# Patient Record
Sex: FEMALE | Race: WHITE | NOT HISPANIC OR LATINO | Employment: OTHER | URBAN - METROPOLITAN AREA
[De-identification: names, ages, dates, MRNs, and addresses within clinical notes are randomized per-mention and may not be internally consistent; named-entity substitution may affect disease eponyms.]

---

## 2017-01-04 ENCOUNTER — RADIANT APPOINTMENT (OUTPATIENT)
Dept: GENERAL RADIOLOGY | Facility: CLINIC | Age: 82
End: 2017-01-04
Attending: INTERNAL MEDICINE
Payer: COMMERCIAL

## 2017-01-04 ENCOUNTER — OFFICE VISIT (OUTPATIENT)
Dept: INTERNAL MEDICINE | Facility: CLINIC | Age: 82
End: 2017-01-04
Payer: COMMERCIAL

## 2017-01-04 VITALS
BODY MASS INDEX: 26.5 KG/M2 | HEART RATE: 77 BPM | SYSTOLIC BLOOD PRESSURE: 120 MMHG | OXYGEN SATURATION: 93 % | WEIGHT: 144 LBS | DIASTOLIC BLOOD PRESSURE: 64 MMHG | HEIGHT: 62 IN | TEMPERATURE: 98.4 F

## 2017-01-04 DIAGNOSIS — M54.2 CERVICALGIA: ICD-10-CM

## 2017-01-04 DIAGNOSIS — M62.830 BACK MUSCLE SPASM: ICD-10-CM

## 2017-01-04 DIAGNOSIS — M62.830 BACK MUSCLE SPASM: Primary | ICD-10-CM

## 2017-01-04 PROCEDURE — 72040 X-RAY EXAM NECK SPINE 2-3 VW: CPT

## 2017-01-04 PROCEDURE — 99204 OFFICE O/P NEW MOD 45 MIN: CPT | Performed by: INTERNAL MEDICINE

## 2017-01-04 PROCEDURE — 72100 X-RAY EXAM L-S SPINE 2/3 VWS: CPT

## 2017-01-04 PROCEDURE — 72070 X-RAY EXAM THORAC SPINE 2VWS: CPT

## 2017-01-04 RX ORDER — METHOCARBAMOL 500 MG/1
1000 TABLET, FILM COATED ORAL 3 TIMES DAILY PRN
Qty: 30 TABLET | Refills: 0 | Status: SHIPPED | OUTPATIENT
Start: 2017-01-04 | End: 2017-01-20
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
## 2017-01-04 NOTE — NURSING NOTE
"Chief Complaint   Patient presents with     Establish Care     from [place]     initial /64 mmHg  Pulse 77  Temp(Src) 98.4  F (36.9  C) (Oral)  Ht 5' 2\" (1.575 m)  Wt 144 lb (65.318 kg)  BMI 26.33 kg/m2  SpO2 93% Estimated body mass index is 26.33 kg/(m^2) as calculated from the following:    Height as of this encounter: 5' 2\" (1.575 m).    Weight as of this encounter: 144 lb (65.318 kg)..  bp completed using cuff size regular  ANA LUISA KISER LPN  "

## 2017-01-04 NOTE — MR AVS SNAPSHOT
After Visit Summary   1/4/2017    Veronica Bray    MRN: [id number]           Patient Information     Date Of Birth          5/31/1931        Visit Information        Provider Department      1/4/2017 9:20 AM Concepcion Mccall MD [facility]        Today's Diagnoses     Back muscle spasm    -  1     Cervicalgia            Follow-ups after your visit        Your next 10 appointments already scheduled     Jan 20, 2017 10:15 AM   New Visit with Tr Benavides MD   [facility] ([facility])    [address]  [facility] [address] [phone]              Future tests that were ordered for you today     Open Future Orders        Priority Expected Expires Ordered    XR Cervical Spine 2/3 Views Routine 1/4/2017 1/4/2018 1/4/2017    XR Thoracic Spine G/E 4 Views Routine 1/4/2017 1/4/2018 1/4/2017    XR Lumbar Spine 2/3 Views Routine 1/4/2017 1/4/2018 1/4/2017            Who to contact     If you have questions or need follow up information about today's clinic visit or your schedule please contact [facility] directly at [phone].  Normal or non-critical lab and imaging results will be communicated to you by MyChart, letter or phone within 4 business days after the clinic has received the results. If you do not hear from us within 7 days, please contact the clinic through [employer] or phone. If you have a critical or abnormal lab result, we will notify you by phone as soon as possible.  Submit refill requests through [employer] or call your pharmacy and they will forward the refill request to us. Please allow 3 business days for your refill to be completed.          Additional Information About Your Visit        MyChart Information     [employer] lets you send messages to your doctor, view your test results, renew your prescriptions, schedule appointments and more. To sign up, go to [email or web address] .  "Click on \"Log in\" on the left side of the screen, which will take you to the Welcome page. Then click on \"Sign up Now\" on the right side of the page.     You will be asked to enter the access code listed below, as well as some personal information. Please follow the directions to create your username and password.     Your access code is: [email or web address]  Expires: 2017  9:34 AM     Your access code will  in 90 days. If you need help or a new code, please call your [facility] or [phone].        Care EveryWhere ID     This is your Care EveryWhere ID. This could be used by other organizations to access your [place] medical records  [id number]        Your Vitals Were     Pulse Temperature Height BMI (Body Mass Index) Pulse Oximetry       77 98.4  F (36.9  C) (Oral) 5' 2\" (1.575 m) 26.33 kg/m2 93%        Blood Pressure from Last 3 Encounters:   17 120/64    Weight from Last 3 Encounters:   17 144 lb (65.318 kg)                 Today's Medication Changes          These changes are accurate as of: 17  9:35 AM.  If you have any questions, ask your nurse or doctor.               Start taking these medicines.        Dose/Directions    methocarbamol 500 MG tablet   Commonly known as:  ROBAXIN   Used for:  Back muscle spasm   Started by:  Concepcion Mccall MD        Dose:  1000 mg   Take 2 tablets (1,000 mg) by mouth 3 times daily as needed for muscle spasms   Quantity:  30 tablet   Refills:  0            Where to get your medicines      These medications were sent to [facility] [address] [place] [address] [address] [address]     Phone:  [phone]    - methocarbamol 500 MG tablet             Primary Care Provider Office Phone # Fax #    Concepcion Mccall [phone]       [facility] [address] [facility] [address]        Thank you!     Thank you for choosing [facility]  for your " care. Our goal is always to provide you with excellent care. Hearing back from our patients is one way we can continue to improve our services. Please take a few minutes to complete the written survey that you may receive in the mail after your visit with us. Thank you!             Your Updated Medication List - Protect others around you: Learn how to safely use, store and throw away your medicines at www.disposemymeds.org.          This list is accurate as of: 1/4/17  9:35 AM.  Always use your most recent med list.                   Brand Name Dispense Instructions for use    ASPIRIN PO      Take 81 mg by mouth       ATORVASTATIN CALCIUM PO      Take 10 mg by mouth daily       CARVEDILOL PO      Take 6.25 mg by mouth 2 times daily (with meals)       LISINOPRIL PO      Take 5 mg by mouth daily       methocarbamol 500 MG tablet    ROBAXIN    30 tablet    Take 2 tablets (1,000 mg) by mouth 3 times daily as needed for muscle spasms       VITEYES COMPLETE PO

## 2017-01-19 ENCOUNTER — PRE VISIT (OUTPATIENT)
Dept: CARDIOLOGY | Facility: CLINIC | Age: 82
End: 2017-01-19

## 2017-01-20 ENCOUNTER — OFFICE VISIT (OUTPATIENT)
Dept: CARDIOLOGY | Facility: CLINIC | Age: 82
End: 2017-01-20
Payer: COMMERCIAL

## 2017-01-20 VITALS
OXYGEN SATURATION: 94 % | BODY MASS INDEX: 25.95 KG/M2 | WEIGHT: 141 LBS | DIASTOLIC BLOOD PRESSURE: 72 MMHG | HEART RATE: 54 BPM | SYSTOLIC BLOOD PRESSURE: 108 MMHG | HEIGHT: 62 IN

## 2017-01-20 DIAGNOSIS — I10 BENIGN ESSENTIAL HYPERTENSION: ICD-10-CM

## 2017-01-20 DIAGNOSIS — I48.0 PAROXYSMAL ATRIAL FIBRILLATION (H): Primary | ICD-10-CM

## 2017-01-20 DIAGNOSIS — I48.92 ATRIAL FLUTTER, PAROXYSMAL (H): ICD-10-CM

## 2017-01-20 DIAGNOSIS — Z95.1 S/P CABG (CORONARY ARTERY BYPASS GRAFT): ICD-10-CM

## 2017-01-20 PROCEDURE — 99204 OFFICE O/P NEW MOD 45 MIN: CPT | Performed by: INTERNAL MEDICINE

## 2017-01-20 RX ORDER — CARVEDILOL 3.12 MG/1
6.25 TABLET ORAL 2 TIMES DAILY WITH MEALS
Qty: 180 TABLET | Refills: 3 | Status: SHIPPED | OUTPATIENT
Start: 2017-01-20 | End: 2017-01-31

## 2017-01-20 RX ORDER — LISINOPRIL 2.5 MG/1
5 TABLET ORAL DAILY
Qty: 90 TABLET | Refills: 3 | Status: SHIPPED | OUTPATIENT
Start: 2017-01-20 | End: 2017-07-07

## 2017-01-20 RX ORDER — ATORVASTATIN CALCIUM 10 MG/1
10 TABLET, FILM COATED ORAL DAILY
Qty: 90 TABLET | Refills: 3 | Status: SHIPPED | OUTPATIENT
Start: 2017-01-20 | End: 2018-01-02

## 2017-01-20 NOTE — MR AVS SNAPSHOT
After Visit Summary   1/20/2017    Veronica Bray    MRN: [id number]           Patient Information     Date Of Birth          5/31/1931        Visit Information        Provider Department      1/20/2017 10:15 AM Kennedy, Tr Carrera MD [facility]        Today's Diagnoses     Paroxysmal atrial fibrillation (H)    -  1     S/P CABG (coronary artery bypass graft)         Atrial flutter, paroxysmal (H)         Benign essential hypertension            Follow-ups after your visit        Additional Services     Follow-Up with Cardiologist                 Your next 10 appointments already scheduled     Apr 28, 2017  9:45 AM   Return Visit with Tr Benavides MD   [facility] ([facility])    [address]  [facility] [address] [phone]              Future tests that were ordered for you today     Open Future Orders        Priority Expected Expires Ordered    Follow-Up with Cardiologist Routine 4/20/2017 1/20/2018 1/20/2017            Who to contact     If you have questions or need follow up information about today's clinic visit or your schedule please contact [facility] directly at [phone].  Normal or non-critical lab and imaging results will be communicated to you by MyChart, letter or phone within 4 business days after the clinic has received the results. If you do not hear from us within 7 days, please contact the clinic through MyChart or phone. If you have a critical or abnormal lab result, we will notify you by phone as soon as possible.  Submit refill requests through [employer] or call your pharmacy and they will forward the refill request to us. Please allow 3 business days for your refill to be completed.          Additional Information About Your Visit        Novar[employer]art Information     [employer] lets you send messages to your doctor, view your  "test results, renew your prescriptions, schedule appointments and more. To sign up, go to [email or web address] . Click on \"Log in\" on the left side of the screen, which will take you to the Welcome page. Then click on \"Sign up Now\" on the right side of the page.     You will be asked to enter the access code listed below, as well as some personal information. Please follow the directions to create your username and password.     Your access code is: [email or web address]  Expires: 2017  9:34 AM     Your access code will  in 90 days. If you need help or a new code, please call your [place] clinic or [phone].        Care EveryWhere ID     This is your Care EveryWhere ID. This could be used by other organizations to access your [place] medical records  [id number]        Your Vitals Were     Pulse Height BMI (Body Mass Index) Pulse Oximetry          54 1.575 m (5' 2\") 25.78 kg/m2 94%         Blood Pressure from Last 3 Encounters:   17 108/72   17 120/64    Weight from Last 3 Encounters:   17 63.957 kg (141 lb)   17 65.318 kg (144 lb)                 Today's Medication Changes          These changes are accurate as of: 17 10:56 AM.  If you have any questions, ask your nurse or doctor.               Start taking these medicines.        Dose/Directions    rivaroxaban ANTICOAGULANT 20 MG Tabs tablet   Commonly known as:  XARELTO   Used for:  Paroxysmal atrial fibrillation (H)   Started by:  Tr Benavides MD        Dose:  20 mg   Take 1 tablet (20 mg) by mouth daily (with dinner)   Quantity:  90 tablet   Refills:  3            Where to get your medicines      These medications were sent to [facility] - [place], [place] - [address]  [address] [facility] [address]     Phone:  [phone]    - atorvastatin 10 MG tablet  - carvedilol 3.125 MG tablet  - lisinopril 2.5 MG tablet  - rivaroxaban ANTICOAGULANT 20 MG Tabs tablet             Primary " Care Provider Office Phone # Fax #    Concepcion Mccall [phone]       [facility] [address]  [facility] [address]        Thank you!     Thank you for choosing [facility] [place]  for your care. Our goal is always to provide you with excellent care. Hearing back from our patients is one way we can continue to improve our services. Please take a few minutes to complete the written survey that you may receive in the mail after your visit with us. Thank you!             Your Updated Medication List - Protect others around you: Learn how to safely use, store and throw away your medicines at www.disposemymeds.org.          This list is accurate as of: 1/20/17 10:56 AM.  Always use your most recent med list.                   Brand Name Dispense Instructions for use    ASPIRIN PO      Take 81 mg by mouth       atorvastatin 10 MG tablet    LIPITOR    90 tablet    Take 1 tablet (10 mg) by mouth daily       carvedilol 3.125 MG tablet    COREG    180 tablet    Take 2 tablets (6.25 mg) by mouth 2 times daily (with meals)       CLARITIN PO          lisinopril 2.5 MG tablet    PRINIVIL/Zestril    90 tablet    Take 2 tablets (5 mg) by mouth daily       rivaroxaban ANTICOAGULANT 20 MG Tabs tablet    XARELTO    90 tablet    Take 1 tablet (20 mg) by mouth daily (with dinner)       VITEYES COMPLETE PO
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
## 2017-01-20 NOTE — PROGRESS NOTES
HPI and Plan:   See dictation    Orders Placed This Encounter   Procedures     Follow-Up with Cardiologist       Orders Placed This Encounter   Medications     Loratadine (CLARITIN PO)     Sig:      carvedilol (COREG) 3.125 MG tablet     Sig: Take 2 tablets (6.25 mg) by mouth 2 times daily (with meals)     Dispense:  180 tablet     Refill:  3     lisinopril (PRINIVIL/ZESTRIL) 2.5 MG tablet     Sig: Take 2 tablets (5 mg) by mouth daily     Dispense:  90 tablet     Refill:  3     atorvastatin (LIPITOR) 10 MG tablet     Sig: Take 1 tablet (10 mg) by mouth daily     Dispense:  90 tablet     Refill:  3     rivaroxaban ANTICOAGULANT (XARELTO) 20 MG TABS tablet     Sig: Take 1 tablet (20 mg) by mouth daily (with dinner)     Dispense:  90 tablet     Refill:  3       Encounter Diagnoses   Name Primary?     Paroxysmal atrial fibrillation (H) Yes     S/P CABG (coronary artery bypass graft)      Atrial flutter, paroxysmal (H)      Benign essential hypertension        CURRENT MEDICATIONS:  Current Outpatient Prescriptions   Medication Sig Dispense Refill     Loratadine (CLARITIN PO)        carvedilol (COREG) 3.125 MG tablet Take 2 tablets (6.25 mg) by mouth 2 times daily (with meals) 180 tablet 3     lisinopril (PRINIVIL/ZESTRIL) 2.5 MG tablet Take 2 tablets (5 mg) by mouth daily 90 tablet 3     atorvastatin (LIPITOR) 10 MG tablet Take 1 tablet (10 mg) by mouth daily 90 tablet 3     rivaroxaban ANTICOAGULANT (XARELTO) 20 MG TABS tablet Take 1 tablet (20 mg) by mouth daily (with dinner) 90 tablet 3     ASPIRIN PO Take 81 mg by mouth       Multiple Vitamins-Minerals (VITEYES COMPLETE PO)        [DISCONTINUED] CARVEDILOL PO Take 6.25 mg by mouth 2 times daily (with meals)       [DISCONTINUED] LISINOPRIL PO Take 5 mg by mouth daily       [DISCONTINUED] ATORVASTATIN CALCIUM PO Take 10 mg by mouth daily         ALLERGIES     Allergies   Allergen Reactions     Aleve [Naproxen] Anaphylaxis     Celebrex [Celecoxib]      Codeine       Demerol [Meperidine]      Hydromorphone      Percocet [Oxycodone-Acetaminophen]      Tramadol      Vicodin [Hydrocodone-Acetaminophen]        PAST MEDICAL HISTORY:  Past Medical History   Diagnosis Date     NSTEMI (non-ST elevated myocardial infarction) (H)      Cardiomyopathy, unspecified (H)      tachy induced     LBBB (left bundle branch block)      PAF (paroxysmal atrial fibrillation) (H)      converted on Amiodarone     SVT (supraventricular tachycardia) (H)      Atrial flutter (H)      Mitral regurgitation      Hyperlipidemia      Hypertension      Tobacco use      Atrial fibrillation (H)        PAST SURGICAL HISTORY:  Past Surgical History   Procedure Laterality Date     Hc left heart catheterization  11/2006     mid distal-anterior/ distal inferior/ Apical Yossi, 85     Hc left heart catheterization  5/2015     100% Proximal LAD, patent LIMA-LAD, SVG-D1, LVEDP 13, 10-18 mmHg AV gradient, 3+ MR after PVC     Coronary angiography adult order  1/2005     Stent: Distal cfx, Mid LAD, Stent: D -1       FAMILY HISTORY:  Family History   Problem Relation Age of Onset     Unknown/Adopted No family hx of        SOCIAL HISTORY:  Social History     Social History     Marital Status:      Spouse Name: N/A     Number of Children: N/A     Years of Education: N/A     Social History Main Topics     Smoking status: Former Smoker     Smokeless tobacco: None      Comment: quit 65 years ago     Alcohol Use: 0.0 oz/week     0 Standard drinks or equivalent per week      Comment: social     Drug Use: No     Sexual Activity:     Partners: Male     Other Topics Concern     Caffeine Concern No     Special Diet No     Exercise No     Social History Narrative       Review of Systems:  Skin:  Negative     Eyes:  Positive for glasses  ENT:  Negative    Respiratory:  Positive for dyspnea on exertion;sleep apnea  Cardiovascular:    lightheadedness;Positive for;palpitations  Gastroenterology: Negative    Genitourinary:  Positive for  "urinary frequency  Musculoskeletal:  Positive for back pain;arthritis;joint pain  Neurologic:  Positive for headaches;numbness or tingling of feet  Psychiatric:  Positive for excessive stress;anxiety  Heme/Lymph/Imm:  Negative    Endocrine:  Negative      Physical Exam:  Vitals: /72 mmHg  Pulse 54  Ht 1.575 m (5' 2\")  Wt 63.957 kg (141 lb)  BMI 25.78 kg/m2  SpO2 94%    Constitutional:  cooperative, alert and oriented, well developed, well nourished, in no acute distress        Skin:  warm and dry to the touch, no apparent skin lesions or masses noted        Head:  normocephalic, no masses or lesions        Eyes:  pupils equal and round, conjunctivae and lids unremarkable, sclera white, no xanthalasma, EOMS intact, no nystagmus        ENT:  no pallor or cyanosis, dentition good        Neck:  carotid pulses are full and equal bilaterally, JVP normal, no carotid bruit, no thyromegaly        Chest:  normal breath sounds, clear to auscultation, normal A-P diameter, normal symmetry, normal respiratory excursion, no use of accessory muscles;healed median sternotomy scar        Cardiac: regular rhythm, normal S1/S2, no S3 or S4, apical impulse not displaced, no murmurs, gallops or rubs                  Abdomen:  abdomen soft, non-tender, BS normoactive, no mass, no HSM, no bruits        Vascular: pulses full and equal, no bruits auscultated                                      Extremities and Back:  no deformities, clubbing, cyanosis, erythema observed        Neurological:  affect appropriate, oriented to time, person and place          Recent Lab Results:  LIPID RESULTS:  Lab Results   Component Value Date    CHOL 81 04/29/2015    HDL 81.0 04/29/2015    LDL 87.00 04/29/2015    TRIG 64 04/29/2015       LIVER ENZYME RESULTS:  No results found for: AST, ALT    CBC RESULTS:  No results found for: WBC, RBC, HGB, HCT, MCV, MCH, MCHC, RDW, PLT    BMP RESULTS:  Lab Results   Component Value Date     07/14/2016    " POTASSIUM 4.1 07/14/2016    CHLORIDE 100 07/14/2016    * 07/14/2016    BUN 14 07/14/2016    CR 0.72 07/14/2016    BARRETT 8.9 07/14/2016        A1C RESULTS:  No results found for: A1C    INR RESULTS:  No results found for: INR        [facility]  [facility]
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
## 2017-01-31 DIAGNOSIS — I48.92 ATRIAL FLUTTER, PAROXYSMAL (H): ICD-10-CM

## 2017-01-31 DIAGNOSIS — I10 BENIGN ESSENTIAL HYPERTENSION: ICD-10-CM

## 2017-01-31 DIAGNOSIS — I48.0 PAROXYSMAL ATRIAL FIBRILLATION (H): Primary | ICD-10-CM

## 2017-01-31 DIAGNOSIS — Z95.1 S/P CABG (CORONARY ARTERY BYPASS GRAFT): ICD-10-CM

## 2017-01-31 RX ORDER — CARVEDILOL 6.25 MG/1
6.25 TABLET ORAL 2 TIMES DAILY WITH MEALS
Qty: 180 TABLET | Refills: 3 | Status: SHIPPED | OUTPATIENT
Start: 2017-01-31 | End: 2017-12-14

## 2017-01-31 NOTE — TELEPHONE ENCOUNTER
Received refill request for:  Carvedilol - 6.25mg tabs BID per pt request (instead of 3.125mg tabs (2 tabs twice daily) from original script)  Last OV was: 1/20/2017 with Dr. Benavides  F/U scheduled: 4/28/2017 with Dr. Benavides  New script sent to: [facility]

## 2017-02-01 ENCOUNTER — TELEPHONE (OUTPATIENT)
Dept: CARDIOLOGY | Facility: CLINIC | Age: 82
End: 2017-02-01

## 2017-02-01 NOTE — TELEPHONE ENCOUNTER
Call from patient to report that Xarelto will likely be too expensive for her to continue filling. I advised her to contact her insurance company to determine if Eliquis, Pradaxa, or Coumadin would be more cost effective for her. She will call back once she has more information on her options. She stated that she has enough Xarelto remaining to continue taking it until a change could be made. ORACIO Granados RN

## 2017-02-27 ENCOUNTER — OFFICE VISIT (OUTPATIENT)
Dept: CARDIOLOGY | Facility: CLINIC | Age: 82
End: 2017-02-27
Payer: COMMERCIAL

## 2017-02-27 VITALS
DIASTOLIC BLOOD PRESSURE: 70 MMHG | HEIGHT: 62 IN | HEART RATE: 60 BPM | BODY MASS INDEX: 26.5 KG/M2 | WEIGHT: 144 LBS | SYSTOLIC BLOOD PRESSURE: 136 MMHG

## 2017-02-27 DIAGNOSIS — I48.0 PAROXYSMAL ATRIAL FIBRILLATION (H): Primary | ICD-10-CM

## 2017-02-27 DIAGNOSIS — Z95.1 S/P CABG (CORONARY ARTERY BYPASS GRAFT): ICD-10-CM

## 2017-02-27 PROCEDURE — 99213 OFFICE O/P EST LOW 20 MIN: CPT | Performed by: INTERNAL MEDICINE

## 2017-02-27 NOTE — PROGRESS NOTES
HISTORY OF PRESENT ILLNESS:  It is a pleasure for me to follow up with Ms. Stauffer.  She is here principally to discuss paroxysmal atrial fibrillation.  For full details, please refer to my note from 2017.        Her CHADS2-VASc score is 4.  I thought she was taking Xarelto.  She stopped taking it because it cost $500 a month.  She tells me she looked at dabigatran and Eliquis as well and these were also too expensive.  However, she has not looked at Savaysa.  I gave her some information on this medication and she will call us back to let us know if the cost is acceptable to her.  She is willing to take warfarin.  I explained what this involves.  Next, if Savaysa is too expensive, then we will start her on warfarin and she can come to have and her INRs monitored in our clinic at [facility].        She described an episode of palpitations.  Heart rate was 90.  She did not have chest pain, shortness of breath, orthopnea or dizzy spells.  The arrhythmia was well tolerated.  I told her not to take an extra dose of Coreg unless the heart rate is persistently above 100 for more than 4 hours.  She understands.        She will let us know how she would like to proceed with regards to anticoagulation.  I will see her again in 6 months' time for further followup.         MOOK JIANG MD, [facility]             D: 2017 11:30   T: 2017 16:17   MT: [id number]      Name:     MICHELLE STAUFFER   MRN:      [id number]        Account:      [id number]   :      1931           Service Date: 2017      Document: [id number]

## 2017-02-27 NOTE — MR AVS SNAPSHOT
"              After Visit Summary   2/27/2017    Veronica Bray    MRN: [id number]           Patient Information     Date Of Birth          5/31/1931        Visit Information        Provider Department      2/27/2017 10:15 AM Kennedy, Tr Carrera MD [facility] HEART [facility]        Today's Diagnoses     Paroxysmal atrial fibrillation (H)    -  1    S/P CABG (coronary artery bypass graft)           Follow-ups after your visit        Additional Services     Follow-Up with Cardiologist                 Future tests that were ordered for you today     Open Future Orders        Priority Expected Expires Ordered    Follow-Up with Cardiologist Routine 8/26/2017 2/27/2018 2/27/2017            Who to contact     If you have questions or need follow up information about today's clinic visit or your schedule please contact [facility] directly at [phone].  Normal or non-critical lab and imaging results will be communicated to you by Flare3dhart, letter or phone within 4 business days after the clinic has received the results. If you do not hear from us within 7 days, please contact the clinic through Flare3dhart or phone. If you have a critical or abnormal lab result, we will notify you by phone as soon as possible.  Submit refill requests through DadShed or call your pharmacy and they will forward the refill request to us. Please allow 3 business days for your refill to be completed.          Additional Information About Your Visit        MyChart Information     DadShed lets you send messages to your doctor, view your test results, renew your prescriptions, schedule appointments and more. To sign up, go to [email or web address] . Click on \"Log in\" on the left side of the screen, which will take you to the Welcome page. Then click on \"Sign up Now\" on the right side of the page.     You will be asked to enter the access code listed below, as well as some personal " "information. Please follow the directions to create your username and password.     Your access code is: [email or web address]  Expires: 2017  9:34 AM     Your access code will  in 90 days. If you need help or a new code, please call your [facility] clinic or [phone].        Care EveryWhere ID     This is your Care EveryWhere ID. This could be used by other organizations to access your [facility] medical records  [id number]        Your Vitals Were     Pulse Height BMI (Body Mass Index)             60 1.575 m (5' 2\") 26.34 kg/m2          Blood Pressure from Last 3 Encounters:   17 136/70   17 108/72   17 120/64    Weight from Last 3 Encounters:   17 65.3 kg (144 lb)   17 64 kg (141 lb)   17 65.3 kg (144 lb)                 Today's Medication Changes          These changes are accurate as of: 17 10:58 AM.  If you have any questions, ask your nurse or doctor.               These medicines have changed or have updated prescriptions.        Dose/Directions    carvedilol 6.25 MG tablet   Commonly known as:  COREG   This may have changed:  when to take this   Used for:  Paroxysmal atrial fibrillation (H), S/P CABG (coronary artery bypass graft), Atrial flutter, paroxysmal (H), Benign essential hypertension        Dose:  6.25 mg   Take 1 tablet (6.25 mg) by mouth 2 times daily (with meals)   Quantity:  180 tablet   Refills:  3                Primary Care Provider Office Phone # Fax #    Concepcion Mccall [phone]       [facility] [place] [address] [facility] [address]        Thank you!     Thank you for choosing [facility] [place]  for your care. Our goal is always to provide you with excellent care. Hearing back from our patients is one way we can continue to improve our services. Please take a few minutes to complete the written survey that you may receive in the mail after your visit with us. Thank you!      "        Your Updated Medication List - Protect others around you: Learn how to safely use, store and throw away your medicines at www.disposemymeds.org.          This list is accurate as of: 2/27/17 10:58 AM.  Always use your most recent med list.                   Brand Name Dispense Instructions for use    ASPIRIN PO      Take 81 mg by mouth       atorvastatin 10 MG tablet    LIPITOR    90 tablet    Take 1 tablet (10 mg) by mouth daily       carvedilol 6.25 MG tablet    COREG    180 tablet    Take 1 tablet (6.25 mg) by mouth 2 times daily (with meals)       CLARITIN PO      Take by mouth as needed       lisinopril 2.5 MG tablet    PRINIVIL/Zestril    90 tablet    Take 2 tablets (5 mg) by mouth daily       VITEYES COMPLETE PO

## 2017-02-27 NOTE — LETTER
2/27/2017    Concepcion Mccall MD  [facility]   [address]  [facility] [address]    RE: Veronica Bray       Dear Colleague,    It is a pleasure for me to follow up with Ms. Bray.  She is here principally to discuss paroxysmal atrial fibrillation.  For full details, please refer to my note from 01/20/2017.        Her CHADS2-VASc score is 4.  I thought she was taking Xarelto.  She stopped taking it because it cost $500 a month.  She tells me she looked at dabigatran and Eliquis as well and these were also too expensive.  However, she has not looked at Savaysa.  I gave her some information on this medication and she will call us back to let us know if the cost is acceptable to her.  She is willing to take warfarin.  I explained what this involves.  Next, if Savaysa is too expensive, then we will start her on warfarin and she can come to have and her INRs monitored in our clinic at [facility].        She described an episode of palpitations.  Heart rate was 90.  She did not have chest pain, shortness of breath, orthopnea or dizzy spells.  The arrhythmia was well tolerated.  I told her not to take an extra dose of Coreg unless the heart rate is persistently above 100 for more than 4 hours.  She understands.        She will let us know how she would like to proceed with regards to anticoagulation.  I will see her again in 6 months' time for further followup.     Again, thank you for allowing me to participate in the care of your patient.      Sincerely,    DR MOOK JIANG MD     [facility]

## 2017-02-27 NOTE — PROGRESS NOTES
HPI and Plan:   See dictation    Orders Placed This Encounter   Procedures     Follow-Up with Cardiologist       No orders of the defined types were placed in this encounter.      Encounter Diagnoses   Name Primary?     Paroxysmal atrial fibrillation (H) Yes     S/P CABG (coronary artery bypass graft)        CURRENT MEDICATIONS:  Current Outpatient Prescriptions   Medication Sig Dispense Refill     carvedilol (COREG) 6.25 MG tablet Take 1 tablet (6.25 mg) by mouth 2 times daily (with meals) (Patient taking differently: Take 6.25 mg by mouth daily ) 180 tablet 3     Loratadine (CLARITIN PO) Take by mouth as needed        lisinopril (PRINIVIL/ZESTRIL) 2.5 MG tablet Take 2 tablets (5 mg) by mouth daily 90 tablet 3     atorvastatin (LIPITOR) 10 MG tablet Take 1 tablet (10 mg) by mouth daily 90 tablet 3     ASPIRIN PO Take 81 mg by mouth       Multiple Vitamins-Minerals (VITEYES COMPLETE PO)          ALLERGIES     Allergies   Allergen Reactions     Aleve [Naproxen] Anaphylaxis     Celebrex [Celecoxib]      Codeine      Demerol [Meperidine]      Hydromorphone      Percocet [Oxycodone-Acetaminophen]      Tramadol      Vicodin [Hydrocodone-Acetaminophen]        PAST MEDICAL HISTORY:  Past Medical History   Diagnosis Date     Atrial fibrillation (H)      Atrial flutter (H)      CAD (coronary artery disease)      LIMA graft to the LAD and vein graft to the diagonal are patent.      Cardiomyopathy, unspecified (H)      tachy induced     Hyperlipidemia      Hypertension      LBBB (left bundle branch block)      Mitral regurgitation      NSTEMI (non-ST elevated myocardial infarction) (H)      PAF (paroxysmal atrial fibrillation) (H)      converted on Amiodarone     Sleep apnea      CPAP     SVT (supraventricular tachycardia) (H)      Tobacco use        PAST SURGICAL HISTORY:  Past Surgical History   Procedure Laterality Date      left heart catheterization  11/2006     mid distal-anterior/ distal inferior/ Apical Akin, 85      "Hc left heart catheterization  5/2015     100% Proximal LAD, patent LIMA-LAD, SVG-D1, LVEDP 13, 10-18 mmHg AV gradient, 3+ MR after PVC     Coronary angiography adult order  1/2005     Stent: Distal cfx, Mid LAD, Stent: D -1       FAMILY HISTORY:  Family History   Problem Relation Age of Onset     Unknown/Adopted No family hx of        SOCIAL HISTORY:  Social History     Social History     Marital status:      Spouse name: N/A     Number of children: N/A     Years of education: N/A     Social History Main Topics     Smoking status: Former Smoker     Smokeless tobacco: Not on file      Comment: quit approx 1967      Alcohol use 0.0 oz/week     0 Standard drinks or equivalent per week      Comment: social     Drug use: No     Sexual activity: Yes     Partners: Male     Other Topics Concern     Caffeine Concern No     Special Diet No     Exercise No     Social History Narrative       Review of Systems:  Skin:  Negative     Eyes:  Positive for glasses  ENT:  Positive for sinus trouble  Respiratory:  Positive for dyspnea on exertion;sleep apnea  Cardiovascular:    Positive for;palpitations  Gastroenterology: Positive for constipation  Genitourinary:  Negative    Musculoskeletal:  Positive for back pain;arthritis;joint pain  Neurologic:  Positive for numbness or tingling of feet  Psychiatric:  Negative    Heme/Lymph/Imm:  Positive for allergies  Endocrine:  Negative      Physical Exam:  Vitals: /70 (BP Location: Right arm, Patient Position: Chair, Cuff Size: Adult Regular)  Pulse 60  Ht 1.575 m (5' 2\")  Wt 65.3 kg (144 lb)  BMI 26.34 kg/m2    Constitutional:  cooperative, alert and oriented, well developed, well nourished, in no acute distress        Skin:  warm and dry to the touch, no apparent skin lesions or masses noted        Head:  normocephalic, no masses or lesions        Eyes:  pupils equal and round, conjunctivae and lids unremarkable, sclera white, no xanthalasma, EOMS intact, no nystagmus    "     ENT:  no pallor or cyanosis, dentition good        Neck:  carotid pulses are full and equal bilaterally, JVP normal, no carotid bruit, no thyromegaly        Chest:  normal breath sounds, clear to auscultation, normal A-P diameter, normal symmetry, normal respiratory excursion, no use of accessory muscles;healed median sternotomy scar        Cardiac: regular rhythm, normal S1/S2, no S3 or S4, apical impulse not displaced, no murmurs, gallops or rubs                  Abdomen:  abdomen soft, non-tender, BS normoactive, no mass, no HSM, no bruits        Vascular: pulses full and equal, no bruits auscultated                                      Extremities and Back:  no deformities, clubbing, cyanosis, erythema observed        Neurological:  affect appropriate, oriented to time, person and place          Recent Lab Results:  LIPID RESULTS:  Lab Results   Component Value Date    CHOL 81 04/29/2015    HDL 81.0 04/29/2015    LDL 87.00 04/29/2015    TRIG 64 04/29/2015       LIVER ENZYME RESULTS:  No results found for: AST, ALT    CBC RESULTS:  No results found for: WBC, RBC, HGB, HCT, MCV, MCH, MCHC, RDW, PLT    BMP RESULTS:  Lab Results   Component Value Date     07/14/2016    POTASSIUM 4.1 07/14/2016    CHLORIDE 100 07/14/2016     (A) 07/14/2016    BUN 14 07/14/2016    CR 0.72 07/14/2016    BARRETT 8.9 07/14/2016        A1C RESULTS:  No results found for: A1C    INR RESULTS:  No results found for: INR        CC  No referring provider defined for this encounter.

## 2017-03-01 ENCOUNTER — TELEPHONE (OUTPATIENT)
Dept: CARDIOLOGY | Facility: CLINIC | Age: 82
End: 2017-03-01

## 2017-03-01 DIAGNOSIS — I48.91 ATRIAL FIBRILLATION (H): Primary | ICD-10-CM

## 2017-03-01 RX ORDER — WARFARIN SODIUM 5 MG/1
TABLET ORAL
Qty: 30 TABLET | Refills: 0 | Status: SHIPPED | OUTPATIENT
Start: 2017-03-01 | End: 2017-03-28

## 2017-03-01 NOTE — TELEPHONE ENCOUNTER
Spoke with pt regarding starting anticoagulation (history of parosyxmal afib with CHADs2-VASc score of 4 per Dr Benavides's OV note). Pt had called to report that all of the NOACs are too expensive and she had decided to start Warfarin as offered by Dr Benavides at the 2/27/17 OV. Briefly reviewed Warfarin with pt but will need to complete education at new pt INR appt on 3/3/17 at the [facility]. She will  the Warfarin Rx tomorrow and take Warfarin 5 mg tomorrow evening. Rx escripted to [facility] pharmacy (eventually she will want a Rx sent to her mail order pharmacy). [place]

## 2017-03-01 NOTE — TELEPHONE ENCOUNTER
Voice message received indicating all NOACs are to expensive based on her insurance. Warfarin is the medication of her choice. OV on 2/27/17 with Dr. Benavides, indicates, If Warfarin, will have INRs monitored in our clinic at [facility].   Would like call back at [phone].    Will forward to INR clinic.

## 2017-03-03 ENCOUNTER — ANTICOAGULATION THERAPY VISIT (OUTPATIENT)
Dept: CARDIOLOGY | Facility: CLINIC | Age: 82
End: 2017-03-03
Payer: COMMERCIAL

## 2017-03-03 DIAGNOSIS — I48.91 ATRIAL FIBRILLATION, UNSPECIFIED TYPE (H): ICD-10-CM

## 2017-03-03 DIAGNOSIS — Z79.01 LONG-TERM (CURRENT) USE OF ANTICOAGULANTS: ICD-10-CM

## 2017-03-03 LAB — INR POINT OF CARE: 1 (ref 0.86–1.14)

## 2017-03-03 PROCEDURE — 36416 COLLJ CAPILLARY BLOOD SPEC: CPT | Performed by: INTERNAL MEDICINE

## 2017-03-03 PROCEDURE — 85610 PROTHROMBIN TIME: CPT | Mod: QW | Performed by: INTERNAL MEDICINE

## 2017-03-03 NOTE — PROGRESS NOTES
ANTICOAGULATION INITIAL CLINIC VISIT    Patient Name:  Veronica Bray  Date:  3/3/2017  Referred by: Dr. Benavides  Contact Type:  Face to Face    SUBJECTIVE:  Coumadin education was completed today.  Topics covered include:  -Introduction to coumadin  -Proper Administration  -INR Testing  -Sign/Symptoms of Bleeding  -Signs/Symptoms of Clot Formation or Stroke  -Dietary Intake of Vitamin K  -Drug Interactions  -Anticoagulation Identification (bracelet, necklace or wallet card)  -Future Surgery  -Effects of Alcohol, Tobacco, and Exercise on Coumadin    Coumadin Education Booklet and Coumadin Identification Wallet Card were given to the patient.       Patient Findings     Positives Initiation of therapy          OBJECTIVE    INR Protime   Date Value Ref Range Status   03/03/2017 1.0 0.86 - 1.14 Final       ASSESSMENT / PLAN  INR assessment SUB    Recheck INR In: 4 DAYS    INR Location Clinic      Anticoagulation Summary as of 3/3/2017     INR goal 2.0-3.0   Today's INR 1.0!   Maintenance plan No maintenance plan   Full instructions 3/3: 5 mg; 3/4: 5 mg; 3/5: 5 mg; 3/6: 5 mg   Plan last modified Larisa Monae, RN (3/3/2017)   Next INR check 3/7/2017   Target end date Indefinite    Indications   Atrial fibrillation (H) [I48.91] [I48.91]  Long-term (current) use of anticoagulants [Z79.01] [Z79.01]         Anticoagulation Episode Summary     INR check location     Preferred lab     Send INR reminders to [facility] INR NURSE    Comments       Anticoagulation Care Providers     Provider Role Specialty Phone number    Kennedy, Tr Carrera MD Responsible Cardiology [phone]            See the Encounter Report to view Anticoagulation Flowsheet and Dosing Calendar (Go to Encounters tab in chart review, and find the Anticoagulation Therapy Visit)    INR 1.0.  New start per Dr. Benavides for afib.  First dose was Warfarin 5mg on Thursday night.  Education provided.  Novell agents too expensive.  Continue the 5mg/day and recheck  in [place] in 4 days.  Mike Monae, RN

## 2017-03-03 NOTE — MR AVS SNAPSHOT
Veronica Bray   3/3/2017 2:20 PM   Anticoagulation Therapy Visit    Description:  85 year old female   Provider:   ANTICOAGULATION   Department:  [facility]           INR as of 3/3/2017     Today's INR 1.0!      Anticoagulation Summary as of 3/3/2017     INR goal 2.0-3.0   Today's INR 1.0!   Full instructions 3/3: 5 mg; 3/4: 5 mg; 3/5: 5 mg; 3/6: 5 mg   Next INR check 3/7/2017    Indications   Atrial fibrillation (H) [I48.91] [I48.91]  Long-term (current) use of anticoagulants [Z79.01] [Z79.01]         Your next Anticoagulation Clinic appointment(s)     Mar 07, 2017  9:40 AM CST   Anticoagulation Visit with  ANTICOAGULATION   [facility] ([facility])    [id number] [place] [address] [address]  [facility] [address]   [phone]            Mar 10, 2017 10:20 AM CST   Anticoagulation Visit with  ANTICOAGULATION   [facility] ([facility])    [address]  [facility] [address]   [phone]              March 2017 Details    Sun Mon Tue Wed Thu Fri Sat        1               2               3      5 mg   See details      4      5 mg           5      5 mg         6      5 mg         7            8               9               10               11                 12               13               14               15               16               17               18                 19               20               21               22               23               24               25                 26               27               28               29               30               31                 Date Details   03/03 This INR check       Date of next INR:  3/7/2017         How to take your warfarin dose     To take:  5 mg Take 1 of the 5 mg tablets.

## 2017-03-07 ENCOUNTER — ANTICOAGULATION THERAPY VISIT (OUTPATIENT)
Dept: CARDIOLOGY | Facility: CLINIC | Age: 82
End: 2017-03-07
Payer: COMMERCIAL

## 2017-03-07 DIAGNOSIS — Z79.01 LONG-TERM (CURRENT) USE OF ANTICOAGULANTS: ICD-10-CM

## 2017-03-07 DIAGNOSIS — I48.91 ATRIAL FIBRILLATION, UNSPECIFIED TYPE (H): ICD-10-CM

## 2017-03-07 LAB — INR POINT OF CARE: 1.5 (ref 0.86–1.14)

## 2017-03-07 PROCEDURE — 36416 COLLJ CAPILLARY BLOOD SPEC: CPT | Performed by: INTERNAL MEDICINE

## 2017-03-07 PROCEDURE — 85610 PROTHROMBIN TIME: CPT | Mod: QW | Performed by: INTERNAL MEDICINE

## 2017-03-07 NOTE — MR AVS SNAPSHOT
Veronica Bray   3/7/2017 9:40 AM   Anticoagulation Therapy Visit    Description:  85 year old female   Provider:   ANTICOAGULATION   Department:  [facility]           INR as of 3/7/2017     Today's INR 1.5!      Anticoagulation Summary as of 3/7/2017     INR goal 2.0-3.0   Today's INR 1.5!   Full instructions 3/7: 5 mg; 3/8: 5 mg; 3/9: 5 mg   Next INR check 3/10/2017    Indications   Atrial fibrillation (H) [I48.91] [I48.91]  Long-term (current) use of anticoagulants [Z79.01] [Z79.01]         Your next Anticoagulation Clinic appointment(s)     Mar 10, 2017 10:20 AM CST   Anticoagulation Visit with  ANTICOAGULATION   [facility] ([facility])    [address]  [facility] [address] [phone]            Mar 14, 2017 11:40 AM CDT   Anticoagulation Visit with  ANTICOAGULATION   [facility] ([facility])    [address]  [facility] [address] [phone]              March 2017 Details    Sun Mon Tue Wed Thu Fri Sat        1               2               3               4                 5               6               7      5 mg   See details      8      5 mg         9      5 mg         10            11                 12               13               14               15               16               17               18                 19               20               21               22               23               24               25                 26               27               28               29               30               31                 Date Details   03/07 This INR check       Date of next INR:  3/10/2017         How to take your warfarin dose     To take:  5 mg Take 1 of the 5 mg tablets.

## 2017-03-07 NOTE — PROGRESS NOTES
ANTICOAGULATION FOLLOW-UP CLINIC VISIT    Patient Name:  Veronica Bray  Date:  3/7/2017  Contact Type:  Face to Face    SUBJECTIVE:     Patient Findings     Positives No Problem Findings           OBJECTIVE    INR Protime   Date Value Ref Range Status   03/07/2017 1.5 (A) 0.86 - 1.14 Final       ASSESSMENT / PLAN  INR assessment SUB    Recheck INR In: 3 DAYS    INR Location Clinic      Anticoagulation Summary as of 3/7/2017     INR goal 2.0-3.0   Today's INR 1.5!   Maintenance plan No maintenance plan   Full instructions 3/7: 5 mg; 3/8: 5 mg; 3/9: 5 mg   Plan last modified Larisa Monae RN (3/3/2017)   Next INR check 3/10/2017   Target end date Indefinite    Indications   Atrial fibrillation (H) [I48.91] [I48.91]  Long-term (current) use of anticoagulants [Z79.01] [Z79.01]         Anticoagulation Episode Summary     INR check location     Preferred lab     Send INR reminders to [facility] HEART INR NURSE    Comments       Anticoagulation Care Providers     Provider Role Specialty Phone number    Ip, Tr Morris Carrera MD Responsible Cardiology [phone]            See the Encounter Report to view Anticoagulation Flowsheet and Dosing Calendar (Go to Encounters tab in chart review, and find the Anticoagulation Therapy Visit)    INR 1.5.  Has been taking Warfarin 5mg for 5 doses now.  Continue the 5mg/day and recheck INR on Friday.  Mike Monae RN

## 2017-03-10 ENCOUNTER — ANTICOAGULATION THERAPY VISIT (OUTPATIENT)
Dept: CARDIOLOGY | Facility: CLINIC | Age: 82
End: 2017-03-10
Payer: COMMERCIAL

## 2017-03-10 DIAGNOSIS — Z79.01 LONG-TERM (CURRENT) USE OF ANTICOAGULANTS: ICD-10-CM

## 2017-03-10 DIAGNOSIS — I48.91 ATRIAL FIBRILLATION, UNSPECIFIED TYPE (H): ICD-10-CM

## 2017-03-10 LAB — INR POINT OF CARE: 2.1 (ref 0.86–1.14)

## 2017-03-10 PROCEDURE — 36416 COLLJ CAPILLARY BLOOD SPEC: CPT | Performed by: INTERNAL MEDICINE

## 2017-03-10 PROCEDURE — 85610 PROTHROMBIN TIME: CPT | Mod: QW | Performed by: INTERNAL MEDICINE

## 2017-03-10 NOTE — MR AVS SNAPSHOT
Veronica Bray   3/10/2017 10:20 AM   Anticoagulation Therapy Visit    Description:  85 year old female   Provider:   ANTICOAGULATION   Department:  [facility]           INR as of 3/10/2017     Today's INR 2.1      Anticoagulation Summary as of 3/10/2017     INR goal 2.0-3.0   Today's INR 2.1   Full instructions 3/10: 5 mg; 3/11: 5 mg; 3/12: 5 mg; 3/13: 5 mg   Next INR check 3/14/2017    Indications   Atrial fibrillation (H) [I48.91] [I48.91]  Long-term (current) use of anticoagulants [Z79.01] [Z79.01]         Your next Anticoagulation Clinic appointment(s)     Mar 10, 2017 10:20 AM CST   Anticoagulation Visit with  ANTICOAGULATION   [facility] ([facility])    [address]  [facility] [address]   [phone]            Mar 14, 2017 11:40 AM CDT   Anticoagulation Visit with  ANTICOAGULATION   [facility] ([facility])    [address]  [facility] [address]   [phone]              March 2017 Details    Sun Mon Tue Wed Thu Fri Sat        1               2               3               4                 5               6               7               8               9               10      5 mg   See details      11      5 mg           12      5 mg         13      5 mg         14            15               16               17               18                 19               20               21               22               23               24               25                 26               27               28               29               30               31                 Date Details   03/10 This INR check       Date of next INR:  3/14/2017         How to take your warfarin dose     To take:  5 mg Take 1 of the 5 mg tablets.

## 2017-03-10 NOTE — PROGRESS NOTES
ANTICOAGULATION FOLLOW-UP CLINIC VISIT    Patient Name:  Veronica Bray  Date:  3/10/2017  Contact Type:  Face to Face    SUBJECTIVE:     Patient Findings     Positives No Problem Findings           OBJECTIVE    INR Protime   Date Value Ref Range Status   03/10/2017 2.1 (A) 0.86 - 1.14 Final       ASSESSMENT / PLAN  INR assessment THER    Recheck INR In: 4 DAYS    INR Location Clinic      Anticoagulation Summary as of 3/10/2017     INR goal 2.0-3.0   Today's INR 2.1   Maintenance plan No maintenance plan   Full instructions 3/10: 5 mg; 3/11: 5 mg; 3/12: 5 mg; 3/13: 5 mg   Plan last modified Larisa Monae RN (3/3/2017)   Next INR check 3/14/2017   Target end date Indefinite    Indications   Atrial fibrillation (H) [I48.91] [I48.91]  Long-term (current) use of anticoagulants [Z79.01] [Z79.01]         Anticoagulation Episode Summary     INR check location     Preferred lab     Send INR reminders to [facility] HEART INR NURSE    Comments       Anticoagulation Care Providers     Provider Role Specialty Phone number    Ip, Tr Morris Carrera MD Responsible Cardiology [phone]            See the Encounter Report to view Anticoagulation Flowsheet and Dosing Calendar (Go to Encounters tab in chart review, and find the Anticoagulation Therapy Visit)    INR 2.1.  Has been on the warfarin for one full week now.  Continue the 5mg/day and recheck on Tuesday.  Mike Monae, ARMANDO

## 2017-03-14 ENCOUNTER — ANTICOAGULATION THERAPY VISIT (OUTPATIENT)
Dept: CARDIOLOGY | Facility: CLINIC | Age: 82
End: 2017-03-14
Payer: COMMERCIAL

## 2017-03-14 DIAGNOSIS — I48.91 ATRIAL FIBRILLATION, UNSPECIFIED TYPE (H): ICD-10-CM

## 2017-03-14 DIAGNOSIS — Z79.01 LONG-TERM (CURRENT) USE OF ANTICOAGULANTS: ICD-10-CM

## 2017-03-14 LAB — INR POINT OF CARE: 2.3 (ref 0.86–1.14)

## 2017-03-14 PROCEDURE — 85610 PROTHROMBIN TIME: CPT | Mod: QW | Performed by: INTERNAL MEDICINE

## 2017-03-14 PROCEDURE — 36416 COLLJ CAPILLARY BLOOD SPEC: CPT | Performed by: INTERNAL MEDICINE

## 2017-03-14 NOTE — PROGRESS NOTES
ANTICOAGULATION FOLLOW-UP CLINIC VISIT    Patient Name:  Veronica Bray  Date:  3/14/2017  Contact Type:  Face to Face    SUBJECTIVE:     Patient Findings     Positives No Problem Findings           OBJECTIVE    INR Protime   Date Value Ref Range Status   03/14/2017 2.3 (A) 0.86 - 1.14 Final       ASSESSMENT / PLAN  INR assessment THER    Recheck INR In: 3 DAYS    INR Location Clinic      Anticoagulation Summary as of 3/14/2017     INR goal 2.0-3.0   Today's INR 2.3   Maintenance plan 5 mg (5 mg x 1) every day   Full instructions 5 mg every day   Weekly total 35 mg   Plan last modified Larisa Monae RN (3/14/2017)   Next INR check 3/17/2017   Target end date Indefinite    Indications   Atrial fibrillation (H) [I48.91] [I48.91]  Long-term (current) use of anticoagulants [Z79.01] [Z79.01]         Anticoagulation Episode Summary     INR check location     Preferred lab     Send INR reminders to [facility] HEART INR NURSE    Comments       Anticoagulation Care Providers     Provider Role Specialty Phone number    Ip, Tr Morris Carrera MD Responsible Cardiology [phone]            See the Encounter Report to view Anticoagulation Flowsheet and Dosing Calendar (Go to Encounters tab in chart review, and find the Anticoagulation Therapy Visit)    INR 2.3.  She had a little blood smear when blowing her nose once and brushing her teeth.  Continue the 5mg/day and recheck INR on Friday then stretch out further.  Mike Monae, RN

## 2017-03-14 NOTE — MR AVS SNAPSHOT
Veronica Bray   3/14/2017 11:40 AM   Anticoagulation Therapy Visit    Description:  85 year old female   Provider:   ANTICOAGULATION   Department:  [facility]           INR as of 3/14/2017     Today's INR 2.3      Anticoagulation Summary as of 3/14/2017     INR goal 2.0-3.0   Today's INR 2.3   Full instructions 5 mg every day   Next INR check 3/17/2017    Indications   Atrial fibrillation (H) [I48.91] [I48.91]  Long-term (current) use of anticoagulants [Z79.01] [Z79.01]         Your next Anticoagulation Clinic appointment(s)     Mar 14, 2017 11:40 AM CDT   Anticoagulation Visit with  ANTICOAGULATION   [facility] [place] ([facility])    [address]  [facility] [address] [phone]            Mar 17, 2017  9:40 AM CDT   Anticoagulation Visit with  ANTICOAGULATION   [facility] [place] ([facility])    [address]  [facility] [address] [phone]              March 2017 Details    Sun Mon Tue Wed Thu Fri Sat        1               2               3               4                 5               6               7               8               9               10               11                 12               13               14      5 mg   See details      15      5 mg         16      5 mg         17            18                 19               20               21               22               23               24               25                 26               27               28               29               30               31                 Date Details   03/14 This INR check       Date of next INR:  3/17/2017         How to take your warfarin dose     To take:  5 mg Take 1 of the 5 mg tablets.

## 2017-03-17 ENCOUNTER — ANTICOAGULATION THERAPY VISIT (OUTPATIENT)
Dept: CARDIOLOGY | Facility: CLINIC | Age: 82
End: 2017-03-17
Payer: COMMERCIAL

## 2017-03-17 DIAGNOSIS — I48.91 ATRIAL FIBRILLATION, UNSPECIFIED TYPE (H): ICD-10-CM

## 2017-03-17 DIAGNOSIS — Z79.01 LONG-TERM (CURRENT) USE OF ANTICOAGULANTS: ICD-10-CM

## 2017-03-17 LAB — INR POINT OF CARE: 2.5 (ref 0.86–1.14)

## 2017-03-17 PROCEDURE — 36416 COLLJ CAPILLARY BLOOD SPEC: CPT | Performed by: INTERNAL MEDICINE

## 2017-03-17 PROCEDURE — 85610 PROTHROMBIN TIME: CPT | Mod: QW | Performed by: INTERNAL MEDICINE

## 2017-03-17 NOTE — PROGRESS NOTES
ANTICOAGULATION FOLLOW-UP CLINIC VISIT    Patient Name:  Veronica Bray  Date:  3/17/2017  Contact Type:  Face to Face    SUBJECTIVE:     Patient Findings     Positives No Problem Findings           OBJECTIVE    INR Protime   Date Value Ref Range Status   03/17/2017 2.5 (A) 0.86 - 1.14 Final       ASSESSMENT / PLAN  INR assessment THER    Recheck INR In: 10 DAYS    INR Location Clinic      Anticoagulation Summary as of 3/17/2017     INR goal 2.0-3.0   Today's INR 2.5   Maintenance plan 5 mg (5 mg x 1) every day   Full instructions 5 mg every day   Weekly total 35 mg   No change documented Larisa Monae RN   Plan last modified Larisa Monae RN (3/14/2017)   Next INR check 3/28/2017   Target end date Indefinite    Indications   Atrial fibrillation (H) [I48.91] [I48.91]  Long-term (current) use of anticoagulants [Z79.01] [Z79.01]         Anticoagulation Episode Summary     INR check location     Preferred lab     Send INR reminders to [facility] HEART INR NURSE    Comments       Anticoagulation Care Providers     Provider Role Specialty Phone number    Ip, Tr Morris Carrera MD Responsible Cardiology [phone]            See the Encounter Report to view Anticoagulation Flowsheet and Dosing Calendar (Go to Encounters tab in chart review, and find the Anticoagulation Therapy Visit)    INR 2.5.  She has been on 5mg/day since starting about 2 weeks ago.  Continue the 5mg/day and recheck in 10 days.  Mike Monae RN

## 2017-03-17 NOTE — MR AVS SNAPSHOT
Veronica Bray   3/17/2017 9:40 AM   Anticoagulation Therapy Visit    Description:  85 year old female   Provider:   ANTICOAGULATION   Department:  [facility]           INR as of 3/17/2017     Today's INR 2.5      Anticoagulation Summary as of 3/17/2017     INR goal 2.0-3.0   Today's INR 2.5   Full instructions 5 mg every day   Next INR check 3/28/2017    Indications   Atrial fibrillation (H) [I48.91] [I48.91]  Long-term (current) use of anticoagulants [Z79.01] [Z79.01]         Your next Anticoagulation Clinic appointment(s)     Mar 17, 2017  9:40 AM CDT   Anticoagulation Visit with  ANTICOAGULATION   [facility] [place] ([facility])    [address]  [facility] [address] [phone]            Mar 28, 2017 10:20 AM CDT   Anticoagulation Visit with  ANTICOAGULATION   [facility] [place] ([facility])    [address]  [facility] [address] [phone]              March 2017 Details    Sun Mon Tue Wed Thu Fri Sat        1               2               3               4                 5               6               7               8               9               10               11                 12               13               14               15               16               17      5 mg   See details      18      5 mg           19      5 mg         20      5 mg         21      5 mg         22      5 mg         23      5 mg         24      5 mg         25      5 mg           26      5 mg         27      5 mg         28            29               30               31                 Date Details   03/17 This INR check       Date of next INR:  3/28/2017         How to take your warfarin dose     To take:  5 mg Take 1 of the 5 mg tablets.

## 2017-03-28 ENCOUNTER — ANTICOAGULATION THERAPY VISIT (OUTPATIENT)
Dept: CARDIOLOGY | Facility: CLINIC | Age: 82
End: 2017-03-28
Payer: COMMERCIAL

## 2017-03-28 DIAGNOSIS — Z79.01 LONG-TERM (CURRENT) USE OF ANTICOAGULANTS: ICD-10-CM

## 2017-03-28 DIAGNOSIS — I48.91 ATRIAL FIBRILLATION, UNSPECIFIED TYPE (H): ICD-10-CM

## 2017-03-28 LAB — INR POINT OF CARE: 2 (ref 0.86–1.14)

## 2017-03-28 PROCEDURE — 36416 COLLJ CAPILLARY BLOOD SPEC: CPT | Performed by: INTERNAL MEDICINE

## 2017-03-28 PROCEDURE — 85610 PROTHROMBIN TIME: CPT | Mod: QW | Performed by: INTERNAL MEDICINE

## 2017-03-28 RX ORDER — WARFARIN SODIUM 5 MG/1
TABLET ORAL
Qty: 40 TABLET | Refills: 0 | Status: SHIPPED | OUTPATIENT
Start: 2017-03-28 | End: 2017-04-11

## 2017-03-28 NOTE — PROGRESS NOTES
ANTICOAGULATION FOLLOW-UP CLINIC VISIT    Patient Name:  Veronica Bray  Date:  3/28/2017  Contact Type:  Face to Face    SUBJECTIVE:     Patient Findings     Positives No Problem Findings           OBJECTIVE    INR Protime   Date Value Ref Range Status   03/28/2017 2.0 (A) 0.86 - 1.14 Final       ASSESSMENT / PLAN  INR assessment THER    Recheck INR In: 2 WEEKS    INR Location Clinic      Anticoagulation Summary as of 3/28/2017     INR goal 2.0-3.0   Today's INR 2.0   Maintenance plan 5 mg (5 mg x 1) every day   Full instructions 5 mg every day   Weekly total 35 mg   No change documented Larisa Monae RN   Plan last modified Larisa Monae RN (3/14/2017)   Next INR check 4/11/2017   Target end date Indefinite    Indications   Atrial fibrillation (H) [I48.91] [I48.91]  Long-term (current) use of anticoagulants [Z79.01] [Z79.01]         Anticoagulation Episode Summary     INR check location     Preferred lab     Send INR reminders to [facility] HEART INR NURSE    Comments       Anticoagulation Care Providers     Provider Role Specialty Phone number    Ip, Tr Morris Carrera MD Responsible Cardiology [phone]            See the Encounter Report to view Anticoagulation Flowsheet and Dosing Calendar (Go to Encounters tab in chart review, and find the Anticoagulation Therapy Visit)    INR 2.0.  She had some extra greens and might continue with more greens going into the summer so if INR is any lower next time then increase to 1 day of 7.5mg.  For now continue the 5mg/day and recheck in 2 weeks.  Will send warfarin 5mg to her [facility] pharmacy and next time we will refill with updated instructions to her [facility] mail order pharmacy.  Mike Monae, ARMANDO

## 2017-03-28 NOTE — MR AVS SNAPSHOT
Veronica Bray   3/28/2017 10:20 AM   Anticoagulation Therapy Visit    Description:  85 year old female   Provider:   ANTICOAGULATION   Department:  [facility]           INR as of 3/28/2017     Today's INR 2.0      Anticoagulation Summary as of 3/28/2017     INR goal 2.0-3.0   Today's INR 2.0   Full instructions 5 mg every day   Next INR check 4/11/2017    Indications   Atrial fibrillation (H) [I48.91] [I48.91]  Long-term (current) use of anticoagulants [Z79.01] [Z79.01]         Your next Anticoagulation Clinic appointment(s)     Mar 28, 2017 10:20 AM CDT   Anticoagulation Visit with  ANTICOAGULATION   [facility] AT [place] ([facility])    [address]  [facility] [address] [phone]            Apr 11, 2017 10:20 AM CDT   Anticoagulation Visit with  ANTICOAGULATION   [facility] AT [place] ([facility])    [address]  [facility] [address] [phone]              March 2017 Details    Sun Mon Tue Wed Thu Fri Sat        1               2               3               4                 5               6               7               8               9               10               11                 12               13               14               15               16               17               18                 19               20               21               22               23               24               25                 26               27               28      5 mg   See details      29      5 mg         30      5 mg         31      5 mg           Date Details   03/28 This INR check               How to take your warfarin dose     To take:  5 mg Take 1 of the 5 mg tablets.           April 2017 Details    Sun Mon Tue Wed Thu Fri Sat           1      5 mg           2      5 mg         3      5 mg         4      5 mg         5      5 mg         6      5 mg         7       5 mg         8      5 mg           9      5 mg         10      5 mg         11            12               13               14               15                 16               17               18               19               20               21               22                 23               24               25               26               27               28               29                 30                      Date Details   No additional details    Date of next INR:  4/11/2017         How to take your warfarin dose     To take:  5 mg Take 1 of the 5 mg tablets.

## 2017-04-11 ENCOUNTER — ANTICOAGULATION THERAPY VISIT (OUTPATIENT)
Dept: CARDIOLOGY | Facility: CLINIC | Age: 82
End: 2017-04-11
Payer: COMMERCIAL

## 2017-04-11 DIAGNOSIS — I48.91 ATRIAL FIBRILLATION, UNSPECIFIED TYPE (H): ICD-10-CM

## 2017-04-11 DIAGNOSIS — Z79.01 LONG-TERM (CURRENT) USE OF ANTICOAGULANTS: ICD-10-CM

## 2017-04-11 LAB — INR POINT OF CARE: 2.2 (ref 0.86–1.14)

## 2017-04-11 PROCEDURE — 85610 PROTHROMBIN TIME: CPT | Mod: QW

## 2017-04-11 PROCEDURE — 36416 COLLJ CAPILLARY BLOOD SPEC: CPT

## 2017-04-11 RX ORDER — WARFARIN SODIUM 5 MG/1
TABLET ORAL
Qty: 100 TABLET | Refills: 1 | Status: SHIPPED | OUTPATIENT
Start: 2017-04-11 | End: 2017-10-25

## 2017-04-11 NOTE — PROGRESS NOTES
ANTICOAGULATION FOLLOW-UP CLINIC VISIT    Patient Name:  Veronica Bray  Date:  4/11/2017  Contact Type:  Face to Face    SUBJECTIVE:     Patient Findings     Positives No Problem Findings           OBJECTIVE    INR Protime   Date Value Ref Range Status   04/11/2017 2.2 (A) 0.86 - 1.14 Final       ASSESSMENT / PLAN  INR assessment THER    Recheck INR In: 2 WEEKS    INR Location Clinic      Anticoagulation Summary as of 4/11/2017     INR goal 2.0-3.0   Today's INR 2.2   Maintenance plan 5 mg (5 mg x 1) every day   Full instructions 5 mg every day   Weekly total 35 mg   No change documented Larisa Monae RN   Plan last modified Larisa Monae RN (3/14/2017)   Next INR check 4/28/2017   Target end date Indefinite    Indications   Atrial fibrillation (H) [I48.91] [I48.91]  Long-term (current) use of anticoagulants [Z79.01] [Z79.01]         Anticoagulation Episode Summary     INR check location     Preferred lab     Send INR reminders to [facility] HEART INR NURSE    Comments       Anticoagulation Care Providers     Provider Role Specialty Phone number    Ip, Tr Morris Carrera MD Responsible Cardiology [phone]            See the Encounter Report to view Anticoagulation Flowsheet and Dosing Calendar (Go to Encounters tab in chart review, and find the Anticoagulation Therapy Visit)    INR 2.2.  Continue the 5mg/day and recheck in 2-3 weeks.  Will send script to [facility] for Warfarin 5mg.  Mike Monae RN

## 2017-04-11 NOTE — MR AVS SNAPSHOT
Veronica Bray   4/11/2017 10:20 AM   Anticoagulation Therapy Visit    Description:  85 year old female   Provider:   ANTICOAGULATION   Department:  [facility]           INR as of 4/11/2017     Today's INR 2.2      Anticoagulation Summary as of 4/11/2017     INR goal 2.0-3.0   Today's INR 2.2   Full instructions 5 mg every day   Next INR check 4/28/2017    Indications   Atrial fibrillation (H) [I48.91] [I48.91]  Long-term (current) use of anticoagulants [Z79.01] [Z79.01]         Your next Anticoagulation Clinic appointment(s)     Apr 11, 2017 10:20 AM CDT   Anticoagulation Visit with  ANTICOAGULATION   [facility] [place] ([facility])    [address]  [facility] [address] [phone]            Apr 28, 2017 11:20 AM CDT   Anticoagulation Visit with  ANTICOAGULATION   [facility] [place] ([facility])    [address]  [facility] [id number] [phone]              April 2017 Details    Sun Mon Tue Wed Thu Fri Sat           1                 2               3               4               5               6               7               8                 9               10               11      5 mg   See details      12      5 mg         13      5 mg         14      5 mg         15      5 mg           16      5 mg         17      5 mg         18      5 mg         19      5 mg         20      5 mg         21      5 mg         22      5 mg           23      5 mg         24      5 mg         25      5 mg         26      5 mg         27      5 mg         28            29                 30                      Date Details   04/11 This INR check       Date of next INR:  4/28/2017         How to take your warfarin dose     To take:  5 mg Take 1 of the 5 mg tablets.

## 2017-04-28 ENCOUNTER — ANTICOAGULATION THERAPY VISIT (OUTPATIENT)
Dept: CARDIOLOGY | Facility: CLINIC | Age: 82
End: 2017-04-28
Payer: COMMERCIAL

## 2017-04-28 DIAGNOSIS — Z79.01 LONG-TERM (CURRENT) USE OF ANTICOAGULANTS: ICD-10-CM

## 2017-04-28 DIAGNOSIS — I48.91 ATRIAL FIBRILLATION, UNSPECIFIED TYPE (H): ICD-10-CM

## 2017-04-28 LAB — INR POINT OF CARE: 1.9 (ref 0.86–1.14)

## 2017-04-28 PROCEDURE — 36416 COLLJ CAPILLARY BLOOD SPEC: CPT

## 2017-04-28 PROCEDURE — 85610 PROTHROMBIN TIME: CPT | Mod: QW

## 2017-04-28 NOTE — MR AVS SNAPSHOT
Veronica DANNY Bray   4/28/2017 11:20 AM   Anticoagulation Therapy Visit    Description:  85 year old female   Provider:  [facility]   Department:  [facility]           INR as of 4/28/2017     Today's INR 1.9!      Anticoagulation Summary as of 4/28/2017     INR goal 2.0-3.0   Today's INR 1.9!   Full instructions 7.5 mg on Sat; 5 mg all other days   Next INR check 5/12/2017    Indications   Atrial fibrillation (H) [I48.91] [I48.91]  Long-term (current) use of anticoagulants [Z79.01] [Z79.01]         Your next Anticoagulation Clinic appointment(s)     May 12, 2017 11:40 AM CDT   Anticoagulation Visit with  ANTICOAGULATION   [facility] [place] ([facility])    [address] [address] [id number] [phone]              April 2017 Details    Sun Mon Tue Wed Thu Fri Sat           1                 2               3               4               5               6               7               8                 9               10               11               12               13               14               15                 16               17               18               19               20               21               22                 23               24               25               26               27               28      5 mg   See details      29      7.5 mg           30      5 mg                Date Details   04/28 This INR check               How to take your warfarin dose     To take:  5 mg Take 1 of the 5 mg tablets.    To take:  7.5 mg Take 1.5 of the 5 mg tablets.           May 2017 Details    Sun Mon Tue Wed Thu Fri Sat      1      5 mg         2      5 mg         3      5 mg         4      5 mg         5      5 mg         6      7.5 mg           7      5 mg         8      5 mg         9      5 mg         10      5 mg         11      5 mg         12            13                 14               15               16                17               18               19               20                 21               22               23               24               25               26               27                 28               29               30               31                   Date Details   No additional details    Date of next INR:  5/12/2017         How to take your warfarin dose     To take:  5 mg Take 1 of the 5 mg tablets.    To take:  7.5 mg Take 1.5 of the 5 mg tablets.

## 2017-04-28 NOTE — PROGRESS NOTES
ANTICOAGULATION FOLLOW-UP CLINIC VISIT    Patient Name:  Veronica Bray  Date:  4/28/2017  Contact Type:  Face to Face    SUBJECTIVE:     Patient Findings     Positives No Problem Findings           OBJECTIVE    INR Protime   Date Value Ref Range Status   04/28/2017 1.9 (A) 0.86 - 1.14 Final       ASSESSMENT / PLAN  INR assessment THER    Recheck INR In: 2 WEEKS    INR Location Clinic      Anticoagulation Summary as of 4/28/2017     INR goal 2.0-3.0   Today's INR 1.9!   Maintenance plan 7.5 mg (5 mg x 1.5) on Sat; 5 mg (5 mg x 1) all other days   Full instructions 7.5 mg on Sat; 5 mg all other days   Weekly total 37.5 mg   Plan last modified Larisa Monae, RN (4/28/2017)   Next INR check 5/12/2017   Target end date Indefinite    Indications   Atrial fibrillation (H) [I48.91] [I48.91]  Long-term (current) use of anticoagulants [Z79.01] [Z79.01]         Anticoagulation Episode Summary     INR check location     Preferred lab     Send INR reminders to [facility] HEART INR NURSE    Comments       Anticoagulation Care Providers     Provider Role Specialty Phone number    Ip, Tr Carrera MD Responsible Cardiology [phone]            See the Encounter Report to view Anticoagulation Flowsheet and Dosing Calendar (Go to Encounters tab in chart review, and find the Anticoagulation Therapy Visit)    INR 1.9.  No missed doses.  Had a salad and green beans yesterday.  We will increase dosing by 2.5mg overall and recheck in 2 weeks.  Mike Monae, ARMANDO

## 2017-05-16 ENCOUNTER — ANTICOAGULATION THERAPY VISIT (OUTPATIENT)
Dept: CARDIOLOGY | Facility: CLINIC | Age: 82
End: 2017-05-16
Payer: COMMERCIAL

## 2017-05-16 DIAGNOSIS — I48.91 ATRIAL FIBRILLATION, UNSPECIFIED TYPE (H): ICD-10-CM

## 2017-05-16 DIAGNOSIS — Z79.01 LONG-TERM (CURRENT) USE OF ANTICOAGULANTS: ICD-10-CM

## 2017-05-16 LAB — INR POINT OF CARE: 2.4 (ref 0.86–1.14)

## 2017-05-16 PROCEDURE — 99207 ZZC NO CHARGE NURSE ONLY: CPT | Performed by: INTERNAL MEDICINE

## 2017-05-16 PROCEDURE — 36416 COLLJ CAPILLARY BLOOD SPEC: CPT | Performed by: INTERNAL MEDICINE

## 2017-05-16 PROCEDURE — 85610 PROTHROMBIN TIME: CPT | Mod: QW | Performed by: INTERNAL MEDICINE

## 2017-05-16 NOTE — PROGRESS NOTES
ANTICOAGULATION FOLLOW-UP CLINIC VISIT    Patient Name:  Veronica Bray  Date:  5/16/2017  Contact Type:  Face to Face    SUBJECTIVE:     Patient Findings     Positives No Problem Findings           OBJECTIVE    INR Protime   Date Value Ref Range Status   05/16/2017 2.4 (A) 0.86 - 1.14 Final       ASSESSMENT / PLAN  INR assessment THER    Recheck INR In: 3 WEEKS    INR Location Clinic      Anticoagulation Summary as of 5/16/2017     INR goal 2.0-3.0   Today's INR 2.4   Maintenance plan 7.5 mg (5 mg x 1.5) on Sat; 5 mg (5 mg x 1) all other days   Full instructions 7.5 mg on Sat; 5 mg all other days   Weekly total 37.5 mg   No change documented Larisa Monae RN   Plan last modified Larisa Monae RN (4/28/2017)   Next INR check 6/9/2017   Target end date Indefinite    Indications   Atrial fibrillation (H) [I48.91] [I48.91]  Long-term (current) use of anticoagulants [Z79.01] [Z79.01]         Anticoagulation Episode Summary     INR check location     Preferred lab     Send INR reminders to [facility] HEART INR NURSE    Comments       Anticoagulation Care Providers     Provider Role Specialty Phone number    Ip, Tr Morris Carrera MD Responsible Cardiology [phone]            See the Encounter Report to view Anticoagulation Flowsheet and Dosing Calendar (Go to Encounters tab in chart review, and find the Anticoagulation Therapy Visit)    INR 2.4.  Dosing was increased by 2.5mg overall last time and now perfectly in range.  Continue the above schedule and recheck in 3 weeks.  Mike Monae RN

## 2017-05-16 NOTE — MR AVS SNAPSHOT
Veronica DANNY Bray   5/16/2017 2:40 PM   Anticoagulation Therapy Visit    Description:  85 year old female   Provider:  [facility]   Department:  [facility]           INR as of 5/16/2017     Today's INR 2.4      Anticoagulation Summary as of 5/16/2017     INR goal 2.0-3.0   Today's INR 2.4   Full instructions 7.5 mg on Sat; 5 mg all other days   Next INR check 6/9/2017    Indications   Atrial fibrillation (H) [I48.91] [I48.91]  Long-term (current) use of anticoagulants [Z79.01] [Z79.01]         Your next Anticoagulation Clinic appointment(s)     Jun 09, 2017 11:40 AM CDT   Anticoagulation Visit with RU ANTICOAGULATION   [facility] ([facility])    [address] [address] [address] [phone]              May 2017 Details    Sun Mon Tue Wed Thu Fri Sat      1               2               3               4               5               6                 7               8               9               10               11               12               13                 14               15               16      5 mg   See details      17      5 mg         18      5 mg         19      5 mg         20      7.5 mg           21      5 mg         22      5 mg         23      5 mg         24      5 mg         25      5 mg         26      5 mg         27      7.5 mg           28      5 mg         29      5 mg         30      5 mg         31      5 mg             Date Details   05/16 This INR check               How to take your warfarin dose     To take:  5 mg Take 1 of the 5 mg tablets.    To take:  7.5 mg Take 1.5 of the 5 mg tablets.           June 2017 Details    Sun Mon Tue Wed Thu Fri Sat         1      5 mg         2      5 mg         3      7.5 mg           4      5 mg         5      5 mg         6      5 mg         7      5 mg         8      5 mg         9            10                 11               12               13                14               15               16               17                 18               19               20               21               22               23               24                 25               26               27               28               29               30                 Date Details   No additional details    Date of next INR:  6/9/2017         How to take your warfarin dose     To take:  5 mg Take 1 of the 5 mg tablets.    To take:  7.5 mg Take 1.5 of the 5 mg tablets.

## 2017-06-06 ENCOUNTER — TELEPHONE (OUTPATIENT)
Dept: INTERNAL MEDICINE | Facility: CLINIC | Age: 82
End: 2017-06-06

## 2017-06-06 DIAGNOSIS — Z98.818 OTHER DENTAL PROCEDURE STATUS: Primary | ICD-10-CM

## 2017-06-06 NOTE — TELEPHONE ENCOUNTER
(Reason for Call:  Medication or medication refill:    Do you use a [facility]?  Name of the pharmacy and phone number for the current request:  [facility]    Name of the medication requested: Antibotic    Other request: needs this before getting teeth cleaned    Can we leave a detailed message on this number? YES    Phone number patient can be reached at: Home number on file [phone] (home)    Best Time: any    Call taken on 6/6/2017 at 11:15 AM by Blanca Avina

## 2017-06-06 NOTE — TELEPHONE ENCOUNTER
No history of antibiotic being prescribed for pt in the past, but she is new to our office.     Called pt, she states that she has history of knee and hip replacements and has taken antibiotics in the past prior to dental work. She is unsure what antibiotic she has taken before. Appt is scheduled for June 19.    Sent to provider to review.     **Same message sent for her  Vaibhav Levine.

## 2017-06-07 RX ORDER — AMOXICILLIN 500 MG/1
2000 CAPSULE ORAL ONCE
Qty: 4 CAPSULE | Refills: 0 | Status: SHIPPED | OUTPATIENT
Start: 2017-06-07 | End: 2017-06-07

## 2017-06-09 ENCOUNTER — ANTICOAGULATION THERAPY VISIT (OUTPATIENT)
Dept: CARDIOLOGY | Facility: CLINIC | Age: 82
End: 2017-06-09
Payer: COMMERCIAL

## 2017-06-09 DIAGNOSIS — Z79.01 LONG-TERM (CURRENT) USE OF ANTICOAGULANTS: ICD-10-CM

## 2017-06-09 DIAGNOSIS — I48.91 ATRIAL FIBRILLATION, UNSPECIFIED TYPE (H): ICD-10-CM

## 2017-06-09 LAB — INR POINT OF CARE: 2.1 (ref 0.86–1.14)

## 2017-06-09 PROCEDURE — 36416 COLLJ CAPILLARY BLOOD SPEC: CPT

## 2017-06-09 PROCEDURE — 85610 PROTHROMBIN TIME: CPT | Mod: QW

## 2017-06-09 NOTE — PROGRESS NOTES
ANTICOAGULATION FOLLOW-UP CLINIC VISIT    Patient Name:  Veronica Bray  Date:  6/9/2017  Contact Type:  Face to Face    SUBJECTIVE:     Patient Findings     Positives No Problem Findings           OBJECTIVE    INR Protime   Date Value Ref Range Status   06/09/2017 2.1 (A) 0.86 - 1.14 Final       ASSESSMENT / PLAN  INR assessment THER    Recheck INR In: 3 WEEKS    INR Location Clinic      Anticoagulation Summary as of 6/9/2017     INR goal 2.0-3.0   Today's INR 2.1   Maintenance plan 7.5 mg (5 mg x 1.5) on Sat; 5 mg (5 mg x 1) all other days   Full instructions 7.5 mg on Sat; 5 mg all other days   Weekly total 37.5 mg   No change documented Violet Sinha RN   Plan last modified Larisa Monae RN (4/28/2017)   Next INR check 6/30/2017   Target end date Indefinite    Indications   Atrial fibrillation (H) [I48.91] [I48.91]  Long-term (current) use of anticoagulants [Z79.01] [Z79.01]         Anticoagulation Episode Summary     INR check location     Preferred lab     Send INR reminders to [facility] HEART INR NURSE    Comments       Anticoagulation Care Providers     Provider Role Specialty Phone number    Ip, Tr Carrera MD Responsible Cardiology [phone]            See the Encounter Report to view Anticoagulation Flowsheet and Dosing Calendar (Go to Encounters tab in chart review, and find the Anticoagulation Therapy Visit)    INR 2.1 No complications noted. No med or diet changes. Continue same dosing and recheck in 3 weeks. If still in range can consider 4 week rechecks.     Violet Sinha RN

## 2017-06-09 NOTE — MR AVS SNAPSHOT
Veronica Bray   6/9/2017 11:40 AM   Anticoagulation Therapy Visit    Description:  86 year old female   Provider:  [facility] ANTICOAGUL[facility]   Department:  [facility]           INR as of 6/9/2017     Today's INR 2.1      Anticoagulation Summary as of 6/9/2017     INR goal 2.0-3.0   Today's INR 2.1   Full instructions 7.5 mg on Sat; 5 mg all other days   Next INR check 6/30/2017    Indications   Atrial fibrillation (H) [I48.91] [I48.91]  Long-term (current) use of anticoagulants [Z79.01] [Z79.01]         Your next Anticoagulation Clinic appointment(s)     Jun 30, 2017 11:40 AM CDT   Anticoagulation Visit with  ANTICOAGULATION   [facility] ([facility])    [address] [address] [id number] [phone]              June 2017 Details    Sun Mon Tue Wed Thu Fri Sat         1               2               3                 4               5               6               7               8               9      5 mg   See details      10      7.5 mg           11      5 mg         12      5 mg         13      5 mg         14      5 mg         15      5 mg         16      5 mg         17      7.5 mg           18      5 mg         19      5 mg         20      5 mg         21      5 mg         22      5 mg         23      5 mg         24      7.5 mg           25      5 mg         26      5 mg         27      5 mg         28      5 mg         29      5 mg         30              Date Details   06/09 This INR check       Date of next INR:  6/30/2017         How to take your warfarin dose     To take:  5 mg Take 1 of the 5 mg tablets.    To take:  7.5 mg Take 1.5 of the 5 mg tablets.

## 2017-06-30 ENCOUNTER — ANTICOAGULATION THERAPY VISIT (OUTPATIENT)
Dept: CARDIOLOGY | Facility: CLINIC | Age: 82
End: 2017-06-30
Payer: COMMERCIAL

## 2017-06-30 DIAGNOSIS — Z79.01 LONG-TERM (CURRENT) USE OF ANTICOAGULANTS: ICD-10-CM

## 2017-06-30 DIAGNOSIS — I48.91 ATRIAL FIBRILLATION, UNSPECIFIED TYPE (H): ICD-10-CM

## 2017-06-30 LAB — INR POINT OF CARE: 1.7 (ref 0.86–1.14)

## 2017-06-30 PROCEDURE — 36416 COLLJ CAPILLARY BLOOD SPEC: CPT | Performed by: INTERNAL MEDICINE

## 2017-06-30 PROCEDURE — 85610 PROTHROMBIN TIME: CPT | Mod: QW | Performed by: INTERNAL MEDICINE

## 2017-06-30 PROCEDURE — 99207 ZZC NO CHARGE NURSE ONLY: CPT | Performed by: INTERNAL MEDICINE

## 2017-06-30 NOTE — MR AVS SNAPSHOT
Veronica Bray   6/30/2017 11:40 AM   Anticoagulation Therapy Visit    Description:  86 year old female   Provider:  [facility] ANTICOAGULATION   Department:  [facility]           INR as of 6/30/2017     Today's INR 1.7!      Anticoagulation Summary as of 6/30/2017     INR goal 2.0-3.0   Today's INR 1.7!   Full instructions 7.5 mg on Mon, Fri; 5 mg all other days   Next INR check 7/14/2017    Indications   Atrial fibrillation (H) [I48.91] [I48.91]  Long-term (current) use of anticoagulants [Z79.01] [Z79.01]         Your next Anticoagulation Clinic appointment(s)     Jun 30, 2017 11:40 AM CDT   Anticoagulation Visit with  ANTICOAGULATION   [facility] ([facility])    [address]  [facility] [address] [phone]            Jul 14, 2017  1:20 PM CDT   Anticoagulation Visit with  ANTICOAGULATION   [facility] ([facility])    [address]  [facility] [id number] [phone]              June 2017 Details    Sun Mon Tue Wed Thu Fri Sat         1               2               3                 4               5               6               7               8               9               10                 11               12               13               14               15               16               17                 18               19               20               21               22               23               24                 25               26               27               28               29               30      7.5 mg   See details        Date Details   06/30 This INR check               How to take your warfarin dose     To take:  7.5 mg Take 1.5 of the 5 mg tablets.           July 2017 Details    Sun Mon Tue Wed Thu Fri Sat           1      5 mg           2      5 mg         3      7.5 mg         4      5 mg         5      5 mg         6      5 mg          7      7.5 mg         8      5 mg           9      5 mg         10      7.5 mg         11      5 mg         12      5 mg         13      5 mg         14            15                 16               17               18               19               20               21               22                 23               24               25               26               27               28               29                 30               31                     Date Details   No additional details    Date of next INR:  7/14/2017         How to take your warfarin dose     To take:  5 mg Take 1 of the 5 mg tablets.    To take:  7.5 mg Take 1.5 of the 5 mg tablets.

## 2017-06-30 NOTE — PROGRESS NOTES
ANTICOAGULATION FOLLOW-UP CLINIC VISIT    Patient Name:  Veronica Bray  Date:  6/30/2017  Contact Type:  Face to Face    SUBJECTIVE:     Patient Findings     Positives No Problem Findings           OBJECTIVE    INR Protime   Date Value Ref Range Status   06/30/2017 1.7 (A) 0.86 - 1.14 Final       ASSESSMENT / PLAN  INR assessment SUB    Recheck INR In: 2 WEEKS    INR Location Clinic      Anticoagulation Summary as of 6/30/2017     INR goal 2.0-3.0   Today's INR 1.7!   Maintenance plan 7.5 mg (5 mg x 1.5) on Mon, Fri; 5 mg (5 mg x 1) all other days   Full instructions 7.5 mg on Mon, Fri; 5 mg all other days   Weekly total 40 mg   Plan last modified Larisa Monae RN (6/30/2017)   Next INR check 7/14/2017   Target end date Indefinite    Indications   Atrial fibrillation (H) [I48.91] [I48.91]  Long-term (current) use of anticoagulants [Z79.01] [Z79.01]         Anticoagulation Episode Summary     INR check location     Preferred lab     Send INR reminders to [facility] HEART INR NURSE    Comments       Anticoagulation Care Providers     Provider Role Specialty Phone number    Ip, Tr Carrera MD Responsible Cardiology [phone]            See the Encounter Report to view Anticoagulation Flowsheet and Dosing Calendar (Go to Encounters tab in chart review, and find the Anticoagulation Therapy Visit)    INR 1.7.  No missed doses.  She did have 2 big salads this week and eating cucumbers with the peel.  We will increase dosing overall by 2.5mg and recheck in 2 weeks. She is eating more greens during the summer months.  Mike Monae, ARMANDO

## 2017-07-07 ENCOUNTER — TELEPHONE (OUTPATIENT)
Dept: INTERNAL MEDICINE | Facility: CLINIC | Age: 82
End: 2017-07-07

## 2017-07-07 DIAGNOSIS — I10 BENIGN ESSENTIAL HYPERTENSION: ICD-10-CM

## 2017-07-07 DIAGNOSIS — I48.92 ATRIAL FLUTTER, PAROXYSMAL (H): ICD-10-CM

## 2017-07-07 DIAGNOSIS — Z00.00 ENCOUNTER FOR ROUTINE ADULT HEALTH EXAMINATION WITHOUT ABNORMAL FINDINGS: Primary | ICD-10-CM

## 2017-07-07 DIAGNOSIS — Z95.1 S/P CABG (CORONARY ARTERY BYPASS GRAFT): ICD-10-CM

## 2017-07-07 DIAGNOSIS — I48.0 PAROXYSMAL ATRIAL FIBRILLATION (H): ICD-10-CM

## 2017-07-07 RX ORDER — LISINOPRIL 2.5 MG/1
5 TABLET ORAL DAILY
Qty: 90 TABLET | Refills: 1 | Status: SHIPPED | OUTPATIENT
Start: 2017-07-07 | End: 2017-08-28 | Stop reason: DRUGHIGH

## 2017-07-07 NOTE — TELEPHONE ENCOUNTER
Pt calls to request order for lab prior to appt on 7/13/17 so she can discuss with Dr. Mccall at appt. She is asking if orders can be placed.    *Same message for pt's  Vaibhav.

## 2017-07-08 DIAGNOSIS — Z00.00 ENCOUNTER FOR ROUTINE ADULT HEALTH EXAMINATION WITHOUT ABNORMAL FINDINGS: ICD-10-CM

## 2017-07-08 LAB
ALBUMIN SERPL-MCNC: 3.4 G/DL (ref 3.4–5)
ALP SERPL-CCNC: 79 U/L (ref 40–150)
ALT SERPL W P-5'-P-CCNC: 27 U/L (ref 0–50)
ANION GAP SERPL CALCULATED.3IONS-SCNC: 7 MMOL/L (ref 3–14)
AST SERPL W P-5'-P-CCNC: 21 U/L (ref 0–45)
BASOPHILS # BLD AUTO: 0 10E9/L (ref 0–0.2)
BASOPHILS NFR BLD AUTO: 1 %
BILIRUB SERPL-MCNC: 0.5 MG/DL (ref 0.2–1.3)
BUN SERPL-MCNC: 14 MG/DL (ref 7–30)
CALCIUM SERPL-MCNC: 8.3 MG/DL (ref 8.5–10.1)
CHLORIDE SERPL-SCNC: 106 MMOL/L (ref 94–109)
CHOLEST SERPL-MCNC: 177 MG/DL
CO2 SERPL-SCNC: 29 MMOL/L (ref 20–32)
CREAT SERPL-MCNC: 0.6 MG/DL (ref 0.52–1.04)
DIFFERENTIAL METHOD BLD: ABNORMAL
EOSINOPHIL # BLD AUTO: 0.1 10E9/L (ref 0–0.7)
EOSINOPHIL NFR BLD AUTO: 3.7 %
ERYTHROCYTE [DISTWIDTH] IN BLOOD BY AUTOMATED COUNT: 13.1 % (ref 10–15)
GFR SERPL CREATININE-BSD FRML MDRD: ABNORMAL ML/MIN/1.7M2
GLUCOSE SERPL-MCNC: 90 MG/DL (ref 70–99)
HCT VFR BLD AUTO: 40.8 % (ref 35–47)
HDLC SERPL-MCNC: 70 MG/DL
HGB BLD-MCNC: 13.1 G/DL (ref 11.7–15.7)
LDLC SERPL CALC-MCNC: 83 MG/DL
LYMPHOCYTES # BLD AUTO: 0.8 10E9/L (ref 0.8–5.3)
LYMPHOCYTES NFR BLD AUTO: 27.7 %
MCH RBC QN AUTO: 30 PG (ref 26.5–33)
MCHC RBC AUTO-ENTMCNC: 32.1 G/DL (ref 31.5–36.5)
MCV RBC AUTO: 93 FL (ref 78–100)
MONOCYTES # BLD AUTO: 0.2 10E9/L (ref 0–1.3)
MONOCYTES NFR BLD AUTO: 7.3 %
NEUTROPHILS # BLD AUTO: 1.8 10E9/L (ref 1.6–8.3)
NEUTROPHILS NFR BLD AUTO: 60.3 %
NONHDLC SERPL-MCNC: 107 MG/DL
PLATELET # BLD AUTO: 123 10E9/L (ref 150–450)
POTASSIUM SERPL-SCNC: 4 MMOL/L (ref 3.4–5.3)
PROT SERPL-MCNC: 7.1 G/DL (ref 6.8–8.8)
RBC # BLD AUTO: 4.37 10E12/L (ref 3.8–5.2)
SODIUM SERPL-SCNC: 142 MMOL/L (ref 133–144)
TRIGL SERPL-MCNC: 122 MG/DL
TSH SERPL DL<=0.005 MIU/L-ACNC: 1.15 MU/L (ref 0.4–4)
WBC # BLD AUTO: 3 10E9/L (ref 4–11)

## 2017-07-08 PROCEDURE — 84443 ASSAY THYROID STIM HORMONE: CPT | Mod: GZ | Performed by: INTERNAL MEDICINE

## 2017-07-08 PROCEDURE — 85025 COMPLETE CBC W/AUTO DIFF WBC: CPT | Mod: GZ | Performed by: INTERNAL MEDICINE

## 2017-07-08 PROCEDURE — 80061 LIPID PANEL: CPT | Performed by: INTERNAL MEDICINE

## 2017-07-08 PROCEDURE — 36415 COLL VENOUS BLD VENIPUNCTURE: CPT | Mod: GZ | Performed by: INTERNAL MEDICINE

## 2017-07-08 PROCEDURE — 80053 COMPREHEN METABOLIC PANEL: CPT | Mod: GZ | Performed by: INTERNAL MEDICINE

## 2017-07-11 ENCOUNTER — ANTICOAGULATION THERAPY VISIT (OUTPATIENT)
Dept: CARDIOLOGY | Facility: CLINIC | Age: 82
End: 2017-07-11
Payer: COMMERCIAL

## 2017-07-11 DIAGNOSIS — Z79.01 LONG-TERM (CURRENT) USE OF ANTICOAGULANTS: ICD-10-CM

## 2017-07-11 DIAGNOSIS — I48.91 ATRIAL FIBRILLATION, UNSPECIFIED TYPE (H): ICD-10-CM

## 2017-07-11 LAB — INR POINT OF CARE: 2.2 (ref 0.86–1.14)

## 2017-07-11 PROCEDURE — 36416 COLLJ CAPILLARY BLOOD SPEC: CPT | Performed by: INTERNAL MEDICINE

## 2017-07-11 PROCEDURE — 85610 PROTHROMBIN TIME: CPT | Mod: QW | Performed by: INTERNAL MEDICINE

## 2017-07-11 NOTE — PROGRESS NOTES
ANTICOAGULATION FOLLOW-UP CLINIC VISIT    Patient Name:  Veronica Bray  Date:  7/11/2017  Contact Type:  Face to Face    SUBJECTIVE:     Patient Findings     Positives No Problem Findings           OBJECTIVE    INR Protime   Date Value Ref Range Status   07/11/2017 2.2 (A) 0.86 - 1.14 Final       ASSESSMENT / PLAN  INR assessment THER    Recheck INR In: 3 WEEKS    INR Location Clinic      Anticoagulation Summary as of 7/11/2017     INR goal 2.0-3.0   Today's INR 2.2   Maintenance plan 7.5 mg (5 mg x 1.5) on Mon, Fri; 5 mg (5 mg x 1) all other days   Full instructions 7.5 mg on Mon, Fri; 5 mg all other days   Weekly total 40 mg   No change documented Larisa Monae RN   Plan last modified Larisa Monae RN (6/30/2017)   Next INR check 8/1/2017   Target end date Indefinite    Indications   Atrial fibrillation (H) [I48.91] [I48.91]  Long-term (current) use of anticoagulants [Z79.01] [Z79.01]         Anticoagulation Episode Summary     INR check location     Preferred lab     Send INR reminders to [facility] HEART INR NURSE    Comments       Anticoagulation Care Providers     Provider Role Specialty Phone number    Ip, Tr Morris Carrera MD Responsible Cardiology [phone]            See the Encounter Report to view Anticoagulation Flowsheet and Dosing Calendar (Go to Encounters tab in chart review, and find the Anticoagulation Therapy Visit)    INR 2.2.  Dosing was increased overall last time and now in range.  Continue the increased schedule and recheck in 3 weeks.  Mike Monae RN

## 2017-07-11 NOTE — MR AVS SNAPSHOT
Veronica DANNY Bray   7/11/2017 11:20 AM   Anticoagulation Therapy Visit    Description:  86 year old female   Provider:  [facility]   Department:  [facility]           INR as of 7/11/2017     Today's INR 2.2      Anticoagulation Summary as of 7/11/2017     INR goal 2.0-3.0   Today's INR 2.2   Full instructions 7.5 mg on Mon, Fri; 5 mg all other days   Next INR check 8/1/2017    Indications   Atrial fibrillation (H) [I48.91] [I48.91]  Long-term (current) use of anticoagulants [Z79.01] [Z79.01]         Your next Anticoagulation Clinic appointment(s)     Aug 01, 2017 11:00 AM CDT   Anticoagulation Visit with  ANTICOAGULATION   [facility] ([facility])    [address] [address] [address] [phone]              July 2017 Details    Sun Mon Tue Wed Thu Fri Sat           1                 2               3               4               5               6               7               8                 9               10               11      5 mg   See details      12      5 mg         13      5 mg         14      7.5 mg         15      5 mg           16      5 mg         17      7.5 mg         18      5 mg         19      5 mg         20      5 mg         21      7.5 mg         22      5 mg           23      5 mg         24      7.5 mg         25      5 mg         26      5 mg         27      5 mg         28      7.5 mg         29      5 mg           30      5 mg         31      7.5 mg               Date Details   07/11 This INR check               How to take your warfarin dose     To take:  5 mg Take 1 of the 5 mg tablets.    To take:  7.5 mg Take 1.5 of the 5 mg tablets.           August 2017 Details    Sun Mon Tue Wed Thu Fri Sat       1            2               3               4               5                 6               7               8               9               10               11               12                  13               14               15               16               17               18               19                 20               21               22               23               24               25               26                 27               28               29               30               31                  Date Details   No additional details    Date of next INR:  8/1/2017         How to take your warfarin dose     To take:  5 mg Take 1 of the 5 mg tablets.

## 2017-07-13 ENCOUNTER — RADIANT APPOINTMENT (OUTPATIENT)
Dept: GENERAL RADIOLOGY | Facility: CLINIC | Age: 82
End: 2017-07-13
Attending: INTERNAL MEDICINE
Payer: COMMERCIAL

## 2017-07-13 ENCOUNTER — OFFICE VISIT (OUTPATIENT)
Dept: INTERNAL MEDICINE | Facility: CLINIC | Age: 82
End: 2017-07-13
Payer: COMMERCIAL

## 2017-07-13 VITALS
HEART RATE: 71 BPM | WEIGHT: 146 LBS | HEIGHT: 62 IN | TEMPERATURE: 98.6 F | OXYGEN SATURATION: 93 % | SYSTOLIC BLOOD PRESSURE: 130 MMHG | DIASTOLIC BLOOD PRESSURE: 60 MMHG | BODY MASS INDEX: 26.87 KG/M2

## 2017-07-13 DIAGNOSIS — M25.561 RIGHT KNEE PAIN, UNSPECIFIED CHRONICITY: ICD-10-CM

## 2017-07-13 DIAGNOSIS — I48.0 PAF (PAROXYSMAL ATRIAL FIBRILLATION) (H): ICD-10-CM

## 2017-07-13 DIAGNOSIS — E78.49 OTHER HYPERLIPIDEMIA: ICD-10-CM

## 2017-07-13 DIAGNOSIS — R26.9 ABNORMALITY OF GAIT: ICD-10-CM

## 2017-07-13 DIAGNOSIS — M54.2 CERVICALGIA: ICD-10-CM

## 2017-07-13 DIAGNOSIS — M25.512 BILATERAL SHOULDER PAIN, UNSPECIFIED CHRONICITY: ICD-10-CM

## 2017-07-13 DIAGNOSIS — D69.6 THROMBOCYTOPENIA, UNSPECIFIED (H): Primary | ICD-10-CM

## 2017-07-13 DIAGNOSIS — I10 ESSENTIAL HYPERTENSION: ICD-10-CM

## 2017-07-13 DIAGNOSIS — M54.50 BILATERAL LOW BACK PAIN WITHOUT SCIATICA, UNSPECIFIED CHRONICITY: ICD-10-CM

## 2017-07-13 DIAGNOSIS — M25.511 BILATERAL SHOULDER PAIN, UNSPECIFIED CHRONICITY: ICD-10-CM

## 2017-07-13 LAB
BASOPHILS # BLD AUTO: 0 10E9/L (ref 0–0.2)
BASOPHILS NFR BLD AUTO: 0.8 %
DIFFERENTIAL METHOD BLD: ABNORMAL
EOSINOPHIL # BLD AUTO: 0.1 10E9/L (ref 0–0.7)
EOSINOPHIL NFR BLD AUTO: 2 %
ERYTHROCYTE [DISTWIDTH] IN BLOOD BY AUTOMATED COUNT: 13.3 % (ref 10–15)
HCT VFR BLD AUTO: 41 % (ref 35–47)
HGB BLD-MCNC: 13.2 G/DL (ref 11.7–15.7)
LYMPHOCYTES # BLD AUTO: 0.9 10E9/L (ref 0.8–5.3)
LYMPHOCYTES NFR BLD AUTO: 24.3 %
MCH RBC QN AUTO: 30.1 PG (ref 26.5–33)
MCHC RBC AUTO-ENTMCNC: 32.2 G/DL (ref 31.5–36.5)
MCV RBC AUTO: 93 FL (ref 78–100)
MONOCYTES # BLD AUTO: 0.4 10E9/L (ref 0–1.3)
MONOCYTES NFR BLD AUTO: 10.3 %
NEUTROPHILS # BLD AUTO: 2.2 10E9/L (ref 1.6–8.3)
NEUTROPHILS NFR BLD AUTO: 62.6 %
PLATELET # BLD AUTO: 147 10E9/L (ref 150–450)
RBC # BLD AUTO: 4.39 10E12/L (ref 3.8–5.2)
WBC # BLD AUTO: 3.6 10E9/L (ref 4–11)

## 2017-07-13 PROCEDURE — 85025 COMPLETE CBC W/AUTO DIFF WBC: CPT | Performed by: INTERNAL MEDICINE

## 2017-07-13 PROCEDURE — 73560 X-RAY EXAM OF KNEE 1 OR 2: CPT | Mod: RT

## 2017-07-13 PROCEDURE — 72040 X-RAY EXAM NECK SPINE 2-3 VW: CPT

## 2017-07-13 PROCEDURE — 99214 OFFICE O/P EST MOD 30 MIN: CPT | Performed by: INTERNAL MEDICINE

## 2017-07-13 PROCEDURE — 36415 COLL VENOUS BLD VENIPUNCTURE: CPT | Performed by: INTERNAL MEDICINE
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
## 2017-07-13 NOTE — MR AVS SNAPSHOT
After Visit Summary   7/13/2017    Veronica Bray    MRN: [id number]           Patient Information     Date Of Birth          5/31/1931        Visit Information        Provider Department      7/13/2017 10:00 AM Concepcion Mccall MD [facility]        Today's Diagnoses     Thrombocytopenia, unspecified (H)    -  1    Right knee pain, unspecified chronicity        Cervicalgia        Bilateral shoulder pain, unspecified chronicity        PAF (paroxysmal atrial fibrillation) (H)        Other hyperlipidemia        Essential hypertension        Abnormality of gait           Follow-ups after your visit        Additional Services     [facility] PT, HAND, AND CHIROPRACTIC REFERRAL       **This order will print in the [facility] Scheduling Office**    Physical Therapy, Hand Therapy and Chiropractic Care are available through:    *[place] [facility]  *[facility]  *[facility]    Call one number to schedule at any of the above locations: [phone].    Your provider has referred you to: Physical Therapy at [facility] or [facility]    Indication/Reason for Referral: right knee, neck , shoulder pain  Onset of Illness:   Therapy Orders: Evaluate and Treat  Special Programs: None  Special Request: None    Cayetano Monteiro      Additional Comments for the Therapist or Chiropractor:     Please be aware that coverage of these services is subject to the terms and limitations of your health insurance plan.  Call member services at your health plan with any benefit or coverage questions.      Please bring the following to your appointment:    *Your personal calendar for scheduling future appointments  *Comfortable clothing                  Your next 10 appointments already scheduled     Aug 01, 2017 11:00 AM CDT   Anticoagulation Visit with RU ANTICOAGULATION   [facility] [place] [facility]    [address] [place] [address]  "[phone]   [phone]            Aug 28, 2017 10:45 AM CDT   Return Visit with Tr Benavides MD   [facility] [place] ([facility])    [address]  [facility] [address] [phone]              Future tests that were ordered for you today     Open Future Orders        Priority Expected Expires Ordered    XR Cervical Spine 2/3 Views Routine 2017    XR Knee Standing Right 2 Views Routine 2017            Who to contact     If you have questions or need follow up information about today's clinic visit or your schedule please contact [facility] directly at [phone].  Normal or non-critical lab and imaging results will be communicated to you by MyChart, letter or phone within 4 business days after the clinic has received the results. If you do not hear from us within 7 days, please contact the clinic through MyChart or phone. If you have a critical or abnormal lab result, we will notify you by phone as soon as possible.  Submit refill requests through [employer] or call your pharmacy and they will forward the refill request to us. Please allow 3 business days for your refill to be completed.          Additional Information About Your Visit        [employer] Information     [employer] lets you send messages to your doctor, view your test results, renew your prescriptions, schedule appointments and more. To sign up, go to [email or web address] . Click on \"Log in\" on the left side of the screen, which will take you to the Welcome page. Then click on \"Sign up Now\" on the right side of the page.     You will be asked to enter the access code listed below, as well as some personal information. Please follow the directions to create your username and password.     Your access code is: [email or web address]  Expires: 10/11/2017 10:49 AM     Your access code will  in 90 days. If you need help or a " "new code, please call your [facility] or [phone].        Care EveryWhere ID     This is your Care EveryWhere ID. This could be used by other organizations to access your [facility] medical records  [id number]        Your Vitals Were     Pulse Temperature Height Pulse Oximetry BMI (Body Mass Index)       71 98.6  F (37  C) (Oral) 5' 2\" (1.575 m) 93% 26.7 kg/m2        Blood Pressure from Last 3 Encounters:   07/13/17 130/60   02/27/17 136/70   01/20/17 108/72    Weight from Last 3 Encounters:   07/13/17 146 lb (66.2 kg)   02/27/17 144 lb (65.3 kg)   01/20/17 141 lb (64 kg)              We Performed the Following     CBC with platelets differential     [facility] PT, HAND, AND CHIROPRACTIC REFERRAL          Today's Medication Changes          These changes are accurate as of: 7/13/17 10:50 AM.  If you have any questions, ask your nurse or doctor.               Start taking these medicines.        Dose/Directions    order for DME   Used for:  Right knee pain, unspecified chronicity, Abnormality of gait   Started by:  Concepcion Mccall MD        Cane with prongs   Quantity:  1 each   Refills:  0         These medicines have changed or have updated prescriptions.        Dose/Directions    carvedilol 6.25 MG tablet   Commonly known as:  COREG   This may have changed:  when to take this   Used for:  Paroxysmal atrial fibrillation (H), S/P CABG (coronary artery bypass graft), Atrial flutter, paroxysmal (H), Benign essential hypertension        Dose:  6.25 mg   Take 1 tablet (6.25 mg) by mouth 2 times daily (with meals)   Quantity:  180 tablet   Refills:  3            Where to get your medicines      Some of these will need a paper prescription and others can be bought over the counter.  Ask your nurse if you have questions.     Bring a paper prescription for each of these medications     order for DME                Primary Care Provider Office Phone # Fax #    Concepcion Mccall [phone]       " [facility] [address]  [facility] [address]        Equal Access to Services     SYLVIA ALLEN : Hadii len arita hadjerryelder Sobrooksali, waaxda luqadaha, qaybta kaalmada adedevikaj luisda, fang blackwood clementeanna amaraltatyanared atkins. So [facility] [phone].    ATENCIÓN: Si habla español, tiene a martinez disposición servicios gratuitos de asistencia lingüística. Llame al [phone].    We comply with applicable federal civil rights laws and [place] laws. We do not discriminate on the basis of race, color, national origin, age, disability sex, sexual orientation or gender identity.            Thank you!     Thank you for choosing [facility]  for your care. Our goal is always to provide you with excellent care. Hearing back from our patients is one way we can continue to improve our services. Please take a few minutes to complete the written survey that you may receive in the mail after your visit with us. Thank you!             Your Updated Medication List - Protect others around you: Learn how to safely use, store and throw away your medicines at www.disposemymeds.org.          This list is accurate as of: 7/13/17 10:50 AM.  Always use your most recent med list.                   Brand Name Dispense Instructions for use Diagnosis    ASPIRIN PO      Take 81 mg by mouth        atorvastatin 10 MG tablet    LIPITOR    90 tablet    Take 1 tablet (10 mg) by mouth daily    Paroxysmal atrial fibrillation (H), S/P CABG (coronary artery bypass graft), Atrial flutter, paroxysmal (H), Benign essential hypertension       carvedilol 6.25 MG tablet    COREG    180 tablet    Take 1 tablet (6.25 mg) by mouth 2 times daily (with meals)    Paroxysmal atrial fibrillation (H), S/P CABG (coronary artery bypass graft), Atrial flutter, paroxysmal (H), Benign essential hypertension       CLARITIN PO      Take by mouth as needed        lisinopril 2.5 MG tablet    PRINIVIL/Zestril    90 tablet    Take 2 tablets (5 mg) by mouth  daily    Paroxysmal atrial fibrillation (H), S/P CABG (coronary artery bypass graft), Atrial flutter, paroxysmal (H), Benign essential hypertension       order for DME     1 each    Cane with prongs    Right knee pain, unspecified chronicity, Abnormality of gait       VITEYES COMPLETE PO           warfarin 5 MG tablet    COUMADIN    100 tablet    Take 1 tab daily or as directed by INR clinic    Atrial fibrillation, unspecified type (H)

## 2017-07-27 ENCOUNTER — THERAPY VISIT (OUTPATIENT)
Dept: PHYSICAL THERAPY | Facility: CLINIC | Age: 82
End: 2017-07-27
Payer: MEDICARE

## 2017-07-27 DIAGNOSIS — M54.2 CERVICALGIA: Primary | ICD-10-CM

## 2017-07-27 PROCEDURE — 97161 PT EVAL LOW COMPLEX 20 MIN: CPT | Mod: GP | Performed by: PHYSICAL THERAPIST

## 2017-07-27 PROCEDURE — 97110 THERAPEUTIC EXERCISES: CPT | Mod: GP | Performed by: PHYSICAL THERAPIST

## 2017-07-27 PROCEDURE — 97112 NEUROMUSCULAR REEDUCATION: CPT | Mod: GP | Performed by: PHYSICAL THERAPIST

## 2017-07-27 PROCEDURE — G8985 CARRY GOAL STATUS: HCPCS | Mod: GP | Performed by: PHYSICAL THERAPIST

## 2017-07-27 PROCEDURE — G8984 CARRY CURRENT STATUS: HCPCS | Mod: GP | Performed by: PHYSICAL THERAPIST

## 2017-07-27 NOTE — MR AVS SNAPSHOT
After Visit Summary   7/27/2017    Veronica Bray    MRN: [id number]           Patient Information     Date Of Birth          5/31/1931        Visit Information        Provider Department      7/27/2017 9:40 AM Mari Lainez, PT [facility]        Today's Diagnoses     Cervicalgia    -  1       Follow-ups after your visit        Your next 10 appointments already scheduled     Aug 01, 2017 11:00 AM CDT   Anticoagulation Visit with RU ANTICOAGULATION   [facility] ([facility])    [address]  [facility] [id number] [phone]            Aug 01, 2017  1:30 PM CDT   [facility] Spine with Mari Lainez PT   [facility]    [address]  [address]  [facility] [address]   [phone]            Aug 03, 2017 10:20 AM CDT   [facility] Spine with Mari Lainez PT   [facility]  )    [id number] [place] [address]  [address]  [facility] [address]   [phone]            Aug 28, 2017 10:45 AM CDT   Return Visit with Tr Benavides MD   [facility] ([facility])    [address]  [facility] [id number] [phone]            Dec 05, 2017  9:00 AM CST   PHYSICAL with Concepcion Mccall MD   [facility]    [address]  [facility] [id number] [phone]              Who to contact     If you have questions or need follow up information about today's clinic visit or your schedule please contact [facility] directly at [phone].  Normal or non-critical lab and imaging results will be communicated to you by MyChart, letter or phone within 4 business days after the clinic has received the results. If you do not hear from us within 7 days, please contact the clinic through MyChart or phone. If you have a critical or abnormal lab result, we will notify you by phone  "as soon as possible.  Submit refill requests through [employer] or call your pharmacy and they will forward the refill request to us. Please allow 3 business days for your refill to be completed.          Additional Information About Your Visit        [employer] Information     [employer] lets you send messages to your doctor, view your test results, renew your prescriptions, schedule appointments and more. To sign up, go to [email or web address] . Click on \"Log in\" on the left side of the screen, which will take you to the Welcome page. Then click on \"Sign up Now\" on the right side of the page.     You will be asked to enter the access code listed below, as well as some personal information. Please follow the directions to create your username and password.     Your access code is: [email or web address]  Expires: 10/11/2017 10:49 AM     Your access code will  in 90 days. If you need help or a new code, please call your [place] clinic or [phone].        Care EveryWhere ID     This is your Care EveryWhere ID. This could be used by other organizations to access your [place] medical records  [id number]         Blood Pressure from Last 3 Encounters:   17 130/60   17 136/70   17 108/72    Weight from Last 3 Encounters:   17 66.2 kg (146 lb)   17 65.3 kg (144 lb)   17 64 kg (141 lb)              We Performed the Following     HC PT EVAL, LOW COMPLEXITY     [facility] CERT REPORT     [facility] INITIAL EVAL REPORT     NEUROMUSCULAR RE-EDUCATION     THERAPEUTIC EXERCISES          Today's Medication Changes          These changes are accurate as of: 17 12:17 PM.  If you have any questions, ask your nurse or doctor.               These medicines have changed or have updated prescriptions.        Dose/Directions    carvedilol 6.25 MG tablet   Commonly known as:  COREG   This may have changed:  when to take this   Used for:  Paroxysmal atrial fibrillation (H), S/P CABG (coronary artery bypass graft), " Atrial flutter, paroxysmal (H), Benign essential hypertension        Dose:  6.25 mg   Take 1 tablet (6.25 mg) by mouth 2 times daily (with meals)   Quantity:  180 tablet   Refills:  3                Primary Care Provider Office Phone # Fax #    Concepcion Mccall [phone]       [facility] [address] [address]        Equal Access to Services     ARABELLA ALLEN : Hadii aad ku hadasho Soomaali, waaxda luqadaha, qaybta kaalmada adeegyada, waxay idiin hayaan adeeg khtatyanash la'aan ah. So [facility] [phone].    ATENCIÓN: Si habla español, tiene a martinez disposición servicios gratuitos de asistencia lingüística. Llame al [phone].    We comply with applicable federal civil rights laws and [place] laws. We do not discriminate on the basis of race, color, national origin, age, disability sex, sexual orientation or gender identity.            Thank you!     Thank you for choosing [facility]   for your care. Our goal is always to provide you with excellent care. Hearing back from our patients is one way we can continue to improve our services. Please take a few minutes to complete the written survey that you may receive in the mail after your visit with us. Thank you!             Your Updated Medication List - Protect others around you: Learn how to safely use, store and throw away your medicines at www.disposemymeds.org.          This list is accurate as of: 7/27/17 12:17 PM.  Always use your most recent med list.                   Brand Name Dispense Instructions for use Diagnosis    ASPIRIN PO      Take 81 mg by mouth        atorvastatin 10 MG tablet    LIPITOR    90 tablet    Take 1 tablet (10 mg) by mouth daily    Paroxysmal atrial fibrillation (H), S/P CABG (coronary artery bypass graft), Atrial flutter, paroxysmal (H), Benign essential hypertension       carvedilol 6.25 MG tablet    COREG    180 tablet    Take 1 tablet (6.25 mg) by mouth 2 times daily (with  meals)    Paroxysmal atrial fibrillation (H), S/P CABG (coronary artery bypass graft), Atrial flutter, paroxysmal (H), Benign essential hypertension       CLARITIN PO      Take by mouth as needed        lisinopril 2.5 MG tablet    PRINIVIL/Zestril    90 tablet    Take 2 tablets (5 mg) by mouth daily    Paroxysmal atrial fibrillation (H), S/P CABG (coronary artery bypass graft), Atrial flutter, paroxysmal (H), Benign essential hypertension       order for DME     1 each    Cane with prongs    Right knee pain, unspecified chronicity, Abnormality of gait       VITEYES COMPLETE PO           warfarin 5 MG tablet    COUMADIN    100 tablet    Take 1 tab daily or as directed by INR clinic    Atrial fibrillation, unspecified type (H)
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
## 2017-07-31 ENCOUNTER — TELEPHONE (OUTPATIENT)
Dept: INTERNAL MEDICINE | Facility: CLINIC | Age: 82
End: 2017-07-31

## 2017-08-01 ENCOUNTER — ANTICOAGULATION THERAPY VISIT (OUTPATIENT)
Dept: CARDIOLOGY | Facility: CLINIC | Age: 82
End: 2017-08-01
Payer: COMMERCIAL

## 2017-08-01 ENCOUNTER — THERAPY VISIT (OUTPATIENT)
Dept: PHYSICAL THERAPY | Facility: CLINIC | Age: 82
End: 2017-08-01
Payer: MEDICARE

## 2017-08-01 DIAGNOSIS — M54.2 CERVICALGIA: ICD-10-CM

## 2017-08-01 DIAGNOSIS — I48.91 ATRIAL FIBRILLATION, UNSPECIFIED TYPE (H): ICD-10-CM

## 2017-08-01 DIAGNOSIS — Z79.01 LONG-TERM (CURRENT) USE OF ANTICOAGULANTS: ICD-10-CM

## 2017-08-01 LAB — INR POINT OF CARE: 3 (ref 0.86–1.14)

## 2017-08-01 PROCEDURE — 97140 MANUAL THERAPY 1/> REGIONS: CPT | Mod: GP | Performed by: PHYSICAL THERAPIST

## 2017-08-01 PROCEDURE — 97112 NEUROMUSCULAR REEDUCATION: CPT | Mod: GP | Performed by: PHYSICAL THERAPIST

## 2017-08-01 PROCEDURE — 85610 PROTHROMBIN TIME: CPT | Mod: QW | Performed by: INTERNAL MEDICINE

## 2017-08-01 PROCEDURE — 36416 COLLJ CAPILLARY BLOOD SPEC: CPT | Performed by: INTERNAL MEDICINE

## 2017-08-01 PROCEDURE — 97110 THERAPEUTIC EXERCISES: CPT | Mod: GP | Performed by: PHYSICAL THERAPIST

## 2017-08-01 NOTE — PROGRESS NOTES
ANTICOAGULATION FOLLOW-UP CLINIC VISIT    Patient Name:  Veronica Bray  Date:  8/1/2017  Contact Type:  Face to Face    SUBJECTIVE:     Patient Findings     Positives No Problem Findings           OBJECTIVE    INR Protime   Date Value Ref Range Status   08/01/2017 3.0 (A) 0.86 - 1.14 Final       ASSESSMENT / PLAN  INR assessment THER    Recheck INR In: 3 WEEKS    INR Location Clinic      Anticoagulation Summary as of 8/1/2017     INR goal 2.0-3.0   Today's INR 3.0   Maintenance plan 7.5 mg (5 mg x 1.5) on Mon, Fri; 5 mg (5 mg x 1) all other days   Full instructions 7.5 mg on Mon, Fri; 5 mg all other days   Weekly total 40 mg   No change documented Larisa Monae RN   Plan last modified Larisa Monae RN (6/30/2017)   Next INR check 8/18/2017   Target end date Indefinite    Indications   Atrial fibrillation (H) [I48.91] [I48.91]  Long-term (current) use of anticoagulants [Z79.01] [Z79.01]         Anticoagulation Episode Summary     INR check location     Preferred lab     Send INR reminders to [facility] HEART INR NURSE    Comments       Anticoagulation Care Providers     Provider Role Specialty Phone number    Ip, Tr Morris Carrera MD Responsible Cardiology [phone]            See the Encounter Report to view Anticoagulation Flowsheet and Dosing Calendar (Go to Encounters tab in chart review, and find the Anticoagulation Therapy Visit)    INR 3.0.  She has been eating fewer salads recently but she is able to increase that amount.  Continue the higher schedule of 2 days of 7.5mg and recheck in 2-3 weeks before leaving [facility] for 1 week.  Mike Monae, ARMANDO

## 2017-08-01 NOTE — MR AVS SNAPSHOT
Veronica DANNY Bray   8/1/2017 11:00 AM   Anticoagulation Therapy Visit    Description:  86 year old female   Provider:   ANTICOAGULATION   Department:  [facility]           INR as of 8/1/2017     Today's INR 3.0      Anticoagulation Summary as of 8/1/2017     INR goal 2.0-3.0   Today's INR 3.0   Full instructions 7.5 mg on Mon, Fri; 5 mg all other days   Next INR check 8/18/2017    Indications   Atrial fibrillation (H) [I48.91] [I48.91]  Long-term (current) use of anticoagulants [Z79.01] [Z79.01]         Your next Anticoagulation Clinic appointment(s)     Aug 01, 2017 11:00 AM CDT   Anticoagulation Visit with  ANTICOAGULATION   [facility] ([facility])    [address]  [place] [address] [phone]            Aug 18, 2017 10:20 AM CDT   Anticoagulation Visit with  ANTICOAGULATION   [facility] ([facility])    [address]  [place] [id number] [phone]              August 2017 Details    Sun Mon Tue Wed Thu Fri Sat       1      5 mg   See details      2      5 mg         3      5 mg         4      7.5 mg         5      5 mg           6      5 mg         7      7.5 mg         8      5 mg         9      5 mg         10      5 mg         11      7.5 mg         12      5 mg           13      5 mg         14      7.5 mg         15      5 mg         16      5 mg         17      5 mg         18            19                 20               21               22               23               24               25               26                 27               28               29               30               31                  Date Details   08/01 This INR check       Date of next INR:  8/18/2017         How to take your warfarin dose     To take:  5 mg Take 1 of the 5 mg tablets.    To take:  7.5 mg Take 1.5 of the 5 mg tablets.

## 2017-08-03 ENCOUNTER — THERAPY VISIT (OUTPATIENT)
Dept: PHYSICAL THERAPY | Facility: CLINIC | Age: 82
End: 2017-08-03
Payer: MEDICARE

## 2017-08-03 DIAGNOSIS — M54.2 CERVICALGIA: ICD-10-CM

## 2017-08-03 PROCEDURE — 97140 MANUAL THERAPY 1/> REGIONS: CPT | Mod: GP | Performed by: PHYSICAL THERAPIST

## 2017-08-03 PROCEDURE — 97110 THERAPEUTIC EXERCISES: CPT | Mod: GP | Performed by: PHYSICAL THERAPIST

## 2017-08-03 PROCEDURE — 97530 THERAPEUTIC ACTIVITIES: CPT | Mod: GP | Performed by: PHYSICAL THERAPIST

## 2017-08-15 ENCOUNTER — THERAPY VISIT (OUTPATIENT)
Dept: PHYSICAL THERAPY | Facility: CLINIC | Age: 82
End: 2017-08-15
Payer: MEDICARE

## 2017-08-15 DIAGNOSIS — M54.2 CERVICALGIA: ICD-10-CM

## 2017-08-15 PROCEDURE — 97140 MANUAL THERAPY 1/> REGIONS: CPT | Mod: GP | Performed by: PHYSICAL THERAPIST

## 2017-08-15 PROCEDURE — 97110 THERAPEUTIC EXERCISES: CPT | Mod: GP | Performed by: PHYSICAL THERAPIST

## 2017-08-18 ENCOUNTER — ANTICOAGULATION THERAPY VISIT (OUTPATIENT)
Dept: CARDIOLOGY | Facility: CLINIC | Age: 82
End: 2017-08-18
Payer: COMMERCIAL

## 2017-08-18 DIAGNOSIS — Z79.01 LONG-TERM (CURRENT) USE OF ANTICOAGULANTS: ICD-10-CM

## 2017-08-18 DIAGNOSIS — I48.91 ATRIAL FIBRILLATION, UNSPECIFIED TYPE (H): ICD-10-CM

## 2017-08-18 LAB — INR POINT OF CARE: 2.3 (ref 0.86–1.14)

## 2017-08-18 PROCEDURE — 85610 PROTHROMBIN TIME: CPT | Mod: QW | Performed by: INTERNAL MEDICINE

## 2017-08-18 PROCEDURE — 36416 COLLJ CAPILLARY BLOOD SPEC: CPT | Performed by: INTERNAL MEDICINE

## 2017-08-18 NOTE — PROGRESS NOTES
ANTICOAGULATION FOLLOW-UP CLINIC VISIT    Patient Name:  Veronica Bray  Date:  8/18/2017  Contact Type:  Face to Face    SUBJECTIVE:     Patient Findings     Positives No Problem Findings           OBJECTIVE    INR Protime   Date Value Ref Range Status   08/18/2017 2.3 (A) 0.86 - 1.14 Final       ASSESSMENT / PLAN  INR assessment THER    Recheck INR In: 4 WEEKS    INR Location Clinic      Anticoagulation Summary as of 8/18/2017     INR goal 2.0-3.0   Today's INR 2.3   Maintenance plan 7.5 mg (5 mg x 1.5) on Mon, Fri; 5 mg (5 mg x 1) all other days   Full instructions 7.5 mg on Mon, Fri; 5 mg all other days   Weekly total 40 mg   No change documented Larisa Monae RN   Plan last modified Larisa Monae RN (6/30/2017)   Next INR check 9/15/2017   Target end date Indefinite    Indications   Atrial fibrillation (H) [I48.91] [I48.91]  Long-term (current) use of anticoagulants [Z79.01] [Z79.01]         Anticoagulation Episode Summary     INR check location     Preferred lab     Send INR reminders to [facility] HEART INR NURSE    Comments       Anticoagulation Care Providers     Provider Role Specialty Phone number    Ip, Tr Morris Carrera MD Responsible Cardiology [phone]            See the Encounter Report to view Anticoagulation Flowsheet and Dosing Calendar (Go to Encounters tab in chart review, and find the Anticoagulation Therapy Visit)    INR 2.3.  No changes.  Continue same schedule and recheck in 4 weeks.  Mike Monae RN

## 2017-08-18 NOTE — MR AVS SNAPSHOT
Veronica Bray   8/18/2017 10:20 AM   Anticoagulation Therapy Visit    Description:  86 year old female   Provider:   ANTICOAGULATION   Department:  [facility]           INR as of 8/18/2017     Today's INR 2.3      Anticoagulation Summary as of 8/18/2017     INR goal 2.0-3.0   Today's INR 2.3   Full instructions 7.5 mg on Mon, Fri; 5 mg all other days   Next INR check 9/15/2017    Indications   Atrial fibrillation (H) [I48.91] [I48.91]  Long-term (current) use of anticoagulants [Z79.01] [Z79.01]         Your next Anticoagulation Clinic appointment(s)     Aug 18, 2017 10:20 AM CDT   Anticoagulation Visit with  ANTICOAGULATION   [facility] ([facility])    [address]  [place] [id number] [phone]            Sep 15, 2017 10:20 AM CDT   Anticoagulation Visit with  ANTICOAGULATION   [facility] ([facility])    [address]  [place] [id number] [phone]              August 2017 Details    Sun Mon Tue Wed Thu Fri Sat       1               2               3               4               5                 6               7               8               9               10               11               12                 13               14               15               16               17               18      7.5 mg   See details      19      5 mg           20      5 mg         21      7.5 mg         22      5 mg         23      5 mg         24      5 mg         25      7.5 mg         26      5 mg           27      5 mg         28      7.5 mg         29      5 mg         30      5 mg         31      5 mg            Date Details   08/18 This INR check               How to take your warfarin dose     To take:  5 mg Take 1 of the 5 mg tablets.    To take:  7.5 mg Take 1.5 of the 5 mg tablets.           September 2017 Details    Sun Mon Tue Wed Thu Fri Sat           1      7.5 mg         2      5 mg           3      5 mg         4      7.5 mg         5      5 mg         6      5 mg         7      5 mg         8      7.5 mg         9      5 mg           10      5 mg         11      7.5 mg         12      5 mg         13      5 mg         14      5 mg         15            16                 17               18               19               20               21               22               23                 24               25               26               27               28               29               30                Date Details   No additional details    Date of next INR:  9/15/2017         How to take your warfarin dose     To take:  5 mg Take 1 of the 5 mg tablets.    To take:  7.5 mg Take 1.5 of the 5 mg tablets.

## 2017-08-24 ENCOUNTER — PRE VISIT (OUTPATIENT)
Dept: CARDIOLOGY | Facility: CLINIC | Age: 82
End: 2017-08-24

## 2017-08-28 ENCOUNTER — OFFICE VISIT (OUTPATIENT)
Dept: CARDIOLOGY | Facility: CLINIC | Age: 82
End: 2017-08-28
Attending: INTERNAL MEDICINE
Payer: COMMERCIAL

## 2017-08-28 VITALS
DIASTOLIC BLOOD PRESSURE: 66 MMHG | BODY MASS INDEX: 27.58 KG/M2 | HEIGHT: 62 IN | WEIGHT: 149.9 LBS | SYSTOLIC BLOOD PRESSURE: 134 MMHG | HEART RATE: 60 BPM

## 2017-08-28 DIAGNOSIS — Z95.1 S/P CABG (CORONARY ARTERY BYPASS GRAFT): ICD-10-CM

## 2017-08-28 DIAGNOSIS — I48.0 PAROXYSMAL ATRIAL FIBRILLATION (H): ICD-10-CM

## 2017-08-28 PROCEDURE — 99214 OFFICE O/P EST MOD 30 MIN: CPT | Performed by: INTERNAL MEDICINE

## 2017-08-28 RX ORDER — LISINOPRIL 5 MG/1
5 TABLET ORAL DAILY
COMMUNITY
End: 2017-09-12

## 2017-08-28 RX ORDER — ANTIOX #8/OM3/DHA/EPA/LUT/ZEAX 250-2.5 MG
1 CAPSULE ORAL 2 TIMES DAILY
COMMUNITY

## 2017-08-28 NOTE — PROGRESS NOTES
HPI and Plan:   See dictation    Orders Placed This Encounter   Procedures     Follow-Up with Cardiologist       Orders Placed This Encounter   Medications     lisinopril (PRINIVIL/ZESTRIL) 5 MG tablet     Sig: Take 5 mg by mouth daily     Dextromethorphan-Guaifenesin (MUCINEX DM MAXIMUM STRENGTH PO)     Sig: Take by mouth as needed     Multiple Vitamins-Minerals (PRESERVISION AREDS 2) CAPS     Sig: Take by mouth 2 times daily       Encounter Diagnoses   Name Primary?     Paroxysmal atrial fibrillation (H)      S/P CABG (coronary artery bypass graft)        CURRENT MEDICATIONS:  Current Outpatient Prescriptions   Medication Sig Dispense Refill     lisinopril (PRINIVIL/ZESTRIL) 5 MG tablet Take 5 mg by mouth daily       Dextromethorphan-Guaifenesin (MUCINEX DM MAXIMUM STRENGTH PO) Take by mouth as needed       Multiple Vitamins-Minerals (PRESERVISION AREDS 2) CAPS Take by mouth 2 times daily       warfarin (COUMADIN) 5 MG tablet Take 1 tab daily or as directed by INR clinic 100 tablet 1     carvedilol (COREG) 6.25 MG tablet Take 1 tablet (6.25 mg) by mouth 2 times daily (with meals) 180 tablet 3     Loratadine (CLARITIN PO) Take by mouth as needed        atorvastatin (LIPITOR) 10 MG tablet Take 1 tablet (10 mg) by mouth daily 90 tablet 3     ASPIRIN PO Take 81 mg by mouth every other day        [DISCONTINUED] lisinopril (PRINIVIL/ZESTRIL) 2.5 MG tablet Take 2 tablets (5 mg) by mouth daily 90 tablet 1       ALLERGIES     Allergies   Allergen Reactions     Aleve [Naproxen] Anaphylaxis     Celebrex [Celecoxib]      Codeine      Demerol [Meperidine]      Hydromorphone      Percocet [Oxycodone-Acetaminophen]      Tramadol      Vicodin [Hydrocodone-Acetaminophen]        PAST MEDICAL HISTORY:  Past Medical History:   Diagnosis Date     Atrial fibrillation (H)      Atrial flutter (H)      CAD (coronary artery disease)     LIMA graft to the LAD and vein graft to the diagonal are patent.      Cardiomyopathy, unspecified (H)      tachy induced     Hyperlipidemia      Hypertension      LBBB (left bundle branch block)      Mitral regurgitation      NSTEMI (non-ST elevated myocardial infarction) (H)      PAF (paroxysmal atrial fibrillation) (H)     converted on Amiodarone     Sleep apnea     CPAP     SVT (supraventricular tachycardia) (H)      Tobacco use        PAST SURGICAL HISTORY:  Past Surgical History:   Procedure Laterality Date     CORONARY ANGIOGRAPHY ADULT ORDER  1/2005    Stent: Distal cfx, Mid LAD, Stent: D -1     HC LEFT HEART CATHETERIZATION  11/2006    mid distal-anterior/ distal inferior/ Apical Yossi, 85     HC LEFT HEART CATHETERIZATION  5/2015    100% Proximal LAD, patent LIMA-LAD, SVG-D1, LVEDP 13, 10-18 mmHg AV gradient, 3+ MR after PVC       FAMILY HISTORY:  Family History   Problem Relation Age of Onset     Unknown/Adopted No family hx of        SOCIAL HISTORY:  Social History     Social History     Marital status:      Spouse name: N/A     Number of children: N/A     Years of education: N/A     Social History Main Topics     Smoking status: Former Smoker     Smokeless tobacco: None      Comment: quit approx 1967      Alcohol use 0.0 oz/week     0 Standard drinks or equivalent per week      Comment: social     Drug use: No     Sexual activity: Yes     Partners: Male     Other Topics Concern     Caffeine Concern No     Special Diet No     Exercise No     Social History Narrative       Review of Systems:  Skin:  Negative     Eyes:  Positive for glasses  ENT:  Positive for sinus trouble;postnasal drainage  Respiratory:  Positive for dyspnea on exertion;sleep apnea  Cardiovascular:    Positive for;palpitations;lightheadedness  Gastroenterology:      Genitourinary:  Negative    Musculoskeletal:    back pain;arthritis;joint pain  Neurologic:  Positive for numbness or tingling of feet  Psychiatric:  Negative    Heme/Lymph/Imm:  Positive for allergies;easy bruising  Endocrine:  Negative      Physical Exam:  Vitals: BP  "134/66 (BP Location: Right arm, Patient Position: Sitting, Cuff Size: Adult Regular)  Pulse 60  Ht 1.575 m (5' 2\")  Wt 68 kg (149 lb 14.4 oz)  BMI 27.42 kg/m2    Constitutional:  cooperative, alert and oriented, well developed, well nourished, in no acute distress        Skin:  warm and dry to the touch, no apparent skin lesions or masses noted        Head:  normocephalic, no masses or lesions        Eyes:  pupils equal and round, conjunctivae and lids unremarkable, sclera white, no xanthalasma, EOMS intact, no nystagmus        ENT:  no pallor or cyanosis, dentition good        Neck:  carotid pulses are full and equal bilaterally, JVP normal, no carotid bruit, no thyromegaly        Chest:  normal breath sounds, clear to auscultation, normal A-P diameter, normal symmetry, normal respiratory excursion, no use of accessory muscles;healed median sternotomy scar        Cardiac: regular rhythm, normal S1/S2, no S3 or S4, apical impulse not displaced, no murmurs, gallops or rubs                  Abdomen:  abdomen soft, non-tender, BS normoactive, no mass, no HSM, no bruits        Vascular: pulses full and equal, no bruits auscultated                                      Extremities and Back:  no deformities, clubbing, cyanosis, erythema observed        Neurological:  affect appropriate, oriented to time, person and place          Recent Lab Results:  LIPID RESULTS:  Lab Results   Component Value Date    CHOL 177 07/08/2017    HDL 70 07/08/2017    LDL 83 07/08/2017    TRIG 122 07/08/2017       LIVER ENZYME RESULTS:  Lab Results   Component Value Date    AST 21 07/08/2017    ALT 27 07/08/2017       CBC RESULTS:  Lab Results   Component Value Date    WBC 3.6 (L) 07/13/2017    RBC 4.39 07/13/2017    HGB 13.2 07/13/2017    HCT 41.0 07/13/2017    MCV 93 07/13/2017    MCH 30.1 07/13/2017    MCHC 32.2 07/13/2017    RDW 13.3 07/13/2017     (L) 07/13/2017       BMP RESULTS:  Lab Results   Component Value Date     " 07/08/2017    POTASSIUM 4.0 07/08/2017    CHLORIDE 106 07/08/2017    CO2 29 07/08/2017    ANIONGAP 7 07/08/2017    GLC 90 07/08/2017    BUN 14 07/08/2017    CR 0.60 07/08/2017    GFRESTIMATED >90  Non  GFR Calc   07/08/2017    GFRESTBLACK >90   GFR Calc   07/08/2017    BARRETT 8.3 (L) 07/08/2017        A1C RESULTS:  No results found for: A1C    INR RESULTS:  Lab Results   Component Value Date    INR 2.3 (A) 08/18/2017    INR 3.0 (A) 08/01/2017           CC  Tr Benavides MD  [address]  [place] [id number]

## 2017-08-28 NOTE — LETTER
8/28/2017    Concepcion Mccall MD  [address] [facility] [address]    RE: Veronica Bray       Dear Colleague,    I had the pleasure of seeing Veronica Bray in the [facility].    It is a pleasure for me to see Mrs. Bray who is here for a followup of paroxysmal atrial fibrillation as well as coronary artery disease.  Her history has been described quite extensively.  Please refer to my note from 01/2017.        At this time she describes no chest pains, shortness of breath, PND, orthopnea or palpitations.  She also has no bleeding.  She is tolerating warfarin well.  Cardiac examination is negative for congestive heart failure.     Outpatient Encounter Prescriptions as of 8/28/2017   Medication Sig Dispense Refill     lisinopril (PRINIVIL/ZESTRIL) 5 MG tablet Take 5 mg by mouth daily       Dextromethorphan-Guaifenesin (MUCINEX DM MAXIMUM STRENGTH PO) Take by mouth as needed       Multiple Vitamins-Minerals (PRESERVISION AREDS 2) CAPS Take by mouth 2 times daily       warfarin (COUMADIN) 5 MG tablet Take 1 tab daily or as directed by INR clinic 100 tablet 1     carvedilol (COREG) 6.25 MG tablet Take 1 tablet (6.25 mg) by mouth 2 times daily (with meals) 180 tablet 3     Loratadine (CLARITIN PO) Take by mouth as needed        atorvastatin (LIPITOR) 10 MG tablet Take 1 tablet (10 mg) by mouth daily 90 tablet 3     ASPIRIN PO Take 81 mg by mouth every other day        [DISCONTINUED] order for DME Cane with prongs (Patient not taking: Reported on 8/28/2017) 1 each 0     [DISCONTINUED] lisinopril (PRINIVIL/ZESTRIL) 2.5 MG tablet Take 2 tablets (5 mg) by mouth daily 90 tablet 1     [DISCONTINUED] Multiple Vitamins-Minerals (VITEYES COMPLETE PO)        No facility-administered encounter medications on file as of 8/28/2017.       IMPRESSION AND PLAN:    1.  Paroxysmal atrial fibrillation.  Continue on warfarin.   2.  Coronary artery disease.  History of bypass surgery with LIMA  graft to the LAD and vein graft to the first diagonal, stable with no symptoms of angina.   3.  Atrial flutter, status post ablation in 2009.   4.  Valvular heart disease not hemodynamically significant at this time.   5.  Hypertension, good control.   6.  Sleep apnea on CPAP.   7.  Dyslipidemia, on low-dose statin with satisfactory numbers.      I will followup in a year's time.     Again, thank you for allowing me to participate in the care of your patient.      Sincerely,    DR MOOK JIANG MD     [facility]

## 2017-08-28 NOTE — PROGRESS NOTES
HISTORY OF PRESENT ILLNESS:  It is a pleasure for me to see Mrs. Stauffer who is here for a followup of paroxysmal atrial fibrillation as well as coronary artery disease.  Her history has been described quite extensively.  Please refer to my note from 2017.        At this time she describes no chest pains, shortness of breath, PND, orthopnea or palpitations.  She also has no bleeding.  She is tolerating warfarin well.  Cardiac examination is negative for congestive heart failure.      IMPRESSION AND PLAN:    1.  Paroxysmal atrial fibrillation.  Continue on warfarin.   2.  Coronary artery disease.  History of bypass surgery with LIMA graft to the LAD and vein graft to the first diagonal, stable with no symptoms of angina.   3.  Atrial flutter, status post ablation in .   4.  Valvular heart disease not hemodynamically significant at this time.   5.  Hypertension, good control.   6.  Sleep apnea on CPAP.   7.  Dyslipidemia, on low-dose statin with satisfactory numbers.      I will followup in a year's time.         MOOK JIANG MD, [facility]             D: 2017 11:53   T: 2017 12:05   MT: INDU      Name:     MICHELLE STAUFFER   MRN:      [id number]        Account:      [id number]   :      1931           Service Date: 2017      Document: [id number]

## 2017-08-28 NOTE — MR AVS SNAPSHOT
After Visit Summary   8/28/2017    Veronica Bray    MRN: [id number]           Patient Information     Date Of Birth          5/31/1931        Visit Information        Provider Department      8/28/2017 10:45 AM Ip, Tr Carrera MD [facility]        Today's Diagnoses     Paroxysmal atrial fibrillation (H)        S/P CABG (coronary artery bypass graft)           Follow-ups after your visit        Additional Services     Follow-Up with Cardiologist                 Your next 10 appointments already scheduled     Aug 29, 2017 12:50 PM CDT   [facility] Spine with Mari Lainez, PT   [facility] AYAAN [facility] PT ([facility]  )    [id number] [place] [address]  [address]  [facility] [address]   [phone]            Sep 05, 2017  2:50 PM CDT   [facility] Spine with Mari Lainez, PT   [facility] ([facility]    [address] [place] [address]  [address]  [facility] [address]   [phone]            Sep 15, 2017 10:20 AM CDT   Anticoagulation Visit with RU ANTICOAGULATION   [facility] ([facility])    [address]  [facility] [id number] [phone]            Dec 05, 2017  9:00 AM CST   PHYSICAL with Concepcion Mccall MD   [facility]    [address]  [facility] [id number] [phone]              Future tests that were ordered for you today     Open Future Orders        Priority Expected Expires Ordered    Follow-Up with Cardiologist Routine 8/28/2018 8/29/2018 8/28/2017            Who to contact     If you have questions or need follow up information about today's clinic visit or your schedule please contact [facility] directly at [phone].  Normal or non-critical lab and imaging results will be communicated to you by MyChart, letter or phone within 4 business days after the clinic has received the results. If  "you do not hear from us within 7 days, please contact the clinic through SwapMob or phone. If you have a critical or abnormal lab result, we will notify you by phone as soon as possible.  Submit refill requests through SwapMob or call your pharmacy and they will forward the refill request to us. Please allow 3 business days for your refill to be completed.          Additional Information About Your Visit        [facility] Information     SwapMob lets you send messages to your doctor, view your test results, renew your prescriptions, schedule appointments and more. To sign up, go to [email or web address] . Click on \"Log in\" on the left side of the screen, which will take you to the Welcome page. Then click on \"Sign up Now\" on the right side of the page.     You will be asked to enter the access code listed below, as well as some personal information. Please follow the directions to create your username and password.     Your access code is: [email or web address]  Expires: 10/11/2017 10:49 AM     Your access code will  in 90 days. If you need help or a new code, please call your [place] clinic or [phone].        Care EveryWhere ID     This is your Care EveryWhere ID. This could be used by other organizations to access your [place] medical records  [id number]        Your Vitals Were     Pulse Height BMI (Body Mass Index)             60 1.575 m (5' 2\") 27.42 kg/m2          Blood Pressure from Last 3 Encounters:   17 134/66   17 130/60   17 136/70    Weight from Last 3 Encounters:   17 68 kg (149 lb 14.4 oz)   17 66.2 kg (146 lb)   17 65.3 kg (144 lb)              We Performed the Following     Follow-Up with Cardiologist          Today's Medication Changes          These changes are accurate as of: 17 12:25 PM.  If you have any questions, ask your nurse or doctor.               These medicines have changed or have updated prescriptions.        Dose/Directions    lisinopril 5 " MG tablet   Commonly known as:  PRINIVIL/ZESTRIL   This may have changed:  Another medication with the same name was removed. Continue taking this medication, and follow the directions you see here.   Changed by:  Tr Benavides MD        Dose:  5 mg   Take 5 mg by mouth daily   Refills:  0                Primary Care Provider Office Phone # Fax #    Concepcion Mccall [phone]       [address] [facility] [address]        Equal Access to Services     [facility] : Hadii aad ku hadasho Soomaali, waaxda luqadaha, qaybta kaalmada adeegyada, waxay idiin hayaan adeeg kharash la'hiren . So [facility] [phone].    ATENCIÓN: Si habla español, tiene a martinez disposición servicios gratuitos de asistencia lingüística. [facility] [phone].    We comply with applicable federal civil rights laws and [place] laws. We do not discriminate on the basis of race, color, national origin, age, disability sex, sexual orientation or gender identity.            Thank you!     Thank you for choosing [facility] [place]  for your care. Our goal is always to provide you with excellent care. Hearing back from our patients is one way we can continue to improve our services. Please take a few minutes to complete the written survey that you may receive in the mail after your visit with us. Thank you!             Your Updated Medication List - Protect others around you: Learn how to safely use, store and throw away your medicines at www.disposemymeds.org.          This list is accurate as of: 8/28/17 12:25 PM.  Always use your most recent med list.                   Brand Name Dispense Instructions for use Diagnosis    ASPIRIN PO      Take 81 mg by mouth every other day        atorvastatin 10 MG tablet    LIPITOR    90 tablet    Take 1 tablet (10 mg) by mouth daily    Paroxysmal atrial fibrillation (H), S/P CABG (coronary artery bypass graft), Atrial flutter, paroxysmal (H), Benign  essential hypertension       carvedilol 6.25 MG tablet    COREG    180 tablet    Take 1 tablet (6.25 mg) by mouth 2 times daily (with meals)    Paroxysmal atrial fibrillation (H), S/P CABG (coronary artery bypass graft), Atrial flutter, paroxysmal (H), Benign essential hypertension       CLARITIN PO      Take by mouth as needed        lisinopril 5 MG tablet    PRINIVIL/ZESTRIL     Take 5 mg by mouth daily        MUCINEX DM MAXIMUM STRENGTH PO      Take by mouth as needed        PRESERVISION AREDS 2 Caps      Take by mouth 2 times daily        warfarin 5 MG tablet    COUMADIN    100 tablet    Take 1 tab daily or as directed by INR clinic    Atrial fibrillation, unspecified type (H)

## 2017-08-29 ENCOUNTER — THERAPY VISIT (OUTPATIENT)
Dept: PHYSICAL THERAPY | Facility: CLINIC | Age: 82
End: 2017-08-29
Payer: MEDICARE

## 2017-08-29 DIAGNOSIS — M54.2 CERVICALGIA: ICD-10-CM

## 2017-08-29 DIAGNOSIS — M54.59 MECHANICAL LOW BACK PAIN: Primary | ICD-10-CM

## 2017-08-29 PROCEDURE — G8979 MOBILITY GOAL STATUS: HCPCS | Mod: GP | Performed by: PHYSICAL THERAPIST

## 2017-08-29 PROCEDURE — 97161 PT EVAL LOW COMPLEX 20 MIN: CPT | Mod: GP | Performed by: PHYSICAL THERAPIST

## 2017-08-29 PROCEDURE — 97110 THERAPEUTIC EXERCISES: CPT | Mod: GP | Performed by: PHYSICAL THERAPIST

## 2017-08-29 PROCEDURE — G8978 MOBILITY CURRENT STATUS: HCPCS | Mod: GP | Performed by: PHYSICAL THERAPIST
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
## 2017-08-29 NOTE — MR AVS SNAPSHOT
After Visit Summary   8/29/2017    Veronica Bray    MRN: [id number]           Patient Information     Date Of Birth          5/31/1931        Visit Information        Provider Department      8/29/2017 12:50 PM Mari Lainez, PT [facility]        Today's Diagnoses     Mechanical low back pain    -  1    Cervicalgia           Follow-ups after your visit        Your next 10 appointments already scheduled     Sep 05, 2017  2:50 PM CDT   LISETH Spine with Mari Lainez PT   LISETH CAMPOS PT ([facility]  )    [address]  [address]  [facility] [address]   [phone]            Sep 15, 2017 10:20 AM CDT   Anticoagulation Visit with RU ANTICOAGULATION   [facility] [place] ([facility])    [address]  [facility] [id number] [phone]            Dec 05, 2017  9:00 AM CST   PHYSICAL with Concepcion Mccall MD   [facility] ([facility])    [address] [place]  [facility] [id number] [phone]              Who to contact     If you have questions or need follow up information about today's clinic visit or your schedule please contact [facility] directly at [phone].  Normal or non-critical lab and imaging results will be communicated to you by Cumuluxhart, letter or phone within 4 business days after the clinic has received the results. If you do not hear from us within 7 days, please contact the clinic through MyChart or phone. If you have a critical or abnormal lab result, we will notify you by phone as soon as possible.  Submit refill requests through Eyevensys or call your pharmacy and they will forward the refill request to us. Please allow 3 business days for your refill to be completed.          Additional Information About Your Visit        CumuluxharPCS Edventures Information     Eyevensys lets you send messages to your doctor, view your test results, renew your prescriptions, schedule  "appointments and more. To sign up, go to [email or web address] . Click on \"Log in\" on the left side of the screen, which will take you to the Welcome page. Then click on \"Sign up Now\" on the right side of the page.     You will be asked to enter the access code listed below, as well as some personal information. Please follow the directions to create your username and password.     Your access code is: [email or web address]  Expires: 10/11/2017 10:49 AM     Your access code will  in 90 days. If you need help or a new code, please call your [place] clinic or [phone].        Care EveryWhere ID     This is your Care EveryWhere ID. This could be used by other organizations to access your [place] medical records  [id number]         Blood Pressure from Last 3 Encounters:   17 134/66   17 130/60   17 136/70    Weight from Last 3 Encounters:   17 68 kg (149 lb 14.4 oz)   17 66.2 kg (146 lb)   17 65.3 kg (144 lb)              We Performed the Following     HC PT EVAL, LOW COMPLEXITY     [facility] CERT REPORT     THERAPEUTIC EXERCISES        Primary Care Provider Office Phone # Fax #    Concepcion Mccall [phone]       [address] [facility] [address]        Equal Access to Services     [facility]: Hadii aad ku hadasho Soomaali, waaxda luqadaha, qaybta kaalmada adeegyada, waxay merced manzano . So [facility] [phone].    ATENCIÓN: Si habla español, tiene a martinez disposición servicios gratuitos de asistencia lingüística. Llame al [phone].    We comply with applicable federal civil rights laws and [place] laws. We do not discriminate on the basis of race, color, national origin, age, disability sex, sexual orientation or gender identity.            Thank you!     Thank you for choosing [facility]  for your care. Our goal is always to provide you with excellent care. Hearing back from our patients is one way we can continue to " improve our services. Please take a few minutes to complete the written survey that you may receive in the mail after your visit with us. Thank you!             Your Updated Medication List - Protect others around you: Learn how to safely use, store and throw away your medicines at www.disposemymeds.org.          This list is accurate as of: 8/29/17 11:59 PM.  Always use your most recent med list.                   Brand Name Dispense Instructions for use Diagnosis    ASPIRIN PO      Take 81 mg by mouth every other day        atorvastatin 10 MG tablet    LIPITOR    90 tablet    Take 1 tablet (10 mg) by mouth daily    Paroxysmal atrial fibrillation (H), S/P CABG (coronary artery bypass graft), Atrial flutter, paroxysmal (H), Benign essential hypertension       carvedilol 6.25 MG tablet    COREG    180 tablet    Take 1 tablet (6.25 mg) by mouth 2 times daily (with meals)    Paroxysmal atrial fibrillation (H), S/P CABG (coronary artery bypass graft), Atrial flutter, paroxysmal (H), Benign essential hypertension       CLARITIN PO      Take by mouth as needed        lisinopril 5 MG tablet    PRINIVIL/ZESTRIL     Take 5 mg by mouth daily        MUCINEX DM MAXIMUM STRENGTH PO      Take by mouth as needed        PRESERVISION AREDS 2 Caps      Take by mouth 2 times daily        warfarin 5 MG tablet    COUMADIN    100 tablet    Take 1 tab daily or as directed by INR clinic    Atrial fibrillation, unspecified type (H)

## 2017-08-30 PROBLEM — M54.59 MECHANICAL LOW BACK PAIN: Status: ACTIVE | Noted: 2017-08-30
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
## 2017-09-05 ENCOUNTER — THERAPY VISIT (OUTPATIENT)
Dept: PHYSICAL THERAPY | Facility: CLINIC | Age: 82
End: 2017-09-05
Payer: MEDICARE

## 2017-09-05 DIAGNOSIS — M54.2 CERVICALGIA: ICD-10-CM

## 2017-09-05 DIAGNOSIS — M54.59 MECHANICAL LOW BACK PAIN: ICD-10-CM

## 2017-09-05 PROCEDURE — 97110 THERAPEUTIC EXERCISES: CPT | Mod: GP | Performed by: PHYSICAL THERAPIST

## 2017-09-05 PROCEDURE — 97140 MANUAL THERAPY 1/> REGIONS: CPT | Mod: GP | Performed by: PHYSICAL THERAPIST

## 2017-09-11 ENCOUNTER — TELEPHONE (OUTPATIENT)
Dept: INTERNAL MEDICINE | Facility: CLINIC | Age: 82
End: 2017-09-11

## 2017-09-11 NOTE — TELEPHONE ENCOUNTER
[facility]  PT  Rehab updated plan of progress  ED's office  Fax back

## 2017-09-12 DIAGNOSIS — I10 BENIGN ESSENTIAL HYPERTENSION: Primary | ICD-10-CM

## 2017-09-12 RX ORDER — LISINOPRIL 5 MG/1
5 TABLET ORAL DAILY
Qty: 90 TABLET | Refills: 3 | Status: SHIPPED | OUTPATIENT
Start: 2017-09-12 | End: 2018-01-02

## 2017-09-14 DIAGNOSIS — I10 BENIGN ESSENTIAL HYPERTENSION: ICD-10-CM

## 2017-09-15 ENCOUNTER — ANTICOAGULATION THERAPY VISIT (OUTPATIENT)
Dept: CARDIOLOGY | Facility: CLINIC | Age: 82
End: 2017-09-15
Payer: COMMERCIAL

## 2017-09-15 DIAGNOSIS — I48.91 ATRIAL FIBRILLATION, UNSPECIFIED TYPE (H): ICD-10-CM

## 2017-09-15 DIAGNOSIS — Z79.01 LONG-TERM (CURRENT) USE OF ANTICOAGULANTS: ICD-10-CM

## 2017-09-15 LAB — INR POINT OF CARE: 3 (ref 0.86–1.14)

## 2017-09-15 PROCEDURE — 36416 COLLJ CAPILLARY BLOOD SPEC: CPT | Performed by: INTERNAL MEDICINE

## 2017-09-15 PROCEDURE — 85610 PROTHROMBIN TIME: CPT | Mod: QW | Performed by: INTERNAL MEDICINE

## 2017-09-15 NOTE — MR AVS SNAPSHOT
Veronica DANNY Bray   9/15/2017 10:20 AM   Anticoagulation Therapy Visit    Description:  86 year old female   Provider:  [facility]   Department:  [facility]           INR as of 9/15/2017     Today's INR 3.0      Anticoagulation Summary as of 9/15/2017     INR goal 2.0-3.0   Today's INR 3.0   Full instructions 7.5 mg on Mon, Fri; 5 mg all other days   Next INR check 10/13/2017    Indications   Atrial fibrillation (H) [I48.91] [I48.91]  Long-term (current) use of anticoagulants [Z79.01] [Z79.01]         Your next Anticoagulation Clinic appointment(s)     Oct 13, 2017 10:20 AM CDT   Anticoagulation Visit with RU ANTICOAGULATION   [facility] ([facility])    [address] [address] [address] [phone]              September 2017 Details    Sun Mon Tue Wed Thu Fri Sat          1               2                 3               4               5               6               7               8               9                 10               11               12               13               14               15      7.5 mg   See details      16      5 mg           17      5 mg         18      7.5 mg         19      5 mg         20      5 mg         21      5 mg         22      7.5 mg         23      5 mg           24      5 mg         25      7.5 mg         26      5 mg         27      5 mg         28      5 mg         29      7.5 mg         30      5 mg          Date Details   09/15 This INR check               How to take your warfarin dose     To take:  5 mg Take 1 of the 5 mg tablets.    To take:  7.5 mg Take 1.5 of the 5 mg tablets.           October 2017 Details    Sun Mon Tue Wed Thu Fri Sat     1      5 mg         2      7.5 mg         3      5 mg         4      5 mg         5      5 mg         6      7.5 mg         7      5 mg           8      5 mg         9      7.5 mg         10      5 mg         11      5 mg         12       5 mg         13            14                 15               16               17               18               19               20               21                 22               23               24               25               26               27               28                 29               30               31                    Date Details   No additional details    Date of next INR:  10/13/2017         How to take your warfarin dose     To take:  5 mg Take 1 of the 5 mg tablets.    To take:  7.5 mg Take 1.5 of the 5 mg tablets.

## 2017-09-15 NOTE — PROGRESS NOTES
ANTICOAGULATION FOLLOW-UP CLINIC VISIT    Patient Name:  Veronica Bray  Date:  9/15/2017  Contact Type:  Face to Face    SUBJECTIVE:     Patient Findings     Positives No Problem Findings           OBJECTIVE    INR Protime   Date Value Ref Range Status   09/15/2017 3.0 (A) 0.86 - 1.14 Final       ASSESSMENT / PLAN  INR assessment THER    Recheck INR In: 4 WEEKS    INR Location Clinic      Anticoagulation Summary as of 9/15/2017     INR goal 2.0-3.0   Today's INR 3.0   Maintenance plan 7.5 mg (5 mg x 1.5) on Mon, Fri; 5 mg (5 mg x 1) all other days   Full instructions 7.5 mg on Mon, Fri; 5 mg all other days   Weekly total 40 mg   No change documented Larisa Monae RN   Plan last modified Larisa Monae RN (6/30/2017)   Next INR check 10/13/2017   Target end date Indefinite    Indications   Atrial fibrillation (H) [I48.91] [I48.91]  Long-term (current) use of anticoagulants [Z79.01] [Z79.01]         Anticoagulation Episode Summary     INR check location     Preferred lab     Send INR reminders to [facility] HEART INR NURSE    Comments       Anticoagulation Care Providers     Provider Role Specialty Phone number    Ip, Tr Morris Carrera MD Responsible Cardiology [phone]            See the Encounter Report to view Anticoagulation Flowsheet and Dosing Calendar (Go to Encounters tab in chart review, and find the Anticoagulation Therapy Visit)    INR 3.0.  She is still doing rehab for her neck pain but no increased amount of tylenol.  Continue same schedule and recheck in 4 weeks.  She said the amount of greens will continue throughout the season change.  Mike Monae RN

## 2017-09-19 ENCOUNTER — THERAPY VISIT (OUTPATIENT)
Dept: PHYSICAL THERAPY | Facility: CLINIC | Age: 82
End: 2017-09-19
Payer: MEDICARE

## 2017-09-19 DIAGNOSIS — M54.2 CERVICALGIA: ICD-10-CM

## 2017-09-19 DIAGNOSIS — M54.59 MECHANICAL LOW BACK PAIN: ICD-10-CM

## 2017-09-19 PROCEDURE — 97530 THERAPEUTIC ACTIVITIES: CPT | Mod: GP | Performed by: PHYSICAL THERAPIST

## 2017-09-19 PROCEDURE — G8986 CARRY D/C STATUS: HCPCS | Mod: GP | Performed by: PHYSICAL THERAPIST

## 2017-09-19 PROCEDURE — 97110 THERAPEUTIC EXERCISES: CPT | Mod: GP | Performed by: PHYSICAL THERAPIST

## 2017-09-19 PROCEDURE — G8985 CARRY GOAL STATUS: HCPCS | Mod: GP | Performed by: PHYSICAL THERAPIST

## 2017-10-03 ENCOUNTER — THERAPY VISIT (OUTPATIENT)
Dept: PHYSICAL THERAPY | Facility: CLINIC | Age: 82
End: 2017-10-03
Payer: MEDICARE

## 2017-10-03 DIAGNOSIS — M54.59 MECHANICAL LOW BACK PAIN: ICD-10-CM

## 2017-10-03 DIAGNOSIS — M54.2 CERVICALGIA: ICD-10-CM

## 2017-10-03 PROCEDURE — 97110 THERAPEUTIC EXERCISES: CPT | Mod: GP | Performed by: PHYSICAL THERAPIST

## 2017-10-03 PROCEDURE — G8978 MOBILITY CURRENT STATUS: HCPCS | Mod: GP | Performed by: PHYSICAL THERAPIST

## 2017-10-03 PROCEDURE — G8980 MOBILITY D/C STATUS: HCPCS | Mod: GP | Performed by: PHYSICAL THERAPIST

## 2017-10-03 PROCEDURE — 97140 MANUAL THERAPY 1/> REGIONS: CPT | Mod: GP | Performed by: PHYSICAL THERAPIST

## 2017-10-03 PROCEDURE — G8979 MOBILITY GOAL STATUS: HCPCS | Mod: GP | Performed by: PHYSICAL THERAPIST

## 2017-10-03 PROCEDURE — 97112 NEUROMUSCULAR REEDUCATION: CPT | Mod: GP | Performed by: PHYSICAL THERAPIST

## 2017-10-03 NOTE — PROGRESS NOTES
Subjective:    HPI                    Objective:    System    Physical Exam    General     ROS    Assessment/Plan:      DISCHARGE REPORT    Progress reporting period is from 7-27-17 to 10-3-17.       SUBJECTIVE  Subjective changes noted by patient:  .  Subjective: Feels she is 85% better overall; increased cervical ROM and lumbar mobility.  Able to do all her regular ADL's and able to go upstairs with reciprocal gait.     Current pain level is 1/10 Current Pain level: 1/10.     Previous pain level was  6/10 Initial Pain level: 6/10.   Changes in function:  Yes (See Goal flowsheet attached for changes in current functional level)  Adverse reaction to treatment or activity: None    OBJECTIVE  Changes noted in objective findings:  The objective findings below are from DOS 10-3-17.  Objective: Cx ROM: L Rot=min loss, Ext=min loss, Lx ROM: flex=min loss, decreased curve reversal, Ext=min loss;      ASSESSMENT/PLAN  Updated problem list and treatment plan: Diagnosis 1:  cervicalgia  Pain -  manual therapy, self management, education, directional preference exercise and home program  Decreased ROM/flexibility - manual therapy and therapeutic exercise  Decreased joint mobility - manual therapy and therapeutic exercise  Decreased strength - therapeutic exercise and therapeutic activities  Impaired muscle performance - neuro re-education  Decreased function - therapeutic activities  Diagnosis 2:  Mechanical low back pain   Pain -  self management, education, directional preference exercise and home program  Decreased ROM/flexibility - manual therapy and therapeutic exercise  Decreased function - therapeutic activities  STG/LTGs have been met or progress has been made towards goals:  Yes (See Goal flow sheet completed today.)  Assessment of Progress: The patient has met all of their long term goals.  Self Management Plans:  Patient is independent in a home treatment program.  Patient is independent in self management of  symptoms.  I have re-evaluated this patient and find that the nature, scope, duration and intensity of the therapy is appropriate for the medical condition of the patient.  Veronica continues to require the following intervention to meet STG and LTG's:  PT intervention is no longer required to meet STG/LTG.    Recommendations:  This patient is ready to be discharged from therapy and continue their home treatment program.    Please refer to the daily flowsheet for treatment today, total treatment time and time spent performing 1:1 timed codes.

## 2017-10-03 NOTE — MR AVS SNAPSHOT
After Visit Summary   10/3/2017    Veronica Bray    MRN: [id number]           Patient Information     Date Of Birth          5/31/1931        Visit Information        Provider Department      10/3/2017 2:50 PM Mari Lainez, PT [facility]        Today's Diagnoses     Mechanical low back pain        Cervicalgia           Follow-ups after your visit        Your next 10 appointments already scheduled     Oct 13, 2017 10:20 AM CDT   Anticoagulation Visit with RU ANTICOAGULATION   [facility] [place] ([email or web address])    [address]  [facility] [facility] [id number] [phone]            Oct 17, 2017  2:50 PM CDT   [facility] with MARK Her[facility] ([facility]  )    [address]  [address]  [place] [address]   [phone]            Dec 05, 2017  9:00 AM CST   PHYSICAL with Concepcion Mccall MD   [facility]    [address] [place]  [facility] [id number] [phone]              Who to contact     If you have questions or need follow up information about today's clinic visit or your schedule please contact [facility] directly at [phone].  Normal or non-critical lab and imaging results will be communicated to you by [employer], letter or phone within 4 business days after the clinic has received the results. If you do not hear from us within 7 days, please contact the clinic through [employer] or phone. If you have a critical or abnormal lab result, we will notify you by phone as soon as possible.  Submit refill requests through [email or web address] or call your pharmacy and they will forward the refill request to us. Please allow 3 business days for your refill to be completed.          Additional Information About Your Visit        [email or web address] Information     [email or web address] lets you send messages to your doctor, view your test results, renew your prescriptions, schedule  "appointments and more. To sign up, go to [email or web address] . Click on \"Log in\" on the left side of the screen, which will take you to the Welcome page. Then click on \"Sign up Now\" on the right side of the page.     You will be asked to enter the access code listed below, as well as some personal information. Please follow the directions to create your username and password.     Your access code is: [email or web address]  Expires: 10/11/2017 10:49 AM     Your access code will  in 90 days. If you need help or a new code, please call your [place] clinic or [phone].        Care EveryWhere ID     This is your Care EveryWhere ID. This could be used by other organizations to access your [place] medical records  [id number]         Blood Pressure from Last 3 Encounters:   17 134/66   17 130/60   17 136/70    Weight from Last 3 Encounters:   17 68 kg (149 lb 14.4 oz)   17 66.2 kg (146 lb)   17 65.3 kg (144 lb)              We Performed the Following     MANUAL THER TECH,1+REGIONS,EA 15 MIN     NEUROMUSCULAR RE-EDUCATION     THERAPEUTIC EXERCISES        Primary Care Provider Office Phone # Fax #    Concepcion Mccall [phone]       [address] [facility] [address]        Equal Access to Services     [facility]: Hadii aad ku hadasho Sogladys, waaxda luqadaha, qaybta kaalmada mannyyada, fang atkins. So [facility] [phone].    ATENCIÓN: Si habla español, tiene a martinez disposición servicios gratuitos de asistencia lingüística. Llame al [phone].    We comply with applicable federal civil rights laws and [place] laws. We do not discriminate on the basis of race, color, national origin, age, disability, sex, sexual orientation, or gender identity.            Thank you!     Thank you for choosing [facility]  for your care. Our goal is always to provide you with excellent care. Hearing back from our patients is one " way we can continue to improve our services. Please take a few minutes to complete the written survey that you may receive in the mail after your visit with us. Thank you!             Your Updated Medication List - Protect others around you: Learn how to safely use, store and throw away your medicines at www.disposemymeds.org.          This list is accurate as of: 10/3/17  3:21 PM.  Always use your most recent med list.                   Brand Name Dispense Instructions for use Diagnosis    ASPIRIN PO      Take 81 mg by mouth every other day        atorvastatin 10 MG tablet    LIPITOR    90 tablet    Take 1 tablet (10 mg) by mouth daily    Paroxysmal atrial fibrillation (H), S/P CABG (coronary artery bypass graft), Atrial flutter, paroxysmal (H), Benign essential hypertension       carvedilol 6.25 MG tablet    COREG    180 tablet    Take 1 tablet (6.25 mg) by mouth 2 times daily (with meals)    Paroxysmal atrial fibrillation (H), S/P CABG (coronary artery bypass graft), Atrial flutter, paroxysmal (H), Benign essential hypertension       CLARITIN PO      Take by mouth as needed        lisinopril 5 MG tablet    PRINIVIL/ZESTRIL    90 tablet    Take 1 tablet (5 mg) by mouth daily    Benign essential hypertension       MUCINEX DM MAXIMUM STRENGTH PO      Take by mouth as needed        PRESERVISION AREDS 2 Caps      Take by mouth 2 times daily        warfarin 5 MG tablet    COUMADIN    100 tablet    Take 1 tab daily or as directed by INR clinic    Atrial fibrillation, unspecified type (H)

## 2017-10-10 ENCOUNTER — OFFICE VISIT (OUTPATIENT)
Dept: INTERNAL MEDICINE | Facility: CLINIC | Age: 82
End: 2017-10-10
Payer: COMMERCIAL

## 2017-10-10 VITALS
DIASTOLIC BLOOD PRESSURE: 68 MMHG | WEIGHT: 149.1 LBS | TEMPERATURE: 97.8 F | BODY MASS INDEX: 27.27 KG/M2 | OXYGEN SATURATION: 93 % | SYSTOLIC BLOOD PRESSURE: 144 MMHG | HEART RATE: 69 BPM

## 2017-10-10 DIAGNOSIS — R79.89 ABNORMAL CBC: ICD-10-CM

## 2017-10-10 DIAGNOSIS — N39.0 URINARY TRACT INFECTION WITH HEMATURIA, SITE UNSPECIFIED: Primary | ICD-10-CM

## 2017-10-10 DIAGNOSIS — R31.9 URINARY TRACT INFECTION WITH HEMATURIA, SITE UNSPECIFIED: Primary | ICD-10-CM

## 2017-10-10 LAB
ALBUMIN UR-MCNC: 30 MG/DL
APPEARANCE UR: ABNORMAL
BACTERIA #/AREA URNS HPF: ABNORMAL /HPF
BILIRUB UR QL STRIP: NEGATIVE
COLOR UR AUTO: YELLOW
ERYTHROCYTE [DISTWIDTH] IN BLOOD BY AUTOMATED COUNT: 13.7 % (ref 10–15)
GLUCOSE UR STRIP-MCNC: NEGATIVE MG/DL
HCT VFR BLD AUTO: 40.9 % (ref 35–47)
HGB BLD-MCNC: 13.1 G/DL (ref 11.7–15.7)
HGB UR QL STRIP: ABNORMAL
KETONES UR STRIP-MCNC: NEGATIVE MG/DL
LEUKOCYTE ESTERASE UR QL STRIP: ABNORMAL
MCH RBC QN AUTO: 30.3 PG (ref 26.5–33)
MCHC RBC AUTO-ENTMCNC: 32 G/DL (ref 31.5–36.5)
MCV RBC AUTO: 95 FL (ref 78–100)
NITRATE UR QL: NEGATIVE
PH UR STRIP: 7 PH (ref 5–7)
PLATELET # BLD AUTO: 178 10E9/L (ref 150–450)
RBC # BLD AUTO: 4.33 10E12/L (ref 3.8–5.2)
RBC #/AREA URNS AUTO: ABNORMAL /HPF
SOURCE: ABNORMAL
SP GR UR STRIP: 1.02 (ref 1–1.03)
UROBILINOGEN UR STRIP-ACNC: 0.2 EU/DL (ref 0.2–1)
WBC # BLD AUTO: 4.5 10E9/L (ref 4–11)
WBC #/AREA URNS AUTO: >100 /HPF

## 2017-10-10 PROCEDURE — 85027 COMPLETE CBC AUTOMATED: CPT | Performed by: INTERNAL MEDICINE

## 2017-10-10 PROCEDURE — 99214 OFFICE O/P EST MOD 30 MIN: CPT | Performed by: INTERNAL MEDICINE

## 2017-10-10 PROCEDURE — 87086 URINE CULTURE/COLONY COUNT: CPT | Performed by: INTERNAL MEDICINE

## 2017-10-10 PROCEDURE — 87186 SC STD MICRODIL/AGAR DIL: CPT | Performed by: INTERNAL MEDICINE

## 2017-10-10 PROCEDURE — 87088 URINE BACTERIA CULTURE: CPT | Performed by: INTERNAL MEDICINE

## 2017-10-10 PROCEDURE — 81001 URINALYSIS AUTO W/SCOPE: CPT | Performed by: INTERNAL MEDICINE

## 2017-10-10 PROCEDURE — 36415 COLL VENOUS BLD VENIPUNCTURE: CPT | Performed by: INTERNAL MEDICINE

## 2017-10-10 RX ORDER — NITROFURANTOIN 25; 75 MG/1; MG/1
100 CAPSULE ORAL 2 TIMES DAILY
Qty: 14 CAPSULE | Refills: 0 | Status: SHIPPED | OUTPATIENT
Start: 2017-10-10 | End: 2018-01-02

## 2017-10-10 NOTE — NURSING NOTE
"Chief Complaint   Patient presents with     Dysuria     Frequesncy x's 6 days       Initial /68  Pulse 69  Temp 97.8  F (36.6  C) (Oral)  Wt 149 lb 1.6 oz (67.6 kg)  SpO2 93%  BMI 27.27 kg/m2 Estimated body mass index is 27.27 kg/(m^2) as calculated from the following:    Height as of 8/28/17: 5' 2\" (1.575 m).    Weight as of this encounter: 149 lb 1.6 oz (67.6 kg).  Medication Reconciliation: complete     Mindi Farley CMA      "

## 2017-10-10 NOTE — PROGRESS NOTES
SUBJECTIVE:   Veronica Bray is a 86 year old female who presents to clinic today for the following health issues:     URINARY TRACT SYMPTOMS      Duration: 6 days     Description  dysuria and frequency    Intensity:  moderate    Accompanying signs and symptoms:  Fever/chills: no   Flank pain no   Nausea and vomiting: YES,nausea   Vaginal symptoms: none  Abdominal/Pelvic Pain: no     History  History of frequent UTI's: no   History of kidney stones: no   Sexually Active: no   Possibility of pregnancy: No    Precipitating or alleviating factors: None    Therapies tried and outcome: pyridium       Patient is also requesting repeat CBC as her previous WBC on  07/17 was low and she was advised by her PCP to repeat in one month        Patient Active Problem List   Diagnosis     LBBB (left bundle branch block)     PAF (paroxysmal atrial fibrillation) (H)     SVT (supraventricular tachycardia) (H)     Atrial flutter (H)     Mitral regurgitation     Hyperlipidemia     Hypertension     Atrial fibrillation (H) [I48.91]     Long-term (current) use of anticoagulants [Z79.01]     Thrombocytopenia, unspecified     Past Surgical History:   Procedure Laterality Date     CORONARY ANGIOGRAPHY ADULT ORDER  1/2005    Stent: Distal cfx, Mid LAD, Stent: D -1     HC LEFT HEART CATHETERIZATION  11/2006    mid distal-anterior/ distal inferior/ Apical Yossi, 85     HC LEFT HEART CATHETERIZATION  5/2015    100% Proximal LAD, patent LIMA-LAD, SVG-D1, LVEDP 13, 10-18 mmHg AV gradient, 3+ MR after PVC       Social History   Substance Use Topics     Smoking status: Former Smoker     Smokeless tobacco: Never Used      Comment: quit approx 1967      Alcohol use 0.0 oz/week     0 Standard drinks or equivalent per week      Comment: social     Family History   Problem Relation Age of Onset     Unknown/Adopted No family hx of          Current Outpatient Prescriptions   Medication Sig Dispense Refill     lisinopril (PRINIVIL/ZESTRIL) 5 MG tablet Take  1 tablet (5 mg) by mouth daily 90 tablet 3     Dextromethorphan-Guaifenesin (MUCINEX DM MAXIMUM STRENGTH PO) Take by mouth as needed       Multiple Vitamins-Minerals (PRESERVISION AREDS 2) CAPS Take by mouth 2 times daily       warfarin (COUMADIN) 5 MG tablet Take 1 tab daily or as directed by INR clinic 100 tablet 1     carvedilol (COREG) 6.25 MG tablet Take 1 tablet (6.25 mg) by mouth 2 times daily (with meals) 180 tablet 3     Loratadine (CLARITIN PO) Take by mouth as needed        atorvastatin (LIPITOR) 10 MG tablet Take 1 tablet (10 mg) by mouth daily 90 tablet 3     ASPIRIN PO Take 81 mg by mouth every other day            Reviewed and updated as needed this visit by clinical staffTobacco  Allergies  Meds  Med Hx  Surg Hx  Fam Hx  Soc Hx      Reviewed and updated as needed this visit by Provider         ROS:  C: NEGATIVE for fever, chills, change in weight  GI: has mild nausea,    : dysuria and frequency     OBJECTIVE:                                                    /68  Pulse 69  Temp 97.8  F (36.6  C) (Oral)  Wt 149 lb 1.6 oz (67.6 kg)  SpO2 93%  BMI 27.27 kg/m2  Body mass index is 27.27 kg/(m^2).   GENERAL: healthy, alert, well nourished, well hydrated, no distress  ABDOMEN: soft, mild suprapubic  tenderness, no  hepatosplenomegaly, no masses, normal bowel sounds  BACK: no CVA tenderness, no paralumbar tenderness         ASSESSMENT/PLAN:                                                         (N39.0,  R31.9) Urinary tract infection with hematuria, site unspecified  Plan: *UA reflex to Microscopic and Culture  Urine Culture Aerobic Bacterial,         Urine Microscopic, started on nitroFURantoin, macrocrystal-monohydrate, (MACROBID) 100 MG         Capsule as directed.explained clearly about the medication,insructions and side effects.   monitor INR while on abx.       (R79.89) Abnormal CBC  Plan: CBC with platelets            Mars Mantilla MD  [facility]

## 2017-10-10 NOTE — MR AVS SNAPSHOT
"              After Visit Summary   10/10/2017    Veronica Bray    MRN: [id number]           Patient Information     Date Of Birth          5/31/1931        Visit Information        Provider Department      10/10/2017 1:20 PM Mars Mantilla MD [facility]        Today's Diagnoses     Urinary tract infection with hematuria, site unspecified    -  1    Abnormal CBC           Follow-ups after your visit        Your next 10 appointments already scheduled     Oct 13, 2017 10:20 AM CDT   Anticoagulation Visit with RU ANTICOAGULATION   [facility] PHYSICIANS [facility] ([facility])    [address]  [facility] [id number] [phone]            Dec 05, 2017  9:00 AM CST   PHYSICAL with Concepcion Mccall MD   [facility]    [address]  [facility] [address] [phone]              Who to contact     If you have questions or need follow up information about today's clinic visit or your schedule please contact [facility] directly at [phone].  Normal or non-critical lab and imaging results will be communicated to you by [employer], letter or phone within 4 business days after the clinic has received the results. If you do not hear from us within 7 days, please contact the clinic through [employer] or phone. If you have a critical or abnormal lab result, we will notify you by phone as soon as possible.  Submit refill requests through [employer] or call your pharmacy and they will forward the refill request to us. Please allow 3 business days for your refill to be completed.          Additional Information About Your Visit        MyChart Information     Zmags lets you send messages to your doctor, view your test results, renew your prescriptions, schedule appointments and more. To sign up, go to [email or web address] . Click on \"Log in\" on the left side of the screen, " "which will take you to the Welcome page. Then click on \"Sign up Now\" on the right side of the page.     You will be asked to enter the access code listed below, as well as some personal information. Please follow the directions to create your username and password.     Your access code is: [email or web address]  Expires: 10/11/2017 10:49 AM     Your access code will  in 90 days. If you need help or a new code, please call your [facility] or [phone].        Care EveryWhere ID     This is your Care EveryWhere ID. This could be used by other organizations to access your [place] medical records  [id number]        Your Vitals Were     Pulse Temperature Pulse Oximetry BMI (Body Mass Index)          69 97.8  F (36.6  C) (Oral) 93% 27.27 kg/m2         Blood Pressure from Last 3 Encounters:   10/10/17 144/68   17 134/66   17 130/60    Weight from Last 3 Encounters:   10/10/17 149 lb 1.6 oz (67.6 kg)   17 149 lb 14.4 oz (68 kg)   17 146 lb (66.2 kg)              We Performed the Following     *UA reflex to Microscopic and Culture ([place] and [facility] (except [place] and [place])     CBC with platelets     Urine Culture Aerobic Bacterial     Urine Microscopic          Today's Medication Changes          These changes are accurate as of: 10/10/17  1:43 PM.  If you have any questions, ask your nurse or doctor.               Start taking these medicines.        Dose/Directions    nitroFURantoin (macrocrystal-monohydrate) 100 MG capsule   Commonly known as:  MACROBID   Used for:  Urinary tract infection with hematuria, site unspecified   Started by:  Mars Mantilla MD        Dose:  100 mg   Take 1 capsule (100 mg) by mouth 2 times daily   Quantity:  14 capsule   Refills:  0            Where to get your medicines      These medications were sent to [facility] [address] [place] [address]     Phone:  " [phone]     nitroFURantoin (macrocrystal-monohydrate) 100 MG capsule                Primary Care Provider Office Phone # Fax #    Concepcion Mccall [phone]       [address] [facility] [address]        Equal Access to Services     SYLVIA ALLEN : Hadii aad ku hadasho Soomaali, waaxda luqadaha, qaybta kaalmada adeegyada, waxay idiin hayaan adedevika beata lalian . So [facility] [phone].    ATENCIÓN: Si habla español, tiene a martinez disposición servicios gratuitos de asistencia lingüística. Yohanaame al [phone].    We comply with applicable federal civil rights laws and [place] laws. We do not discriminate on the basis of race, color, national origin, age, disability, sex, sexual orientation, or gender identity.            Thank you!     Thank you for choosing [facility]  for your care. Our goal is always to provide you with excellent care. Hearing back from our patients is one way we can continue to improve our services. Please take a few minutes to complete the written survey that you may receive in the mail after your visit with us. Thank you!             Your Updated Medication List - Protect others around you: Learn how to safely use, store and throw away your medicines at www.disposemymeds.org.          This list is accurate as of: 10/10/17  1:43 PM.  Always use your most recent med list.                   Brand Name Dispense Instructions for use Diagnosis    ASPIRIN PO      Take 81 mg by mouth every other day        atorvastatin 10 MG tablet    LIPITOR    90 tablet    Take 1 tablet (10 mg) by mouth daily    Paroxysmal atrial fibrillation (H), S/P CABG (coronary artery bypass graft), Atrial flutter, paroxysmal (H), Benign essential hypertension       carvedilol 6.25 MG tablet    COREG    180 tablet    Take 1 tablet (6.25 mg) by mouth 2 times daily (with meals)    Paroxysmal atrial fibrillation (H), S/P CABG (coronary artery bypass graft), Atrial flutter,  paroxysmal (H), Benign essential hypertension       CLARITIN PO      Take by mouth as needed        lisinopril 5 MG tablet    PRINIVIL/ZESTRIL    90 tablet    Take 1 tablet (5 mg) by mouth daily    Benign essential hypertension       MUCINEX DM MAXIMUM STRENGTH PO      Take by mouth as needed        nitroFURantoin (macrocrystal-monohydrate) 100 MG capsule    MACROBID    14 capsule    Take 1 capsule (100 mg) by mouth 2 times daily    Urinary tract infection with hematuria, site unspecified       PRESERVISION AREDS 2 Caps      Take by mouth 2 times daily        warfarin 5 MG tablet    COUMADIN    100 tablet    Take 1 tab daily or as directed by INR clinic    Atrial fibrillation, unspecified type (H)

## 2017-10-11 LAB
BACTERIA SPEC CULT: ABNORMAL
SPECIMEN SOURCE: ABNORMAL

## 2017-10-13 ENCOUNTER — ANTICOAGULATION THERAPY VISIT (OUTPATIENT)
Dept: CARDIOLOGY | Facility: CLINIC | Age: 82
End: 2017-10-13
Payer: COMMERCIAL

## 2017-10-13 DIAGNOSIS — Z79.01 LONG-TERM (CURRENT) USE OF ANTICOAGULANTS: ICD-10-CM

## 2017-10-13 DIAGNOSIS — I48.91 ATRIAL FIBRILLATION, UNSPECIFIED TYPE (H): ICD-10-CM

## 2017-10-13 LAB — INR POINT OF CARE: 7.9 (ref 0.86–1.14)

## 2017-10-13 PROCEDURE — 85610 PROTHROMBIN TIME: CPT | Mod: QW | Performed by: INTERNAL MEDICINE

## 2017-10-13 PROCEDURE — 36416 COLLJ CAPILLARY BLOOD SPEC: CPT | Performed by: INTERNAL MEDICINE

## 2017-10-13 PROCEDURE — 99207 ZZC NO CHARGE NURSE ONLY: CPT | Performed by: INTERNAL MEDICINE

## 2017-10-13 NOTE — MR AVS SNAPSHOT
Veronica DANNY Bray   10/13/2017 10:20 AM   Anticoagulation Therapy Visit    Description:  86 year old female   Provider:  ENRIQUE ANTICOAGULATION   [facility]  [facility]           INR as of 10/13/2017     Today's INR 7.9!      Anticoagulation Summary as of 10/13/2017     INR goal 2.0-3.0   Today's INR 7.9!   Full instructions 10/13: Hold; 10/14: Hold; 10/15: Hold; 10/16: Hold; Otherwise 7.5 mg on Mon, Fri; 5 mg all other days   Next INR check 10/17/2017    Indications   Atrial fibrillation (H) [I48.91] [I48.91]  Long-term (current) use of anticoagulants [Z79.01] [Z79.01]         Your next Anticoagulation Clinic appointment(s)     Oct 17, 2017 10:20 AM CDT   Anticoagulation Visit with  ANTICOAGULATION   [facility] PHYSICIANS [facility] ([facility])    [address]  [facility] [id number] [phone]              October 2017 Details    Sun Mon Tue Wed Thu Fri Sat     1               2               3               4               5               6               7                 8               9               10               11               12               13      Hold   See details      14      Hold           15      Hold         16      Hold         17            18               19               20               21                 22               23               24               25               26               27               28                 29               30               31                    Date Details   10/13 This INR check       Date of next INR:  10/17/2017         How to take your warfarin dose     To take:  5 mg Take 1 of the 5 mg tablets.    Hold Do not take your warfarin dose. See the Details table to the right for additional instructions.

## 2017-10-13 NOTE — PROGRESS NOTES
ANTICOAGULATION FOLLOW-UP CLINIC VISIT    Patient Name:  Veronica Bray  Date:  10/13/2017  Contact Type:  Face to Face    SUBJECTIVE:     Patient Findings     Positives No Problem Findings           OBJECTIVE    INR Protime   Date Value Ref Range Status   10/13/2017 7.9 (A) 0.86 - 1.14 Final       ASSESSMENT / PLAN  INR assessment SUPRA    Recheck INR In: 4 DAYS    INR Location Clinic      Anticoagulation Summary as of 10/13/2017     INR goal 2.0-3.0   Today's INR 7.9!   Maintenance plan 7.5 mg (5 mg x 1.5) on Mon, Fri; 5 mg (5 mg x 1) all other days   Full instructions 10/13: Hold; 10/14: Hold; 10/15: Hold; 10/16: Hold; Otherwise 7.5 mg on Mon, Fri; 5 mg all other days   Weekly total 40 mg   Plan last modified Larisa Monae RN (6/30/2017)   Next INR check 10/17/2017   Target end date Indefinite    Indications   Atrial fibrillation (H) [I48.91] [I48.91]  Long-term (current) use of anticoagulants [Z79.01] [Z79.01]         Anticoagulation Episode Summary     INR check location     Preferred lab     Send INR reminders to [facility] HEART INR NURSE    Comments       Anticoagulation Care Providers     Provider Role Specialty Phone number    Kennedy, Tr Carrera MD Responsible Cardiology [phone]            See the Encounter Report to view Anticoagulation Flowsheet and Dosing Calendar (Go to Encounters tab in chart review, and find the Anticoagulation Therapy Visit)    INR 7.9 x 2 checks.  She was seen on Tuesday and started on Macrobid for 7 days for severe bladder infection.  She denied any signs of bleeding.  She did drink 2 glasses of cranberry juice last night.  No increased tylenol.  She will hold her warfarin for 4 days until recheck in [place] on Tuesday since she refused to go to [place] on Monday.  Told her to increase her greens and no ETOH or cranberry juice this weekend.  Told her to go to the ER this weekend if she has any signs of bleeding.  Will update Dr. Benavides.  Mike Monae,  RN

## 2017-10-17 ENCOUNTER — ANTICOAGULATION THERAPY VISIT (OUTPATIENT)
Dept: CARDIOLOGY | Facility: CLINIC | Age: 82
End: 2017-10-17
Payer: COMMERCIAL

## 2017-10-17 DIAGNOSIS — Z79.01 LONG-TERM (CURRENT) USE OF ANTICOAGULANTS: ICD-10-CM

## 2017-10-17 DIAGNOSIS — I48.91 ATRIAL FIBRILLATION, UNSPECIFIED TYPE (H): ICD-10-CM

## 2017-10-17 LAB — INR POINT OF CARE: 1.4 (ref 0.86–1.14)

## 2017-10-17 PROCEDURE — 85610 PROTHROMBIN TIME: CPT | Mod: QW | Performed by: INTERNAL MEDICINE

## 2017-10-17 PROCEDURE — 36416 COLLJ CAPILLARY BLOOD SPEC: CPT | Performed by: INTERNAL MEDICINE

## 2017-10-17 PROCEDURE — 99207 ZZC NO CHARGE NURSE ONLY: CPT | Performed by: INTERNAL MEDICINE

## 2017-10-17 NOTE — PROGRESS NOTES
ANTICOAGULATION FOLLOW-UP CLINIC VISIT    Patient Name:  Veronica Bray  Date:  10/17/2017  Contact Type:  Face to Face    SUBJECTIVE:     Patient Findings     Positives No Problem Findings           OBJECTIVE    INR Protime   Date Value Ref Range Status   10/17/2017 1.4 (A) 0.86 - 1.14 Final       ASSESSMENT / PLAN  INR assessment SUB    Recheck INR In: 10 DAYS    INR Location Clinic      Anticoagulation Summary as of 10/17/2017     INR goal 2.0-3.0   Today's INR 1.4!   Maintenance plan 7.5 mg (5 mg x 1.5) on Mon, Fri; 5 mg (5 mg x 1) all other days   Full instructions 7.5 mg on Mon, Fri; 5 mg all other days   Weekly total 40 mg   No change documented Larisa Monae RN   Plan last modified Larisa Monae RN (6/30/2017)   Next INR check 10/27/2017   Target end date Indefinite    Indications   Atrial fibrillation (H) [I48.91] [I48.91]  Long-term (current) use of anticoagulants [Z79.01] [Z79.01]         Anticoagulation Episode Summary     INR check location     Preferred lab     Send INR reminders to [facility] HEART INR NURSE    Comments       Anticoagulation Care Providers     Provider Role Specialty Phone number    Ip, Tr Morris Carrera MD Responsible Cardiology [phone]            See the Encounter Report to view Anticoagulation Flowsheet and Dosing Calendar (Go to Encounters tab in chart review, and find the Anticoagulation Therapy Visit)    INR 1.4.  Last dose of Macrobid was last night.  Her INR was up to 7.9 on Friday due to bladder infection and antibiotic.  Dosing was held for the past 4 days and she increased her greens.  No bleeding.  Resume normal schedule and recheck in 10 days.  Told her to call right away if more antibiotics are started.  Mike Monae RN

## 2017-10-17 NOTE — MR AVS SNAPSHOT
Veronica DANNY Bray   10/17/2017 10:20 AM   Anticoagulation Therapy Visit    Description:  86 year old female   Provider:   ANTICOAGULATION   Department:  [facility]           INR as of 10/17/2017     Today's INR 1.4!      Anticoagulation Summary as of 10/17/2017     INR goal 2.0-3.0   Today's INR 1.4!   Full instructions 7.5 mg on Mon, Fri; 5 mg all other days   Next INR check 10/27/2017    Indications   Atrial fibrillation (H) [I48.91] [I48.91]  Long-term (current) use of anticoagulants [Z79.01] [Z79.01]         Your next Anticoagulation Clinic appointment(s)     Oct 17, 2017 10:20 AM CDT   Anticoagulation Visit with  ANTICOAGULATION   [facility] ([facility])    [address]  [facility] [address] [phone]            Oct 27, 2017 10:40 AM CDT   Anticoagulation Visit with  ANTICOAGULATION   [facility] ([facility])    [address]  [facility] [address] [phone]              October 2017 Details    Sun Mon Tue Wed Thu Fri Sat     1               2               3               4               5               6               7                 8               9               10               11               12               13               14                 15               16               17      5 mg   See details      18      5 mg         19      5 mg         20      7.5 mg         21      5 mg           22      5 mg         23      7.5 mg         24      5 mg         25      5 mg         26      5 mg         27            28                 29               30               31                    Date Details   10/17 This INR check       Date of next INR:  10/27/2017         How to take your warfarin dose     To take:  5 mg Take 1 of the 5 mg tablets.    To take:  7.5 mg Take 1.5 of the 5 mg tablets.

## 2017-10-25 DIAGNOSIS — I48.91 ATRIAL FIBRILLATION, UNSPECIFIED TYPE (H): ICD-10-CM

## 2017-10-25 RX ORDER — WARFARIN SODIUM 5 MG/1
TABLET ORAL
Qty: 110 TABLET | Refills: 1 | Status: SHIPPED | OUTPATIENT
Start: 2017-10-25 | End: 2018-01-02 | Stop reason: DRUGHIGH

## 2017-10-27 ENCOUNTER — ANTICOAGULATION THERAPY VISIT (OUTPATIENT)
Dept: CARDIOLOGY | Facility: CLINIC | Age: 82
End: 2017-10-27
Payer: COMMERCIAL

## 2017-10-27 DIAGNOSIS — Z79.01 LONG-TERM (CURRENT) USE OF ANTICOAGULANTS: ICD-10-CM

## 2017-10-27 DIAGNOSIS — I48.91 ATRIAL FIBRILLATION, UNSPECIFIED TYPE (H): ICD-10-CM

## 2017-10-27 LAB — INR POINT OF CARE: 1.9 (ref 0.86–1.14)

## 2017-10-27 PROCEDURE — 36416 COLLJ CAPILLARY BLOOD SPEC: CPT | Performed by: INTERNAL MEDICINE

## 2017-10-27 PROCEDURE — 85610 PROTHROMBIN TIME: CPT | Mod: QW | Performed by: INTERNAL MEDICINE

## 2017-10-27 NOTE — MR AVS SNAPSHOT
Veronica DANNY Bray   10/27/2017 10:40 AM   Anticoagulation Therapy Visit    Description:  86 year old female   Provider:  ENRIQUE ANTICOAGULATION   Department:  [facility]           INR as of 10/27/2017     Today's INR 1.9!      Anticoagulation Summary as of 10/27/2017     INR goal 2.0-3.0   Today's INR 1.9!   Full instructions 7.5 mg on Mon, Fri; 5 mg all other days   Next INR check 11/10/2017    Indications   Atrial fibrillation (H) [I48.91] [I48.91]  Long-term (current) use of anticoagulants [Z79.01] [Z79.01]         Your next Anticoagulation Clinic appointment(s)     Nov 10, 2017 10:40 AM CST   Anticoagulation Visit with  ANTICOAGULATION   [facility] ([facility])    [address] [address] [address] [phone]              October 2017 Details    Sun Mon Tue Wed Thu Fri Sat     1               2               3               4               5               6               7                 8               9               10               11               12               13               14                 15               16               17               18               19               20               21                 22               23               24               25               26               27      7.5 mg   See details      28      5 mg           29      5 mg         30      7.5 mg         31      5 mg              Date Details   10/27 This INR check               How to take your warfarin dose     To take:  5 mg Take 1 of the 5 mg tablets.    To take:  7.5 mg Take 1.5 of the 5 mg tablets.           November 2017 Details    Sun Mon Tue Wed Thu Fri Sat        1      5 mg         2      5 mg         3      7.5 mg         4      5 mg           5      5 mg         6      7.5 mg         7      5 mg         8      5 mg         9      5 mg         10            11                 12               13               14                15               16               17               18                 19               20               21               22               23               24               25                 26               27               28               29               30                  Date Details   No additional details    Date of next INR:  11/10/2017         How to take your warfarin dose     To take:  5 mg Take 1 of the 5 mg tablets.    To take:  7.5 mg Take 1.5 of the 5 mg tablets.

## 2017-10-27 NOTE — PROGRESS NOTES
ANTICOAGULATION FOLLOW-UP CLINIC VISIT    Patient Name:  Veronica Bray  Date:  10/27/2017  Contact Type:  Face to Face    SUBJECTIVE:     Patient Findings     Positives No Problem Findings           OBJECTIVE    INR Protime   Date Value Ref Range Status   10/27/2017 1.9 (A) 0.86 - 1.14 Final       ASSESSMENT / PLAN  INR assessment THER    Recheck INR In: 2 WEEKS    INR Location Clinic      Anticoagulation Summary as of 10/27/2017     INR goal 2.0-3.0   Today's INR 1.9!   Maintenance plan 7.5 mg (5 mg x 1.5) on Mon, Fri; 5 mg (5 mg x 1) all other days   Full instructions 7.5 mg on Mon, Fri; 5 mg all other days   Weekly total 40 mg   No change documented Larisa Monae RN   Plan last modified Larisa Monae RN (6/30/2017)   Next INR check 11/10/2017   Target end date Indefinite    Indications   Atrial fibrillation (H) [I48.91] [I48.91]  Long-term (current) use of anticoagulants [Z79.01] [Z79.01]         Anticoagulation Episode Summary     INR check location     Preferred lab     Send INR reminders to [facility] HEART INR NURSE    Comments       Anticoagulation Care Providers     Provider Role Specialty Phone number    Ip, Tr Morris Carrera MD Responsible Cardiology [phone]            See the Encounter Report to view Anticoagulation Flowsheet and Dosing Calendar (Go to Encounters tab in chart review, and find the Anticoagulation Therapy Visit)    INR 1.9.  She has been done with the antibiotics that caused INR to jump up.  She is due for bigger dose tonight so we will continue her normal schedule and recheck in 2 weeks.  Mike Monae RN

## 2017-11-10 ENCOUNTER — ANTICOAGULATION THERAPY VISIT (OUTPATIENT)
Dept: CARDIOLOGY | Facility: CLINIC | Age: 82
End: 2017-11-10
Payer: COMMERCIAL

## 2017-11-10 DIAGNOSIS — I48.91 ATRIAL FIBRILLATION, UNSPECIFIED TYPE (H): ICD-10-CM

## 2017-11-10 DIAGNOSIS — Z79.01 LONG-TERM (CURRENT) USE OF ANTICOAGULANTS: ICD-10-CM

## 2017-11-10 LAB — INR POINT OF CARE: 3.4 (ref 0.86–1.14)

## 2017-11-10 PROCEDURE — 85610 PROTHROMBIN TIME: CPT | Mod: QW | Performed by: INTERNAL MEDICINE

## 2017-11-10 PROCEDURE — 36416 COLLJ CAPILLARY BLOOD SPEC: CPT | Performed by: INTERNAL MEDICINE

## 2017-11-10 NOTE — MR AVS SNAPSHOT
Veronica Bray   11/10/2017 10:40 AM   Anticoagulation Therapy Visit    Description:  86 year old female   Provider:  ENRIQUE ANTICOAGULATION   Department:  [facility]           INR as of 11/10/2017     Today's INR 3.4!      Anticoagulation Summary as of 11/10/2017     INR goal 2.0-3.0   Today's INR 3.4!   Full instructions 7.5 mg on Mon; 5 mg all other days   Next INR check 12/1/2017    Indications   Atrial fibrillation (H) [I48.91] [I48.91]  Long-term (current) use of anticoagulants [Z79.01] [Z79.01]         Your next Anticoagulation Clinic appointment(s)     Dec 01, 2017 10:40 AM CST   Anticoagulation Visit with RU ANTICOAGULATION   [facility] ([facility])    [address] [address] [address] [phone]              November 2017 Details    Sun Mon Tue Wed Thu Fri Sat        1               2               3               4                 5               6               7               8               9               10      5 mg   See details      11      5 mg           12      5 mg         13      7.5 mg         14      5 mg         15      5 mg         16      5 mg         17      5 mg         18      5 mg           19      5 mg         20      7.5 mg         21      5 mg         22      5 mg         23      5 mg         24      5 mg         25      5 mg           26      5 mg         27      7.5 mg         28      5 mg         29      5 mg         30      5 mg            Date Details   11/10 This INR check               How to take your warfarin dose     To take:  5 mg Take 1 of the 5 mg tablets.    To take:  7.5 mg Take 1.5 of the 5 mg tablets.           December 2017 Details    Sun Mon Tue Wed Thu Fri Sat          1            2                 3               4               5               6               7               8               9                 10               11               12               13               14                15               16                 17               18               19               20               21               22               23                 24               25               26               27               28               29               30                 31                      Date Details   No additional details    Date of next INR:  12/1/2017         How to take your warfarin dose     To take:  5 mg Take 1 of the 5 mg tablets.

## 2017-11-10 NOTE — PROGRESS NOTES
ANTICOAGULATION FOLLOW-UP CLINIC VISIT    Patient Name:  Veronica Bray  Date:  11/10/2017  Contact Type:  Face to Face    SUBJECTIVE:     Patient Findings     Positives No Problem Findings           OBJECTIVE    INR Protime   Date Value Ref Range Status   11/10/2017 3.4 (A) 0.86 - 1.14 Final       ASSESSMENT / PLAN  INR assessment SUPRA    Recheck INR In: 3 WEEKS    INR Location Clinic      Anticoagulation Summary as of 11/10/2017     INR goal 2.0-3.0   Today's INR 3.4!   Maintenance plan 7.5 mg (5 mg x 1.5) on Mon; 5 mg (5 mg x 1) all other days   Full instructions 7.5 mg on Mon; 5 mg all other days   Weekly total 37.5 mg   Plan last modified Larisa Monae RN (11/10/2017)   Next INR check 12/1/2017   Target end date Indefinite    Indications   Atrial fibrillation (H) [I48.91] [I48.91]  Long-term (current) use of anticoagulants [Z79.01] [Z79.01]         Anticoagulation Episode Summary     INR check location     Preferred lab     Send INR reminders to [facility] HEART INR NURSE    Comments       Anticoagulation Care Providers     Provider Role Specialty Phone number    Ip, Tr Carrera MD Responsible Cardiology [phone]            See the Encounter Report to view Anticoagulation Flowsheet and Dosing Calendar (Go to Encounters tab in chart review, and find the Anticoagulation Therapy Visit)    INR 3.4.  She missed a dose the night before her last INR check.  No other changes.  NO bleeding.  We will decrease dosing by 2.5mg overall and recheck in 3 weeks since that was the soonest she could return.  Mike Monae, ARMANDO

## 2017-11-29 ENCOUNTER — TELEPHONE (OUTPATIENT)
Dept: INTERNAL MEDICINE | Facility: CLINIC | Age: 82
End: 2017-11-29

## 2017-11-29 ENCOUNTER — DOCUMENTATION ONLY (OUTPATIENT)
Dept: INTERNAL MEDICINE | Facility: CLINIC | Age: 82
End: 2017-11-29

## 2017-11-29 DIAGNOSIS — I10 ESSENTIAL HYPERTENSION: Primary | ICD-10-CM

## 2017-11-29 NOTE — TELEPHONE ENCOUNTER
Pt calls, has MD appt for 12/05 and lab appt for 12/01. Asking MD to enter needed orders.    Please advise, thanks.  Also calling for spouse, Vaibhav.

## 2017-12-01 ENCOUNTER — ANTICOAGULATION THERAPY VISIT (OUTPATIENT)
Dept: CARDIOLOGY | Facility: CLINIC | Age: 82
End: 2017-12-01
Payer: COMMERCIAL

## 2017-12-01 DIAGNOSIS — I10 ESSENTIAL HYPERTENSION: ICD-10-CM

## 2017-12-01 DIAGNOSIS — Z79.01 LONG-TERM (CURRENT) USE OF ANTICOAGULANTS: ICD-10-CM

## 2017-12-01 DIAGNOSIS — I48.91 ATRIAL FIBRILLATION, UNSPECIFIED TYPE (H): ICD-10-CM

## 2017-12-01 LAB
ANION GAP SERPL CALCULATED.3IONS-SCNC: 10 MMOL/L (ref 3–14)
BASOPHILS # BLD AUTO: 0 10E9/L (ref 0–0.2)
BASOPHILS NFR BLD AUTO: 0.6 %
BUN SERPL-MCNC: 13 MG/DL (ref 7–30)
CALCIUM SERPL-MCNC: 8.8 MG/DL (ref 8.5–10.1)
CHLORIDE SERPL-SCNC: 106 MMOL/L (ref 94–109)
CO2 SERPL-SCNC: 26 MMOL/L (ref 20–32)
CREAT SERPL-MCNC: 0.63 MG/DL (ref 0.52–1.04)
DIFFERENTIAL METHOD BLD: ABNORMAL
EOSINOPHIL # BLD AUTO: 0.1 10E9/L (ref 0–0.7)
EOSINOPHIL NFR BLD AUTO: 2.6 %
ERYTHROCYTE [DISTWIDTH] IN BLOOD BY AUTOMATED COUNT: 13.6 % (ref 10–15)
GFR SERPL CREATININE-BSD FRML MDRD: 89 ML/MIN/1.7M2
GLUCOSE SERPL-MCNC: 86 MG/DL (ref 70–99)
HCT VFR BLD AUTO: 40.2 % (ref 35–47)
HGB BLD-MCNC: 12.8 G/DL (ref 11.7–15.7)
INR POINT OF CARE: 3.2 (ref 0.86–1.14)
LYMPHOCYTES # BLD AUTO: 0.8 10E9/L (ref 0.8–5.3)
LYMPHOCYTES NFR BLD AUTO: 23 %
MCH RBC QN AUTO: 30.1 PG (ref 26.5–33)
MCHC RBC AUTO-ENTMCNC: 31.8 G/DL (ref 31.5–36.5)
MCV RBC AUTO: 95 FL (ref 78–100)
MONOCYTES # BLD AUTO: 0.3 10E9/L (ref 0–1.3)
MONOCYTES NFR BLD AUTO: 8.5 %
NEUTROPHILS # BLD AUTO: 2.2 10E9/L (ref 1.6–8.3)
NEUTROPHILS NFR BLD AUTO: 65.3 %
PLATELET # BLD AUTO: 145 10E9/L (ref 150–450)
POTASSIUM SERPL-SCNC: 4.1 MMOL/L (ref 3.4–5.3)
RBC # BLD AUTO: 4.25 10E12/L (ref 3.8–5.2)
SODIUM SERPL-SCNC: 142 MMOL/L (ref 133–144)
WBC # BLD AUTO: 3.4 10E9/L (ref 4–11)

## 2017-12-01 PROCEDURE — 85610 PROTHROMBIN TIME: CPT | Mod: QW | Performed by: INTERNAL MEDICINE

## 2017-12-01 PROCEDURE — 36416 COLLJ CAPILLARY BLOOD SPEC: CPT | Performed by: INTERNAL MEDICINE

## 2017-12-01 PROCEDURE — 80048 BASIC METABOLIC PNL TOTAL CA: CPT | Performed by: INTERNAL MEDICINE

## 2017-12-01 PROCEDURE — 85025 COMPLETE CBC W/AUTO DIFF WBC: CPT | Performed by: INTERNAL MEDICINE

## 2017-12-01 NOTE — PROGRESS NOTES
ANTICOAGULATION FOLLOW-UP CLINIC VISIT    Patient Name:  Veronica Bray  Date:  12/1/2017  Contact Type:  Face to Face    SUBJECTIVE:     Patient Findings     Positives No Problem Findings           OBJECTIVE    INR Protime   Date Value Ref Range Status   12/01/2017 3.2 (A) 0.86 - 1.14 Final       ASSESSMENT / PLAN  INR assessment SUPRA    Recheck INR In: 2 WEEKS    INR Location Clinic      Anticoagulation Summary as of 12/1/2017     INR goal 2.0-3.0   Today's INR 3.2!   Maintenance plan 5 mg (5 mg x 1) every day   Full instructions 5 mg every day   Weekly total 35 mg   Plan last modified Larisa Monae, RN (12/1/2017)   Next INR check 12/19/2017   Target end date Indefinite    Indications   Atrial fibrillation (H) [I48.91] [I48.91]  Long-term (current) use of anticoagulants [Z79.01] [Z79.01]         Anticoagulation Episode Summary     INR check location     Preferred lab     Send INR reminders to [facility] HEART INR NURSE    Comments       Anticoagulation Care Providers     Provider Role Specialty Phone number    Ip, Tr Morris Carrera MD Responsible Cardiology [phone]            See the Encounter Report to view Anticoagulation Flowsheet and Dosing Calendar (Go to Encounters tab in chart review, and find the Anticoagulation Therapy Visit)    INR 3.2.  No bleeding.  No major changes.  Decrease dosing by 2.5mg overall and take 5mg/day and recheck in 2 weeks.  Mike Monae, RN

## 2017-12-01 NOTE — MR AVS SNAPSHOT
Veronica Bray   12/1/2017 10:40 AM   Anticoagulation Therapy Visit    Description:  86 year old female   Provider:  [facility]   Department:  [facility]           INR as of 12/1/2017     Today's INR 3.2!      Anticoagulation Summary as of 12/1/2017     INR goal 2.0-3.0   Today's INR 3.2!   Full instructions 5 mg every day   Next INR check 12/19/2017    Indications   Atrial fibrillation (H) [I48.91] [I48.91]  Long-term (current) use of anticoagulants [Z79.01] [Z79.01]         Your next Anticoagulation Clinic appointment(s)     Dec 19, 2017 10:40 AM CST   Anticoagulation Visit with RU ANTICOAGULATION   [facility] ([facility])    [address] [address] [phone]              December 2017 Details    Sun Mon Tue Wed Thu Fri Sat          1      5 mg   See details      2      5 mg           3      5 mg         4      5 mg         5      5 mg         6      5 mg         7      5 mg         8      5 mg         9      5 mg           10      5 mg         11      5 mg         12      5 mg         13      5 mg         14      5 mg         15      5 mg         16      5 mg           17      5 mg         18      5 mg         19            20               21               22               23                 24               25               26               27               28               29               30                 31                      Date Details   12/01 This INR check       Date of next INR:  12/19/2017         How to take your warfarin dose     To take:  5 mg Take 1 of the 5 mg tablets.

## 2017-12-14 DIAGNOSIS — I48.0 PAROXYSMAL ATRIAL FIBRILLATION (H): ICD-10-CM

## 2017-12-14 DIAGNOSIS — I10 BENIGN ESSENTIAL HYPERTENSION: ICD-10-CM

## 2017-12-14 DIAGNOSIS — I48.92 ATRIAL FLUTTER, PAROXYSMAL (H): ICD-10-CM

## 2017-12-14 DIAGNOSIS — Z95.1 S/P CABG (CORONARY ARTERY BYPASS GRAFT): ICD-10-CM

## 2017-12-14 RX ORDER — CARVEDILOL 6.25 MG/1
6.25 TABLET ORAL 2 TIMES DAILY WITH MEALS
Qty: 180 TABLET | Refills: 2 | Status: SHIPPED | OUTPATIENT
Start: 2017-12-14 | End: 2018-01-02

## 2017-12-19 ENCOUNTER — ANTICOAGULATION THERAPY VISIT (OUTPATIENT)
Dept: CARDIOLOGY | Facility: CLINIC | Age: 82
End: 2017-12-19
Payer: COMMERCIAL

## 2017-12-19 DIAGNOSIS — Z79.01 LONG-TERM (CURRENT) USE OF ANTICOAGULANTS: ICD-10-CM

## 2017-12-19 DIAGNOSIS — I48.91 ATRIAL FIBRILLATION, UNSPECIFIED TYPE (H): ICD-10-CM

## 2017-12-19 LAB — INR POINT OF CARE: 2 (ref 0.86–1.14)

## 2017-12-19 PROCEDURE — 85610 PROTHROMBIN TIME: CPT | Mod: QW | Performed by: INTERNAL MEDICINE

## 2017-12-19 PROCEDURE — 99207 ZZC NO CHARGE NURSE ONLY: CPT | Performed by: INTERNAL MEDICINE

## 2017-12-19 PROCEDURE — 36416 COLLJ CAPILLARY BLOOD SPEC: CPT | Performed by: INTERNAL MEDICINE

## 2017-12-19 NOTE — PROGRESS NOTES
ANTICOAGULATION FOLLOW-UP CLINIC VISIT    Patient Name:  Veronica Bray  Date:  12/19/2017  Contact Type:  Face to Face    SUBJECTIVE:     Patient Findings     Positives Missed doses           OBJECTIVE    INR Protime   Date Value Ref Range Status   12/19/2017 2.0 (A) 0.86 - 1.14 Final       ASSESSMENT / PLAN  INR assessment THER    Recheck INR In: 2 WEEKS    INR Location Clinic      Anticoagulation Summary as of 12/19/2017     INR goal 2.0-3.0   Today's INR 2.0   Maintenance plan 5 mg (5 mg x 1) every day   Full instructions 5 mg every day   Weekly total 35 mg   No change documented Larisa Monae, RN   Plan last modified Larisa Monae RN (12/1/2017)   Next INR check 1/5/2018   Target end date Indefinite    Indications   Atrial fibrillation (H) [I48.91] [I48.91]  Long-term (current) use of anticoagulants [Z79.01] [Z79.01]         Anticoagulation Episode Summary     INR check location     Preferred lab     Send INR reminders to [facility] HEART INR NURSE    Comments       Anticoagulation Care Providers     Provider Role Specialty Phone number    Ip, Tr Morris Carrera MD Responsible Cardiology [phone]            See the Encounter Report to view Anticoagulation Flowsheet and Dosing Calendar (Go to Encounters tab in chart review, and find the Anticoagulation Therapy Visit)    INR 2.0.  She missed a dose of Warfarin about 3 days ago or more.  It got stuck in her pill box and I reviewed that it is ok to make up missed doses.  Continue the 5mg/day and recheck in 2 weeks.  Dosing was decreased last time so we will keep the 5mg/day to truly see her INR with the decrease versus the missed dose.  Mike Monae, ARMANDO

## 2017-12-19 NOTE — MR AVS SNAPSHOT
Veronica Bray   12/19/2017 10:40 AM   Anticoagulation Therapy Visit    Description:  86 year old female   Provider:  [facility]   [facility]:  [facility]           INR as of 12/19/2017     Today's INR 2.0      Anticoagulation Summary as of 12/19/2017     INR goal 2.0-3.0   Today's INR 2.0   Full instructions 5 mg every day   Next INR check 1/5/2018    Indications   Atrial fibrillation (H) [I48.91] [I48.91]  Long-term (current) use of anticoagulants [Z79.01] [Z79.01]         Your next Anticoagulation Clinic appointment(s)     Jan 05, 2018 11:00 AM CST   Anticoagulation Visit with RU ANTICOAGULATION   [facility] ([facility])    [address]  [facility] [address] [phone]              December 2017 Details    Sun Mon Tue Wed Thu Fri Sat          1               2                 3               4               5               6               7               8               9                 10               11               12               13               14               15               16                 17               18               19      5 mg   See details      20      5 mg         21      5 mg         22      5 mg         23      5 mg           24      5 mg         25      5 mg         26      5 mg         27      5 mg         28      5 mg         29      5 mg         30      5 mg           31      5 mg                Date Details   12/19 This INR check               How to take your warfarin dose     To take:  5 mg Take 1 of the 5 mg tablets.           January 2018 Details    Sun Mon Tue Wed Thu Fri Sat      1      5 mg         2      5 mg         3      5 mg         4      5 mg         5            6                 7               8               9               10               11               12               13                 14               15               16               17               18                19               20                 21               22               23               24               25               26               27                 28               29               30               31                   Date Details   No additional details    Date of next INR:  1/5/2018         How to take your warfarin dose     To take:  5 mg Take 1 of the 5 mg tablets.

## 2018-01-02 ENCOUNTER — OFFICE VISIT (OUTPATIENT)
Dept: INTERNAL MEDICINE | Facility: CLINIC | Age: 83
End: 2018-01-02
Payer: COMMERCIAL

## 2018-01-02 VITALS
HEART RATE: 73 BPM | BODY MASS INDEX: 26.68 KG/M2 | WEIGHT: 145 LBS | TEMPERATURE: 97.6 F | SYSTOLIC BLOOD PRESSURE: 120 MMHG | OXYGEN SATURATION: 95 % | DIASTOLIC BLOOD PRESSURE: 74 MMHG | HEIGHT: 62 IN

## 2018-01-02 DIAGNOSIS — I48.92 ATRIAL FLUTTER, PAROXYSMAL (H): ICD-10-CM

## 2018-01-02 DIAGNOSIS — I10 BENIGN ESSENTIAL HYPERTENSION: ICD-10-CM

## 2018-01-02 DIAGNOSIS — I48.0 PAROXYSMAL ATRIAL FIBRILLATION (H): ICD-10-CM

## 2018-01-02 DIAGNOSIS — Z95.1 S/P CABG (CORONARY ARTERY BYPASS GRAFT): ICD-10-CM

## 2018-01-02 PROCEDURE — 99214 OFFICE O/P EST MOD 30 MIN: CPT | Performed by: INTERNAL MEDICINE

## 2018-01-02 RX ORDER — LISINOPRIL 5 MG/1
5 TABLET ORAL DAILY
Qty: 90 TABLET | Refills: 4 | Status: SHIPPED | OUTPATIENT
Start: 2018-01-02 | End: 2019-01-15

## 2018-01-02 RX ORDER — CARVEDILOL 6.25 MG/1
6.25 TABLET ORAL 2 TIMES DAILY WITH MEALS
Qty: 180 TABLET | Refills: 4 | Status: SHIPPED | OUTPATIENT
Start: 2018-01-02 | End: 2019-01-15

## 2018-01-02 RX ORDER — WARFARIN SODIUM 5 MG/1
5 TABLET ORAL AT BEDTIME
COMMUNITY
End: 2018-05-29 | Stop reason: DRUGHIGH

## 2018-01-02 RX ORDER — ATORVASTATIN CALCIUM 10 MG/1
10 TABLET, FILM COATED ORAL DAILY
Qty: 90 TABLET | Refills: 4 | Status: SHIPPED | OUTPATIENT
Start: 2018-01-02 | End: 2018-03-04

## 2018-01-02 NOTE — LETTER
To whom it may concern:    Veronica Bray is a patient under my care.   She has balance issues following a right knee surgery approximately in 2014.  Recommendations are for home physical therapy through [facility].     Please call with any questions.      Concepcion Mccall MD

## 2018-01-02 NOTE — PROGRESS NOTES
"  SUBJECTIVE:   Veronica Bray is a 86 year old female who presents to clinic today for the following health issues:      Hyperlipidemia Follow-Up      Rate your low fat/cholesterol diet?: good    Taking statin?  Yes, no muscle aches from statin    Other lipid medications/supplements?:  none    Hypertension Follow-up      Outpatient blood pressures are not being checked.    Low Salt Diet: low salt    Atrial fibrillation.  She is followed by Dr. Benavides.     She has felt dizzy the past couple of days.  She feels lightheaded. The lightheadedness occurs when she is sitting down or standing, and lasts moments.  After she lays down, the symptoms resolved.   She currently has a cold and feels like there is something in her ears.   She has not chest pain or shortness of breath. No palpitations.   Does not occur with sitting to standing.   She is staying hydrated.     Right leg issues.  She has had right knee replacement.  She had her tibia fractured during the surgery.  The patient has had no sensation below her right knee.  She has balance issues, and would like a physical therapy.         Amount of exercise or physical activity: 6-7 days/week for an average of as tolerated    Problems taking medications regularly: No    Medication side effects: none  Diet: regular (no restrictions), low carb          Problem list and histories reviewed & adjusted, as indicated.  Additional history: as documented    Labs reviewed in EPIC    Reviewed and updated as needed this visit by clinical staffTobacco  Allergies  Med Hx  Surg Hx  Fam Hx  Soc Hx      Reviewed and updated as needed this visit by Provider         ROS:  C: NEGATIVE for fever, chills, change in weight  ENT: ear pain  R: NEGATIVE for significant cough or SOB  CV: NEGATIVE for chest pain, palpitations or peripheral edema    OBJECTIVE:     /74 (BP Location: Left arm, Cuff Size: Adult Large)  Pulse 73  Temp 97.6  F (36.4  C) (Oral)  Ht 5' 2\" (1.575 m)  Wt 145 lb " (65.8 kg)  SpO2 95%  Breastfeeding? No  BMI 26.52 kg/m2  Body mass index is 26.52 kg/(m^2).  GENERAL: healthy, alert and no distress  ENT: right ear with wax blocking tympanic membrane; right ear canal clear with gray tympanic membrane  NECK: no adenopathy, no asymmetry, masses, or scars and thyroid normal to palpation  RESP: lungs clear to auscultation - no rales, rhonchi or wheezes  CV: regular rate and rhythm, normal S1 S2, no S3 or S4, no murmur, click or rub  Neuro: CN II-XII intact; moving all extremities    ASSESSMENT/PLAN:     (I48.0) Paroxysmal atrial fibrillation (H)  Comment: stable  Plan: atorvastatin (LIPITOR) 10 MG tablet, carvedilol        (COREG) 6.25 MG tablet            (Z95.1) S/P CABG (coronary artery bypass graft)  Comment: stable  Plan: atorvastatin (LIPITOR) 10 MG tablet, carvedilol        (COREG) 6.25 MG tablet            (I48.92) Atrial flutter, paroxysmal (H)  Comment: stable  Plan: carvedilol (COREG) 6.25 MG tablet            (I10) Benign essential hypertension  Comment: at goal  Plan: lisinopril        (PRINIVIL/ZESTRIL) 5 MG tablet, carvedilol         (COREG) 6.25 MG tablet          Lightheaded.  Likely secondary to URI. Stay hydrated. Monitor for now. Patient to call if symptoms worsen.     Neuropathy.  Physical therapy letter written.      Concepcion Mccall MD  [facility]

## 2018-01-02 NOTE — NURSING NOTE
"Chief Complaint   Patient presents with     Hypertension     labs last month, dizzy last 2 days     Lipids       Initial /74 (BP Location: Left arm, Cuff Size: Adult Large)  Pulse 73  Temp 97.6  F (36.4  C) (Oral)  Ht 5' 2\" (1.575 m)  Wt 145 lb (65.8 kg)  SpO2 95%  Breastfeeding? No  BMI 26.52 kg/m2 Estimated body mass index is 26.52 kg/(m^2) as calculated from the following:    Height as of this encounter: 5' 2\" (1.575 m).    Weight as of this encounter: 145 lb (65.8 kg).  Medication Reconciliation: complete   Maye Seo CMA      "

## 2018-01-02 NOTE — MR AVS SNAPSHOT
"              After Visit Summary   1/2/2018    Veronica Bray    MRN: [id number]           Patient Information     Date Of Birth          5/31/1931        Visit Information        Provider Department      1/2/2018 11:00 AM Concepcion Mccall MD [facility]        Today's Diagnoses     Paroxysmal atrial fibrillation (H)        S/P CABG (coronary artery bypass graft)        Atrial flutter, paroxysmal (H)        Benign essential hypertension           Follow-ups after your visit        Your next 10 appointments already scheduled     Jan 05, 2018 11:00 AM CST   Anticoagulation Visit with RU ANTICOAGULATION   [facility] ([facility])    [address]  [facility] [address] [phone]              Who to contact     If you have questions or need follow up information about today's clinic visit or your schedule please contact [facility] directly at [phone].  Normal or non-critical lab and imaging results will be communicated to you by MyChart, letter or phone within 4 business days after the clinic has received the results. If you do not hear from us within 7 days, please contact the clinic through MyChart or phone. If you have a critical or abnormal lab result, we will notify you by phone as soon as possible.  Submit refill requests through [employer] or call your pharmacy and they will forward the refill request to us. Please allow 3 business days for your refill to be completed.          Additional Information About Your Visit        MyChart Information     [employer] lets you send messages to your doctor, view your test results, renew your prescriptions, schedule appointments and more. To sign up, go to [email or web address] . Click on \"Log in\" on the left side of the screen, which will take you to the Welcome page. Then click on \"Sign up Now\" on the right side of the page.     You will be asked to enter the " "access code listed below, as well as some personal information. Please follow the directions to create your username and password.     Your access code is: [email or web address]  Expires: 2018 12:04 PM     Your access code will  in 90 days. If you need help or a new code, please call your [facility] or [phone].        Care EveryWhere ID     This is your Care EveryWhere ID. This could be used by other organizations to access your [place] medical records  [id number]        Your Vitals Were     Pulse Temperature Height Pulse Oximetry Breastfeeding? BMI (Body Mass Index)    73 97.6  F (36.4  C) (Oral) 5' 2\" (1.575 m) 95% No 26.52 kg/m2       Blood Pressure from Last 3 Encounters:   18 120/74   10/10/17 144/68   17 134/66    Weight from Last 3 Encounters:   18 145 lb (65.8 kg)   10/10/17 149 lb 1.6 oz (67.6 kg)   17 149 lb 14.4 oz (68 kg)              Today, you had the following     No orders found for display         Today's Medication Changes          These changes are accurate as of: 18 12:04 PM.  If you have any questions, ask your nurse or doctor.               These medicines have changed or have updated prescriptions.        Dose/Directions    warfarin 5 MG tablet   Commonly known as:  COUMADIN   This may have changed:  Another medication with the same name was removed. Continue taking this medication, and follow the directions you see here.   Changed by:  Concepcion Mccall MD        Dose:  5 mg   Take 5 mg by mouth daily   Refills:  0         Stop taking these medicines if you haven't already. Please contact your care team if you have questions.     CLARITIN PO   Stopped by:  Concepcion Mccall MD                Where to get your medicines      These medications were sent to [facility] - [place], [place] - [address]  [address] [place] [address]     Phone:  [phone]     atorvastatin 10 MG tablet    carvedilol 6.25 MG tablet    " lisinopril 5 MG tablet                Primary Care Provider Office Phone # Fax #    Concepcion Renny Mccall [phone]       [address] [facility] [address]        Equal Access to Services     SYLVIA ALLEN : Hadii len ku beao Sobrooksali, waaxda luqadaha, qaybta kaalmada adeegyada, fang cornejon manny cota laDorishiren atkins. So [facility] [phone].    ATENCIÓN: Si habla español, tiene a martinez disposición servicios gratuitos de asistencia lingüística. Llame al [phone].    We comply with applicable federal civil rights laws and [place] laws. We do not discriminate on the basis of race, color, national origin, age, disability, sex, sexual orientation, or gender identity.            Thank you!     Thank you for choosing [facility]  for your care. Our goal is always to provide you with excellent care. Hearing back from our patients is one way we can continue to improve our services. Please take a few minutes to complete the written survey that you may receive in the mail after your visit with us. Thank you!             Your Updated Medication List - Protect others around you: Learn how to safely use, store and throw away your medicines at www.disposemymeds.org.          This list is accurate as of: 1/2/18 12:04 PM.  Always use your most recent med list.                   Brand Name Dispense Instructions for use Diagnosis    ALLEGRA PO      Take by mouth daily        ASPIRIN PO      Take 81 mg by mouth every other day        atorvastatin 10 MG tablet    LIPITOR    90 tablet    Take 1 tablet (10 mg) by mouth daily    Paroxysmal atrial fibrillation (H), S/P CABG (coronary artery bypass graft), Atrial flutter, paroxysmal (H), Benign essential hypertension       carvedilol 6.25 MG tablet    COREG    180 tablet    Take 1 tablet (6.25 mg) by mouth 2 times daily (with meals)    Paroxysmal atrial fibrillation (H), S/P CABG (coronary artery bypass graft), Atrial flutter, paroxysmal (H),  Benign essential hypertension       lisinopril 5 MG tablet    PRINIVIL/ZESTRIL    90 tablet    Take 1 tablet (5 mg) by mouth daily    Benign essential hypertension       MUCINEX DM MAXIMUM STRENGTH PO      Take by mouth 2 times daily        PRESERVISION AREDS 2 Caps      Take by mouth 2 times daily        warfarin 5 MG tablet    COUMADIN     Take 5 mg by mouth daily

## 2018-01-08 ENCOUNTER — TELEPHONE (OUTPATIENT)
Dept: INTERNAL MEDICINE | Facility: CLINIC | Age: 83
End: 2018-01-08

## 2018-01-12 ENCOUNTER — ANTICOAGULATION THERAPY VISIT (OUTPATIENT)
Dept: CARDIOLOGY | Facility: CLINIC | Age: 83
End: 2018-01-12
Payer: COMMERCIAL

## 2018-01-12 DIAGNOSIS — I48.91 ATRIAL FIBRILLATION, UNSPECIFIED TYPE (H): ICD-10-CM

## 2018-01-12 DIAGNOSIS — Z79.01 LONG-TERM (CURRENT) USE OF ANTICOAGULANTS: ICD-10-CM

## 2018-01-12 LAB — INR POINT OF CARE: 2.1 (ref 0.86–1.14)

## 2018-01-12 PROCEDURE — 99207 ZZC NO CHARGE NURSE ONLY: CPT | Performed by: INTERNAL MEDICINE

## 2018-01-12 PROCEDURE — 85610 PROTHROMBIN TIME: CPT | Mod: QW | Performed by: INTERNAL MEDICINE

## 2018-01-12 PROCEDURE — 36416 COLLJ CAPILLARY BLOOD SPEC: CPT | Performed by: INTERNAL MEDICINE

## 2018-01-12 NOTE — MR AVS SNAPSHOT
Veronica DANNY Bray   1/12/2018 10:40 AM   Anticoagulation Therapy Visit    Description:  86 year old female   Provider:  [facility]   Department:  [facility]           INR as of 1/12/2018     Today's INR 2.1      Anticoagulation Summary as of 1/12/2018     INR goal 2.0-3.0   Today's INR 2.1   Full instructions 5 mg every day   Next INR check 2/2/2018    Indications   Atrial fibrillation (H) [I48.91] [I48.91]  Long-term (current) use of anticoagulants [Z79.01] [Z79.01]         Your next Anticoagulation Clinic appointment(s)     Feb 02, 2018 10:40 AM CST   Anticoagulation Visit with RU ANTICOAGULATION   [facility] ([facility])    [address] [address] [phone]              January 2018 Details    Sun Mon Tue Wed Thu Fri Sat      1               2               3               4               5               6                 7               8               9               10               11               12      5 mg   See details      13      5 mg           14      5 mg         15      5 mg         16      5 mg         17      5 mg         18      5 mg         19      5 mg         20      5 mg           21      5 mg         22      5 mg         23      5 mg         24      5 mg         25      5 mg         26      5 mg         27      5 mg           28      5 mg         29      5 mg         30      5 mg         31      5 mg             Date Details   01/12 This INR check               How to take your warfarin dose     To take:  5 mg Take 1 of the 5 mg tablets.           February 2018 Details    Sun Mon Tue Wed Thu Fri Sat         1      5 mg         2            3                 4               5               6               7               8               9               10                 11               12               13               14               15               16               17                  18               19               20               21               22               23               24                 25               26               27               28                   Date Details   No additional details    Date of next INR:  2/2/2018         How to take your warfarin dose     To take:  5 mg Take 1 of the 5 mg tablets.

## 2018-01-12 NOTE — PROGRESS NOTES
ANTICOAGULATION FOLLOW-UP CLINIC VISIT    Patient Name:  Veronica Bray  Date:  1/12/2018  Contact Type:  Face to Face    SUBJECTIVE:     Patient Findings     Positives No Problem Findings           OBJECTIVE    INR Protime   Date Value Ref Range Status   01/12/2018 2.1 (A) 0.86 - 1.14 Final       ASSESSMENT / PLAN  INR assessment THER    Recheck INR In: 3 WEEKS    INR Location Clinic      Anticoagulation Summary as of 1/12/2018     INR goal 2.0-3.0   Today's INR 2.1   Maintenance plan 5 mg (5 mg x 1) every day   Full instructions 5 mg every day   Weekly total 35 mg   No change documented Larisa Monae, RN   Plan last modified Larisa Monae RN (12/1/2017)   Next INR check 2/2/2018   Target end date Indefinite    Indications   Atrial fibrillation (H) [I48.91] [I48.91]  Long-term (current) use of anticoagulants [Z79.01] [Z79.01]         Anticoagulation Episode Summary     INR check location     Preferred lab     Send INR reminders to [facility] HEART INR NURSE    Comments       Anticoagulation Care Providers     Provider Role Specialty Phone number    Ip, Tr Morris Carrera MD Responsible Cardiology [phone]            See the Encounter Report to view Anticoagulation Flowsheet and Dosing Calendar (Go to Encounters tab in chart review, and find the Anticoagulation Therapy Visit)    INR 2.1.  No missed doses.  She had a cold last week and took some cold ease but no other cold meds or antibiotics.  Continue 5mg/day and recheck in 3 weeks.  Watch to see if 1 day of 7.5mg is needed again.  Mike Monae RN

## 2018-01-15 ENCOUNTER — TELEPHONE (OUTPATIENT)
Dept: INTERNAL MEDICINE | Facility: CLINIC | Age: 83
End: 2018-01-15

## 2018-01-19 ENCOUNTER — TELEPHONE (OUTPATIENT)
Dept: INTERNAL MEDICINE | Facility: CLINIC | Age: 83
End: 2018-01-19

## 2018-01-19 NOTE — TELEPHONE ENCOUNTER
Form received from: [facility]    Form requesting following info/need: 2 PT orders    SANCHEZ needed?: No    Location of form: Dr. Mccall's in basket    When completed the route for return: Fax

## 2018-01-25 ENCOUNTER — TELEPHONE (OUTPATIENT)
Dept: INTERNAL MEDICINE | Facility: CLINIC | Age: 83
End: 2018-01-25

## 2018-01-25 NOTE — TELEPHONE ENCOUNTER
[facility] [place], [facility]  Progress on patient goals  ED's office  Fax back

## 2018-01-26 ENCOUNTER — MEDICAL CORRESPONDENCE (OUTPATIENT)
Dept: HEALTH INFORMATION MANAGEMENT | Facility: CLINIC | Age: 83
End: 2018-01-26

## 2018-02-02 ENCOUNTER — ANTICOAGULATION THERAPY VISIT (OUTPATIENT)
Dept: CARDIOLOGY | Facility: CLINIC | Age: 83
End: 2018-02-02
Payer: COMMERCIAL

## 2018-02-02 DIAGNOSIS — I48.91 ATRIAL FIBRILLATION, UNSPECIFIED TYPE (H): ICD-10-CM

## 2018-02-02 DIAGNOSIS — Z79.01 LONG-TERM (CURRENT) USE OF ANTICOAGULANTS: ICD-10-CM

## 2018-02-02 LAB — INR POINT OF CARE: 2.5 (ref 0.86–1.14)

## 2018-02-02 PROCEDURE — 99207 ZZC NO CHARGE NURSE ONLY: CPT | Performed by: INTERNAL MEDICINE

## 2018-02-02 PROCEDURE — 36416 COLLJ CAPILLARY BLOOD SPEC: CPT | Performed by: INTERNAL MEDICINE

## 2018-02-02 PROCEDURE — 85610 PROTHROMBIN TIME: CPT | Mod: QW | Performed by: INTERNAL MEDICINE

## 2018-02-02 NOTE — MR AVS SNAPSHOT
Veronica DANNY Bray   2/2/2018 10:40 AM   Anticoagulation Therapy Visit    Description:  86 year old female   Provider:  [facility]   Department:  [facility]           INR as of 2/2/2018     Today's INR 2.5      Anticoagulation Summary as of 2/2/2018     INR goal 2.0-3.0   Today's INR 2.5   Full instructions 5 mg every day   Next INR check 3/2/2018    Indications   Atrial fibrillation (H) [I48.91] [I48.91]  Long-term (current) use of anticoagulants [Z79.01] [Z79.01]         Your next Anticoagulation Clinic appointment(s)     Mar 02, 2018 10:40 AM CST   Anticoagulation Visit with RU ANTICOAGULATION   [facility] ([facility])    [address] [address] [phone]              February 2018 Details    Sun Mon Tue Wed Thu Fri Sat         1               2      5 mg   See details      3      5 mg           4      5 mg         5      5 mg         6      5 mg         7      5 mg         8      5 mg         9      5 mg         10      5 mg           11      5 mg         12      5 mg         13      5 mg         14      5 mg         15      5 mg         16      5 mg         17      5 mg           18      5 mg         19      5 mg         20      5 mg         21      5 mg         22      5 mg         23      5 mg         24      5 mg           25      5 mg         26      5 mg         27      5 mg         28      5 mg             Date Details   02/02 This INR check               How to take your warfarin dose     To take:  5 mg Take 1 of the 5 mg tablets.           March 2018 Details    Sun Mon Tue Wed Thu Fri Sat         1      5 mg         2            3                 4               5               6               7               8               9               10                 11               12               13               14               15               16               17                 18               19                20               21               22               23               24                 25               26               27               28               29               30               31                Date Details   No additional details    Date of next INR:  3/2/2018         How to take your warfarin dose     To take:  5 mg Take 1 of the 5 mg tablets.

## 2018-02-02 NOTE — PROGRESS NOTES
ANTICOAGULATION FOLLOW-UP CLINIC VISIT    Patient Name:  Veronica Bray  Date:  2/2/2018  Contact Type:  Face to Face    SUBJECTIVE:     Patient Findings     Positives No Problem Findings           OBJECTIVE    INR Protime   Date Value Ref Range Status   02/02/2018 2.5 (A) 0.86 - 1.14 Final       ASSESSMENT / PLAN  INR assessment THER    Recheck INR In: 4 WEEKS    INR Location Clinic      Anticoagulation Summary as of 2/2/2018     INR goal 2.0-3.0   Today's INR 2.5   Maintenance plan 5 mg (5 mg x 1) every day   Full instructions 5 mg every day   Weekly total 35 mg   No change documented Larisa Monae RN   Plan last modified Larisa Monae RN (12/1/2017)   Next INR check 3/2/2018   Target end date Indefinite    Indications   Atrial fibrillation (H) [I48.91] [I48.91]  Long-term (current) use of anticoagulants [Z79.01] [Z79.01]         Anticoagulation Episode Summary     INR check location     Preferred lab     Send INR reminders to [facility] HEART INR NURSE    Comments       Anticoagulation Care Providers     Provider Role Specialty Phone number    Ip, Tr Morris Carrera MD Responsible Cardiology [phone]            See the Encounter Report to view Anticoagulation Flowsheet and Dosing Calendar (Go to Encounters tab in chart review, and find the Anticoagulation Therapy Visit)    INR 2.5.  No changes.  Continue same schedule and recheck in 4 weeks. Mike Monae RN

## 2018-03-02 ENCOUNTER — ANTICOAGULATION THERAPY VISIT (OUTPATIENT)
Dept: CARDIOLOGY | Facility: CLINIC | Age: 83
End: 2018-03-02
Payer: COMMERCIAL

## 2018-03-02 DIAGNOSIS — Z79.01 LONG-TERM (CURRENT) USE OF ANTICOAGULANTS: ICD-10-CM

## 2018-03-02 DIAGNOSIS — I48.91 ATRIAL FIBRILLATION, UNSPECIFIED TYPE (H): ICD-10-CM

## 2018-03-02 LAB — INR POINT OF CARE: 2.2 (ref 0.86–1.14)

## 2018-03-02 PROCEDURE — 85610 PROTHROMBIN TIME: CPT | Mod: QW | Performed by: INTERNAL MEDICINE

## 2018-03-02 PROCEDURE — 36416 COLLJ CAPILLARY BLOOD SPEC: CPT | Performed by: INTERNAL MEDICINE

## 2018-03-02 PROCEDURE — 99207 ZZC NO CHARGE NURSE ONLY: CPT | Performed by: INTERNAL MEDICINE

## 2018-03-02 NOTE — MR AVS SNAPSHOT
Veronica Bray   3/2/2018 10:40 AM   Anticoagulation Therapy Visit    Description:  86 year old female   Provider:  [facility]   Department:  [facility]           INR as of 3/2/2018     Today's INR 2.2      Anticoagulation Summary as of 3/2/2018     INR goal 2.0-3.0   Today's INR 2.2   Full instructions 5 mg every day   Next INR check 3/30/2018    Indications   Atrial fibrillation (H) [I48.91] [I48.91]  Long-term (current) use of anticoagulants [Z79.01] [Z79.01]         Your next Anticoagulation Clinic appointment(s)     Mar 30, 2018 10:40 AM CDT   Anticoagulation Visit with RU ANTICOAGULATION   [facility] ([facility])    [address] [address]  [facility] [address] [phone]              March 2018 Details    Sun Mon Tue Wed Thu Fri Sat         1               2      5 mg   See details      3      5 mg           4      5 mg         5      5 mg         6      5 mg         7      5 mg         8      5 mg         9      5 mg         10      5 mg           11      5 mg         12      5 mg         13      5 mg         14      5 mg         15      5 mg         16      5 mg         17      5 mg           18      5 mg         19      5 mg         20      5 mg         21      5 mg         22      5 mg         23      5 mg         24      5 mg           25      5 mg         26      5 mg         27      5 mg         28      5 mg         29      5 mg         30            31                Date Details   03/02 This INR check       Date of next INR:  3/30/2018         How to take your warfarin dose     To take:  5 mg Take 1 of the 5 mg tablets.

## 2018-03-02 NOTE — PROGRESS NOTES
ANTICOAGULATION FOLLOW-UP CLINIC VISIT    Patient Name:  Veronica Bray  Date:  3/2/2018  Contact Type:  Face to Face    SUBJECTIVE:     Patient Findings     Positives No Problem Findings           OBJECTIVE    INR Protime   Date Value Ref Range Status   03/02/2018 2.2 (A) 0.86 - 1.14 Final       ASSESSMENT / PLAN  INR assessment THER    Recheck INR In: 4 WEEKS    INR Location Clinic      Anticoagulation Summary as of 3/2/2018     INR goal 2.0-3.0   Today's INR 2.2   Maintenance plan 5 mg (5 mg x 1) every day   Full instructions 5 mg every day   Weekly total 35 mg   No change documented Larisa Monae, RN   Plan last modified Larisa Monae RN (12/1/2017)   Next INR check 3/30/2018   Target end date Indefinite    Indications   Atrial fibrillation (H) [I48.91] [I48.91]  Long-term (current) use of anticoagulants [Z79.01] [Z79.01]         Anticoagulation Episode Summary     INR check location     Preferred lab     Send INR reminders to [facility] HEART INR NURSE    Comments       Anticoagulation Care Providers     Provider Role Specialty Phone number    Ip, Tr Carrera MD Responsible Cardiology [phone]            See the Encounter Report to view Anticoagulation Flowsheet and Dosing Calendar (Go to Encounters tab in chart review, and find the Anticoagulation Therapy Visit)    INR 2.2.  She has a cold and has been taking cold eeze.  Continue normal schedule and recheck in 4 weeks.  Told her to call if antibiotics are started.  Mike Monae, RN

## 2018-03-04 ENCOUNTER — APPOINTMENT (OUTPATIENT)
Dept: ULTRASOUND IMAGING | Facility: CLINIC | Age: 83
End: 2018-03-04
Attending: HOSPITALIST
Payer: MEDICARE

## 2018-03-04 ENCOUNTER — HOSPITAL ENCOUNTER (OUTPATIENT)
Facility: CLINIC | Age: 83
Setting detail: OBSERVATION
Discharge: HOME | End: 2018-03-05
Attending: EMERGENCY MEDICINE | Admitting: HOSPITALIST
Payer: MEDICARE

## 2018-03-04 ENCOUNTER — APPOINTMENT (OUTPATIENT)
Dept: GENERAL RADIOLOGY | Facility: CLINIC | Age: 83
End: 2018-03-04
Attending: EMERGENCY MEDICINE
Payer: MEDICARE

## 2018-03-04 DIAGNOSIS — J06.9 VIRAL URI WITH COUGH: Primary | ICD-10-CM

## 2018-03-04 DIAGNOSIS — I48.91 ATRIAL FIBRILLATION WITH RVR (H): ICD-10-CM

## 2018-03-04 DIAGNOSIS — R07.9 ACUTE CHEST PAIN: ICD-10-CM

## 2018-03-04 PROBLEM — Z86.79 ATRIAL FIBRILLATION, CURRENTLY IN SINUS RHYTHM: Status: ACTIVE | Noted: 2018-03-04

## 2018-03-04 LAB
ALBUMIN SERPL-MCNC: 3.6 G/DL (ref 3.4–5)
ALP SERPL-CCNC: 86 U/L (ref 40–150)
ALT SERPL W P-5'-P-CCNC: 28 U/L (ref 0–50)
ANION GAP SERPL CALCULATED.3IONS-SCNC: 6 MMOL/L (ref 3–14)
APTT PPP: 36 SEC (ref 22–37)
AST SERPL W P-5'-P-CCNC: 28 U/L (ref 0–45)
BASOPHILS # BLD AUTO: 0 10E9/L (ref 0–0.2)
BASOPHILS NFR BLD AUTO: 1 %
BILIRUB SERPL-MCNC: 0.7 MG/DL (ref 0.2–1.3)
BUN SERPL-MCNC: 17 MG/DL (ref 7–30)
CALCIUM SERPL-MCNC: 8.5 MG/DL (ref 8.5–10.1)
CHLORIDE SERPL-SCNC: 104 MMOL/L (ref 94–109)
CO2 SERPL-SCNC: 27 MMOL/L (ref 20–32)
CREAT SERPL-MCNC: 0.63 MG/DL (ref 0.52–1.04)
DIFFERENTIAL METHOD BLD: ABNORMAL
EOSINOPHIL # BLD AUTO: 0.1 10E9/L (ref 0–0.7)
EOSINOPHIL NFR BLD AUTO: 1.4 %
ERYTHROCYTE [DISTWIDTH] IN BLOOD BY AUTOMATED COUNT: 13.2 % (ref 10–15)
FLUAV+FLUBV AG SPEC QL: NEGATIVE
FLUAV+FLUBV AG SPEC QL: NEGATIVE
GFR SERPL CREATININE-BSD FRML MDRD: 90 ML/MIN/1.7M2
GLUCOSE SERPL-MCNC: 119 MG/DL (ref 70–99)
HCT VFR BLD AUTO: 45 % (ref 35–47)
HGB BLD-MCNC: 14.3 G/DL (ref 11.7–15.7)
IMM GRANULOCYTES # BLD: 0 10E9/L (ref 0–0.4)
IMM GRANULOCYTES NFR BLD: 0.2 %
INR PPP: 1.87 (ref 0.86–1.14)
LYMPHOCYTES # BLD AUTO: 0.7 10E9/L (ref 0.8–5.3)
LYMPHOCYTES NFR BLD AUTO: 15.5 %
MAGNESIUM SERPL-MCNC: 2.3 MG/DL (ref 1.6–2.3)
MCH RBC QN AUTO: 29.3 PG (ref 26.5–33)
MCHC RBC AUTO-ENTMCNC: 31.8 G/DL (ref 31.5–36.5)
MCV RBC AUTO: 92 FL (ref 78–100)
MONOCYTES # BLD AUTO: 0.5 10E9/L (ref 0–1.3)
MONOCYTES NFR BLD AUTO: 11.9 %
NEUTROPHILS # BLD AUTO: 2.9 10E9/L (ref 1.6–8.3)
NEUTROPHILS NFR BLD AUTO: 70 %
NRBC # BLD AUTO: 0 10*3/UL
NRBC BLD AUTO-RTO: 0 /100
PLATELET # BLD AUTO: 140 10E9/L (ref 150–450)
POTASSIUM SERPL-SCNC: 4.2 MMOL/L (ref 3.4–5.3)
PROT SERPL-MCNC: 7.7 G/DL (ref 6.8–8.8)
RBC # BLD AUTO: 4.88 10E12/L (ref 3.8–5.2)
SODIUM SERPL-SCNC: 137 MMOL/L (ref 133–144)
SPECIMEN SOURCE: NORMAL
TROPONIN I SERPL-MCNC: 0.02 UG/L (ref 0–0.04)
TROPONIN I SERPL-MCNC: 0.04 UG/L (ref 0–0.04)
TSH SERPL DL<=0.005 MIU/L-ACNC: 0.83 MU/L (ref 0.4–4)
WBC # BLD AUTO: 4.2 10E9/L (ref 4–11)

## 2018-03-04 PROCEDURE — 87804 INFLUENZA ASSAY W/OPTIC: CPT | Performed by: EMERGENCY MEDICINE

## 2018-03-04 PROCEDURE — 99285 EMERGENCY DEPT VISIT HI MDM: CPT | Mod: 25

## 2018-03-04 PROCEDURE — A9270 NON-COVERED ITEM OR SERVICE: HCPCS | Mod: GY | Performed by: PHYSICIAN ASSISTANT

## 2018-03-04 PROCEDURE — 84484 ASSAY OF TROPONIN QUANT: CPT | Performed by: EMERGENCY MEDICINE

## 2018-03-04 PROCEDURE — 25000128 H RX IP 250 OP 636: Performed by: EMERGENCY MEDICINE

## 2018-03-04 PROCEDURE — 36415 COLL VENOUS BLD VENIPUNCTURE: CPT | Performed by: HOSPITALIST

## 2018-03-04 PROCEDURE — 85730 THROMBOPLASTIN TIME PARTIAL: CPT | Performed by: EMERGENCY MEDICINE

## 2018-03-04 PROCEDURE — 25000125 ZZHC RX 250: Performed by: EMERGENCY MEDICINE

## 2018-03-04 PROCEDURE — 85610 PROTHROMBIN TIME: CPT | Performed by: EMERGENCY MEDICINE

## 2018-03-04 PROCEDURE — A9270 NON-COVERED ITEM OR SERVICE: HCPCS | Mod: GY | Performed by: EMERGENCY MEDICINE

## 2018-03-04 PROCEDURE — 25000132 ZZH RX MED GY IP 250 OP 250 PS 637: Mod: GY | Performed by: PHYSICIAN ASSISTANT

## 2018-03-04 PROCEDURE — 83735 ASSAY OF MAGNESIUM: CPT | Performed by: EMERGENCY MEDICINE

## 2018-03-04 PROCEDURE — 25000132 ZZH RX MED GY IP 250 OP 250 PS 637: Mod: GY | Performed by: EMERGENCY MEDICINE

## 2018-03-04 PROCEDURE — 80053 COMPREHEN METABOLIC PANEL: CPT | Performed by: EMERGENCY MEDICINE

## 2018-03-04 PROCEDURE — 93005 ELECTROCARDIOGRAM TRACING: CPT

## 2018-03-04 PROCEDURE — 85025 COMPLETE CBC W/AUTO DIFF WBC: CPT | Performed by: EMERGENCY MEDICINE

## 2018-03-04 PROCEDURE — 99220 ZZC INITIAL OBSERVATION CARE,LEVL III: CPT | Performed by: HOSPITALIST

## 2018-03-04 PROCEDURE — 84443 ASSAY THYROID STIM HORMONE: CPT | Performed by: EMERGENCY MEDICINE

## 2018-03-04 PROCEDURE — 96361 HYDRATE IV INFUSION ADD-ON: CPT

## 2018-03-04 PROCEDURE — 96374 THER/PROPH/DIAG INJ IV PUSH: CPT

## 2018-03-04 PROCEDURE — 84484 ASSAY OF TROPONIN QUANT: CPT | Performed by: HOSPITALIST

## 2018-03-04 PROCEDURE — G0378 HOSPITAL OBSERVATION PER HR: HCPCS

## 2018-03-04 PROCEDURE — 93880 EXTRACRANIAL BILAT STUDY: CPT

## 2018-03-04 PROCEDURE — 71046 X-RAY EXAM CHEST 2 VIEWS: CPT

## 2018-03-04 PROCEDURE — 25000132 ZZH RX MED GY IP 250 OP 250 PS 637: Mod: GY | Performed by: HOSPITALIST

## 2018-03-04 PROCEDURE — 93005 ELECTROCARDIOGRAM TRACING: CPT | Mod: 76

## 2018-03-04 PROCEDURE — A9270 NON-COVERED ITEM OR SERVICE: HCPCS | Mod: GY | Performed by: HOSPITALIST

## 2018-03-04 RX ORDER — ASPIRIN 325 MG
325 TABLET ORAL ONCE
Status: COMPLETED | OUTPATIENT
Start: 2018-03-04 | End: 2018-03-04

## 2018-03-04 RX ORDER — LISINOPRIL 5 MG/1
5 TABLET ORAL DAILY
Status: DISCONTINUED | OUTPATIENT
Start: 2018-03-05 | End: 2018-03-05 | Stop reason: HOSPADM

## 2018-03-04 RX ORDER — DILTIAZEM HYDROCHLORIDE 5 MG/ML
20 INJECTION INTRAVENOUS ONCE
Status: COMPLETED | OUTPATIENT
Start: 2018-03-04 | End: 2018-03-04

## 2018-03-04 RX ORDER — ONDANSETRON 4 MG/1
4 TABLET, ORALLY DISINTEGRATING ORAL EVERY 6 HOURS PRN
Status: DISCONTINUED | OUTPATIENT
Start: 2018-03-04 | End: 2018-03-05 | Stop reason: HOSPADM

## 2018-03-04 RX ORDER — BENZONATATE 100 MG/1
100 CAPSULE ORAL 3 TIMES DAILY PRN
Status: DISCONTINUED | OUTPATIENT
Start: 2018-03-04 | End: 2018-03-05 | Stop reason: HOSPADM

## 2018-03-04 RX ORDER — ASPIRIN 81 MG/1
81 TABLET, CHEWABLE ORAL EVERY OTHER DAY
Status: DISCONTINUED | OUTPATIENT
Start: 2018-03-06 | End: 2018-03-05 | Stop reason: HOSPADM

## 2018-03-04 RX ORDER — WARFARIN SODIUM 7.5 MG/1
7.5 TABLET ORAL
Status: COMPLETED | OUTPATIENT
Start: 2018-03-04 | End: 2018-03-04

## 2018-03-04 RX ORDER — FLUTICASONE PROPIONATE 50 MCG
1 SPRAY, SUSPENSION (ML) NASAL DAILY
Status: ON HOLD | COMMUNITY
End: 2019-12-13

## 2018-03-04 RX ORDER — ATORVASTATIN CALCIUM 10 MG/1
10 TABLET, FILM COATED ORAL AT BEDTIME
Status: DISCONTINUED | OUTPATIENT
Start: 2018-03-04 | End: 2018-03-05 | Stop reason: HOSPADM

## 2018-03-04 RX ORDER — DILTIAZEM HYDROCHLORIDE 5 MG/ML
10 INJECTION INTRAVENOUS 2 TIMES DAILY PRN
Status: DISCONTINUED | OUTPATIENT
Start: 2018-03-04 | End: 2018-03-05 | Stop reason: HOSPADM

## 2018-03-04 RX ORDER — NALOXONE HYDROCHLORIDE 0.4 MG/ML
.1-.4 INJECTION, SOLUTION INTRAMUSCULAR; INTRAVENOUS; SUBCUTANEOUS
Status: DISCONTINUED | OUTPATIENT
Start: 2018-03-04 | End: 2018-03-05 | Stop reason: HOSPADM

## 2018-03-04 RX ORDER — ONDANSETRON 2 MG/ML
4 INJECTION INTRAMUSCULAR; INTRAVENOUS EVERY 6 HOURS PRN
Status: DISCONTINUED | OUTPATIENT
Start: 2018-03-04 | End: 2018-03-05 | Stop reason: HOSPADM

## 2018-03-04 RX ORDER — GUAIFENESIN/DEXTROMETHORPHAN 100-10MG/5
5 SYRUP ORAL EVERY 4 HOURS PRN
Status: DISCONTINUED | OUTPATIENT
Start: 2018-03-04 | End: 2018-03-05 | Stop reason: HOSPADM

## 2018-03-04 RX ORDER — ACETAMINOPHEN 325 MG/1
650 TABLET ORAL EVERY 4 HOURS PRN
Status: DISCONTINUED | OUTPATIENT
Start: 2018-03-04 | End: 2018-03-05 | Stop reason: HOSPADM

## 2018-03-04 RX ORDER — CARVEDILOL 6.25 MG/1
6.25 TABLET ORAL 2 TIMES DAILY WITH MEALS
Status: DISCONTINUED | OUTPATIENT
Start: 2018-03-04 | End: 2018-03-05 | Stop reason: HOSPADM

## 2018-03-04 RX ORDER — ACETAMINOPHEN 650 MG/1
650 SUPPOSITORY RECTAL EVERY 4 HOURS PRN
Status: DISCONTINUED | OUTPATIENT
Start: 2018-03-04 | End: 2018-03-05 | Stop reason: HOSPADM

## 2018-03-04 RX ADMIN — ATORVASTATIN CALCIUM 10 MG: 10 TABLET, FILM COATED ORAL at 21:30

## 2018-03-04 RX ADMIN — WARFARIN SODIUM 7.5 MG: 7.5 TABLET ORAL at 19:40

## 2018-03-04 RX ADMIN — CARVEDILOL 6.25 MG: 6.25 TABLET, FILM COATED ORAL at 19:40

## 2018-03-04 RX ADMIN — SODIUM CHLORIDE 999 ML: 9 INJECTION, SOLUTION INTRAVENOUS at 09:56

## 2018-03-04 RX ADMIN — GUAIFENESIN AND DEXTROMETHORPHAN 5 ML: 100; 10 SYRUP ORAL at 22:17

## 2018-03-04 RX ADMIN — ASPIRIN 325 MG ORAL TABLET 325 MG: 325 PILL ORAL at 10:17

## 2018-03-04 RX ADMIN — DILTIAZEM HYDROCHLORIDE 20 MG: 5 INJECTION INTRAVENOUS at 10:25

## 2018-03-04 RX ADMIN — BENZONATATE 100 MG: 100 CAPSULE ORAL at 19:44

## 2018-03-04 ASSESSMENT — ENCOUNTER SYMPTOMS
TROUBLE SWALLOWING: 1
COUGH: 1
DIZZINESS: 0
FATIGUE: 1
SORE THROAT: 0
FEVER: 0
SHORTNESS OF BREATH: 1
PALPITATIONS: 0
LIGHT-HEADEDNESS: 0

## 2018-03-04 ASSESSMENT — PAIN DESCRIPTION - DESCRIPTORS: DESCRIPTORS: ACHING

## 2018-03-04 NOTE — PLAN OF CARE
Problem: Patient Care Overview  Goal: Plan of Care/Patient Progress Review  Outcome: Improving  PRIMARY DIAGNOSIS: CHEST PAIN/Afib  OUTPATIENT/OBSERVATION GOALS TO BE MET BEFORE DISCHARGE:  1. Ruled out acute coronary syndrome (negative or stable Troponin):  No-trop negative x1- trops at 6pm and midnight  2. Pain Status: Improved with use of alternative comfort measures i.e.: says pain is d/t cough, improves when not coughing  3. Appropriate provocative testing performed: No  - Stress Test Procedure: Echo  - Interpretation of cardiac rhythm per telemetry tech: SR with bundle branch block    4. Cleared by Consultants (if applicable):N/A  5. Return to near baseline physical activity: Yes  Discharge Planner Nurse   Safe discharge environment identified: Yes  Barriers to discharge: No       Entered by: Ruby Mei 03/04/2018 4:55 PM     Please review provider order for any additional goals.   Nurse to notify provider when observation goals have been met and patient is ready for discharge.    Patient is alert and oriented. Patient complains of cough and some chest pain with cough. Patient denies heart palpitations/SOB. Patient is SR with bundle branch block on tele. Plan is for trops at 6pm and midnight, a complete echo and US of carotid bilateral. Will continue to monitor.

## 2018-03-04 NOTE — ED NOTES
".  [facility]  ED Nurse Handoff Report    Veronica Bray is a 86 year old female   ED Chief complaint: chest \"hurts\"  . ED Diagnosis:   Final diagnoses:   Atrial fibrillation with RVR (H)   Acute chest pain     Allergies:   Allergies   Allergen Reactions     Aleve [Naproxen] Anaphylaxis     Celebrex [Celecoxib]      Codeine      Demerol [Meperidine]      Hydromorphone      Percocet [Oxycodone-Acetaminophen]      Tramadol      Vicodin [Hydrocodone-Acetaminophen]        Code Status: Full Code  Activity level - Baseline/Home:  Independent. Activity Level - Current:   Stand with Assist. Lift room needed: No. Bariatric: No   Needed: No   Isolation: No. Infection: Not Applicable.     Vital Signs:   Vitals:    03/04/18 1215 03/04/18 1229 03/04/18 1230 03/04/18 1245   BP: 110/51  107/48 112/47   Resp:       Temp:       TempSrc:       SpO2: 96% 94%  94%   Weight:       Height:           Cardiac Rhythm:  Sinus with LBBB    Pain level: 0-10 Pain Scale: 0  Patient confused: No. Patient Falls Risk: Yes.   Elimination Status: Has voided   Patient Report - Initial Complaint:a history of atrial flutter, atrial fibrillation s/p ablation, SVT, left bundle branch block, hypertension, hyperlipidemia, CAD, NSTEMI, valley fever, s/p cardiac stenting and CABG, anticoagulated on warfarin who presents to the emergency department today for evaluation of chest pain and is accompanied by her .  The patient states that she first got sick with cold symptoms 5 days ago and had been coughing up clear sputum since then, though this became green/yellow last night and pinkish this morning.  Today, she woke up with substernal chest pain that she describes as chest heaviness.  She also feels very weak, feels as if there is a lump in her throat, was constipated earlier, and denies any sore throat, pain with swallowing, fevers, leg swelling, palpitations, dizziness, lightheadedness, or history of CHF.  Of note, the patient " has a history of cardiac stenting, MI, a double coronary artery bypass graft, and a history of atrial flutter following an ablation for atrial fibrillation.  Her most recent angiogram took place several years ago.  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  The patient follows with Dr. Benavides of cardiology and as she takes warfarin daily, takes 81 mg aspirin every other day.  She is unsure if she took aspirin today.   Focused Assessment: Cardiac - Cardiac WDL:  WDL except; all Cardiac Rhythm: radial pulse regular  Review of Systems (Cardiac) - Cardiac Signs/Symptoms: chest pain (6/10) Cardiac Conditions: coronary artery bypass graft surgery  Chest Pain Assessment - Rating (0-10):  (6) Precipitating Factors: activity; other (see comments) (and coughing) Chest Pain Reproducible?: Yes  Cardiac Monitoring - EKG Monitoring: Yes Cardiac Regularity: Regular Cardiac Rhythm:  (LBBB)  Chest Pain Assessment - Chest Pain Location: midsternal   Tests Performed: several EKG's, labs, CXR. Abnormal Results: .  Labs Ordered and Resulted from Time of ED Arrival Up to the Time of Departure from the ED   CBC WITH PLATELETS DIFFERENTIAL - Abnormal; Notable for the following:        Result Value    Platelet Count 140 (*)     Absolute Lymphocytes 0.7 (*)     All other components within normal limits   INR - Abnormal; Notable for the following:     INR 1.87 (*)     All other components within normal limits   COMPREHENSIVE METABOLIC PANEL - Abnormal; Notable for the following:     Glucose 119 (*)     All other components within normal limits   TROPONIN I   PARTIAL THROMBOPLASTIN TIME   MAGNESIUM   PULSE OXIMETRY NURSING   CARDIAC CONTINUOUS MONITORING   PERIPHERAL IV CATHETER   PATIENT CARE ORDER   INFLUENZA A/B ANTIGEN     .  XR Chest 2 Views   Final Result   IMPRESSION: No alveolar-type infiltrate          ALPHONSE SALDIVAR MD        Treatments provided: 20mg cardizem, returned  to NSR with LBBB  Family Comments: NA  OBS brochure/video discussed/provided to patient:  Yes  ED Medications:   Medications   0.9% sodium chloride BOLUS (0 mLs Intravenous Stopped 3/4/18 1045)   aspirin tablet 325 mg (325 mg Oral Given 3/4/18 1017)   diltiazem (CARDIZEM) injection 20 mg (20 mg Intravenous Given 3/4/18 1025)     Drips infusing:  No  For the majority of the shift, the patient's behavior Green. Interventions performed were NA.     Severe Sepsis OR Septic Shock Diagnosis Present: No      ED Nurse Name/Phone Number: ARMANDO Chowdray  12:48 PM    RECEIVING UNIT ED HANDOFF REVIEW    Above ED Nurse Handoff Report was reviewed: Yes  Reviewed by: Ca Ragland on March 4, 2018 at 1:10 PM

## 2018-03-04 NOTE — PHARMACY-ANTICOAGULATION SERVICE
Clinical Pharmacy - Warfarin Dosing Consult     Pharmacy has been consulted to manage this patient s warfarin therapy.  Indication: Atrial Fibrillation  Therapy Goal: INR 2-3  Warfarin Prior to Admission: Yes  Warfarin PTA Regimen: 5mg qhs  Significant drug interactions: Aspirin    INR   Date Value Ref Range Status   03/04/2018 1.87 (H) 0.86 - 1.14 Final     INR Protime   Date Value Ref Range Status   03/02/2018 2.2 (A) 0.86 - 1.14 Final       Recommend warfarin 7.5 mg today.  Pharmacy will monitor Veronica Bray daily and order warfarin doses to achieve specified goal.      Please contact pharmacy as soon as possible if the warfarin needs to be held for a procedure or if the warfarin goals change.      Emma Reeves, PharmD  March 4, 2018

## 2018-03-04 NOTE — H&P
[facility]    History and Physical  Hospitalist       Date of Admission:  3/4/2018  Date of Service (when I saw the patient): 03/04/18    Assessment & Plan   Veronica Bray is a 86 year old female with history CAD, MI 2006 with CABG- LIMA to LAD/vein graft to 1st diag (with damage of right atrium/patch), Type 2 MI with angio 2015, a flutter s/p ablation in 2009, parox a fib, HTN, VALENTIN not on CPAP, HLP who presents with recent viral URI, rapid a fib now sinus.    1. Neuro- no issues  2. Cardiovascular- CAD, MI 2006 with CABG- LIMA to LAD/vein graft to 1st diag (with damage of right atrium/patch), Type 2 MI with angio 2015, a flutter s/p ablation in 2009, parox a fib. Was in rapid a fib in ED. Converted on own after 20mg cardizem. Now sinus. Dr. Newman spoke with cards who recommended monitoring/trops/ECHO. Will trend trops (if positive, likely due to her rapid a fib). Will check ECHO (last in 2016). Patient requesting carotid US as she is due to have these checked. I will order these for her.  3. Pulmonary- VALENTIN. No longer on CPAP. Asthma- stable. CXR and flu swab neg in ED  4. GI- regular  5. ID- likely has a viral URI. Improving. Flu swab neg  6. Renal- no issues  7. Heme/Onc- no issues  8. Endo- will check TSH (added-on)  9. Musculoskeletal- ambulate in halls  10. Prophylaxis- on coumadin  11. Full code  12.  in room- all questions answered      Larry Mercado MD, Hospitalist  Pager: [phone]    Disposition: Expected discharge tomorrow if stable overnight  Larry Mercado MD    Primary Care Physician   Concepcion Mccall    Chief Complaint   Chest heaviness. Rapid a fib    History is obtained from the patient. I personally spoke with Dr. Newman in the ED    History of Present Illness   Veronica Bray is a 86 year old female with history CAD, MI 2006 with CABG- LIMA to LAD/vein graft to 1st diag (with damage of right atrium/patch), Type 2 MI with angio 2015, a flutter s/p ablation in 2009, parox a  fib, HTN, VALENTIN not on CPAP, HLP who presents with recent viral URI, rapid a fib now sinus. Patient states she has had a URI for about 3-4 days with productive (yellow) cough. This morning when she woke up she had chest pressure, so she came to the ED. She was found to be in rapid a fib. She was given 20mg of cardizem. She then converted to NSR on her own and now feels well, at baseline. She denies any current chest pain, sob, abdo pain, n/v/d. No recent fever or chills. When Dr. Newman called for admission, I questioned if she could just DC home. He spoke with the on-call cardiologist who recommended serial trops and repeat ECHO. Patient currently has no complaints except her cough from her recent URI.    Past Medical History    I have reviewed this patient's medical history and updated it with pertinent information if needed.   Past Medical History:   Diagnosis Date     Atrial fibrillation (H)      Atrial flutter (H)      CAD (coronary artery disease)     LIMA graft to the LAD and vein graft to the diagonal are patent.      Cardiomyopathy, unspecified     tachy induced     Hyperlipidemia      Hypertension      LBBB (left bundle branch block)      Mitral regurgitation      NSTEMI (non-ST elevated myocardial infarction) (H)      PAF (paroxysmal atrial fibrillation) (H)     converted on Amiodarone     Sleep apnea     CPAP     SVT (supraventricular tachycardia) (H)      Tobacco use        Past Surgical History   I have reviewed this patient's surgical history and updated it with pertinent information if needed.  Past Surgical History:   Procedure Laterality Date     CHOLECYSTECTOMY       CORONARY ANGIOGRAPHY ADULT ORDER  1/2005    Stent: Distal cfx, Mid LAD, Stent: D -1     GYN SURGERY      hysterectomy     HC LEFT HEART CATHETERIZATION  11/2006    mid distal-anterior/ distal inferior/ Apical Yossi, 85     HC LEFT HEART CATHETERIZATION  5/2015    100% Proximal LAD, patent LIMA-LAD, SVG-D1, LVEDP 13, 10-18 mmHg AV gradient,  3+ MR after PVC     ORTHOPEDIC SURGERY      hip, knee, wrist       Prior to Admission Medications   Prior to Admission Medications   Prescriptions Last Dose Informant Patient Reported? Taking?   ASPIRIN PO 3/4/2018 at AM  Yes Yes   Sig: Take 81 mg by mouth every other day    ATORVASTATIN CALCIUM PO 3/3/2018 at HS Self Yes Yes   Sig: Take 10 mg by mouth At Bedtime   Fexofenadine HCl (ALLEGRA PO) 3/4/2018 at AM Self Yes Yes   Sig: Take 180 mg by mouth daily    Multiple Vitamins-Minerals (PRESERVISION AREDS 2) CAPS 3/4/2018 at AM  Yes Yes   Sig: Take 1 capsule by mouth 2 times daily    carvedilol (COREG) 6.25 MG tablet 3/4/2018 at AM  No Yes   Sig: Take 1 tablet (6.25 mg) by mouth 2 times daily (with meals)   fluticasone (FLONASE) 50 MCG/ACT spray 3/3/2018 at AM Self Yes Yes   Sig: Spray 1 spray into both nostrils daily   lisinopril (PRINIVIL/ZESTRIL) 5 MG tablet 3/4/2018 at 2.5MG IN AM  No Yes   Sig: Take 1 tablet (5 mg) by mouth daily   warfarin (COUMADIN) 5 MG tablet 3/3/2018 at HS  Yes Yes   Sig: Take 5 mg by mouth At Bedtime       Facility-Administered Medications: None     Allergies   Allergies   Allergen Reactions     Aleve [Naproxen] Anaphylaxis     Celebrex [Celecoxib]      Codeine      Demerol [Meperidine]      Hydromorphone      Percocet [Oxycodone-Acetaminophen]      Tramadol      Vicodin [Hydrocodone-Acetaminophen]        Social History   I have reviewed this patient's social history and updated it with pertinent information if needed. Veronica Bray  reports that she has quit smoking. She has never used smokeless tobacco. She reports that she drinks about 2.4 oz of alcohol per week  She reports that she does not use illicit drugs.    Family History   I have reviewed this patient's family history and updated it with pertinent information if needed.   Family History   Problem Relation Age of Onset     Unknown/Adopted No family hx of        Review of Systems   The 10 point Review of Systems is negative other  than noted in the HPI or here.     Physical Exam   Temp: 98.5  F (36.9  C) Temp src: Oral BP: (!) 127/37   Heart Rate: 68 Resp: 20 SpO2: 94 % O2 Device: Nasal cannula Oxygen Delivery: 2 LPM  Vital Signs with Ranges  Temp:  [98.5  F (36.9  C)-99.7  F (37.6  C)] 98.5  F (36.9  C)  Heart Rate:  [] 68  Resp:  [18-27] 20  BP: (107-132)/(37-74) 127/37  SpO2:  [91 %-96 %] 94 %  145 lbs 0 oz    Exam:  GEN:  Alert, oriented x 3, appears comfortable, NAD.  HEENT:  Normocephalic/atraumatic, no scleral icterus, no nasal discharge, mouth moist.  CV:  Regular rate and rhythm, no murmur or JVD.  S1 + S2 noted, no S3 or S4.  LUNGS:  Clear to auscultation bilaterally without rales/rhonchi/wheezing/retractions.  Symmetric chest rise on inhalation noted.  ABD:  Active bowel sounds, soft, non-tender/non-distended.  No rebound/guarding/rigidity.  EXT:  No edema or cyanosis.  Hands/feet warm to touch with good signs of peripheral perfusion.  No joint synovitis noted.  SKIN:  Dry to touch, no exanthems noted in the visualized areas.  NEURO:  Symmetric muscle strength, sensation to touch grossly intact.  No new focal deficits appreciated.    Data   Data reviewed today:  I personally reviewed all imaging and EKGs   Recent Results (from the past 24 hour(s))   XR Chest 2 Views    Narrative    XR CHEST 2 VW   3/4/2018 11:10 AM     HISTORY: Chest pain;     COMPARISON: None.    FINDINGS: Midline sternotomy. Cardiac silhouette upper limits of  normal. Right diaphragm is mildly elevated and there is a loop of  bowel beneath the right diaphragm. The lungs are clear. The pulmonary  vasculature is normal.  The bones and soft tissues are unremarkable.      Impression    IMPRESSION: No alveolar-type infiltrate        ALPHONSE SALDIVAR MD         Recent Labs  Lab 03/04/18  0948   WBC 4.2   HGB 14.3   HCT 45.0   MCV 92   *          Lab Results   Component Value Date     03/04/2018     12/01/2017     07/08/2017    Lab Results    Component Value Date    CHLORIDE 104 03/04/2018    CHLORIDE 106 12/01/2017    CHLORIDE 106 07/08/2017    Lab Results   Component Value Date    BUN 17 03/04/2018    BUN 13 12/01/2017    BUN 14 07/08/2017      Lab Results   Component Value Date    POTASSIUM 4.2 03/04/2018    POTASSIUM 4.1 12/01/2017    POTASSIUM 4.0 07/08/2017    Lab Results   Component Value Date    CO2 27 03/04/2018    CO2 26 12/01/2017    CO2 29 07/08/2017    Lab Results   Component Value Date    CR 0.63 03/04/2018    CR 0.63 12/01/2017    CR 0.60 07/08/2017          Recent Labs  Lab 03/04/18  0948   TSH 0.83       Recent Results (from the past 24 hour(s))   XR Chest 2 Views    Narrative    XR CHEST 2 VW   3/4/2018 11:10 AM     HISTORY: Chest pain;     COMPARISON: None.    FINDINGS: Midline sternotomy. Cardiac silhouette upper limits of  normal. Right diaphragm is mildly elevated and there is a loop of  bowel beneath the right diaphragm. The lungs are clear. The pulmonary  vasculature is normal.  The bones and soft tissues are unremarkable.      Impression    IMPRESSION: No alveolar-type infiltrate        ALPHONSE SALDIVAR MD

## 2018-03-04 NOTE — PHARMACY-ADMISSION MEDICATION HISTORY
Admission medication history interview status for this patient is complete. See [facility] admission navigator for allergy information, prior to admission medications and immunization status.     Medication history interview source(s):Patient  Medication history resources (including written lists, pill bottles, clinic record):None  Primary pharmacy: Not ID'd    Changes made to PTA medication list:  Added: Flonase  Deleted: None  Changed: Lipitor (QHS), Allegra (180mg QDAY), MVT (#1 BID), Warfarin (QHS)    Actions taken by pharmacist (provider contacted, etc):None     Additional medication history information:None    Medication reconciliation/reorder completed by provider prior to medication history? No    Prior to Admission medications    Medication Sig Last Dose Taking? Auth Provider   ATORVASTATIN CALCIUM PO Take 10 mg by mouth At Bedtime 3/3/2018 at HS Yes Unknown, Entered By History   fluticasone (FLONASE) 50 MCG/ACT spray Spray 1 spray into both nostrils daily 3/3/2018 at AM Yes Unknown, Entered By History   warfarin (COUMADIN) 5 MG tablet Take 5 mg by mouth At Bedtime  3/3/2018 at HS Yes Reported, Patient   Fexofenadine HCl (ALLEGRA PO) Take 180 mg by mouth daily  3/4/2018 at AM Yes Reported, Patient   lisinopril (PRINIVIL/ZESTRIL) 5 MG tablet Take 1 tablet (5 mg) by mouth daily 3/4/2018 at 2.5MG IN AM Yes Concepcion Mccall MD   carvedilol (COREG) 6.25 MG tablet Take 1 tablet (6.25 mg) by mouth 2 times daily (with meals) 3/4/2018 at AM Yes Concepcion Mccall MD   Multiple Vitamins-Minerals (PRESERVISION AREDS 2) CAPS Take 1 capsule by mouth 2 times daily  3/4/2018 at AM Yes Reported, Patient   ASPIRIN PO Take 81 mg by mouth every other day  3/4/2018 at AM Yes Reported, Patient

## 2018-03-04 NOTE — IP AVS SNAPSHOT
MRN:[id number]                      After Visit Summary   3/4/2018    Veronica Bray    MRN: [id number]           Thank you!     Thank you for choosing [facility] for your care. Our goal is always to provide you with excellent care. Hearing back from our patients is one way we can continue to improve our services. Please take a few minutes to complete the written survey that you may receive in the mail after you visit. If you would like to speak to someone directly about your visit please contact Patient Relations at [phone]. Thank you!          Patient Information     Date Of Birth          5/31/1931        About your hospital stay     You were admitted on:  March 4, 2018 You last received care in the:  [facility] Observation Department    You were discharged on:  March 5, 2018        Reason for your hospital stay       You were admitted for concerns of chest pain, palpitations and recent URI. You were found to be in Atrial Fib with RVR. You converted to sinus after IV meds. Here, other work up did not indicated acute infection such as Pneumonia, Influenza or UTI. Carotid US was negative for significant blockage. Your ECHO showed normal heart function, but your pulmonary pressure is a bit more elevated than 2016 and this is likely secondary to untreated sleep apnea. Please follow up with PCP in 1 week.   Take meds as directed.                  Who to Call     For medical emergencies, please call [phone].  For non-urgent questions about your medical care, please call your primary care provider or clinic, [phone]          Attending Provider     Provider Specialty    Sumeet Newman MD Emergency Medicine    Young, Larry Apple MD Internal Medicine       Primary Care Provider Office Phone # Fax #    Concepcion Mccall [phone]      After Care Instructions     Diet       Follow this diet upon discharge: Orders Placed This Encounter      Regular Diet  "Adult                  Follow-up Appointments     Follow-up and recommended labs and tests        Follow up with primary care provider, Concepcion Mccall, within 7 days for hospital follow- up.  No follow up labs or test are needed.                  Your next 10 appointments already scheduled     Mar 30, 2018 10:40 AM CDT   Anticoagulation Visit with RU ANTICOAGULATION   [facility] ([facility])    [address]  [place] [address]   [phone]            Jul 03, 2018 10:00 AM CDT   SHORT with Concepcion Mccall MD   [facility]    [address]  [facility] [id number] [phone]                         Pending Results     No orders found for last 3 day(s).            Statement of Approval     Ordered          03/05/18 1047  I have reviewed and agree with all the recommendations and orders detailed in this document.  EFFECTIVE NOW     Approved and electronically signed by:  Larisa Erwin PA-C             Admission Information     Date & Time Provider Department Dept. Phone    3/4/2018 Larry Mercado MD [facility] Observation Department [phone]      Your Vitals Were     Blood Pressure Temperature Respirations Height Weight Pulse Oximetry    140/54 (BP Location: Right arm) 99.5  F (37.5  C) (Oral) 16 1.575 m (5' 2\") 65.8 kg (145 lb) 94%    BMI (Body Mass Index)                   26.52 kg/m2           MyChart Information     [facility] lets you send messages to your doctor, view your test results, renew your prescriptions, schedule appointments and more. To sign up, go to [email or web address] . Click on \"Log in\" on the left side of the screen, which will take you to the Welcome page. Then click on \"Sign up Now\" on the right side of the page.     You will be asked to enter the access code listed below, as well as some personal information. Please follow the " directions to create your username and password.     Your access code is: [email or web address]  Expires: 2018 12:04 PM     Your access code will  in 90 days. If you need help or a new code, please call your [place] clinic or [phone].        Care EveryWhere ID     This is your Care EveryWhere ID. This could be used by other organizations to access your [place] medical records  [id number]        Equal Access to Services     SYLVIA ALLEN : Hadii len arita hadasho Soomaali, waaxda luqadaha, qaybta kaalmada adeegyada, waxmann blackwood hayhiren amaraltatyanared manzano . So [facility] [phone].    ATENCIÓN: Si habla español, tiene a martinez disposición servicios gratuitos de asistencia lingüística. [facility] [phone].    We comply with applicable federal civil rights laws and [place] laws. We do not discriminate on the basis of race, color, national origin, age, disability, sex, sexual orientation, or gender identity.               Review of your medicines      START taking        Dose / Directions    albuterol 108 (90 BASE) MCG/ACT Inhaler   Commonly known as:  PROAIR HFA/PROVENTIL HFA/VENTOLIN HFA   Used for:  Viral URI with cough        Dose:  1-2 puff   Inhale 1-2 puffs into the lungs every 6 hours as needed for shortness of breath / dyspnea or wheezing   Quantity:  1 Inhaler   Refills:  0       benzonatate 100 MG capsule   Commonly known as:  TESSALON   Used for:  Viral URI with cough        Dose:  100 mg   Take 1 capsule (100 mg) by mouth 3 times daily for 5 days   Quantity:  15 capsule   Refills:  0       MUCINEX DM  MG per 12 hr tablet   Used for:  Viral URI with cough        Dose:  2 tablet   Take 2 tablets by mouth every 12 hours   Quantity:  28 tablet   Refills:  0         CONTINUE these medicines which have NOT CHANGED        Dose / Directions    ALLEGRA PO        Dose:  180 mg   Take 180 mg by mouth daily   Refills:  0       ASPIRIN PO        Dose:  81 mg   Take 81 mg by mouth every other day   Refills:  0        ATORVASTATIN CALCIUM PO        Dose:  10 mg   Take 10 mg by mouth At Bedtime   Refills:  0       carvedilol 6.25 MG tablet   Commonly known as:  COREG   Used for:  Paroxysmal atrial fibrillation (H), S/P CABG (coronary artery bypass graft), Atrial flutter, paroxysmal (H), Benign essential hypertension        Dose:  6.25 mg   Take 1 tablet (6.25 mg) by mouth 2 times daily (with meals)   Quantity:  180 tablet   Refills:  4       fluticasone 50 MCG/ACT spray   Commonly known as:  FLONASE   Indication:  Allergic Rhinitis        Dose:  1 spray   Spray 1 spray into both nostrils daily   Refills:  0       lisinopril 5 MG tablet   Commonly known as:  PRINIVIL/ZESTRIL   Used for:  Benign essential hypertension        Dose:  5 mg   Take 1 tablet (5 mg) by mouth daily   Quantity:  90 tablet   Refills:  4       PRESERVISION AREDS 2 Caps        Dose:  1 capsule   Take 1 capsule by mouth 2 times daily   Refills:  0       warfarin 5 MG tablet   Commonly known as:  COUMADIN        Dose:  5 mg   Take 5 mg by mouth At Bedtime   Refills:  0            Where to get your medicines      These medications were sent to [facility] [place], [place] - [address] [place] [address]     Phone:  [phone]     albuterol 108 (90 BASE) MCG/ACT Inhaler    benzonatate 100 MG capsule         Some of these will need a paper prescription and others can be bought over the counter. Ask your nurse if you have questions.     You don't need a prescription for these medications     MUCINEX DM  MG per 12 hr tablet                Protect others around you: Learn how to safely use, store and throw away your medicines at www.disposemymeds.org.             Medication List: This is a list of all your medications and when to take them. Check marks below indicate your daily home schedule. Keep this list as a reference.      Medications           Morning Afternoon Evening Bedtime As Needed     albuterol 108 (90 BASE) MCG/ACT Inhaler   Commonly known as:  PROAIR HFA/PROVENTIL HFA/VENTOLIN HFA   Inhale 1-2 puffs into the lungs every 6 hours as needed for shortness of breath / dyspnea or wheezing                                ALLEGRA PO   Take 180 mg by mouth daily                                ASPIRIN PO   Take 81 mg by mouth every other day   Last time this was given:  325 mg on 3/4/2018 10:17 AM                                ATORVASTATIN CALCIUM PO   Take 10 mg by mouth At Bedtime   Last time this was given:  10 mg on 3/4/2018  9:30 PM                                benzonatate 100 MG capsule   Commonly known as:  TESSALON   Take 1 capsule (100 mg) by mouth 3 times daily for 5 days   Last time this was given:  100 mg on 3/4/2018  7:44 PM                                carvedilol 6.25 MG tablet   Commonly known as:  COREG   Take 1 tablet (6.25 mg) by mouth 2 times daily (with meals)   Last time this was given:  6.25 mg on 3/5/2018  9:08 AM                                fluticasone 50 MCG/ACT spray   Commonly known as:  FLONASE   Spray 1 spray into both nostrils daily                                lisinopril 5 MG tablet   Commonly known as:  PRINIVIL/ZESTRIL   Take 1 tablet (5 mg) by mouth daily   Last time this was given:  5 mg on 3/5/2018  9:07 AM                                MUCINEX DM  MG per 12 hr tablet   Take 2 tablets by mouth every 12 hours                                PRESERVISION AREDS 2 Caps   Take 1 capsule by mouth 2 times daily                                warfarin 5 MG tablet   Commonly known as:  COUMADIN   Take 5 mg by mouth At Bedtime   Last time this was given:  7.5 mg on 3/4/2018  7:40 PM

## 2018-03-04 NOTE — IP AVS SNAPSHOT
[facility] Observation Department    [address]    [facility] [address]    Phone:  [phone]                                       After Visit Summary   3/4/2018    Veronica Bray    MRN: [id number]           After Visit Summary Signature Page     I have received my discharge instructions, and my questions have been answered. I have discussed any challenges I see with this plan with the nurse or doctor.    ..........................................................................................................................................  Patient/Patient Representative Signature      ..........................................................................................................................................  Patient Representative Print Name and Relationship to Patient    ..................................................               ................................................  Date                                            Time    ..........................................................................................................................................  Reviewed by Signature/Title    ...................................................              ..............................................  Date                                                            Time

## 2018-03-04 NOTE — PROGRESS NOTES
ROOM # 207    Living Situation (if not independent, order SW consult): independent living with   Facility name: [facility] [place]  : Ej ()    Activity level at baseline: ind  Activity level on admit: SBA      Patient registered to observation; given Patient Bill of Rights; given the opportunity to ask questions about observation status and their plan of care.  Patient has been oriented to the observation room, bathroom and call light is in place.    Discussed discharge goals and expectations with patient/family.

## 2018-03-04 NOTE — ED NOTES
"States got a cold Tuesday, last night it got worse.  Has been \"spitting\" into bag, was clear at first \"then got green and yellow, now it's a little pink\".  Chest pain 6/10 midsternal, worse if \"walks or coughs\".  Reports having a hard time swallowing.  Hx two stents, and hx of Valley Fever.  Pulmonologist told patient to tell MD's that if they look at CXR will think she has cancer or something. ABCD's intact; A/o x 4.   "

## 2018-03-04 NOTE — ED PROVIDER NOTES
"  History     Chief Complaint:  Chest pain    HPI   Veronica Bray is a 86 year old female with a history of atrial flutter, atrial fibrillation s/p ablation, SVT, left bundle branch block, hypertension, hyperlipidemia, CAD, NSTEMI, \"valley fever\", s/p cardiac stenting and CABG, anticoagulated on warfarin who presents to the emergency department today for evaluation of chest pain and is accompanied by her .  The patient states that she first got sick with cold symptoms 5 days ago and had been coughing up clear sputum since then, though this became green/yellow last night and pinkish this morning.  Today, she woke up with substernal chest pain that she describes as chest heaviness.  She also feels very weak, short of breath, feels as if there is a lump in her throat.  She denies any sore throat, pain with swallowing, fevers, leg swelling, palpitations, dizziness, lightheadedness, or history of CHF. Her most recent angiogram took place several years ago.  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  The patient follows with Dr. Benavides of cardiology and as she takes warfarin daily, takes 81 mg aspirin every other day.  She is unsure if she took aspirin today.    Allergies:  Aleve [Naproxen]  Celebrex [Celecoxib]  Codeine  Demerol [Meperidine]  Hydromorphone  Percocet [Oxycodone-Acetaminophen]  Tramadol  Vicodin [Hydrocodone-Acetaminophen]    Medications:    Warfarin  Allegra  Atorvastatin  Lisinopril  Coreg  Mucinex DM  Aspirin 81    Past Medical History:    Atrial fibrillation (H)   Atrial flutter (H)   CAD (coronary artery disease)   Cardiomyopathy, unspecified   Hyperlipidemia   Hypertension   LBBB (left bundle branch block)   Mitral regurgitation   NSTEMI (non-ST elevated myocardial infarction) (H)   PAF (paroxysmal atrial fibrillation) (H)   Sleep apnea   SVT (supraventricular tachycardia) (H)   Tobacco use     Past Surgical History:  " "  Cholecystectomy  Coronary angiogram  Hysterectomy  Left heart catheterization x2  Hip, knee, wrist orthopedic surgery    Family History:    History reviewed. No pertinent family history.     Social History:  The patient was accompanied to the ED by her .  Smoking Status: Former smoker, quit 1967  Smokeless Tobacco: Never used  Alcohol Use: Yes, 4 glasses of wine per week  Marital Status:   [2]    Review of Systems   Constitutional: Positive for fatigue. Negative for fever.   HENT: Positive for trouble swallowing. Negative for sore throat.    Respiratory: Positive for cough and shortness of breath.    Cardiovascular: Positive for chest pain. Negative for palpitations and leg swelling.   Neurological: Negative for dizziness and light-headedness.   All other systems reviewed and are negative.    Physical Exam     Patient Vitals for the past 24 hrs:   BP Temp Temp src Heart Rate Resp SpO2 Height Weight   03/04/18 1245 112/47 - - 64 - 94 % - -   03/04/18 1230 107/48 - - - - - - -   03/04/18 1229 - - - 62 - 94 % - -   03/04/18 1215 110/51 - - 63 - 96 % - -   03/04/18 1200 117/52 - - 61 - 95 % - -   03/04/18 1145 114/64 - - 58 - 95 % - -   03/04/18 1131 - - - 56 26 95 % - -   03/04/18 1130 111/48 - - - - - - -   03/04/18 1120 - - - - 25 96 % - -   03/04/18 1118 119/49 - - - - 92 % - -   03/04/18 1048 - - - 60 - 94 % - -   03/04/18 1045 113/48 - - 59 - - - -   03/04/18 1040 - - - 61 21 93 % - -   03/04/18 1030 112/60 - - - - - - -   03/04/18 1028 123/71 - - 101 - 96 % - -   03/04/18 1022 - - - 120 18 96 % - -   03/04/18 1017 - - - 122 27 96 % - -   03/04/18 1015 115/72 - - - - - - -   03/04/18 1001 - - - 125 21 95 % - -   03/04/18 1000 112/62 - - 123 - 95 % - -   03/04/18 0957 - 99.7  F (37.6  C) Oral - - - - -   03/04/18 0949 132/74 - - 133 - 95 % - -   03/04/18 0938 126/53 - - 70 18 95 % 1.575 m (5' 2\") 65.8 kg (145 lb)   03/04/18 0935 126/53 - - - - 96 % - -     Physical Exam  General: Nontoxic, appears " uncomfortable.  Head:  Scalp, face, and head appear normal  Eyes:  Pupils are equal, round, and reactive to light    No nystagmus    Conjunctivae non-injected and sclerae white  ENT:    The external nose is normal    Pinnae are normal    The oropharynx is normal, mucous membranes dry    Uvula is in the midline  Neck:  Normal range of motion    There is no rigidity noted    Trachea is in the midline  CV:  Tachycardic rate, regular rhythm      Normal S1/S2, no S3/S4    No murmur or rub  Resp:  Lungs are clear and equal bilaterally    There is no tachypnea    No increased work of breathing    No rales, wheezing, or rhonchi  GI:  Abdomen is soft, no rigidity or guarding    No distension, or mass    No tenderness or rebound tenderness   MS:  Normal muscular tone    Symmetric motor strength    No lower extremity edema. Radial and DP pulses 2+ and symmetric  Skin:  No rash or acute skin lesions noted  Neuro:  Awake and alert    Speech is normal and fluent    Moves all extremities spontaneously  Psych:  Normal affect.  Appropriate interactions.      Emergency Department Course     ECG:  ECG taken at 0938, ECG read at 0941  Wide QRS tachycardia  Left bundle branch block  Abnormal ECG  Rate 140 bpm. CT interval *. QRS duration 126. QT/QTc 330/503. P-R-T axes * -7 152.    ECG taken at 0954, ECG read at 1002  Wide QRS tachy  Left bundle branch block  Abnormal ECG  Rate 137 bpm. CT interval *. QRS duration 132. QT/QTc 336/507. P-R-T axes * -5 152.    ECG taken at 1050, ECG read at 1052  Normal sinus rhythm  Left bundle branch block  Abnormal ECG  Rate 61 bpm. CT interval 168. QRS duration 134. QT/QTc 452/455. P-R-T axes 47 -4 120.    Imaging:  Radiology findings were communicated with the patient and family who voiced understanding of the findings.    XR Chest 2 Views  No alveolar-type infiltrate.  Reading per radiology    Laboratory:  Laboratory findings were communicated with the patient and family who voiced understanding of  the findings.    CBC: WBC 4.2, HGB 14.3,  (L)   CMP: Glucose 119 (H) o/w WNL (Creatinine 0.63)  Troponin (Collected 0948): 0.016  PTT: 36  INR: 1.87 (H)  Magnesium: 2.3  TSH with free T4 reflex: Pending    Influenza A/B antigen: Negative    Interventions:  0956 ns 1 L IV  1017 aspirin 325 mg PO  1025 diltiazem 20 mg IV    Emergency Department Course:  Nursing notes and vitals reviewed.  0943: I performed an exam of the patient as documented above.   0948: IV was inserted and blood was drawn for laboratory testing, results above.  1018: I spoke with Dr. Dorman of the cardiology service from [facility] regarding patient's presentation, findings, and plan of care.  He reviewed the EKGs obtained in the emergency department and found them to be consistent with atrial flutter.  1101: The patient was sent for a 2 view chest x-ray while in the emergency department, results above.   1202: I spoke with Dr. Mercado of the hospitalist service regarding patient's presentation, findings, and plan of care.  1211: I spoke with Dr. Dorman of the cardiology service from [facility] regarding patient's presentation, findings, and plan of care.  He felt that given the patient's history, comorbidities, and presentation, the patient would warrant admission.  1214: I discussed the patient with Dr. Mercado, who will admit the patient to a monitored bed for further evaluation and treatment.  1250: I rechecked the patient and updated her and her  on the results of laboratory and imaging studies.  I discussed the treatment plan with the patient and family.  They expressed understanding of this plan and consented to admission.  All questions were answered.    Impression & Plan      Medical Decision Making:  Veronica Bray is a 86 year old female with a history of a known left bundle branch block, paroxysmal atrial fibrillation and flutter as well as SVT in the setting of known coronary artery disease  and structural heart abnormalities, who presents as noted above with what sounds like mild URI symptoms followed by sudden onset of chest pain and shortness of breath this morning.    Initial EKG was concerning for wide complex regular tachycardia, although on closer inspection with calipers, the rate is mildly irregular.  Broad differential for this dysrhythmia was considered including ventricular tachycardia, atrial fibrillation, atrial flutter, SVT with aberrancy.  In reviewing old EKGs, the morphology is unchanged and is consistent with a left bundle branch block pattern.  Therefore, I feel that it is less likely to be ventricular tachycardia.  In addition, the QRS complex would be fairly narrow for a typical ventricular tachycardia.  The patient's hemodynamics were stable, although she was having some dyspnea and chest discomfort with this.  A Zoll monitor was placed in the room.  I placed an urgent call to cardiology who reviewed the case with me and agreed that the EKG did not appear to represent ventricular Tachycardia, but rather likely atrial flutter.  Given this, rate control strategy was pursued, Cardizem was given with significant improvement of her heart rate, chest pain, as well as her dyspnea.  She eventually converted to normal sinus rhythm and the remainder of her symptoms completely resolved.    The remainder of the ED workup for possible etiologies that triggered this episode was fairly unremarkable.  She was negative for influenza and the remainder of her laboratory studies including chest x-ray were unremarkable without any signs of infection.  Initial troponin was negative.  Given her profound symptoms and risk for reverting back to A-fib with RVR, I feel the patient warrants admission to the observation unit. Dr. Dorman felt that the patient should have an Echo and repeat troponins. Patient was admitted to the hospitalist service in stable and improved condition.    Diagnosis:    ICD-10-CM    1.  Atrial fibrillation with RVR (H) I48.91    2. Acute chest pain R07.9      Disposition:   Admission    Scribe Disclosure:  I, Jina Mcgraw, am serving as a scribe at 9:43 AM on 3/4/2018 to document services personally performed by Sumeet Newman MD, based on my observations and the provider's statements to me.    3/4/2018   [facility] EMERGENCY DEPARTMENT       Sumeet Newman MD  03/04/18 2040

## 2018-03-05 ENCOUNTER — APPOINTMENT (OUTPATIENT)
Dept: CARDIOLOGY | Facility: CLINIC | Age: 83
End: 2018-03-05
Attending: HOSPITALIST
Payer: MEDICARE

## 2018-03-05 VITALS
WEIGHT: 145 LBS | DIASTOLIC BLOOD PRESSURE: 54 MMHG | RESPIRATION RATE: 16 BRPM | BODY MASS INDEX: 26.68 KG/M2 | TEMPERATURE: 99.5 F | HEIGHT: 62 IN | OXYGEN SATURATION: 94 % | SYSTOLIC BLOOD PRESSURE: 140 MMHG

## 2018-03-05 PROBLEM — Z86.79 ATRIAL FIBRILLATION, CURRENTLY IN SINUS RHYTHM: Status: RESOLVED | Noted: 2018-03-04 | Resolved: 2018-03-05

## 2018-03-05 LAB
INR PPP: 2.23 (ref 0.86–1.14)
INTERPRETATION ECG - MUSE: NORMAL
TROPONIN I SERPL-MCNC: 0.03 UG/L (ref 0–0.04)
TROPONIN I SERPL-MCNC: 0.05 UG/L (ref 0–0.04)

## 2018-03-05 PROCEDURE — 93306 TTE W/DOPPLER COMPLETE: CPT

## 2018-03-05 PROCEDURE — G0378 HOSPITAL OBSERVATION PER HR: HCPCS

## 2018-03-05 PROCEDURE — 84484 ASSAY OF TROPONIN QUANT: CPT | Performed by: HOSPITALIST

## 2018-03-05 PROCEDURE — 84484 ASSAY OF TROPONIN QUANT: CPT | Mod: 91 | Performed by: INTERNAL MEDICINE

## 2018-03-05 PROCEDURE — A9270 NON-COVERED ITEM OR SERVICE: HCPCS | Mod: GY | Performed by: HOSPITALIST

## 2018-03-05 PROCEDURE — A9270 NON-COVERED ITEM OR SERVICE: HCPCS | Mod: GY | Performed by: PHYSICIAN ASSISTANT

## 2018-03-05 PROCEDURE — 99217 ZZC OBSERVATION CARE DISCHARGE: CPT | Performed by: PHYSICIAN ASSISTANT

## 2018-03-05 PROCEDURE — 25000132 ZZH RX MED GY IP 250 OP 250 PS 637: Mod: GY | Performed by: HOSPITALIST

## 2018-03-05 PROCEDURE — 85610 PROTHROMBIN TIME: CPT | Performed by: HOSPITALIST

## 2018-03-05 PROCEDURE — 93306 TTE W/DOPPLER COMPLETE: CPT | Mod: 26 | Performed by: INTERNAL MEDICINE

## 2018-03-05 PROCEDURE — 36415 COLL VENOUS BLD VENIPUNCTURE: CPT | Performed by: HOSPITALIST

## 2018-03-05 PROCEDURE — 25000132 ZZH RX MED GY IP 250 OP 250 PS 637: Mod: GY | Performed by: PHYSICIAN ASSISTANT

## 2018-03-05 RX ORDER — GUAIFENESIN AND DEXTROMETHORPHAN HYDROBROMIDE 600; 30 MG/1; MG/1
2 TABLET, EXTENDED RELEASE ORAL EVERY 12 HOURS
Qty: 28 TABLET | Refills: 0 | COMMUNITY
Start: 2018-03-05 | End: 2018-04-26

## 2018-03-05 RX ORDER — ALBUTEROL SULFATE 90 UG/1
1-2 AEROSOL, METERED RESPIRATORY (INHALATION) EVERY 6 HOURS PRN
Qty: 1 INHALER | Refills: 0 | Status: SHIPPED | OUTPATIENT
Start: 2018-03-05 | End: 2018-03-21

## 2018-03-05 RX ORDER — BENZONATATE 100 MG/1
100 CAPSULE ORAL 3 TIMES DAILY
Qty: 15 CAPSULE | Refills: 0 | Status: SHIPPED | OUTPATIENT
Start: 2018-03-05 | End: 2018-03-21

## 2018-03-05 RX ADMIN — Medication 1 MG: at 00:33

## 2018-03-05 RX ADMIN — LISINOPRIL 5 MG: 5 TABLET ORAL at 09:07

## 2018-03-05 RX ADMIN — CARVEDILOL 6.25 MG: 6.25 TABLET, FILM COATED ORAL at 09:08

## 2018-03-05 RX ADMIN — GUAIFENESIN AND DEXTROMETHORPHAN 5 ML: 100; 10 SYRUP ORAL at 09:10

## 2018-03-05 NOTE — PLAN OF CARE
Problem: Patient Care Overview  Goal: Plan of Care/Patient Progress Review  PRIMARY DIAGNOSIS: Chest pain  OUTPATIENT/OBSERVATION GOALS TO BE MET BEFORE DISCHARGE:  1. Ruled out acute coronary syndrome (negative or stable Troponin):  Trops negative x2, Midnight trop 0.047   2. Pain Status: Pain free.  3. Appropriate provocative testing performed: No  - Stress Test Procedure: Echo complete in AM  - Interpretation of cardiac rhythm per telemetry tech: SR HR 60's    4. Cleared by Consultants (if applicable):N/A  5. Return to near baseline physical activity: Yes  Discharge Planner Nurse   Safe discharge environment identified: Yes  Barriers to discharge: No       Entered by: Paty Barakat 03/05/2018 6:05 AM      A&O, VSS on RA, afebrile. Denies any pain os shortness of breath. Has frequent productive cough. Trops negative x2, midnight trop was 0.047- paged hospitalist, will have another troponin recheck at 0600. Up with SBA. Plan for complete echo in the AM. Will continue to monitor.     Please review provider order for any additional goals.   Nurse to notify provider when observation goals have been met and patient is ready for discharge.

## 2018-03-05 NOTE — PLAN OF CARE
Problem: Patient Care Overview  Goal: Plan of Care/Patient Progress Review  Outcome: Improving  PRIMARY DIAGNOSIS: CHEST PAIN  OUTPATIENT/OBSERVATION GOALS TO BE MET BEFORE DISCHARGE:  1. Ruled out acute coronary syndrome (negative or stable Troponin): Yes, 0.016, 0.045, 0.047, 0.031  2. Pain Status: Pain free.  3. Appropriate provocative testing performed: Yes  - Stress Test Procedure: Echo  - Interpretation of cardiac rhythm per telemetry tech: SR    4. Cleared by Consultants (if applicable): N/A  5. Return to near baseline physical activity: Yes  Pt A&Ox4, VSS on RA. Pt denies chest pain or SOB at this time. Pt c/o productive cough, states robitussin dm worked very well for her, dose given this AM. Pt only c/o feeling mildly fatigued and slightly decreased appetite, o/w no complaints. Echo done this AM, results pending. Pt up with SBA. Will continue to monitor.  Discharge Planner Nurse   Safe discharge environment identified: Yes  Barriers to discharge: Yes       Entered by: Ca Ragland 03/05/2018 8:00 AM     Please review provider order for any additional goals.   Nurse to notify provider when observation goals have been met and patient is ready for discharge.

## 2018-03-05 NOTE — PLAN OF CARE
Problem: Patient Care Overview  Goal: Plan of Care/Patient Progress Review  Outcome: Adequate for Discharge Date Met: 03/05/18  OBSERVATION patient END time: 1200    Patient's After Visit Summary was reviewed with patient and spouse.   Patient verbalized understanding of After Visit Summary, recommended follow up and was given an opportunity to ask questions.   Discharge medications sent home with patient/family: YES, tessalon & albuterol inhaler   Discharged with spouse.

## 2018-03-05 NOTE — PLAN OF CARE
"Problem: Patient Care Overview  Goal: Plan of Care/Patient Progress Review  PRIMARY DIAGNOSIS: Chest pain  OUTPATIENT/OBSERVATION GOALS TO BE MET BEFORE DISCHARGE:  1. Ruled out acute coronary syndrome (negative or stable Troponin):  Trops negative x2, Midnight trop 0.047   2. Pain Status: Pain free.  3. Appropriate provocative testing performed: No  - Stress Test Procedure: Echo complete in AM  - Interpretation of cardiac rhythm per telemetry tech: SR HR 61    4. Cleared by Consultants (if applicable):N/A  5. Return to near baseline physical activity: Yes  Discharge Planner Nurse   Safe discharge environment identified: Yes  Barriers to discharge: No       Entered by: Paty Barakat 03/05/2018 2:27 AM      A&O, VSS on RA, afebrile. Reports 1/10 midsternal \"chest pressure\" but thinks it's associated with coughing. Pt stated prn robitussin really improved her coughing. Denies shortness of breath. Has frequent productive cough. Trops negative x2, midnight trop was 0.047- paged hospitalist, will have another troponin recheck at 0600. Up with SBA. Plan for complete echo in the AM. Will continue to monitor.     Please review provider order for any additional goals.   Nurse to notify provider when observation goals have been met and patient is ready for discharge.      "

## 2018-03-05 NOTE — PLAN OF CARE
Problem: Patient Care Overview  Goal: Plan of Care/Patient Progress Review  Outcome: No Change  PRIMARY DIAGNOSIS: CHEST PAIN/Afib  OUTPATIENT/OBSERVATION GOALS TO BE MET BEFORE DISCHARGE:  1. Ruled out acute coronary syndrome (negative or stable Troponin):  No-trop negative x2 and last at midnight  2. Pain Status: Improved with use of alternative comfort measures i.e.: says pain is d/t cough, improves when not coughing  3. Appropriate provocative testing performed: No  - Stress Test Procedure: Echo  - Interpretation of cardiac rhythm per telemetry tech: SR with bundle branch block     4. Cleared by Consultants (if applicable):N/A  5. Return to near baseline physical activity: Yes  Discharge Planner Nurse   Safe discharge environment identified: Yes  Barriers to discharge: No       Entered by: Ruby Mei 03/04/2018 4:55 PM  Please review provider order for any additional goals.   Nurse to notify provider when observation goals have been met and patient is ready for discharge.     Patient is alert and oriented. Patient complains of cough and some chest pain with cough. Patient denies heart palpitations/SOB. Patient is SR with bundle branch block on tele. Plan is for trop at midnight, (last trop 0.045) a complete echo and US of carotid bilateral results pending. Will continue to monitor. Patient is running low grade temp of 99. Will monitor.

## 2018-03-05 NOTE — PHARMACY-ANTICOAGULATION SERVICE
Clinical Pharmacy- Warfarin Discharge Note  This patient is currently on warfarin for the treatment of Atrial fibrillation.  INR Goal= 2-3  Expected length of therapy undetermined.  Prior to admit dose: Warfarin 5mg QPM      Anticoagulation Dose History     Recent Dosing and Labs Latest Ref Rng & Units 12/1/2017 12/19/2017 1/12/2018 2/2/2018 3/2/2018 3/4/2018 3/5/2018    Warfarin 7.5 mg - - - - - - 7.5 mg -    INR 0.86 - 1.14 - - - - - 1.87(H) 2.23(H)    INR 0.86 - 1.14 3.2(A) 2.0(A) 2.1(A) 2.5(A) 2.2(A) - -          Vitamin K doses administered during the last 7 days: none  FFP administered during the last 7 days: none    Pt received slightly higher dose yesterday due to subtherapeutic INR. INR therapeutic today, ok to resume prior to admit dosing of warfarin 5mg qpm with INR check in 7 days.

## 2018-03-05 NOTE — DISCHARGE SUMMARY
[facility] Outpatient / Observation Unit  Discharge Summary        Veronica Bray MRN# [id number]   YOB: 1931 Age: 86 year old     Date of Admission:  3/4/2018  Date of Discharge:  3/5/2018  Admitting Physician:  Larry Mercado MD  Discharge Physician: Larisa Erwin PA-C  Discharging Service: Hospitalist      Primary Provider: Concepcion Mccall  Primary Care Physician Phone Number: [phone]         Primary Discharge Diagnoses:    Veronica Bray was admitted on 3/4/2018 for concerns of acute chest pain secondary to rapid afib (converted after diltiazem) as well as viral URI.     1. Chest pain 2/2 rapid afib-converted after 1 dose of Diltiazem  2. Viral URI with cough contributing to #1          Secondary Discharge Diagnoses:     Past Medical History:   Diagnosis Date     Atrial fibrillation (H)      Atrial flutter (H)      CAD (coronary artery disease)     LIMA graft to the LAD and vein graft to the diagonal are patent.      Cardiomyopathy, unspecified     tachy induced     Hyperlipidemia      Hypertension      LBBB (left bundle branch block)      Mitral regurgitation      NSTEMI (non-ST elevated myocardial infarction) (H)      PAF (paroxysmal atrial fibrillation) (H)     converted on Amiodarone     Sleep apnea     CPAP     SVT (supraventricular tachycardia) (H)      Tobacco use                 Code Status:      Full Code        Brief Hospital Summary:        Reason for your hospital stay       You were admitted for concerns of chest pain, palpitations and recent   URI. You were found to be in Atrial Fib with RVR. You converted to sinus   after IV meds. Here, other work up did not indicated acute infection such   as Pneumonia, Influenza or UTI. Carotid US was negative for significant   blockage. Your ECHO showed normal heart function, but your pulmonary   pressure is a bit more elevated than 2016 and this is likely secondary to   untreated sleep apnea. Please follow up with PCP in 1 week.   Take  meds as directed.       Veronica Bray is a 86 year old female with history CAD, MI 2006 with CABG- LIMA to LAD/vein graft to 1st diag (with damage of right atrium/patch), Type 2 MI with angio 2015, a flutter s/p ablation in 2009, parox a fib, HTN, VALENTIN not on CPAP, HLP who presents with recent viral URI, rapid a fib now sinus. Patient states she has had a URI for about 3-4 days with productive (yellow) cough. This morning when she woke up she had chest pressure, so she came to the ED. She was found to be in rapid a fib. She was given 20mg of cardizem. She then converted to NSR on her own and now feels well, at baseline. She denies any current chest pain, sob, abdo pain, n/v/d. No recent fever or chills. When Dr. Newman called for admission, I questioned if she could just DC home. He spoke with the on-call cardiologist who recommended serial trops and repeat ECHO. Patient currently has no complaints except her cough from her recent URI.  Pt was admitted to OBS. She remains in NSR. Trops did peak at 0.043 but likley from RVR. She had no CP after conversion. Her ECHO results were noted below with no significant CM or concerns for ischemia. She does have increase Pulm pressure and pHTN and this likely 2/2 untreated VALENTIN. She was o/w stable and meds were given to her for viral bronchitis. She will follow up with PCP in 1 week.       The visual ejection fraction is estimated at 55-60%.  Grade I or early diastolic dysfunction.  The left atrium is moderate to severely dilated.  The right atrium is mild to moderately dilated.  There is mild (1+) mitral regurgitation.  There is mild mitral stenosis.  There is mild (1+) tricuspid regurgitation.  Right ventricular systolic pressure is elevated, consistent with moderate to  severe pulmonary hypertension.  There is no comparison study available.                Significant Lab During Hospitalization:        Recent Labs  Lab 03/04/18  0948   WBC 4.2   HGB 14.3   HCT 45.0   MCV 92    *       Recent Labs  Lab 03/04/18  0948      POTASSIUM 4.2   CHLORIDE 104   CO2 27   ANIONGAP 6   *   BUN 17   CR 0.63   GFRESTIMATED 90   GFRESTBLACK >90   BARRETT 8.5     No results for input(s): CULT in the last 168 hours.    Recent Labs  Lab 03/04/18  0948   *       Recent Labs  Lab 03/04/18  0948   HGB 14.3     No results for input(s): LACT in the last 168 hours.  No results for input(s): LIPASE in the last 168 hours.    Recent Labs  Lab 03/04/18  0948   TSH 0.83       Recent Labs  Lab 03/05/18  0546 03/05/18  0009 03/04/18  1803   TROPI 0.031 0.047* 0.045     No results for input(s): COLOR, APPEARANCE, URINEGLC, URINEBILI, URINEKETONE, SG, UBLD, URINEPH, PROTEIN, UROBILINOGEN, NITRITE, LEUKEST, RBCU, WBCU in the last 168 hours.             Significant Imaging During Hospitalization:      Results for orders placed or performed during the hospital encounter of 03/04/18   XR Chest 2 Views    Narrative    XR CHEST 2 VW   3/4/2018 11:10 AM     HISTORY: Chest pain;     COMPARISON: None.    FINDINGS: Midline sternotomy. Cardiac silhouette upper limits of  normal. Right diaphragm is mildly elevated and there is a loop of  bowel beneath the right diaphragm. The lungs are clear. The pulmonary  vasculature is normal.  The bones and soft tissues are unremarkable.      Impression    IMPRESSION: No alveolar-type infiltrate        ALPHONSE SALDIVAR MD   US Carotid Bilateral    Narrative    BILATERAL CAROTID ULTRASOUND March 4, 2018 6:19 PM     HISTORY: Known AS, follow-up on known AS     COMPARISON: None.    RIGHT CAROTID FINDINGS: Carotid bulb and internal carotid artery.  Right ICA PSV:  140  cm/sec.  Right ICA EDV:  27 cm/sec.  Right ICA/CCA PSV Ratio:  1.7.    These indicate 50-69% diameter stenosis of the right ICA.    Right Vertebral: Antegrade flow.   Right ECA: Antegrade flow.     LEFT CAROTID FINDINGS: Plaque in the internal carotid artery.  Left ICA PSV:  147  cm/sec.  Left ICA EDV:  38  cm/sec.  Left ICA/CCA PSV Ratio:  1.4.    These indicate 50-69% diameter stenosis of the left ICA.    Left Vertebral: Antegrade flow.   Left ECA: Antegrade flow.     Causes of Decreased Accuracy: None.      Impression    IMPRESSION:    1. 50-69% diameter stenosis of the right internal carotid artery  relative to the distal internal carotid artery diameter.   2. 50-69% diameter stenosis of the left internal carotid artery  relative to the distal internal carotid artery diameter.     CHRISTINA KHAN DO              Pending Results:        Unresulted Labs Ordered in the Past 30 Days of this Admission     No orders found for last 61 day(s).              Consultations This Hospital Stay:      No consultations were requested during this admission         Discharge Instructions and Follow-Up:      Follow up with primary care provider, Concepcion Mccall, within 7 days   for hospital follow- up.  No follow up labs or test are needed.             Discharge Disposition:      Discharged to home         Discharge Medications:        Current Discharge Medication List      START taking these medications    Details   benzonatate (TESSALON) 100 MG capsule Take 1 capsule (100 mg) by mouth 3 times daily for 5 days  Qty: 15 capsule, Refills: 0    Associated Diagnoses: Viral URI with cough      Dextromethorphan-Guaifenesin (MUCINEX DM)  MG per 12 hr tablet Take 2 tablets by mouth every 12 hours  Qty: 28 tablet, Refills: 0    Associated Diagnoses: Viral URI with cough      albuterol (PROAIR HFA/PROVENTIL HFA/VENTOLIN HFA) 108 (90 BASE) MCG/ACT Inhaler Inhale 1-2 puffs into the lungs every 6 hours as needed for shortness of breath / dyspnea or wheezing  Qty: 1 Inhaler, Refills: 0    Associated Diagnoses: Viral URI with cough         CONTINUE these medications which have NOT CHANGED    Details   ATORVASTATIN CALCIUM PO Take 10 mg by mouth At Bedtime      fluticasone (FLONASE) 50 MCG/ACT spray Spray 1 spray into both nostrils  "daily      warfarin (COUMADIN) 5 MG tablet Take 5 mg by mouth At Bedtime       Fexofenadine HCl (ALLEGRA PO) Take 180 mg by mouth daily       lisinopril (PRINIVIL/ZESTRIL) 5 MG tablet Take 1 tablet (5 mg) by mouth daily  Qty: 90 tablet, Refills: 4    Comments: Profile Rx: patient will contact pharmacy when needed  Associated Diagnoses: Benign essential hypertension      carvedilol (COREG) 6.25 MG tablet Take 1 tablet (6.25 mg) by mouth 2 times daily (with meals)  Qty: 180 tablet, Refills: 4    Comments: Profile Rx: patient will contact pharmacy when needed  Associated Diagnoses: Paroxysmal atrial fibrillation (H); S/P CABG (coronary artery bypass graft); Atrial flutter, paroxysmal (H); Benign essential hypertension      Multiple Vitamins-Minerals (PRESERVISION AREDS 2) CAPS Take 1 capsule by mouth 2 times daily       ASPIRIN PO Take 81 mg by mouth every other day                  Allergies:         Allergies   Allergen Reactions     Aleve [Naproxen] Anaphylaxis     Celebrex [Celecoxib]      Codeine      Demerol [Meperidine]      Hydromorphone      Percocet [Oxycodone-Acetaminophen]      Tramadol      Vicodin [Hydrocodone-Acetaminophen]            Condition and Physical on Discharge:      Discharge condition: Stable   Vitals: Blood pressure 119/48, temperature 98.8  F (37.1  C), temperature source Oral, resp. rate 16, height 1.575 m (5' 2\"), weight 65.8 kg (145 lb), SpO2 91 %, not currently breastfeeding.  145 lbs 0 oz      GENERAL:  Comfortable.  PSYCH: pleasant, oriented, No acute distress.  HEENT:  PERRLA. Normal conjunctiva, normal hearing, nasal mucosa and Oropharynx are normal.  NECK:  Supple, no neck vein distention, adenopathy or bruits, normal thyroid.  HEART:  Normal S1, S2 with no murmur, no pericardial rub, gallops or S3 or S4.  LUNGS:  Coarse BS but clears with cough, normal Respiratory effort. No wheezing, rales or ronchi.  ABDOMEN:  Soft, no hepatosplenomegaly, normal bowel sounds. Non-tender, non " distended.   EXTREMITIES:  No pedal edema, +2 pulses bilateral and equal.  SKIN:  Dry to touch, No rash, wound or ulcerations.  NEUROLOGIC:  CN 2-12 intact, BL 5/5 symmetric upper and lower extremity strength, sensation is intact with no focal deficits.

## 2018-03-06 ENCOUNTER — TELEPHONE (OUTPATIENT)
Dept: INTERNAL MEDICINE | Facility: CLINIC | Age: 83
End: 2018-03-06

## 2018-03-06 NOTE — TELEPHONE ENCOUNTER
IP F/U    Date: 03/05/18  Diagnosis: Viral URI w/cough; Afib  Is patient active in care coordination? No  Was patient in TCU? No

## 2018-03-07 NOTE — TELEPHONE ENCOUNTER
"Next 5 appointments (look out 90 days)     Mar 09, 2018 11:00 AM CST   Office [facility]all [facility]   [facility]    [address]  [facility] [address] [phone]                ED / Discharge Outreach Protocol    Patient Contact    Attempt # 1    Was call answered?  Yes.  \"May I please speak with <patient name>\"  Is patient available?   No. Left message with pt's  for patient to call me back.    "

## 2018-03-07 NOTE — TELEPHONE ENCOUNTER
"ED/Discharge Protocol    \"Hi, my name is Adrienne Johnston, a registered nurse, and I am calling on behalf of Dr. Mccall's office at [facility].  I am calling to follow up and see how things are going for you after your recent visit.\"    \"I see that you were in the (ER/UC/IP) on 3/4/18.    How are you doing now that you are home?\" Feeling better    Is patient experiencing symptoms that may require a hospital visit?  no    Discharge Instructions    \"Let's review your discharge instructions.  What is/are the follow-up recommendations?  Pt. Response: Follow up with PCP    \"Were you instructed to make a follow-up appointment?\"  Pt. Response: Yes.  Has appointment been made?   Yes      \"When you see the provider, I would recommend that you bring your discharge instructions with you.    Medications    \"How many new medications are you on since your hospitalization/ED visit?\"    0-1  \"How many of your current medicines changed (dose, timing, name, etc.) while you were in the hospital/ED visit?\"   0-1  \"Do you have questions about your medications?\"   No  \"Were you newly diagnosed with heart failure, COPD, diabetes or did you have a heart attack?\"   No  For patients on insulin: \"Did you start on insulin in the hospital or did you have your insulin dose changed?\"   No    Medication reconciliation completed? Yes    Was MTM referral placed (*Make sure to put transitions as reason for referral)?   No    Call Summary    \"Do you have any questions or concerns about your condition or care plan at the moment?\"    No  Triage nurse advice given: Keep appt with Dr. Mccall 3/9/18    Patient was in ER 0 in the past year (assess appropriateness of ER visits.)      \"If you have questions or things don't continue to improve, we encourage you contact us through the main clinic number,  [phone].  Even if the clinic is not open, triage nurses are available 24/7 to help you.     We would like you to know that our clinic has extended " "hours (provide information).  We also have urgent care (provide details on closest location and hours/contact info)\"      \"Thank you for your time and take care!\"        "

## 2018-03-09 ENCOUNTER — OFFICE VISIT (OUTPATIENT)
Dept: INTERNAL MEDICINE | Facility: CLINIC | Age: 83
End: 2018-03-09
Payer: COMMERCIAL

## 2018-03-09 VITALS
WEIGHT: 147 LBS | TEMPERATURE: 98.4 F | BODY MASS INDEX: 27.05 KG/M2 | HEIGHT: 62 IN | SYSTOLIC BLOOD PRESSURE: 130 MMHG | HEART RATE: 84 BPM | DIASTOLIC BLOOD PRESSURE: 72 MMHG | OXYGEN SATURATION: 94 %

## 2018-03-09 DIAGNOSIS — I48.0 PAF (PAROXYSMAL ATRIAL FIBRILLATION) (H): Primary | ICD-10-CM

## 2018-03-09 DIAGNOSIS — H61.21 IMPACTED CERUMEN OF RIGHT EAR: ICD-10-CM

## 2018-03-09 DIAGNOSIS — J45.20 MILD INTERMITTENT ASTHMA WITHOUT COMPLICATION: ICD-10-CM

## 2018-03-09 PROCEDURE — 99495 TRANSJ CARE MGMT MOD F2F 14D: CPT | Performed by: INTERNAL MEDICINE

## 2018-03-09 NOTE — PROGRESS NOTES
SUBJECTIVE:   Veronica Bray is a 86 year old female who presents to clinic today for the following health issues:          Hospital Follow-up Visit:    Hospital/Nursing Home/IP Rehab Facility: [facility]  Date of Admission: 3/4/18  Date of Discharge: 3/5/18  Reason(s) for Admission: a fib & URI     A fib. Patient had presented with atrial fibrillation with RVR.   She had serial cardiac enzymes, which were unremarkable (second troponin slightly elevated- thought to be secondary to RVR).   Echocardiogram revealed no signs of ischemia.  Patient converted with diltiazem.  The patient was discharged on her home meds of coumadin and carvedilol.      URI.  The chest xray reveals no new infiltrates. Her cough is better.  Breathing is also better.          Problems taking medications regularly:  None       Medication changes since discharge: None       Problems adhering to non-medication therapy:  None    Summary of hospitalization:  [facility] discharge summary reviewed  Diagnostic Tests/Treatments reviewed.  Follow up needed: none  Other Healthcare Providers Involved in Patient s Care:         None  Update since discharge: improved.     Post Discharge Medication Reconciliation: discharge medications reconciled, continue medications without change.  Plan of care communicated with patient and family     Coding guidelines for this visit:  Type of Medical   Decision Making Face-to-Face Visit       within 7 Days of discharge Face-to-Face Visit        within 14 days of discharge   Moderate Complexity [id number]   High Complexity [id number]              PROBLEMS TO ADD ON...    Problem list and histories reviewed & adjusted, as indicated.  Additional history: as documented    Labs reviewed in EPIC    Reviewed and updated as needed this visit by clinical staff  Tobacco  Allergies  Meds  Med Hx  Surg Hx  Fam Hx  Soc Hx      Reviewed and updated as needed this visit by Provider      "    ROS:  CONSTITUTIONAL: NEGATIVE for fever, chills, change in weight  ENT/MOUTH: POS right ear feeling of wax  RESP: POS cough and shortness of breath improved  CV: NEGATIVE for chest pain, palpitations or peripheral edema    OBJECTIVE:     /72 (BP Location: Left arm, Cuff Size: Adult Large)  Pulse 84  Temp 98.4  F (36.9  C) (Oral)  Ht 5' 2\" (1.575 m)  Wt 147 lb (66.7 kg)  SpO2 94%  Breastfeeding? No  BMI 26.89 kg/m2  Body mass index is 26.89 kg/(m^2).  GENERAL: healthy, alert and no distress  ENT: right ear with cerumen; left ear canal clear  NECK: no adenopathy, no asymmetry, masses, or scars and thyroid normal to palpation  RESP: lungs clear to auscultation - no rales, rhonchi or wheezes  CV: RRR, no M/R/G    ASSESSMENT/PLAN:       (I48.0) PAF (paroxysmal atrial fibrillation) (H)  (primary encounter diagnosis)  Comment: sinus rhythm today  Plan: carvedilol  -recommend pt use her VALENTIN mask  -pt plans on following up with cardiology    (J45.20) Mild intermittent asthma without complication  Comment: flares with URI  Plan: inhalers    (H61.21) Impacted cerumen of right ear  Comment: trial of flush today  Plan: carbamide peroxide (DEBROX) 6.5 % otic solution                Concepcion Mccall MD  [facility]    "

## 2018-03-09 NOTE — NURSING NOTE
"Chief Complaint   Patient presents with     Hospital F/U     FVR 3/4-3/5/18 a fib & URI-still not feeling good       Initial /72 (BP Location: Left arm, Cuff Size: Adult Large)  Pulse 84  Temp 98.4  F (36.9  C) (Oral)  Ht 5' 2\" (1.575 m)  Wt 147 lb (66.7 kg)  SpO2 94%  Breastfeeding? No  BMI 26.89 kg/m2 Estimated body mass index is 26.89 kg/(m^2) as calculated from the following:    Height as of this encounter: 5' 2\" (1.575 m).    Weight as of this encounter: 147 lb (66.7 kg).  Medication Reconciliation: complete   Maye Seo CMA      "

## 2018-03-09 NOTE — MR AVS SNAPSHOT
"              After Visit Summary   3/9/2018    Veronica Bray    MRN: [id number]           Patient Information     Date Of Birth          5/31/1931        Visit Information        Provider Department      3/9/2018 11:00 AM Concepcion Mccall MD [facility]        Today's Diagnoses     PAF (paroxysmal atrial fibrillation) (H)    -  1    Mild intermittent asthma without complication           Follow-ups after your visit        Your next 10 appointments already scheduled     Mar 30, 2018 10:40 AM CDT   Anticoagulation Visit with RU ANTICOAGULATION   [facility] ([facility])    [address]  [facility] [id number] [phone]            Jul 03, 2018 10:00 AM CDT   SHORT with Concepcion Mccall MD   [facility] ([facility])    [address]  [facility] [address] [phone]              Who to contact     If you have questions or need follow up information about today's clinic visit or your schedule please contact [facility] directly at [phone].  Normal or non-critical lab and imaging results will be communicated to you by [employer], letter or phone within 4 business days after the clinic has received the results. If you do not hear from us within 7 days, please contact the clinic through [employer] or phone. If you have a critical or abnormal lab result, we will notify you by phone as soon as possible.  Submit refill requests through [employer] or call your pharmacy and they will forward the refill request to us. Please allow 3 business days for your refill to be completed.          Additional Information About Your Visit        MyChart Information     [employer] lets you send messages to your doctor, view your test results, renew your prescriptions, schedule appointments and more. To sign up, go to [email or web address] . Click on \"Log in\" on the left side of the " "screen, which will take you to the Welcome page. Then click on \"Sign up Now\" on the right side of the page.     You will be asked to enter the access code listed below, as well as some personal information. Please follow the directions to create your username and password.     Your access code is: [email or web address]  Expires: 2018 12:04 PM     Your access code will  in 90 days. If you need help or a new code, please call your [place] clinic or [phone].        Care EveryWhere ID     This is your Care EveryWhere ID. This could be used by other organizations to access your [place] medical records  [id number]        Your Vitals Were     Pulse Temperature Height Pulse Oximetry Breastfeeding? BMI (Body Mass Index)    84 98.4  F (36.9  C) (Oral) 5' 2\" (1.575 m) 94% No 26.89 kg/m2       Blood Pressure from Last 3 Encounters:   18 130/72   18 140/54   18 120/74    Weight from Last 3 Encounters:   18 147 lb (66.7 kg)   18 145 lb (65.8 kg)   18 145 lb (65.8 kg)              Today, you had the following     No orders found for display       Primary Care Provider Office Phone # Fax #    Concepcion Mccall [phone]       [address] [place] [address]        Equal Access to Services     [facility]: Hadii aad ku hadasho Sogladys, waaxda luqadaha, qaybta kaalmada adeegyada, fang manzano . So [facility] [phone].    ATENCIÓN: Si habla español, tiene a martinez disposición servicios gratuitos de asistencia lingüística. Oneil al [phone].    We comply with applicable federal civil rights laws and [place] laws. We do not discriminate on the basis of race, color, national origin, age, disability, sex, sexual orientation, or gender identity.            Thank you!     Thank you for choosing [facility]  for your care. Our goal is always to provide you with excellent care. Hearing back from our patients is one way we " can continue to improve our services. Please take a few minutes to complete the written survey that you may receive in the mail after your visit with us. Thank you!             Your Updated Medication List - Protect others around you: Learn how to safely use, store and throw away your medicines at www.disposemymeds.org.          This list is accurate as of 3/9/18 12:01 PM.  Always use your most recent med list.                   Brand Name Dispense Instructions for use Diagnosis    albuterol 108 (90 BASE) MCG/ACT Inhaler    PROAIR HFA/PROVENTIL HFA/VENTOLIN HFA    1 Inhaler    Inhale 1-2 puffs into the lungs every 6 hours as needed for shortness of breath / dyspnea or wheezing    Viral URI with cough       ALLEGRA PO      Take 180 mg by mouth daily        ASPIRIN PO      Take 81 mg by mouth every other day        ATORVASTATIN CALCIUM PO      Take 10 mg by mouth At Bedtime        benzonatate 100 MG capsule    TESSALON    15 capsule    Take 1 capsule (100 mg) by mouth 3 times daily for 5 days    Viral URI with cough       carvedilol 6.25 MG tablet    COREG    180 tablet    Take 1 tablet (6.25 mg) by mouth 2 times daily (with meals)    Paroxysmal atrial fibrillation (H), S/P CABG (coronary artery bypass graft), Atrial flutter, paroxysmal (H), Benign essential hypertension       fluticasone 50 MCG/ACT spray    FLONASE     Spray 1 spray into both nostrils daily        lisinopril 5 MG tablet    PRINIVIL/ZESTRIL    90 tablet    Take 1 tablet (5 mg) by mouth daily    Benign essential hypertension       MUCINEX DM  MG per 12 hr tablet     28 tablet    Take 2 tablets by mouth every 12 hours    Viral URI with cough       PRESERVISION AREDS 2 Caps      Take 1 capsule by mouth 2 times daily        warfarin 5 MG tablet    COUMADIN     Take 5 mg by mouth At Bedtime

## 2018-03-21 ENCOUNTER — OFFICE VISIT (OUTPATIENT)
Dept: INTERNAL MEDICINE | Facility: CLINIC | Age: 83
End: 2018-03-21
Payer: COMMERCIAL

## 2018-03-21 ENCOUNTER — RADIANT APPOINTMENT (OUTPATIENT)
Dept: GENERAL RADIOLOGY | Facility: CLINIC | Age: 83
End: 2018-03-21
Attending: INTERNAL MEDICINE
Payer: COMMERCIAL

## 2018-03-21 VITALS
SYSTOLIC BLOOD PRESSURE: 153 MMHG | WEIGHT: 147 LBS | BODY MASS INDEX: 27.05 KG/M2 | OXYGEN SATURATION: 93 % | TEMPERATURE: 97.3 F | HEART RATE: 93 BPM | DIASTOLIC BLOOD PRESSURE: 74 MMHG | HEIGHT: 62 IN

## 2018-03-21 DIAGNOSIS — I25.10 CORONARY ARTERY DISEASE INVOLVING NATIVE CORONARY ARTERY OF NATIVE HEART, ANGINA PRESENCE UNSPECIFIED: ICD-10-CM

## 2018-03-21 DIAGNOSIS — I44.7 LBBB (LEFT BUNDLE BRANCH BLOCK): ICD-10-CM

## 2018-03-21 DIAGNOSIS — R07.89 CHEST DISCOMFORT: Primary | ICD-10-CM

## 2018-03-21 DIAGNOSIS — J20.9 ACUTE BRONCHITIS, UNSPECIFIED ORGANISM: ICD-10-CM

## 2018-03-21 DIAGNOSIS — J45.21 INTERMITTENT ASTHMA WITH ACUTE EXACERBATION, UNSPECIFIED ASTHMA SEVERITY: ICD-10-CM

## 2018-03-21 DIAGNOSIS — R05.9 COUGH: ICD-10-CM

## 2018-03-21 LAB
BASOPHILS # BLD AUTO: 0 10E9/L (ref 0–0.2)
BASOPHILS NFR BLD AUTO: 0.2 %
DIFFERENTIAL METHOD BLD: ABNORMAL
EOSINOPHIL # BLD AUTO: 0.2 10E9/L (ref 0–0.7)
EOSINOPHIL NFR BLD AUTO: 4.6 %
ERYTHROCYTE [DISTWIDTH] IN BLOOD BY AUTOMATED COUNT: 13.7 % (ref 10–15)
HCT VFR BLD AUTO: 42.4 % (ref 35–47)
HGB BLD-MCNC: 13.2 G/DL (ref 11.7–15.7)
LYMPHOCYTES # BLD AUTO: 0.9 10E9/L (ref 0.8–5.3)
LYMPHOCYTES NFR BLD AUTO: 20.6 %
MCH RBC QN AUTO: 29.2 PG (ref 26.5–33)
MCHC RBC AUTO-ENTMCNC: 31.1 G/DL (ref 31.5–36.5)
MCV RBC AUTO: 94 FL (ref 78–100)
MONOCYTES # BLD AUTO: 0.4 10E9/L (ref 0–1.3)
MONOCYTES NFR BLD AUTO: 8.9 %
NEUTROPHILS # BLD AUTO: 2.9 10E9/L (ref 1.6–8.3)
NEUTROPHILS NFR BLD AUTO: 65.7 %
PLATELET # BLD AUTO: 164 10E9/L (ref 150–450)
RBC # BLD AUTO: 4.52 10E12/L (ref 3.8–5.2)
WBC # BLD AUTO: 4.4 10E9/L (ref 4–11)

## 2018-03-21 PROCEDURE — 85025 COMPLETE CBC W/AUTO DIFF WBC: CPT | Performed by: INTERNAL MEDICINE

## 2018-03-21 PROCEDURE — 71046 X-RAY EXAM CHEST 2 VIEWS: CPT

## 2018-03-21 PROCEDURE — 99214 OFFICE O/P EST MOD 30 MIN: CPT | Performed by: INTERNAL MEDICINE

## 2018-03-21 PROCEDURE — 36415 COLL VENOUS BLD VENIPUNCTURE: CPT | Performed by: INTERNAL MEDICINE

## 2018-03-21 PROCEDURE — 93000 ELECTROCARDIOGRAM COMPLETE: CPT | Performed by: INTERNAL MEDICINE

## 2018-03-21 RX ORDER — LEVOFLOXACIN 500 MG/1
500 TABLET, FILM COATED ORAL DAILY
Qty: 10 TABLET | Refills: 0 | Status: SHIPPED | OUTPATIENT
Start: 2018-03-21 | End: 2018-04-26

## 2018-03-21 RX ORDER — BENZONATATE 100 MG/1
100 CAPSULE ORAL 3 TIMES DAILY
Qty: 30 CAPSULE | Refills: 0 | Status: SHIPPED | OUTPATIENT
Start: 2018-03-21 | End: 2018-04-26

## 2018-03-21 RX ORDER — PREDNISONE 10 MG/1
TABLET ORAL
Qty: 30 TABLET | Refills: 0 | Status: SHIPPED | OUTPATIENT
Start: 2018-03-21 | End: 2018-04-26

## 2018-03-21 RX ORDER — ALBUTEROL SULFATE 90 UG/1
1-2 AEROSOL, METERED RESPIRATORY (INHALATION) EVERY 6 HOURS PRN
Qty: 1 INHALER | Refills: 0 | Status: SHIPPED | OUTPATIENT
Start: 2018-03-21 | End: 2018-10-15

## 2018-03-21 NOTE — MR AVS SNAPSHOT
After Visit Summary   3/21/2018    Veronica Bray    MRN: [id number]           Patient Information     Date Of Birth          5/31/1931        Visit Information        Provider Department      3/21/2018 11:00 AM Ryan Badillo MD [facility]        Today's Diagnoses     Chest discomfort    -  1    LBBB (left bundle branch block)        Cough        Intermittent asthma with acute exacerbation, unspecified asthma severity        Coronary artery disease involving native coronary artery of native heart, angina presence unspecified        Acute bronchitis, unspecified organism          Care Instructions    The chest discomfort is more likely from asthma/bronchitis than from your heart.   The most conservative (cautious) plan would be to bring you into the hospital, however, I think it is reasonable to try care at home.     New meds:  Antibiotic (Levaquin)  Prednisone  Tessalon  Albuterol.     Call us if symptoms worsen, or if not improving in next few days.           Follow-ups after your visit        Your next 10 appointments already scheduled     Mar 30, 2018 10:40 AM CDT   Anticoagulation Visit with RU ANTICOAGULATION   [facility] ([facility])    [address] [place] [address]  [facility] [id number] [phone]            Apr 05, 2018 12:30 PM CDT   Return Visit with Bhavani Plaza PA-C   [facility] ([facility])    [address] [place] [address] [address]  [facility] [id number] [phone]            Jul 03, 2018 10:00 AM CDT   SHORT with Concepcion Mccall MD   [facility] ([facility])    [address]  [facility] [id number] [phone]              Who to contact     If you have questions or need follow up information about today's clinic visit or your schedule please contact [facility] directly at [phone].  Normal  "or non-critical lab and imaging results will be communicated to you by MyChart, letter or phone within 4 business days after the clinic has received the results. If you do not hear from us within 7 days, please contact the clinic through [employer] or phone. If you have a critical or abnormal lab result, we will notify you by phone as soon as possible.  Submit refill requests through [employer] or call your pharmacy and they will forward the refill request to us. Please allow 3 business days for your refill to be completed.          Additional Information About Your Visit        Dhir Diamondshar[employer]ua Information     [employer] lets you send messages to your doctor, view your test results, renew your prescriptions, schedule appointments and more. To sign up, go to [email or web address] . Click on \"Log in\" on the left side of the screen, which will take you to the Welcome page. Then click on \"Sign up Now\" on the right side of the page.     You will be asked to enter the access code listed below, as well as some personal information. Please follow the directions to create your username and password.     Your access code is: [email or web address]  Expires: 2018  1:04 PM     Your access code will  in 90 days. If you need help or a new code, please call your [place] clinic or [phone].        Care EveryWhere ID     This is your Care EveryWhere ID. This could be used by other organizations to access your [place] medical records  [id number]        Your Vitals Were     Pulse Temperature Height Pulse Oximetry Breastfeeding? BMI (Body Mass Index)    93 97.3  F (36.3  C) (Oral) 5' 2\" (1.575 m) 93% No 26.89 kg/m2       Blood Pressure from Last 3 Encounters:   18 153/74   18 130/72   18 140/54    Weight from Last 3 Encounters:   18 147 lb (66.7 kg)   18 147 lb (66.7 kg)   18 145 lb (65.8 kg)              We Performed the Following     CBC with platelets and differential     EKG 12-lead complete w/read - " Clinics     XR Chest 2 Views          Today's Medication Changes          These changes are accurate as of 3/21/18 12:30 PM.  If you have any questions, ask your nurse or doctor.               Start taking these medicines.        Dose/Directions    benzonatate 100 MG capsule   Commonly known as:  TESSALON   Used for:  Cough   Started by:  Ryan Badillo MD        Dose:  100 mg   Take 1 capsule (100 mg) by mouth 3 times daily   Quantity:  30 capsule   Refills:  0       levofloxacin 500 MG tablet   Commonly known as:  LEVAQUIN   Used for:  Acute bronchitis, unspecified organism   Started by:  Ryan Badillo MD        Dose:  500 mg   Take 1 tablet (500 mg) by mouth daily   Quantity:  10 tablet   Refills:  0       predniSONE 10 MG tablet   Commonly known as:  DELTASONE   Used for:  Intermittent asthma with acute exacerbation, unspecified asthma severity   Started by:  Ryan Badillo MD        4 tabs daily for 3 days, then 3 tabs daily for 3 days, then 2 tabs daily for 3 days, then 1 tab daily for 3 days, then stop   Quantity:  30 tablet   Refills:  0            Where to get your medicines      These medications were sent to [facility] [address] [place], [place] [address]     Phone:  [phone]     albuterol 108 (90 BASE) MCG/ACT Inhaler    benzonatate 100 MG capsule    levofloxacin 500 MG tablet    predniSONE 10 MG tablet                Primary Care Provider Office Phone # Fax #    Concepcion Renny Mccall [phone]       [address] [place] [address]        Equal Access to Services     [facility] AH: Hadii aad ku hadasho Soomaali, waaxda luqadaha, qaybta kaalmada adeegyada, fang manzano . So [facility] [phone].    ATENCIÓN: Si habla español, tiene a martinez disposición servicios gratuitos de asistencia lingüística. Oneil al [phone].    We comply with applicable federal civil rights laws and [place]  laws. We do not discriminate on the basis of race, color, national origin, age, disability, sex, sexual orientation, or gender identity.            Thank you!     Thank you for choosing [facility]  for your care. Our goal is always to provide you with excellent care. Hearing back from our patients is one way we can continue to improve our services. Please take a few minutes to complete the written survey that you may receive in the mail after your visit with us. Thank you!             Your Updated Medication List - Protect others around you: Learn how to safely use, store and throw away your medicines at www.disposemymeds.org.          This list is accurate as of 3/21/18 12:30 PM.  Always use your most recent med list.                   Brand Name Dispense Instructions for use Diagnosis    albuterol 108 (90 BASE) MCG/ACT Inhaler    PROAIR HFA/PROVENTIL HFA/VENTOLIN HFA    1 Inhaler    Inhale 1-2 puffs into the lungs every 6 hours as needed for shortness of breath / dyspnea or wheezing    Cough, Intermittent asthma with acute exacerbation, unspecified asthma severity       ALLEGRA PO      Take 180 mg by mouth daily        ASPIRIN PO      Take 81 mg by mouth every other day        ATORVASTATIN CALCIUM PO      Take 10 mg by mouth At Bedtime        benzonatate 100 MG capsule    TESSALON    30 capsule    Take 1 capsule (100 mg) by mouth 3 times daily    Cough       carbamide peroxide 6.5 % otic solution    DEBROX    30 mL    Place 5-10 drops into the right ear 2 times daily    Impacted cerumen of right ear       carvedilol 6.25 MG tablet    COREG    180 tablet    Take 1 tablet (6.25 mg) by mouth 2 times daily (with meals)    Paroxysmal atrial fibrillation (H), S/P CABG (coronary artery bypass graft), Atrial flutter, paroxysmal (H), Benign essential hypertension       fluticasone 50 MCG/ACT spray    FLONASE     Spray 1 spray into both nostrils daily        levofloxacin 500 MG tablet    LEVAQUIN    10  tablet    Take 1 tablet (500 mg) by mouth daily    Acute bronchitis, unspecified organism       lisinopril 5 MG tablet    PRINIVIL/ZESTRIL    90 tablet    Take 1 tablet (5 mg) by mouth daily    Benign essential hypertension       MUCINEX DM  MG per 12 hr tablet     28 tablet    Take 2 tablets by mouth every 12 hours    Viral URI with cough       predniSONE 10 MG tablet    DELTASONE    30 tablet    4 tabs daily for 3 days, then 3 tabs daily for 3 days, then 2 tabs daily for 3 days, then 1 tab daily for 3 days, then stop    Intermittent asthma with acute exacerbation, unspecified asthma severity       PRESERVISION AREDS 2 Caps      Take 1 capsule by mouth 2 times daily        warfarin 5 MG tablet    COUMADIN     Take 5 mg by mouth At Bedtime

## 2018-03-21 NOTE — PATIENT INSTRUCTIONS
The chest discomfort is more likely from asthma/bronchitis than from your heart.   The most conservative (cautious) plan would be to bring you into the hospital, however, I think it is reasonable to try care at home.     New meds:  Antibiotic (Levaquin)  Prednisone  Tessalon  Albuterol.     Call us if symptoms worsen, or if not improving in next few days.

## 2018-03-21 NOTE — LETTER
[facility]  [address] [place] [address]  [phone]    4/1/2018    Veronica Bray  [address]   [facility] [address]           Dear Ms. Bray,    I hope you are feeling better!    The results of your lab tests are enclosed. Everything looks fine.   are of no concern.    Your hemoglobin, white blood cell count, and platelet count looked fine.  Hemoglobin measures the amount of red blood cells carrying oxygen to the body's tissues. A low hemoglobin can cause symptoms of fatigue.  WBC Count measures White Blood Cells, the cells that fight infection. The percentages of various types of white blood cells are listed and look fine.  Platelets assist in normal blood clotting. Your platelet count looks normal.     If you have any further questions or problems, please contact our office.    Sincerely,      NANNETTE VOSS M.D.  Attachment: Lab results
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
## 2018-03-21 NOTE — NURSING NOTE
"Chief Complaint   Patient presents with     Cough     Shortness of Breath       Initial /74 (BP Location: Left arm, Patient Position: Sitting, Cuff Size: Adult Regular)  Pulse 93  Temp 97.3  F (36.3  C) (Oral)  Ht 5' 2\" (1.575 m)  Wt 147 lb (66.7 kg)  SpO2 93%  Breastfeeding? No  BMI 26.89 kg/m2 Estimated body mass index is 26.89 kg/(m^2) as calculated from the following:    Height as of this encounter: 5' 2\" (1.575 m).    Weight as of this encounter: 147 lb (66.7 kg).  Medication Reconciliation: complete   Osbaldo VALDES      "

## 2018-03-23 ENCOUNTER — TELEPHONE (OUTPATIENT)
Dept: CARDIOLOGY | Facility: CLINIC | Age: 83
End: 2018-03-23

## 2018-03-23 NOTE — TELEPHONE ENCOUNTER
Patient called to report that she has been sick.  She saw PMD on Wednesday and Dr. Badillo started Levaquin and Prednisone.  She had already been to the ER earlier in March and was given Tessalon and Albuterol.  Called patient back and said we should check the INR today and she said she was too sick to come today.   Told her to hold her Warfarin for 2 days then recheck INR in 1 week as already scheduled.  Mike ROBERTS

## 2018-03-27 ENCOUNTER — TRANSFERRED RECORDS (OUTPATIENT)
Dept: HEALTH INFORMATION MANAGEMENT | Facility: CLINIC | Age: 83
End: 2018-03-27

## 2018-03-30 ENCOUNTER — ANTICOAGULATION THERAPY VISIT (OUTPATIENT)
Dept: CARDIOLOGY | Facility: CLINIC | Age: 83
End: 2018-03-30
Payer: COMMERCIAL

## 2018-03-30 DIAGNOSIS — I48.91 ATRIAL FIBRILLATION, UNSPECIFIED TYPE (H): ICD-10-CM

## 2018-03-30 DIAGNOSIS — Z79.01 LONG-TERM (CURRENT) USE OF ANTICOAGULANTS: ICD-10-CM

## 2018-03-30 LAB — INR POINT OF CARE: 2.5 (ref 0.86–1.14)

## 2018-03-30 PROCEDURE — 85610 PROTHROMBIN TIME: CPT | Mod: QW | Performed by: INTERNAL MEDICINE

## 2018-03-30 PROCEDURE — 36416 COLLJ CAPILLARY BLOOD SPEC: CPT | Performed by: INTERNAL MEDICINE

## 2018-03-30 PROCEDURE — 99207 ZZC NO CHARGE NURSE ONLY: CPT | Performed by: INTERNAL MEDICINE

## 2018-03-30 NOTE — MR AVS SNAPSHOT
Veronica Bray   3/30/2018 10:40 AM   Anticoagulation Therapy Visit    Description:  86 year old female   Provider:  [facility]   Department:  [facility]           INR as of 3/30/2018     Today's INR 2.5      Anticoagulation Summary as of 3/30/2018     INR goal 2.0-3.0   Today's INR 2.5   Full instructions 5 mg every day   Next INR check 4/27/2018    Indications   Atrial fibrillation (H) [I48.91] [I48.91]  Long-term (current) use of anticoagulants [Z79.01] [Z79.01]         Your next Anticoagulation Clinic appointment(s)     Apr 27, 2018 10:40 AM CDT   Anticoagulation Visit with RU ANTICOAGULATION   [facility] ([facility])    [address]  [facility] [address] [phone]              March 2018 Details    Sun Mon Tue Wed Thu Fri Sat         1               2               3                 4               5               6               7               8               9               10                 11               12               13               14               15               16               17                 18               19               20               21               22               23               24                 25               26               27               28               29               30      5 mg   See details      31      5 mg          Date Details   03/30 This INR check               How to take your warfarin dose     To take:  5 mg Take 1 of the 5 mg tablets.           April 2018 Details    Sun Mon Tue Wed Thu Fri Sat     1      5 mg         2      5 mg         3      5 mg         4      5 mg         5      5 mg         6      5 mg         7      5 mg           8      5 mg         9      5 mg         10      5 mg         11      5 mg         12      5 mg         13      5 mg         14      5 mg           15      5 mg         16      5 mg         17      5 mg         18      5 mg          19      5 mg         20      5 mg         21      5 mg           22      5 mg         23      5 mg         24      5 mg         25      5 mg         26      5 mg         27            28                 29               30                     Date Details   No additional details    Date of next INR:  4/27/2018         How to take your warfarin dose     To take:  5 mg Take 1 of the 5 mg tablets.

## 2018-03-30 NOTE — PROGRESS NOTES
ANTICOAGULATION FOLLOW-UP CLINIC VISIT    Patient Name:  Veronica Bray  Date:  3/30/2018  Contact Type:  Face to Face    SUBJECTIVE:     Patient Findings     Positives Change in medications    Comments Started Levaquin and Prednisone on 3/21, held Warfarin on 3/23 and 3/24           OBJECTIVE    INR Protime   Date Value Ref Range Status   03/30/2018 2.5 (A) 0.86 - 1.14 Final       ASSESSMENT / PLAN  INR assessment THER    Recheck INR In: 4 WEEKS    INR Location Clinic      Anticoagulation Summary as of 3/30/2018     INR goal 2.0-3.0   Today's INR 2.5   Maintenance plan 5 mg (5 mg x 1) every day   Full instructions 5 mg every day   Weekly total 35 mg   No change documented Larisa Monae RN   Plan last modified Larisa Monae, RN (12/1/2017)   Next INR check 4/27/2018   Target end date Indefinite    Indications   Atrial fibrillation (H) [I48.91] [I48.91]  Long-term (current) use of anticoagulants [Z79.01] [Z79.01]         Anticoagulation Episode Summary     INR check location     Preferred lab     Send INR reminders to [facility] HEART INR NURSE    Comments       Anticoagulation Care Providers     Provider Role Specialty Phone number    Ip, Tr Carrera MD Responsible Cardiology [phone]            See the Encounter Report to view Anticoagulation Flowsheet and Dosing Calendar (Go to Encounters tab in chart review, and find the Anticoagulation Therapy Visit)    INR 2.5.  She has been really sick in March.  She started Levaquin and Prednisone on 3/21 so I had her hold the Warfarin for 2 days of 3/23 and 3/24.  Today is the last day of both Levaquin and Prednisone.  She is still taking the cough med.  She is back to normal diet and feels much better.  Continue her normal 5mg/day and recheck in 4 weeks or sooner if needed.  She just started monthly eye injections for macular degeneration which just turned from dry to wet.  Mike Monae, RN

## 2018-04-12 ENCOUNTER — OFFICE VISIT (OUTPATIENT)
Dept: CARDIOLOGY | Facility: CLINIC | Age: 83
End: 2018-04-12
Payer: COMMERCIAL

## 2018-04-12 VITALS
OXYGEN SATURATION: 95 % | HEIGHT: 62 IN | HEART RATE: 62 BPM | DIASTOLIC BLOOD PRESSURE: 60 MMHG | WEIGHT: 150 LBS | SYSTOLIC BLOOD PRESSURE: 141 MMHG | BODY MASS INDEX: 27.6 KG/M2

## 2018-04-12 DIAGNOSIS — Z95.1 S/P CABG (CORONARY ARTERY BYPASS GRAFT): ICD-10-CM

## 2018-04-12 DIAGNOSIS — I48.92 ATRIAL FLUTTER, PAROXYSMAL (H): Primary | ICD-10-CM

## 2018-04-12 DIAGNOSIS — I48.91 ATRIAL FIBRILLATION, UNSPECIFIED TYPE (H): ICD-10-CM

## 2018-04-12 PROCEDURE — 99214 OFFICE O/P EST MOD 30 MIN: CPT | Performed by: PHYSICIAN ASSISTANT

## 2018-04-12 NOTE — LETTER
2018    Concepcion Mccall MD  [address] [facility] [address]    RE: Veronica Bray       Dear Colleague,    I had the pleasure of seeing Veronica Bray in the [facility].    CARDIOLOGY CLINIC PROGRESS NOTE    DOS: 2018    Veronica Bray  : 1931, 86 year old  MRN: [id number]      History:   I had the pleasure of meeting Veronica Bray today in the cardiology clinic. She is a patient of Dr. Benavides.     Veronica is an 86 year old female with history CAD, chronic LBBB, SVT, aflutter s/p ablation in , parox afib, HTN, VALENTIN not on CPAP, HLP.     She had a non-Q-wave myocardial infarction in  and went on to have coronary artery bypass surgery.  A LIMA was placed to the LAD and a vein graft to the first diagonal.  During this operation, the right atrium was accidentally damaged and this was repaired by insertion of a patch.  She had repeat angiography in  when she had a type 2 myocardial infarct.  Her troponins were elevated in the setting of hypotension.  Angiography revealed patency of the grafts with complete occlusion of the proximal LAD.  The circ and the RCA had nonobstructive disease only.     She has a long history of arrhythmias.  Baseline ECG shows a left bundle branch block.  SVT was diagnosed in .  Atrial flutter was diagnosed in  and she had A-flutter ablation.  This has unfortunately recurred at least twice and she has been successfully cardioverted. In  she had afib. She was on amiodarone for a time, which she tolerated. She does not recall why this was stopped.     Review and summary of recent hospital admission:   Admitted 3/4/18-3/5/18 with chest pain and cough.   Found to be in afib with RVR.  She was given 20mg of cardizem. She then converted to NSR spontaneously. She had resolution of her sxs.   Trops were mildly elevated, peaking at 0.043.   Echo done that showed LVEF 55-60%, mild valve disease, Right ventricular systolic  pressure elevated, consistent with moderate to severe pulmonary hypertension. Increased pulm pressure and likely due to untreated VALENTIN.     Interval History:   Had one more episode of afib after she got home, but back in normal rhythm by the next morning.   No more chest pain.   Has macular degeneration. Had bleeding in her right eye. Received an injection.   PT was coming to her home for her . Thy would check her BP for her, and it was 110-120/60s.   She has post nasal drainage and cough. She wonders if the cough is from lisinopril.     ROS:  Skin:  Negative     Eyes:  Positive for    ENT:  Positive for sinus trouble;postnasal drainage  Respiratory:  Positive for dyspnea on exertion;sleep apnea  Cardiovascular:    Positive for;palpitations  Gastroenterology: Negative    Genitourinary:  Positive for urinary frequency  Musculoskeletal:  Positive for back pain;arthritis;joint pain  Neurologic:  Positive for numbness or tingling of feet  Psychiatric:  Positive for excessive stress;anxiety;depression  Heme/Lymph/Imm:  Positive for easy bruising  Endocrine:  Negative      PAST MEDICAL HISTORY:  Past Medical History:   Diagnosis Date     Atrial fibrillation (H)      Atrial flutter (H)      CAD (coronary artery disease)     LIMA graft to the LAD and vein graft to the diagonal are patent.      Cardiomyopathy, unspecified     tachy induced     Hyperlipidemia      Hypertension      LBBB (left bundle branch block)      Mitral regurgitation      NSTEMI (non-ST elevated myocardial infarction) (H)      PAF (paroxysmal atrial fibrillation) (H)     converted on Amiodarone     Sleep apnea     CPAP     SVT (supraventricular tachycardia) (H)      Tobacco use        PAST SURGICAL HISTORY:  Past Surgical History:   Procedure Laterality Date     CHOLECYSTECTOMY       CORONARY ANGIOGRAPHY ADULT ORDER  1/2005    Stent: Distal cfx, Mid LAD, Stent: D -1     GYN SURGERY      hysterectomy     HC LEFT HEART CATHETERIZATION  11/2006    mid  distal-anterior/ distal inferior/ Apical Yossi, 85     HC LEFT HEART CATHETERIZATION  5/2015    100% Proximal LAD, patent LIMA-LAD, SVG-D1, LVEDP 13, 10-18 mmHg AV gradient, 3+ MR after PVC     ORTHOPEDIC SURGERY      hip, knee, wrist       SOCIAL HISTORY:  Social History     Social History     Marital status:      Spouse name: N/A     Number of children: N/A     Years of education: N/A     Social History Main Topics     Smoking status: Former Smoker     Smokeless tobacco: Never Used      Comment: quit approx 1967      Alcohol use 2.4 oz/week     0 Standard drinks or equivalent, 4 Glasses of wine per week     Drug use: No     Sexual activity: Yes     Partners: Male     Other Topics Concern     Caffeine Concern No     Special Diet No     Exercise No     Social History Narrative       FAMILY HISTORY:  Family History   Problem Relation Age of Onset     Unknown/Adopted Mother      Unknown/Adopted Father        MEDS:   Current Outpatient Prescriptions on File Prior to Visit:  albuterol (PROAIR HFA/PROVENTIL HFA/VENTOLIN HFA) 108 (90 BASE) MCG/ACT Inhaler Inhale 1-2 puffs into the lungs every 6 hours as needed for shortness of breath / dyspnea or wheezing   Dextromethorphan-Guaifenesin (MUCINEX DM)  MG per 12 hr tablet Take 2 tablets by mouth every 12 hours   ATORVASTATIN CALCIUM PO Take 10 mg by mouth At Bedtime   fluticasone (FLONASE) 50 MCG/ACT spray Spray 1 spray into both nostrils daily   warfarin (COUMADIN) 5 MG tablet Take 5 mg by mouth At Bedtime    Fexofenadine HCl (ALLEGRA PO) Take 180 mg by mouth daily    lisinopril (PRINIVIL/ZESTRIL) 5 MG tablet Take 1 tablet (5 mg) by mouth daily   carvedilol (COREG) 6.25 MG tablet Take 1 tablet (6.25 mg) by mouth 2 times daily (with meals)   Multiple Vitamins-Minerals (PRESERVISION AREDS 2) CAPS Take 1 capsule by mouth 2 times daily    ASPIRIN PO Take 81 mg by mouth every other day    predniSONE (DELTASONE) 10 MG tablet 4 tabs daily for 3 days, then 3 tabs daily  "for 3 days, then 2 tabs daily for 3 days, then 1 tab daily for 3 days, then stop (Patient not taking: Reported on 4/12/2018)   levofloxacin (LEVAQUIN) 500 MG tablet Take 1 tablet (500 mg) by mouth daily (Patient not taking: Reported on 4/12/2018)   benzonatate (TESSALON) 100 MG capsule Take 1 capsule (100 mg) by mouth 3 times daily (Patient not taking: Reported on 4/12/2018)   carbamide peroxide (DEBROX) 6.5 % otic solution Place 5-10 drops into the right ear 2 times daily (Patient not taking: Reported on 3/21/2018)     No current facility-administered medications on file prior to visit.     ALLERGIES:   Allergies   Allergen Reactions     Aleve [Naproxen] Anaphylaxis     Celebrex [Celecoxib]      Codeine      Demerol [Meperidine]      Hydromorphone      Percocet [Oxycodone-Acetaminophen]      Tramadol      Vicodin [Hydrocodone-Acetaminophen]        PHYSICAL EXAM:  Vitals: /60 (BP Location: Right arm, Patient Position: Sitting, Cuff Size: Adult Regular)  Pulse 62  Ht 1.575 m (5' 2\")  Wt 68 kg (150 lb)  SpO2 95%  BMI 27.44 kg/m2  Constitutional:  cooperative, alert and oriented, well developed, well nourished, in no acute distress        Skin:  warm and dry to the touch, no apparent skin lesions or masses noted        Head:  normocephalic, no masses or lesions        Eyes:  pupils equal and round;conjunctivae and lids unremarkable        ENT:  no pallor or cyanosis, dentition good        Neck:  JVP normal        Respiratory:  normal breath sounds, clear to auscultation, normal A-P diameter, normal symmetry, normal respiratory excursion, no use of accessory muscles;healed median sternotomy scar        Cardiac: regular rhythm;normal S1 and S2       systolic murmur;grade 1          GI:  abdomen soft;BS normoactive;non-tender        Vascular: pulses full and equal                                      Extremities and Musculoskeletal:  no deformities, clubbing, cyanosis, erythema observed;no edema    "     Neurological:  no gross motor deficits;affect appropriate          LABS/DATA:  I reviewed the following:  Carotid US 3/4/18:     IMPRESSION:    1. 50-69% diameter stenosis of the right internal carotid artery  relative to the distal internal carotid artery diameter.   2. 50-69% diameter stenosis of the left internal carotid artery  relative to the distal internal carotid artery diameter.       Echo 3/5/18:  Interpretation Summary     The visual ejection fraction is estimated at 55-60%.  Grade I or early diastolic dysfunction.  The left atrium is moderate to severely dilated.  The right atrium is mild to moderately dilated.  There is mild (1+) mitral regurgitation.  There is mild mitral stenosis.  There is mild (1+) tricuspid regurgitation.  Right ventricular systolic pressure is elevated, consistent with moderate to  severe pulmonary hypertension.  There is no comparison study available.        ASSESSMENT/PLAN:  1.  Paroxysmal atrial fibrillation.   On Coreg 6.25 mg BID. Dr. Benavides recommends extra dose of Coreg if the heart rate is persistently above 100 for more than 4 hours.  Recently had episode of RVR in setting of URI.  In SR today on exam.  Her CHADS2-VASc score is at least 4.  Continue on warfarin. I note she is also on ASA. She recently had a bleed in her eye. I will send a message to Dr. Benavides re stopping the ASA.     2.  Coronary artery disease.  History of bypass surgery with LIMA graft to the LAD and vein graft to the first diagonal, most recent cath 2015.  Stable with no symptoms of angina.   Continue ASA, Coreg, statin. May be stopping ASA as she is on warfarin and has had an eye bleed recently.     3.  Atrial flutter, status post ablation in 2009.     4.  Valvular heart disease not hemodynamically significant at this time.     5.  Hypertension,  has been good control. Today is 141/60.  She tells me PT checked her BP at home and it was 110-120/60s. Continue current Coreg, lisinopril doses. Will continue  to monitor.      6.  Sleep apnea, untreated.     7.  Dyslipidemia, on atorvastatin 10 mg with satisfactory numbers. 7/2017 LDL 83.      8.  Carotid disease.  Carotid US 3/4/18: moderate disease bilaterally.   Continue ASA, statin.  May be stopping ASA as she is on warfarin and has had an eye bleed recently.     9. Cough. I discussed that I think it is more likely she has allergies or something causing the post nasal drainage which causes the cough. If she still has cough when she sees Dr. Benavides, they can consider changing to losartan.       Thank you for allowing me to participate in the care of your patient.    Sincerely,     Bhavani Plaza PA-C     [facility]

## 2018-04-12 NOTE — PATIENT INSTRUCTIONS
1.  You are in normal rhythm today on exam.   2.  Continue carvedilol 6.25 mg twice a day.    3.  If you have the afib again, Dr. Benavides recommends that if it is in afib for 4 hours then take an extra carvedilol (total of 12.5 mg). If the afib does not go away after a few hours, please call us.   4.  With the bleed in your eye, please talk to Dr. Carrero about being on warfarin and aspirin.   I will talk to Dr. Benavides and see if he is ok with you coming off the aspirin.   5.  You are due to see Dr. Benavides in August. If you still have a cough then, we can consider switching your lisinopril to a different medication.

## 2018-04-12 NOTE — MR AVS SNAPSHOT
After Visit Summary   4/12/2018    Veronica Bray    MRN: [id number]           Patient Information     Date Of Birth          5/31/1931        Visit Information        Provider Department      4/12/2018 12:30 PM Bhavani Plaza PA-C [facility]        Care Instructions    1.  You are in normal rhythm today on exam.   2.  Continue carvedilol 6.25 mg twice a day.    3.  If you have the afib again, Dr. Benavides recommends that if it is in afib for 4 hours then take an extra carvedilol (total of 12.5 mg). If the afib does not go away after a few hours, please call us.   4.  With the bleed in your eye, please talk to Dr. Carrero about being on warfarin and aspirin.   I will talk to Dr. Benavides and see if he is ok with you coming off the aspirin.   5.  You are due to see Dr. Benavides in August. If you still have a cough then, we can consider switching your lisinopril to a different medication.             Follow-ups after your visit        Your next 10 appointments already scheduled     Apr 27, 2018 10:40 AM CDT   Anticoagulation Visit with RU ANTICOAGULATION   [facility] ([facility])    [address]  [facility] [id number] [phone]            Jul 03, 2018 10:00 AM CDT   SHORT with Concepcion Mccall MD   [facility] ([facility])    [address]  [facility] [id number] [phone]              Who to contact     If you have questions or need follow up information about today's clinic visit or your schedule please contact [facility] directly at [phone].  Normal or non-critical lab and imaging results will be communicated to you by MyChart, letter or phone within 4 business days after the clinic has received the results. If you do not hear from us within 7 days, please contact the clinic through MyChart or phone.  "If you have a critical or abnormal lab result, we will notify you by phone as soon as possible.  Submit refill requests through [employer] or call your pharmacy and they will forward the refill request to us. Please allow 3 business days for your refill to be completed.          Additional Information About Your Visit        [employer] Information     [employer] lets you send messages to your doctor, view your test results, renew your prescriptions, schedule appointments and more. To sign up, go to [email or web address] . Click on \"Log in\" on the left side of the screen, which will take you to the Welcome page. Then click on \"Sign up Now\" on the right side of the page.     You will be asked to enter the access code listed below, as well as some personal information. Please follow the directions to create your username and password.     Your access code is: [email or web address]  Expires: 2018  1:10 PM     Your access code will  in 90 days. If you need help or a new code, please call your [facility] clinic or [phone].        Care EveryWhere ID     This is your Care EveryWhere ID. This could be used by other organizations to access your [facility] medical records  [id number]        Your Vitals Were     Pulse Height Pulse Oximetry BMI (Body Mass Index)          62 1.575 m (5' 2\") 95% 27.44 kg/m2         Blood Pressure from Last 3 Encounters:   18 141/60   18 153/74   18 130/72    Weight from Last 3 Encounters:   18 68 kg (150 lb)   18 66.7 kg (147 lb)   18 66.7 kg (147 lb)              Today, you had the following     No orders found for display       Primary Care Provider Office Phone # Fax #    Concepcion Mccall [phone]       [address]  [facility] [address]        Equal Access to Services     SYLVIA ALLEN : Shabbir villalobos Sogladys, waaxda luqadaha, qaybta kaalmalian osuna, fang manzano . So [facility] " [phone].    ATENCIÓN: Si janessa rodriguez, tiene a martinez disposición servicios gratuitos de asistencia lingüística. Oneil yip [phone].    We comply with applicable federal civil rights laws and [place] laws. We do not discriminate on the basis of race, color, national origin, age, disability, sex, sexual orientation, or gender identity.            Thank you!     Thank you for choosing [facility]  for your care. Our goal is always to provide you with excellent care. Hearing back from our patients is one way we can continue to improve our services. Please take a few minutes to complete the written survey that you may receive in the mail after your visit with us. Thank you!             Your Updated Medication List - Protect others around you: Learn how to safely use, store and throw away your medicines at www.disposemymeds.org.          This list is accurate as of 4/12/18  1:10 PM.  Always use your most recent med list.                   Brand Name Dispense Instructions for use Diagnosis    albuterol 108 (90 Base) MCG/ACT Inhaler    PROAIR HFA/PROVENTIL HFA/VENTOLIN HFA    1 Inhaler    Inhale 1-2 puffs into the lungs every 6 hours as needed for shortness of breath / dyspnea or wheezing    Cough, Intermittent asthma with acute exacerbation, unspecified asthma severity       ALLEGRA PO      Take 180 mg by mouth daily        ASPIRIN PO      Take 81 mg by mouth every other day        ATORVASTATIN CALCIUM PO      Take 10 mg by mouth At Bedtime        benzonatate 100 MG capsule    TESSALON    30 capsule    Take 1 capsule (100 mg) by mouth 3 times daily    Cough       carbamide peroxide 6.5 % otic solution    DEBROX    30 mL    Place 5-10 drops into the right ear 2 times daily    Impacted cerumen of right ear       carvedilol 6.25 MG tablet    COREG    180 tablet    Take 1 tablet (6.25 mg) by mouth 2 times daily (with meals)    Paroxysmal atrial fibrillation (H), S/P CABG  (coronary artery bypass graft), Atrial flutter, paroxysmal (H), Benign essential hypertension       fluticasone 50 MCG/ACT spray    FLONASE     Spray 1 spray into both nostrils daily        levofloxacin 500 MG tablet    LEVAQUIN    10 tablet    Take 1 tablet (500 mg) by mouth daily    Acute bronchitis, unspecified organism       lisinopril 5 MG tablet    PRINIVIL/ZESTRIL    90 tablet    Take 1 tablet (5 mg) by mouth daily    Benign essential hypertension       MUCINEX DM  MG per 12 hr tablet     28 tablet    Take 2 tablets by mouth every 12 hours    Viral URI with cough       predniSONE 10 MG tablet    DELTASONE    30 tablet    4 tabs daily for 3 days, then 3 tabs daily for 3 days, then 2 tabs daily for 3 days, then 1 tab daily for 3 days, then stop    Intermittent asthma with acute exacerbation, unspecified asthma severity       PRESERVISION AREDS 2 Caps      Take 1 capsule by mouth 2 times daily        warfarin 5 MG tablet    COUMADIN     Take 5 mg by mouth At Bedtime

## 2018-04-12 NOTE — PROGRESS NOTES
CARDIOLOGY CLINIC PROGRESS NOTE    DOS: 2018    Veronica Bray  : 1931, 86 year old  MRN: [id number]      History:   I had the pleasure of meeting Veronica Bray today in the cardiology clinic. She is a patient of Dr. Benavides.     Veronica is an 86 year old female with history CAD, chronic LBBB, SVT, aflutter s/p ablation in , parox afib, HTN, VALENTIN not on CPAP, HLP.     She had a non-Q-wave myocardial infarction in  and went on to have coronary artery bypass surgery.  A LIMA was placed to the LAD and a vein graft to the first diagonal.  During this operation, the right atrium was accidentally damaged and this was repaired by insertion of a patch.  She had repeat angiography in  when she had a type 2 myocardial infarct.  Her troponins were elevated in the setting of hypotension.  Angiography revealed patency of the grafts with complete occlusion of the proximal LAD.  The circ and the RCA had nonobstructive disease only.     She has a long history of arrhythmias.  Baseline ECG shows a left bundle branch block.  SVT was diagnosed in .  Atrial flutter was diagnosed in  and she had A-flutter ablation.  This has unfortunately recurred at least twice and she has been successfully cardioverted. In  she had afib. She was on amiodarone for a time, which she tolerated. She does not recall why this was stopped.     Review and summary of recent hospital admission:   Admitted 3/4/18-3/5/18 with chest pain and cough.   Found to be in afib with RVR.  She was given 20mg of cardizem. She then converted to NSR spontaneously. She had resolution of her sxs.   Trops were mildly elevated, peaking at 0.043.   Echo done that showed LVEF 55-60%, mild valve disease, Right ventricular systolic pressure elevated, consistent with moderate to severe pulmonary hypertension. Increased pulm pressure and likely due to untreated VALENTIN.     Interval History:   Had one more episode of afib after she got home, but back in normal  rhythm by the next morning.   No more chest pain.   Has macular degeneration. Had bleeding in her right eye. Received an injection.   PT was coming to her home for her . Thy would check her BP for her, and it was 110-120/60s.   She has post nasal drainage and cough. She wonders if the cough is from lisinopril.     ROS:  Skin:  Negative     Eyes:  Positive for    ENT:  Positive for sinus trouble;postnasal drainage  Respiratory:  Positive for dyspnea on exertion;sleep apnea  Cardiovascular:    Positive for;palpitations  Gastroenterology: Negative    Genitourinary:  Positive for urinary frequency  Musculoskeletal:  Positive for back pain;arthritis;joint pain  Neurologic:  Positive for numbness or tingling of feet  Psychiatric:  Positive for excessive stress;anxiety;depression  Heme/Lymph/Imm:  Positive for easy bruising  Endocrine:  Negative      PAST MEDICAL HISTORY:  Past Medical History:   Diagnosis Date     Atrial fibrillation (H)      Atrial flutter (H)      CAD (coronary artery disease)     LIMA graft to the LAD and vein graft to the diagonal are patent.      Cardiomyopathy, unspecified     tachy induced     Hyperlipidemia      Hypertension      LBBB (left bundle branch block)      Mitral regurgitation      NSTEMI (non-ST elevated myocardial infarction) (H)      PAF (paroxysmal atrial fibrillation) (H)     converted on Amiodarone     Sleep apnea     CPAP     SVT (supraventricular tachycardia) (H)      Tobacco use        PAST SURGICAL HISTORY:  Past Surgical History:   Procedure Laterality Date     CHOLECYSTECTOMY       CORONARY ANGIOGRAPHY ADULT ORDER  1/2005    Stent: Distal cfx, Mid LAD, Stent: D -1     GYN SURGERY      hysterectomy     HC LEFT HEART CATHETERIZATION  11/2006    mid distal-anterior/ distal inferior/ Apical Yossi, 85     HC LEFT HEART CATHETERIZATION  5/2015    100% Proximal LAD, patent LIMA-LAD, SVG-D1, LVEDP 13, 10-18 mmHg AV gradient, 3+ MR after PVC     ORTHOPEDIC SURGERY      hip,  knee, wrist       SOCIAL HISTORY:  Social History     Social History     Marital status:      Spouse name: N/A     Number of children: N/A     Years of education: N/A     Social History Main Topics     Smoking status: Former Smoker     Smokeless tobacco: Never Used      Comment: quit approx 1967      Alcohol use 2.4 oz/week     0 Standard drinks or equivalent, 4 Glasses of wine per week     Drug use: No     Sexual activity: Yes     Partners: Male     Other Topics Concern     Caffeine Concern No     Special Diet No     Exercise No     Social History Narrative       FAMILY HISTORY:  Family History   Problem Relation Age of Onset     Unknown/Adopted Mother      Unknown/Adopted Father        MEDS:   Current Outpatient Prescriptions on File Prior to Visit:  albuterol (PROAIR HFA/PROVENTIL HFA/VENTOLIN HFA) 108 (90 BASE) MCG/ACT Inhaler Inhale 1-2 puffs into the lungs every 6 hours as needed for shortness of breath / dyspnea or wheezing   Dextromethorphan-Guaifenesin (MUCINEX DM)  MG per 12 hr tablet Take 2 tablets by mouth every 12 hours   ATORVASTATIN CALCIUM PO Take 10 mg by mouth At Bedtime   fluticasone (FLONASE) 50 MCG/ACT spray Spray 1 spray into both nostrils daily   warfarin (COUMADIN) 5 MG tablet Take 5 mg by mouth At Bedtime    Fexofenadine HCl (ALLEGRA PO) Take 180 mg by mouth daily    lisinopril (PRINIVIL/ZESTRIL) 5 MG tablet Take 1 tablet (5 mg) by mouth daily   carvedilol (COREG) 6.25 MG tablet Take 1 tablet (6.25 mg) by mouth 2 times daily (with meals)   Multiple Vitamins-Minerals (PRESERVISION AREDS 2) CAPS Take 1 capsule by mouth 2 times daily    ASPIRIN PO Take 81 mg by mouth every other day    predniSONE (DELTASONE) 10 MG tablet 4 tabs daily for 3 days, then 3 tabs daily for 3 days, then 2 tabs daily for 3 days, then 1 tab daily for 3 days, then stop (Patient not taking: Reported on 4/12/2018)   levofloxacin (LEVAQUIN) 500 MG tablet Take 1 tablet (500 mg) by mouth daily (Patient not  "taking: Reported on 4/12/2018)   benzonatate (TESSALON) 100 MG capsule Take 1 capsule (100 mg) by mouth 3 times daily (Patient not taking: Reported on 4/12/2018)   carbamide peroxide (DEBROX) 6.5 % otic solution Place 5-10 drops into the right ear 2 times daily (Patient not taking: Reported on 3/21/2018)     No current facility-administered medications on file prior to visit.     ALLERGIES:   Allergies   Allergen Reactions     Aleve [Naproxen] Anaphylaxis     Celebrex [Celecoxib]      Codeine      Demerol [Meperidine]      Hydromorphone      Percocet [Oxycodone-Acetaminophen]      Tramadol      Vicodin [Hydrocodone-Acetaminophen]        PHYSICAL EXAM:  Vitals: /60 (BP Location: Right arm, Patient Position: Sitting, Cuff Size: Adult Regular)  Pulse 62  Ht 1.575 m (5' 2\")  Wt 68 kg (150 lb)  SpO2 95%  BMI 27.44 kg/m2  Constitutional:  cooperative, alert and oriented, well developed, well nourished, in no acute distress        Skin:  warm and dry to the touch, no apparent skin lesions or masses noted        Head:  normocephalic, no masses or lesions        Eyes:  pupils equal and round;conjunctivae and lids unremarkable        ENT:  no pallor or cyanosis, dentition good        Neck:  JVP normal        Respiratory:  normal breath sounds, clear to auscultation, normal A-P diameter, normal symmetry, normal respiratory excursion, no use of accessory muscles;healed median sternotomy scar        Cardiac: regular rhythm;normal S1 and S2       systolic murmur;grade 1          GI:  abdomen soft;BS normoactive;non-tender        Vascular: pulses full and equal                                      Extremities and Musculoskeletal:  no deformities, clubbing, cyanosis, erythema observed;no edema        Neurological:  no gross motor deficits;affect appropriate          LABS/DATA:  I reviewed the following:  Carotid US 3/4/18:     IMPRESSION:    1. 50-69% diameter stenosis of the right internal carotid artery  relative to " the distal internal carotid artery diameter.   2. 50-69% diameter stenosis of the left internal carotid artery  relative to the distal internal carotid artery diameter.       Echo 3/5/18:  Interpretation Summary     The visual ejection fraction is estimated at 55-60%.  Grade I or early diastolic dysfunction.  The left atrium is moderate to severely dilated.  The right atrium is mild to moderately dilated.  There is mild (1+) mitral regurgitation.  There is mild mitral stenosis.  There is mild (1+) tricuspid regurgitation.  Right ventricular systolic pressure is elevated, consistent with moderate to  severe pulmonary hypertension.  There is no comparison study available.        ASSESSMENT/PLAN:  1.  Paroxysmal atrial fibrillation.  On Coreg 6.25 mg BID. Dr. Benavides recommends extra dose of Coreg if the heart rate is persistently above 100 for more than 4 hours.  Recently had episode of RVR in setting of URI.  In SR today on exam.  Her CHADS2-VASc score is at least 4.  Continue on warfarin. I note she is also on ASA. She recently had a bleed in her eye. I will send a message to Dr. Benavides re stopping the ASA.     2.  Coronary artery disease.  History of bypass surgery with LIMA graft to the LAD and vein graft to the first diagonal, most recent cath 2015.  Stable with no symptoms of angina.   Continue ASA, Coreg, statin. May be stopping ASA as she is on warfarin and has had an eye bleed recently.     3.  Atrial flutter, status post ablation in 2009.     4.  Valvular heart disease not hemodynamically significant at this time.     5.  Hypertension, has been good control. Today is 141/60.  She tells me PT checked her BP at home and it was 110-120/60s. Continue current Coreg, lisinopril doses. Will continue to monitor.      6.  Sleep apnea, untreated.     7.  Dyslipidemia, on atorvastatin 10 mg with satisfactory numbers. 7/2017 LDL 83.      8.  Carotid disease.  Carotid US 3/4/18: moderate disease bilaterally.   Continue ASA,  statin.  May be stopping ASA as she is on warfarin and has had an eye bleed recently.     9. Cough. I discussed that I think it is more likely she has allergies or something causing the post nasal drainage which causes the cough. If she still has cough when she sees Dr. Benavides, they can consider changing to losartan.       MARYBETH TolliverC

## 2018-04-16 ENCOUNTER — CARE COORDINATION (OUTPATIENT)
Dept: CARDIOLOGY | Facility: CLINIC | Age: 83
End: 2018-04-16

## 2018-04-16 NOTE — PROGRESS NOTES
Called pt per Bhavani Plaza PA-C, advised to stop ASA. Pt understood and will do so. Advised to call back with any further questions.     Last OV 4/12/18 w/ Bhavani, h/o CABG and carotid disease, on warfarin for PAF. Recently had an eye bleed. Orders in Epic for f/u 8/2018.   Tanvir ROBERTS

## 2018-04-26 ENCOUNTER — OFFICE VISIT (OUTPATIENT)
Dept: INTERNAL MEDICINE | Facility: CLINIC | Age: 83
End: 2018-04-26
Payer: COMMERCIAL

## 2018-04-26 ENCOUNTER — TRANSFERRED RECORDS (OUTPATIENT)
Dept: HEALTH INFORMATION MANAGEMENT | Facility: CLINIC | Age: 83
End: 2018-04-26

## 2018-04-26 ENCOUNTER — ANTICOAGULATION THERAPY VISIT (OUTPATIENT)
Dept: ANTICOAGULATION | Facility: CLINIC | Age: 83
End: 2018-04-26
Payer: COMMERCIAL

## 2018-04-26 VITALS
OXYGEN SATURATION: 93 % | SYSTOLIC BLOOD PRESSURE: 126 MMHG | RESPIRATION RATE: 16 BRPM | DIASTOLIC BLOOD PRESSURE: 74 MMHG | BODY MASS INDEX: 27.18 KG/M2 | HEIGHT: 62 IN | TEMPERATURE: 97.7 F | WEIGHT: 147.7 LBS | HEART RATE: 61 BPM

## 2018-04-26 DIAGNOSIS — Z01.818 PREOP GENERAL PHYSICAL EXAM: Primary | ICD-10-CM

## 2018-04-26 DIAGNOSIS — Z79.01 LONG-TERM (CURRENT) USE OF ANTICOAGULANTS: ICD-10-CM

## 2018-04-26 LAB — INR POINT OF CARE: 3.1 (ref 0.86–1.14)

## 2018-04-26 PROCEDURE — 99214 OFFICE O/P EST MOD 30 MIN: CPT | Performed by: NURSE PRACTITIONER

## 2018-04-26 PROCEDURE — 99207 ZZC NO CHARGE NURSE ONLY: CPT

## 2018-04-26 PROCEDURE — 36416 COLLJ CAPILLARY BLOOD SPEC: CPT

## 2018-04-26 PROCEDURE — 85610 PROTHROMBIN TIME: CPT | Mod: QW

## 2018-04-26 NOTE — PROGRESS NOTES
[place] [address]  [facility] [address]  [phone]  Dept: [phone]    PRE-OP EVALUATION:  Today's date: 2018    Veronica Bray (: 1931) presents for pre-operative evaluation assessment as requested by Dr. Marc Carrero.  She requires evaluation and anesthesia risk assessment prior to undergoing surgery/procedure for treatment of Rt eye macular degeneration .    Proposed Surgery/ Procedure: R eye vitrectomy  Date of Surgery/ Procedure: 18  Time of Surgery/ Procedure: 9:20  Hospital/Surgical Facility: [facility]  Fax number for surgical facility: [phone]  Primary Physician: Concepcion Mccall  Type of Anesthesia Anticipated: Local with MAC    Patient has a Health Care Directive or Living Will:  YES     1. YES - DO YOU HAVE A HISTORY OF HEART ATTACK, STROKE, STENT, BYPASS OR SURGERY ON AN ARTERY IN THE HEAD, NECK, HEART OR LEG? Stents x3 , CABG x 2   2. NO - Do you ever have any pain or discomfort in your chest?  3. NO - Do you have a history of  Heart Failure?  4. NO - Are you troubled by shortness of breath when: walking on the level, up a slight hill or at night?  5. NO - Do you currently have a cold, bronchitis or other respiratory infection?  6. NO - Do you have a cough, shortness of breath or wheezing?  7. NO - Do you sometimes get pains in the calves of your legs when you walk?  8. NO - Do you or anyone in your family have previous history of blood clots?  9. NO - Do you or does anyone in your family have a serious bleeding problem such as prolonged bleeding following surgeries or cuts?  10. NO - Have you ever had problems with anemia or been told to take iron pills?  11. NO - Have you had any abnormal blood loss such as black, tarry or bloody stools, or abnormal vaginal bleeding?  12. YES - Have you ever had a blood transfusion?  13. NO - Have you or any of your relatives ever had problems with anesthesia?  14. YES - Do  you have sleep apnea, excessive snoring or daytime drowsiness? No Cpap  15. NO - Do you have any prosthetic heart valves?  16. YES - Do you have prosthetic joints?bital knees, R hip  17. NO - Is there any chance that you may be pregnant?      HPI:     HPI related to upcoming procedure: R eye macular degeneration      See problem list for active medical problems.  Problems all longstanding and stable, except as noted/documented.  See ROS for pertinent symptoms related to these conditions.                                                                                                                                                          .    MEDICAL HISTORY:     Patient Active Problem List    Diagnosis Date Noted     Intermittent asthma without complication 03/09/2018     Priority: Medium     Thrombocytopenia, unspecified 07/13/2017     Priority: Medium     Atrial fibrillation (H) [I48.91] 03/03/2017     Priority: Medium     Long-term (current) use of anticoagulants [Z79.01] 03/03/2017     Priority: Medium     LBBB (left bundle branch block)      Priority: Medium     PAF (paroxysmal atrial fibrillation) (H)      Priority: Medium     converted on Amiodarone       SVT (supraventricular tachycardia) (H)      Priority: Medium     Atrial flutter (H)      Priority: Medium     Mitral regurgitation      Priority: Medium     Hyperlipidemia      Priority: Medium     Hypertension      Priority: Medium      Past Medical History:   Diagnosis Date     Atrial fibrillation (H)      Atrial flutter (H)      CAD (coronary artery disease)     LIMA graft to the LAD and vein graft to the diagonal are patent.      Cardiomyopathy, unspecified     tachy induced     Hyperlipidemia      Hypertension      LBBB (left bundle branch block)      Mitral regurgitation      NSTEMI (non-ST elevated myocardial infarction) (H)      PAF (paroxysmal atrial fibrillation) (H)     converted on Amiodarone     Sleep apnea     CPAP     SVT (supraventricular  tachycardia) (H)      Tobacco use      Past Surgical History:   Procedure Laterality Date     CHOLECYSTECTOMY       CORONARY ANGIOGRAPHY ADULT ORDER  1/2005    Stent: Distal cfx, Mid LAD, Stent: D -1     GYN SURGERY      hysterectomy     HC LEFT HEART CATHETERIZATION  11/2006    mid distal-anterior/ distal inferior/ Apical Yossi, 85     HC LEFT HEART CATHETERIZATION  5/2015    100% Proximal LAD, patent LIMA-LAD, SVG-D1, LVEDP 13, 10-18 mmHg AV gradient, 3+ MR after PVC     ORTHOPEDIC SURGERY      hip, knee, wrist     Current Outpatient Prescriptions   Medication Sig Dispense Refill     ATORVASTATIN CALCIUM PO Take 10 mg by mouth At Bedtime       carvedilol (COREG) 6.25 MG tablet Take 1 tablet (6.25 mg) by mouth 2 times daily (with meals) 180 tablet 4     fluticasone (FLONASE) 50 MCG/ACT spray Spray 1 spray into both nostrils daily       lisinopril (PRINIVIL/ZESTRIL) 5 MG tablet Take 1 tablet (5 mg) by mouth daily 90 tablet 4     Multiple Vitamins-Minerals (PRESERVISION AREDS 2) CAPS Take 1 capsule by mouth 2 times daily        warfarin (COUMADIN) 5 MG tablet Take 5 mg by mouth At Bedtime        albuterol (PROAIR HFA/PROVENTIL HFA/VENTOLIN HFA) 108 (90 BASE) MCG/ACT Inhaler Inhale 1-2 puffs into the lungs every 6 hours as needed for shortness of breath / dyspnea or wheezing (Patient not taking: Reported on 4/26/2018) 1 Inhaler 0     ASPIRIN PO Take 81 mg by mouth every other day        Fexofenadine HCl (ALLEGRA PO) Take 180 mg by mouth daily        OTC products: None, except as noted above    Allergies   Allergen Reactions     Aleve [Naproxen] Anaphylaxis     Celebrex [Celecoxib]      Codeine      Demerol [Meperidine]      Hydromorphone      Percocet [Oxycodone-Acetaminophen]      Tramadol      Vicodin [Hydrocodone-Acetaminophen]       Latex Allergy: NO    Social History   Substance Use Topics     Smoking status: Former Smoker     Smokeless tobacco: Never Used      Comment: quit approx 1967      Alcohol use 2.4  "oz/week     0 Standard drinks or equivalent, 4 Glasses of wine per week     History   Drug Use No       REVIEW OF SYSTEMS:   CONSTITUTIONAL: NEGATIVE for fever, chills, change in weight  ENT/MOUTH: NEGATIVE for ear, mouth and throat problems  RESP: NEGATIVE for significant cough or SOB  CV: NEGATIVE for chest pain, palpitations or peripheral edema  GI: NEGATIVE for nausea, abdominal pain, heartburn, or change in bowel habits  : NEGATIVE for frequency, dysuria, or hematuria  MUSCULOSKELETAL: NEGATIVE for significant arthralgias or myalgia  NEURO: NEGATIVE for weakness, dizziness or paresthesias  ENDOCRINE: NEGATIVE for temperature intolerance, skin/hair changes  HEME: NEGATIVE for bleeding problems    EXAM:   /74 (BP Location: Right arm, Patient Position: Sitting, Cuff Size: Adult Large)  Pulse 61  Temp 97.7  F (36.5  C) (Oral)  Resp 16  Ht 5' 2\" (1.575 m)  Wt 147 lb 11.2 oz (67 kg)  SpO2 93%  BMI 27.01 kg/m2    GENERAL APPEARANCE: frail, elderly female, NAD     NECK: no adenopathy, no asymmetry, masses, or scars and thyroid normal to palpation     RESP: lungs clear to auscultation - no rales, rhonchi or wheezes     CV: regular rates and rhythm, normal S1 S2, no S3 or S4 and no murmur, click or rub     ABDOMEN:  soft, nontender, no HSM or masses and bowel sounds normal     MS: extremities normal- no gross deformities noted, no evidence of inflammation in joints, FROM in all extremities.     SKIN: no suspicious lesions or rashes     NEURO: Normal strength and tone, sensory exam grossly normal, mentation intact and speech normal     PSYCH: mentation appears normal. and affect normal/bright    DIAGNOSTICS:   EKG: Left Bundle Branch Block 3/21/18  Coagulation Studies (e.g. INR, PTT, platelet count)    Recent Labs   Lab Test 03/30/18 03/21/18   1207  03/05/18   0546  03/04/18   0948   12/01/17   0849   HGB   --   13.2   --   14.3   --   12.8   PLT   --   164   --   140*   --   145*   INR  2.5*   --   2.23* "  1.87*   < >   --    NA   --    --    --   137   --   142   POTASSIUM   --    --    --   4.2   --   4.1   CR   --    --    --   0.63   --   0.63    < > = values in this interval not displayed.        IMPRESSION:   Reason for surgery/procedure: R eye macular degeneration    The proposed surgical procedure is considered LOW risk.    REVISED CARDIAC RISK INDEX  The patient has the following serious cardiovascular risks for perioperative complications such as (MI, PE, VFib and 3  AV Block):  Coronary Artery Disease (MI, positive stress test, angina, Qs on EKG)  INTERPRETATION: 1 risks: Class II (low risk - 0.9% complication rate)    The patient has the following additional risks for perioperative complications:  No identified additional risks      ICD-10-CM    1. Preop general physical exam Z01.818        RECOMMENDATIONS:     --Consult hospital rounder / IM to assist post-op medical management        APPROVAL GIVEN to proceed with proposed procedure, without further diagnostic evaluation       Signed Electronically by: Imelda Duncan NP    Copy of this evaluation report is provided to requesting physician.    Mario Alberto Preop Guidelines    Revised Cardiac Risk Index

## 2018-04-26 NOTE — PROGRESS NOTES
ANTICOAGULATION FOLLOW-UP CLINIC VISIT    Patient Name:  Veronica Bray  Date:  4/26/2018  Contact Type:  Face to Face    SUBJECTIVE:     Patient Findings     Positives No Problem Findings    Comments Change in diet/appetite - Pt reports has not been eating greens this week, will resume usual diet.   Pt at [facility] today for a pre-op, having eye surgery at [facility] 4/27/18. An INR is required today for surgery. Pt stated has not had any greens this week only fruit. Pt states she will resume usual diet beginning today.            OBJECTIVE    INR Protime   Date Value Ref Range Status   04/26/2018 3.1 (A) 0.86 - 1.14 Final       ASSESSMENT / PLAN  INR assessment THER    Recheck INR In: 4 WEEKS    INR Location Clinic      Anticoagulation Summary as of 4/26/2018     INR goal 2.0-3.0   Today's INR 3.1!   Maintenance plan 5 mg (5 mg x 1) every day   Full instructions 5 mg every day   Weekly total 35 mg   No change documented Yumiko Traylor RN   Plan last modified Larisa Moane RN (12/1/2017)   Next INR check 5/24/2018   Target end date Indefinite    Indications   Atrial fibrillation (H) [I48.91] [I48.91]  Long-term (current) use of anticoagulants [Z79.01] [Z79.01]         Anticoagulation Episode Summary     INR check location     Preferred lab     Send INR reminders to [facility] HEART INR NURSE    Comments       Anticoagulation Care Providers     Provider Role Specialty Phone number    Ip, Tr Carrera MD Responsible Cardiology [phone]            See the Encounter Report to view Anticoagulation Flowsheet and Dosing Calendar (Go to Encounters tab in chart review, and find the Anticoagulation Therapy Visit)    Dosage adjustment made based on physician directed care plan.    Yumiko Traylor, RN

## 2018-04-26 NOTE — MR AVS SNAPSHOT
Veronica Bray   4/26/2018 5:30 PM   Anticoagulation Therapy Visit    Description:  86 year old female   Provider:  [facility]   Department:  [facility]           INR as of 4/26/2018     Today's INR 3.1!      Anticoagulation Summary as of 4/26/2018     INR goal 2.0-3.0   Today's INR 3.1!   Full instructions 5 mg every day   Next INR check 5/24/2018    Indications   Atrial fibrillation (H) [I48.91] [I48.91]  Long-term (current) use of anticoagulants [Z79.01] [Z79.01]         Your next Anticoagulation Clinic appointment(s)     Apr 26, 2018  5:30 PM CDT   Anticoagulation Visit with [facility]   [facility] ([facility]    [address] [facility] [address]  [facility] [address] [phone]            May 01, 2018 10:20 AM CDT   Anticoagulation Visit with  ANTICOAGULATION   [facility] ([facility])    [address]  [facility] [address] [phone]              Contact Numbers     [facility] Phone Numbers:  Anticoagulation Clinic Appointments : [phone]  Anticoagulation Nurse: [phone]         April 2018 Details    Sun Mon Tue Wed Thu Fri Sat     1               2               3               4               5               6               7                 8               9               10               11               12               13               14                 15               16               17               18               19               20               21                 22               23               24               25               26      5 mg   See details      27      5 mg         28      5 mg           29      5 mg         30      5 mg               Date Details   04/26 This INR check               How to take your warfarin dose     To take:  5 mg Take 1 of the 5 mg tablets.           May 2018 Details    Sun Mon Tue Wed Thu Fri  Sat       1      5 mg         2      5 mg         3      5 mg         4      5 mg         5      5 mg           6      5 mg         7      5 mg         8      5 mg         9      5 mg         10      5 mg         11      5 mg         12      5 mg           13      5 mg         14      5 mg         15      5 mg         16      5 mg         17      5 mg         18      5 mg         19      5 mg           20      5 mg         21      5 mg         22      5 mg         23      5 mg         24            25               26                 27               28               29               30               31                  Date Details   No additional details    Date of next INR:  5/24/2018         How to take your warfarin dose     To take:  5 mg Take 1 of the 5 mg tablets.

## 2018-04-26 NOTE — NURSING NOTE
"Chief Complaint   Patient presents with     Pre-Op Exam     rt eye- vitrectomy       Initial /74 (BP Location: Right arm, Patient Position: Sitting, Cuff Size: Adult Large)  Pulse 61  Temp 97.7  F (36.5  C) (Oral)  Resp 16  Ht 5' 2\" (1.575 m)  Wt 147 lb 11.2 oz (67 kg)  SpO2 93%  BMI 27.01 kg/m2 Estimated body mass index is 27.01 kg/(m^2) as calculated from the following:    Height as of this encounter: 5' 2\" (1.575 m).    Weight as of this encounter: 147 lb 11.2 oz (67 kg).  Medication Reconciliation: complete    "

## 2018-04-26 NOTE — MR AVS SNAPSHOT
After Visit Summary   4/26/2018    Veronica Bray    MRN: [id number]           Patient Information     Date Of Birth          5/31/1931        Visit Information        Provider Department      4/26/2018 9:20 AM Imelda Duncan NP [facility]        Today's Diagnoses     Preop general physical exam    -  1      Care Instructions      Before Your Surgery      Call your surgeon if there is any change in your health. This includes signs of a cold or flu (such as a sore throat, runny nose, cough, rash or fever).    Do not smoke, drink alcohol or take over the counter medicine (unless your surgeon or primary care doctor tells you to) for the 24 hours before and after surgery.    If you take prescribed drugs: Follow your doctor s orders about which medicines to take and which to stop until after surgery.    Eating and drinking prior to surgery: follow the instructions from your surgeon    Take a shower or bath the night before surgery. Use the soap your surgeon gave you to gently clean your skin. If you do not have soap from your surgeon, use your regular soap. Do not shave or scrub the surgery site.  Wear clean pajamas and have clean sheets on your bed.           Follow-ups after your visit        Your next 10 appointments already scheduled     Apr 26, 2018  5:30 PM CDT   Anticoagulation Visit with [facility]   [facility]    [address]  [facility] [id number] [phone]            May 01, 2018 10:20 AM CDT   Anticoagulation Visit with  ANTICOAGULATION   [facility] ([facility])    [address]  [facility] [address]   [phone]            Jul 03, 2018 10:00 AM CDT   SHORT with Concepcion Mccall MD   [facility] ([facility]    [address]  [facility] [id number] [phone]         "      Who to contact     If you have questions or need follow up information about today's clinic visit or your schedule please contact [facility] directly at [phone].  Normal or non-critical lab and imaging results will be communicated to you by MyChart, letter or phone within 4 business days after the clinic has received the results. If you do not hear from us within 7 days, please contact the clinic through MyChart or phone. If you have a critical or abnormal lab result, we will notify you by phone as soon as possible.  Submit refill requests through [employer] or call your pharmacy and they will forward the refill request to us. Please allow 3 business days for your refill to be completed.          Additional Information About Your Visit        All Web LeadsharZingCheckout Information     [employer] lets you send messages to your doctor, view your test results, renew your prescriptions, schedule appointments and more. To sign up, go to [email or web address] . Click on \"Log in\" on the left side of the screen, which will take you to the Welcome page. Then click on \"Sign up Now\" on the right side of the page.     You will be asked to enter the access code listed below, as well as some personal information. Please follow the directions to create your username and password.     Your access code is: [email or web address]  Expires: 2018  1:10 PM     Your access code will  in 90 days. If you need help or a new code, please call your [place] clinic or [phone].        Care EveryWhere ID     This is your Care EveryWhere ID. This could be used by other organizations to access your [place] medical records  [id number]        Your Vitals Were     Pulse Temperature Respirations Height Pulse Oximetry BMI (Body Mass Index)    61 97.7  F (36.5  C) (Oral) 16 5' 2\" (1.575 m) 93% 27.01 kg/m2       Blood Pressure from Last 3 Encounters:   18 126/74   18 141/60   18 153/74    Weight from Last 3 Encounters: "   04/26/18 147 lb 11.2 oz (67 kg)   04/12/18 150 lb (68 kg)   03/21/18 147 lb (66.7 kg)              Today, you had the following     No orders found for display       Primary Care Provider Office Phone # Fax #    Concepcion Mccall [phone]       [address] [facility] [address]        Equal Access to Services     [facility] : Hadii aad ku hadasho Soomaali, waaxda luqadaha, qaybta kaalmada adeegyada, waxay idiin hayaan adeeg kharash la'aan ah. So [facility] [phone].    ATENCIÓN: Si habla esphill, tiene a martinez disposición servicios gratuitos de asistencia lingüística. Llame al [phone].    We comply with applicable federal civil rights laws and [place] laws. We do not discriminate on the basis of race, color, national origin, age, disability, sex, sexual orientation, or gender identity.            Thank you!     Thank you for choosing [facility]  for your care. Our goal is always to provide you with excellent care. Hearing back from our patients is one way we can continue to improve our services. Please take a few minutes to complete the written survey that you may receive in the mail after your visit with us. Thank you!             Your Updated Medication List - Protect others around you: Learn how to safely use, store and throw away your medicines at www.disposemymeds.org.          This list is accurate as of 4/26/18 10:07 AM.  Always use your most recent med list.                   Brand Name Dispense Instructions for use Diagnosis    albuterol 108 (90 Base) MCG/ACT Inhaler    PROAIR HFA/PROVENTIL HFA/VENTOLIN HFA    1 Inhaler    Inhale 1-2 puffs into the lungs every 6 hours as needed for shortness of breath / dyspnea or wheezing    Cough, Intermittent asthma with acute exacerbation, unspecified asthma severity       ALLEGRA PO      Take 180 mg by mouth daily        ASPIRIN PO      Take 81 mg by mouth every other day        ATORVASTATIN CALCIUM PO      Take 10 mg  by mouth At Bedtime        carvedilol 6.25 MG tablet    COREG    180 tablet    Take 1 tablet (6.25 mg) by mouth 2 times daily (with meals)    Paroxysmal atrial fibrillation (H), S/P CABG (coronary artery bypass graft), Atrial flutter, paroxysmal (H), Benign essential hypertension       fluticasone 50 MCG/ACT spray    FLONASE     Spray 1 spray into both nostrils daily        lisinopril 5 MG tablet    PRINIVIL/ZESTRIL    90 tablet    Take 1 tablet (5 mg) by mouth daily    Benign essential hypertension       PRESERVISION AREDS 2 Caps      Take 1 capsule by mouth 2 times daily        warfarin 5 MG tablet    COUMADIN     Take 5 mg by mouth At Bedtime

## 2018-04-27 ASSESSMENT — ASTHMA QUESTIONNAIRES: ACT_TOTALSCORE: 24

## 2018-05-29 ENCOUNTER — ANTICOAGULATION THERAPY VISIT (OUTPATIENT)
Dept: CARDIOLOGY | Facility: CLINIC | Age: 83
End: 2018-05-29
Payer: COMMERCIAL

## 2018-05-29 DIAGNOSIS — Z79.01 LONG-TERM (CURRENT) USE OF ANTICOAGULANTS: ICD-10-CM

## 2018-05-29 DIAGNOSIS — I48.91 ATRIAL FIBRILLATION, UNSPECIFIED TYPE (H): ICD-10-CM

## 2018-05-29 DIAGNOSIS — Z79.01 LONG TERM CURRENT USE OF ANTICOAGULANT THERAPY: Primary | ICD-10-CM

## 2018-05-29 DIAGNOSIS — Z79.01 LONG TERM CURRENT USE OF ANTICOAGULANT THERAPY: ICD-10-CM

## 2018-05-29 LAB — INR POINT OF CARE: 2.3 (ref 0.86–1.14)

## 2018-05-29 PROCEDURE — 85610 PROTHROMBIN TIME: CPT | Mod: QW | Performed by: INTERNAL MEDICINE

## 2018-05-29 PROCEDURE — 36416 COLLJ CAPILLARY BLOOD SPEC: CPT | Performed by: INTERNAL MEDICINE

## 2018-05-29 RX ORDER — WARFARIN SODIUM 5 MG/1
TABLET ORAL
Qty: 100 TABLET | Refills: 1 | Status: SHIPPED | OUTPATIENT
Start: 2018-05-29 | End: 2018-10-31

## 2018-05-29 RX ORDER — WARFARIN SODIUM 5 MG/1
TABLET ORAL
Qty: 30 TABLET | Refills: 0 | Status: SHIPPED | OUTPATIENT
Start: 2018-05-29 | End: 2018-05-29

## 2018-05-29 NOTE — PROGRESS NOTES
ANTICOAGULATION FOLLOW-UP CLINIC VISIT    Patient Name:  Veronica Bray  Date:  5/29/2018  Contact Type:  Face to Face    SUBJECTIVE:     Patient Findings     Positives No Problem Findings           OBJECTIVE    INR Protime   Date Value Ref Range Status   05/29/2018 2.3 (A) 0.86 - 1.14 Final       ASSESSMENT / PLAN  INR assessment THER    Recheck INR In: 4 WEEKS    INR Location Clinic      Anticoagulation Summary as of 5/29/2018     INR goal 2.0-3.0   Today's INR 2.3   Warfarin maintenance plan 5 mg (5 mg x 1) every day   Full warfarin instructions 5 mg every day   Weekly warfarin total 35 mg   No change documented Larisa Monae RN   Plan last modified Larisa Monae RN (12/1/2017)   Next INR check 6/26/2018   Target end date Indefinite    Indications   Atrial fibrillation (H) [I48.91] [I48.91]  Long-term (current) use of anticoagulants [Z79.01] [Z79.01]         Anticoagulation Episode Summary     INR check location     Preferred lab     Send INR reminders to [facility] HEART INR NURSE    Comments       Anticoagulation Care Providers     Provider Role Specialty Phone number    Ip, Tr Morris Carrera MD Responsible Cardiology [phone]            See the Encounter Report to view Anticoagulation Flowsheet and Dosing Calendar (Go to Encounters tab in chart review, and find the Anticoagulation Therapy Visit)      INR 2.3.  She had an eye surgery about 1 month ago and held her ASA but did not hold her Warfarin.  Continue the 5mg/day and recheck in 1 month.  The last INR was 3.1 and dose at her PMD office since it was needed for eye surgery but no changes were made.  Will send warfarin script to local pharmacy and mail order pharmacy since she will run out this week.  Mike Monae RN

## 2018-05-29 NOTE — MR AVS SNAPSHOT
Veronica DANNY Bray   5/29/2018 10:20 AM   Anticoagulation Therapy Visit    Description:  86 year old female   Provider:  [facility]   Department:  [facility]           INR as of 5/29/2018     Today's INR 2.3      Anticoagulation Summary as of 5/29/2018     INR goal 2.0-3.0   Today's INR 2.3   Full warfarin instructions 5 mg every day   Next INR check 6/26/2018    Indications   Atrial fibrillation (H) [I48.91] [I48.91]  Long-term (current) use of anticoagulants [Z79.01] [Z79.01]         Your next Anticoagulation Clinic appointment(s)     Jun 26, 2018 10:00 AM CDT   Anticoagulation Visit with [facility] ANTICOAGULATION   [facility] ([facility])    [address] [address] [phone]              May 2018 Details    Sun Mon Tue Wed Thu Fri Sat       1               2               3               4               5                 6               7               8               9               10               11               12                 13               14               15               16               17               18               19                 20               21               22               23               24               25               26                 27               28               29      5 mg   See details      30      5 mg         31      5 mg            Date Details   05/29 This INR check               How to take your warfarin dose     To take:  5 mg Take 1 of the 5 mg tablets.           June 2018 Details    Sun Mon Tue Wed Thu Fri Sat          1      5 mg         2      5 mg           3      5 mg         4      5 mg         5      5 mg         6      5 mg         7      5 mg         8      5 mg         9      5 mg           10      5 mg         11      5 mg         12      5 mg         13      5 mg         14      5 mg         15      5 mg         16      5 mg           17      5 mg          18      5 mg         19      5 mg         20      5 mg         21      5 mg         22      5 mg         23      5 mg           24      5 mg         25      5 mg         26            27               28               29               30                Date Details   No additional details    Date of next INR:  6/26/2018         How to take your warfarin dose     To take:  5 mg Take 1 of the 5 mg tablets.

## 2018-06-26 ENCOUNTER — ANTICOAGULATION THERAPY VISIT (OUTPATIENT)
Dept: CARDIOLOGY | Facility: CLINIC | Age: 83
End: 2018-06-26
Payer: COMMERCIAL

## 2018-06-26 DIAGNOSIS — Z79.01 LONG-TERM (CURRENT) USE OF ANTICOAGULANTS: ICD-10-CM

## 2018-06-26 DIAGNOSIS — I48.91 ATRIAL FIBRILLATION, UNSPECIFIED TYPE (H): ICD-10-CM

## 2018-06-26 LAB — INR POINT OF CARE: 2 (ref 0.86–1.14)

## 2018-06-26 PROCEDURE — 36416 COLLJ CAPILLARY BLOOD SPEC: CPT | Performed by: INTERNAL MEDICINE

## 2018-06-26 PROCEDURE — 85610 PROTHROMBIN TIME: CPT | Mod: QW | Performed by: INTERNAL MEDICINE

## 2018-06-26 NOTE — PROGRESS NOTES
ANTICOAGULATION FOLLOW-UP CLINIC VISIT    Patient Name:  Veronica Bray  Date:  6/26/2018  Contact Type:  Face to Face    SUBJECTIVE:     Patient Findings     Positives No Problem Findings           OBJECTIVE    INR Protime   Date Value Ref Range Status   06/26/2018 2.0 (A) 0.86 - 1.14 Final       ASSESSMENT / PLAN  INR assessment THER    Recheck INR In: 3 WEEKS    INR Location Clinic      Anticoagulation Summary as of 6/26/2018     INR goal 2.0-3.0   Today's INR 2.0   Warfarin maintenance plan 7.5 mg (5 mg x 1.5) on Tue; 5 mg (5 mg x 1) all other days   Full warfarin instructions 7.5 mg on Tue; 5 mg all other days   Weekly warfarin total 37.5 mg   Plan last modified Larisa Monae RN (6/26/2018)   Next INR check 7/17/2018   Target end date Indefinite    Indications   Atrial fibrillation (H) [I48.91] [I48.91]  Long-term (current) use of anticoagulants [Z79.01] [Z79.01]         Anticoagulation Episode Summary     INR check location     Preferred lab     Send INR reminders to [facility] HEART INR NURSE    Comments       Anticoagulation Care Providers     Provider Role Specialty Phone number    Ip, Tr Carrera MD Responsible Cardiology [phone]            See the Encounter Report to view Anticoagulation Flowsheet and Dosing Calendar (Go to Encounters tab in chart review, and find the Anticoagulation Therapy Visit)    INR 2.0.  She is eating more bonita salads and some spinach and has some broccoli at home to eat.  We will increase dosing by 2.5mg overall to 7.5mg Tuesday and 5mg ROW and recheck in 3 weeks.  Mike Monae, ARMANDO

## 2018-06-26 NOTE — MR AVS SNAPSHOT
Veronica DANNY Bray   6/26/2018 10:00 AM   Anticoagulation Therapy Visit    Description:  87 year old female   Provider:  [facility]   Department:  [facility]           INR as of 6/26/2018     Today's INR 2.0      Anticoagulation Summary as of 6/26/2018     INR goal 2.0-3.0   Today's INR 2.0   Full warfarin instructions 7.5 mg on Tue; 5 mg all other days   Next INR check 7/17/2018    Indications   Atrial fibrillation (H) [I48.91] [I48.91]  Long-term (current) use of anticoagulants [Z79.01] [Z79.01]         Your next Anticoagulation Clinic appointment(s)     Jul 17, 2018 10:00 AM CDT   Anticoagulation Visit with RU ANTICOAGULATION   [facility] ([facility])    [address] [address] [id number] [phone]              June 2018 Details    Sun Mon Tue Wed Thu Fri Sat          1               2                 3               4               5               6               7               8               9                 10               11               12               13               14               15               16                 17               18               19               20               21               22               23                 24               25               26      7.5 mg   See details      27      5 mg         28      5 mg         29      5 mg         30      5 mg          Date Details   06/26 This INR check               How to take your warfarin dose     To take:  5 mg Take 1 of the 5 mg tablets.    To take:  7.5 mg Take 1.5 of the 5 mg tablets.           July 2018 Details    Sun Mon Tue Wed Thu Fri Sat     1      5 mg         2      5 mg         3      7.5 mg         4      5 mg         5      5 mg         6      5 mg         7      5 mg           8      5 mg         9      5 mg         10      7.5 mg         11      5 mg         12      5 mg         13      5 mg         14      5 mg            15      5 mg         16      5 mg         17            18               19               20               21                 22               23               24               25               26               27               28                 29               30               31                    Date Details   No additional details    Date of next INR:  7/17/2018         How to take your warfarin dose     To take:  5 mg Take 1 of the 5 mg tablets.    To take:  7.5 mg Take 1.5 of the 5 mg tablets.

## 2018-07-06 ENCOUNTER — TELEPHONE (OUTPATIENT)
Dept: INTERNAL MEDICINE | Facility: CLINIC | Age: 83
End: 2018-07-06

## 2018-07-06 NOTE — TELEPHONE ENCOUNTER
Patient calls requesting labs to be drawn prior to appointment with PCP, advised to make sure insurance will cover labs prior to appointment. Patient states she will just leave it like it is and have them done at appointment.

## 2018-07-11 ENCOUNTER — OFFICE VISIT (OUTPATIENT)
Dept: INTERNAL MEDICINE | Facility: CLINIC | Age: 83
End: 2018-07-11
Payer: COMMERCIAL

## 2018-07-11 VITALS
TEMPERATURE: 97.8 F | OXYGEN SATURATION: 94 % | SYSTOLIC BLOOD PRESSURE: 129 MMHG | HEIGHT: 62 IN | WEIGHT: 141.8 LBS | HEART RATE: 74 BPM | DIASTOLIC BLOOD PRESSURE: 63 MMHG | BODY MASS INDEX: 26.09 KG/M2

## 2018-07-11 DIAGNOSIS — I48.20 CHRONIC ATRIAL FIBRILLATION (H): ICD-10-CM

## 2018-07-11 DIAGNOSIS — H35.30 MACULAR DEGENERATION (SENILE) OF RETINA: ICD-10-CM

## 2018-07-11 DIAGNOSIS — I10 ESSENTIAL HYPERTENSION: ICD-10-CM

## 2018-07-11 DIAGNOSIS — E78.49 OTHER HYPERLIPIDEMIA: ICD-10-CM

## 2018-07-11 DIAGNOSIS — J45.20 MILD INTERMITTENT ASTHMA WITHOUT COMPLICATION: Primary | ICD-10-CM

## 2018-07-11 DIAGNOSIS — G62.9 NEUROPATHY: ICD-10-CM

## 2018-07-11 DIAGNOSIS — R26.89 BALANCE PROBLEMS: ICD-10-CM

## 2018-07-11 PROCEDURE — 36415 COLL VENOUS BLD VENIPUNCTURE: CPT | Performed by: INTERNAL MEDICINE

## 2018-07-11 PROCEDURE — 99214 OFFICE O/P EST MOD 30 MIN: CPT | Performed by: INTERNAL MEDICINE

## 2018-07-11 PROCEDURE — 80061 LIPID PANEL: CPT | Performed by: INTERNAL MEDICINE

## 2018-07-11 PROCEDURE — 80053 COMPREHEN METABOLIC PANEL: CPT | Performed by: INTERNAL MEDICINE

## 2018-07-11 NOTE — PROGRESS NOTES
"  SUBJECTIVE:   Veronica Bray is a 87 year old female who presents to clinic today for the following health issues:      Asthma.  Score of 22 today. Patient reports her asthma is under good control.  Allergies trigger her asthma, and she is taking allegara.    Atrial fibrillation.  No chest pain or shortness of breath.   6/26 INR was 2.     Hyperlipidemia Follow-Up      Rate your low fat/cholesterol diet?: good    Taking statin?  Yes, no muscle aches from statin    Other lipid medications/supplements?:  none    Hypertension Follow-up      Outpatient blood pressures are not being checked.    Low Salt Diet: low salt      Amount of exercise or physical activity: 2-3 days/week for an average of 15-30 minutes    Problems taking medications regularly: No    Medication side effects: none    Diet: regular (no restrictions)    Numbness on the right lower leg- tibia fractured after knee replacement.  She has tried gabapentin, which did not work. She reports since she has the neuropathy, she has difficulty with balance.  Furthermore, she has macular degeneration.    Complained of both ears being plugged up.          Problem list and histories reviewed & adjusted, as indicated.      Reviewed and updated as needed this visit by clinical staff  Tobacco  Allergies  Meds  Med Hx  Surg Hx  Fam Hx  Soc Hx      Reviewed and updated as needed this visit by Provider         ROS:  CONSTITUTIONAL: NEGATIVE for fever, chills, change in weight  ENT/MOUTH: NEGATIVE for ear, mouth and throat problems  RESP: NEGATIVE for significant cough or SOB  CV: NEGATIVE for chest pain, palpitations or peripheral edema    OBJECTIVE:     /63 (BP Location: Right arm, Patient Position: Chair, Cuff Size: Adult Regular)  Pulse 74  Temp 97.8  F (36.6  C) (Oral)  Ht 5' 2\" (1.575 m)  Wt 141 lb 12.8 oz (64.3 kg)  SpO2 94%  Breastfeeding? No  BMI 25.94 kg/m2  Body mass index is 25.94 kg/(m^2).  GENERAL: healthy, alert and no distress  ENT: ear " canals without wax bilaterally  NECK: no adenopathy, no asymmetry, masses, or scars and thyroid normal to palpation  RESP: lungs clear to auscultation - no rales, rhonchi or wheezes  CV: regular rate and rhythm, normal S1 S2, no S3 or S4, no murmur, click or rub      ASSESSMENT/PLAN:       (J45.20) Mild intermittent asthma without complication  (primary encounter diagnosis)  Comment: stable  Plan: continue with inhaler    (I48.2) Chronic atrial fibrillation (H)  Comment: rate-controlled  Plan: coumadin and carvedilol    (E78.4) Other hyperlipidemia  Comment:   Plan: Lipid panel reflex to direct LDL Fasting,         Comprehensive metabolic panel            (I10) Essential hypertension  Comment: at goal  Plan: Comprehensive metabolic panel            (G62.9) Neuropathy  Comment:   Plan: NEUROLOGY ADULT REFERRAL, HOME CARE NURSING         REFERRAL            (H35.30) Macular degeneration (senile) of retina  Comment:   Plan: HOME CARE NURSING REFERRAL            (R26.89) Balance problems  Comment:   Plan: HOME CARE NURSING REFERRAL                Concepcion Mccall MD  [facility]

## 2018-07-11 NOTE — MR AVS SNAPSHOT
After Visit Summary   7/11/2018    Veronica Stauffer    MRN: [id number]           Patient Information     Date Of Birth          5/31/1931        Visit Information        Provider Department      7/11/2018 9:20 AM Concepcion Mccall MD [facility]        Today's Diagnoses     Mild intermittent asthma without complication    -  1    Chronic atrial fibrillation (H)        Other hyperlipidemia        Essential hypertension        Neuropathy        Macular degeneration (senile) of retina        Balance problems           Follow-ups after your visit        Additional Services     HOME CARE NURSING REFERRAL       **Order classes of: [facility], [facility] and [facility] will route to the Home Care and Hospice Referral Pool.  Home Care or Hospice will then contact the patient to schedule their appointment.**    If you do not hear from Home Care and Hospice, or you would like to call to schedule, please call the referring place of service that your provider has listed below.  ______________________________________________________________________    Your provider has referred you to: [facility]: [facility] [phone]   [email or web address]    Extended Emergency Contact Information  Primary Emergency Contact: Vaibhav Stauffer  Address: [address] [place] [address] [id number] [place] [address]  Home Phone: [phone]  Relation: Spouse  Preferred language: English   needed? No  Secondary Emergency Contact: DR WILLIAM STAUFFER   [facility]  Home Phone: [phone]  Work Phone: [phone]  Mobile Phone: [phone]  Relation: Son  Preferred language: English   needed? No    Patient Anticipated Discharge Date: 7/11/8   RN, PT, HHA to begin 24 - 48 hours after discharge.  PLEASE EVALUATE AND TREAT (Evaluation timeline is 24 - 48 hrs. Please call if there is need for a variance to this  timeline).    REASON FOR REFERRAL: Assessment & Treatment: PT and RN    ADDITIONAL SERVICES NEEDED: HHA    OTHER PERTINENT INFORMATION: Patient was last seen by provider on 7/11/18 for neuropathy and balance issues.    Current Outpatient Prescriptions:  albuterol (PROAIR HFA/PROVENTIL HFA/VENTOLIN HFA) 108 (90 BASE) MCG/ACT Inhaler, Inhale 1-2 puffs into the lungs every 6 hours as needed for shortness of breath / dyspnea or wheezing, Disp: 1 Inhaler, Rfl: 0  ASPIRIN PO, Take 81 mg by mouth every other day , Disp: , Rfl:   ATORVASTATIN CALCIUM PO, Take 10 mg by mouth At Bedtime, Disp: , Rfl:   carvedilol (COREG) 6.25 MG tablet, Take 1 tablet (6.25 mg) by mouth 2 times daily (with meals), Disp: 180 tablet, Rfl: 4  Fexofenadine HCl (ALLEGRA PO), Take 180 mg by mouth daily , Disp: , Rfl:   fluticasone (FLONASE) 50 MCG/ACT spray, Spray 1 spray into both nostrils daily, Disp: , Rfl:   lisinopril (PRINIVIL/ZESTRIL) 5 MG tablet, Take 1 tablet (5 mg) by mouth daily, Disp: 90 tablet, Rfl: 4  Multiple Vitamins-Minerals (PRESERVISION AREDS 2) CAPS, Take 1 capsule by mouth 2 times daily , Disp: , Rfl:   warfarin (COUMADIN) 5 MG tablet, 1 tab daily as directed per INR Clinic, Disp: 100 tablet, Rfl: 1      Patient Active Problem List:     LBBB (left bundle branch block)     PAF (paroxysmal atrial fibrillation) (H)     SVT (supraventricular tachycardia) (H)     Atrial flutter (H)     Mitral regurgitation     Hyperlipidemia     Atrial fibrillation (H) [I48.91]     Long-term (current) use of anticoagulants [Z79.01]     Thrombocytopenia, unspecified     Intermittent asthma without complication     Essential hypertension     Other hyperlipidemia      Documentation of Face to Face and Certification for Home Health Services    I certify that patient, Veronica Bray is under my care and that I, or a Nurse Practitioner or Physician's Assistant working with me, had a face-to-face encounter that meets the physician face-to-face encounter  requirements with this patient on: 7/11/2018.    This encounter with the patient was in whole, or in part, for the following medical condition, which is the primary reason for Home Health Care: .    I certify that, based on my findings, the following services are medically necessary Home Health Services: Nursing and Physical Therapy    My clinical findings support the need for the above services because: Nurse is needed: multiple medical conditions.    Further, I certify that my clinical findings support that this patient is homebound (i.e. absences from home require considerable and taxing effort and are for medical reasons or [employer] services or infrequently or of short duration when for other reasons) because: Leaving home is medically contraindicated for the following reason(s): macular degeneration and balance issues- unable to drive.    Based on the above findings, I certify that this patient is confined to the home and needs intermittent skilled nursing care, physical therapy and/or speech therapy.  The patient is under my care, and I have initiated the establishment of the plan of care.  This patient will be followed by a physician who will periodically review the plan of care.    Physician/Provider to provide follow up care: Concepcion Mccall    [facility] certified Physician at time of discharge: Concepcion Mccall MD      Please be aware that coverage of these services is subject to the terms and limitations of your health insurance plan.  Call member services at your health plan with any benefit or coverage questions.            NEUROLOGY ADULT REFERRAL       Your provider has referred you for the following:   Consult at [facility]: [facility] [phone]   [email or web address]    Please be aware that coverage of these services is subject to the terms and limitations of your health insurance plan.  Call member services at your health  plan with any benefit or coverage questions.      Please bring the following with you to your appointment:    (1) Any X-Rays, CTs or MRIs which have been performed.  Contact the facility where they were done to arrange for  prior to your scheduled appointment.    (2) List of current medications  (3) This referral request   (4) Any documents/labs given to you for this referral                  Your next 10 appointments already scheduled     Jul 17, 2018 10:00 AM CDT   Anticoagulation Visit with RU ANTICOAGULATION   [facility] ([facility])    [address]  [facility] [id number] [phone]            Aug 24, 2018  9:15 AM CDT   Return Visit with Tr Benavides MD   [facility] ([facility])    [id number] [place] [address] [address]  [facility] [phone]              Who to contact     If you have questions or need follow up information about today's clinic visit or your schedule please contact [facility] directly at [phone].  Normal or non-critical lab and imaging results will be communicated to you by MyChart, letter or phone within 4 business days after the clinic has received the results. If you do not hear from us within 7 days, please contact the clinic through MyChart or phone. If you have a critical or abnormal lab result, we will notify you by phone as soon as possible.  Submit refill requests through [employer] or call your pharmacy and they will forward the refill request to us. Please allow 3 business days for your refill to be completed.          Additional Information About Your Visit        Care EveryWhere ID     This is your Care EveryWhere ID. This could be used by other organizations to access your [facility] medical records  [id number]        Your Vitals Were     Pulse Temperature Height Pulse Oximetry Breastfeeding? BMI (Body Mass Index)     "74 97.8  F (36.6  C) (Oral) 5' 2\" (1.575 m) 94% No 25.94 kg/m2       Blood Pressure from Last 3 Encounters:   07/11/18 129/63   04/26/18 126/74   04/12/18 141/60    Weight from Last 3 Encounters:   07/11/18 141 lb 12.8 oz (64.3 kg)   04/26/18 147 lb 11.2 oz (67 kg)   04/12/18 150 lb (68 kg)              We Performed the Following     Comprehensive metabolic panel     HOME CARE NURSING REFERRAL     Lipid panel reflex to direct LDL Fasting     NEUROLOGY ADULT REFERRAL        Primary Care Provider Office Phone # Fax #    Concepcion Mccall [phone]       [address]  [facility] [address]        Equal Access to Services     SYLVIA ALLEN : Hadii aad ku hadasho Soomaali, waaxda luqadaha, qaybta kaalmada adeegyada, fang manzano . So [facility] [phone].    ATENCIÓN: Si habla español, tiene a martinez disposición servicios gratuitos de asistencia lingüística. Llame al [phone].    We comply with applicable federal civil rights laws and [place] laws. We do not discriminate on the basis of race, color, national origin, age, disability, sex, sexual orientation, or gender identity.            Thank you!     Thank you for choosing [facility]  for your care. Our goal is always to provide you with excellent care. Hearing back from our patients is one way we can continue to improve our services. Please take a few minutes to complete the written survey that you may receive in the mail after your visit with us. Thank you!             Your Updated Medication List - Protect others around you: Learn how to safely use, store and throw away your medicines at www.disposemymeds.org.          This list is accurate as of 7/11/18 10:03 AM.  Always use your most recent med list.                   Brand Name Dispense Instructions for use Diagnosis    albuterol 108 (90 Base) MCG/ACT Inhaler    PROAIR HFA/PROVENTIL HFA/VENTOLIN HFA    1 Inhaler    Inhale 1-2 puffs into the " lungs every 6 hours as needed for shortness of breath / dyspnea or wheezing    Cough, Intermittent asthma with acute exacerbation, unspecified asthma severity       ALLEGRA PO      Take 180 mg by mouth daily        ASPIRIN PO      Take 81 mg by mouth every other day        ATORVASTATIN CALCIUM PO      Take 10 mg by mouth At Bedtime        carvedilol 6.25 MG tablet    COREG    180 tablet    Take 1 tablet (6.25 mg) by mouth 2 times daily (with meals)    Paroxysmal atrial fibrillation (H), S/P CABG (coronary artery bypass graft), Atrial flutter, paroxysmal (H), Benign essential hypertension       fluticasone 50 MCG/ACT spray    FLONASE     Spray 1 spray into both nostrils daily        lisinopril 5 MG tablet    PRINIVIL/ZESTRIL    90 tablet    Take 1 tablet (5 mg) by mouth daily    Benign essential hypertension       PRESERVISION AREDS 2 Caps      Take 1 capsule by mouth 2 times daily        warfarin 5 MG tablet    COUMADIN    100 tablet    1 tab daily as directed per INR Clinic    Long term current use of anticoagulant therapy

## 2018-07-12 LAB
ALBUMIN SERPL-MCNC: 3.7 G/DL (ref 3.4–5)
ALP SERPL-CCNC: 69 U/L (ref 40–150)
ALT SERPL W P-5'-P-CCNC: 27 U/L (ref 0–50)
ANION GAP SERPL CALCULATED.3IONS-SCNC: 5 MMOL/L (ref 3–14)
AST SERPL W P-5'-P-CCNC: 25 U/L (ref 0–45)
BILIRUB SERPL-MCNC: 0.5 MG/DL (ref 0.2–1.3)
BUN SERPL-MCNC: 15 MG/DL (ref 7–30)
CALCIUM SERPL-MCNC: 8.6 MG/DL (ref 8.5–10.1)
CHLORIDE SERPL-SCNC: 103 MMOL/L (ref 94–109)
CHOLEST SERPL-MCNC: 172 MG/DL
CO2 SERPL-SCNC: 31 MMOL/L (ref 20–32)
CREAT SERPL-MCNC: 0.62 MG/DL (ref 0.52–1.04)
GFR SERPL CREATININE-BSD FRML MDRD: >90 ML/MIN/1.7M2
GLUCOSE SERPL-MCNC: 92 MG/DL (ref 70–99)
HDLC SERPL-MCNC: 69 MG/DL
LDLC SERPL CALC-MCNC: 86 MG/DL
NONHDLC SERPL-MCNC: 103 MG/DL
POTASSIUM SERPL-SCNC: 4.7 MMOL/L (ref 3.4–5.3)
PROT SERPL-MCNC: 7.5 G/DL (ref 6.8–8.8)
SODIUM SERPL-SCNC: 139 MMOL/L (ref 133–144)
TRIGL SERPL-MCNC: 87 MG/DL

## 2018-07-12 ASSESSMENT — ASTHMA QUESTIONNAIRES: ACT_TOTALSCORE: 22

## 2018-07-13 ENCOUNTER — DOCUMENTATION ONLY (OUTPATIENT)
Dept: CARE COORDINATION | Facility: CLINIC | Age: 83
End: 2018-07-13

## 2018-07-13 NOTE — PROGRESS NOTES
[facility] now requests orders and shares plan of care/discharge summaries for some patients through [employer].  Please REPLY TO THIS MESSAGE OR ROUTE BACK TO THE AUTHOR in order to give authorization for orders when needed.  This is considered a verbal order, you will still receive a faxed copy of orders for signature.  Thank you for your assistance in improving collaboration for our patients.    ORDER    Requesting start of care orders.     Skilled Nursing 1 week x 3, 2 prn visits for medication mgmt, INR rechecks, fall prevention, CP assessment.     To continue current warfarin dosing as directed, to have home care recheck INR on 7/16, rather than 7/17 per pt request.     PT to eval/treat for fall prevention and strengthening.   OT to eval/treat for home safety/adaptive equipment needs.     Thank you for this referral,   Ketty Montelongo, RN  [phone]

## 2018-07-16 ENCOUNTER — ANTICOAGULATION THERAPY VISIT (OUTPATIENT)
Dept: CARDIOLOGY | Facility: CLINIC | Age: 83
End: 2018-07-16
Payer: COMMERCIAL

## 2018-07-16 DIAGNOSIS — I48.20 CHRONIC ATRIAL FIBRILLATION (H): ICD-10-CM

## 2018-07-16 DIAGNOSIS — Z79.01 LONG-TERM (CURRENT) USE OF ANTICOAGULANTS: ICD-10-CM

## 2018-07-16 LAB — INR PPP: 2.1

## 2018-07-16 PROCEDURE — 99207 ZZC NO CHARGE NURSE ONLY: CPT

## 2018-07-16 NOTE — MR AVS SNAPSHOT
Veronica Bray   7/16/2018   Anticoagulation Therapy Visit    Description:  87 year old female   Provider:  Amanda Fuentes, RN   Department:  [facility]           INR as of 7/16/2018     Today's INR 2.1      Anticoagulation Summary as of 7/16/2018     INR goal 2.0-3.0   Today's INR 2.1   Full warfarin instructions 7.5 mg on Tue; 5 mg all other days   Next INR check 7/26/2018    Indications   Atrial fibrillation (H) [I48.91] [I48.91]  Long-term (current) use of anticoagulants [Z79.01] [Z79.01]         Contact Numbers     Anticoagulant (INR) Clinic Number: [phone]          July 2018 Details    Sun Mon Tue Wed Thu Fri Sat     1               2               3               4               5               6               7                 8               9               10               11               12               13               14                 15               16      5 mg   See details      17      7.5 mg         18      5 mg         19      5 mg         20      5 mg         21      5 mg           22      5 mg         23      5 mg         24      7.5 mg         25      5 mg         26            27               28                 29               30               31                    Date Details   07/16 This INR check       Date of next INR:  7/26/2018         How to take your warfarin dose     To take:  5 mg Take 1 of the 5 mg tablets.    To take:  7.5 mg Take 1.5 of the 5 mg tablets.

## 2018-07-16 NOTE — PROGRESS NOTES
ANTICOAGULATION FOLLOW-UP CLINIC VISIT    Patient Name:  Veronica Bray  Date:  7/16/2018  Contact Type:  Telephone/ CHRISTEL Hdez -[phone]    SUBJECTIVE:     Patient Findings     Positives No Problem Findings           OBJECTIVE    INR   Date Value Ref Range Status   07/16/2018 2.1  Final       ASSESSMENT / PLAN  INR assessment THER    Recheck INR In: 10 DAYS    INR Location Homecare INR      Anticoagulation Summary as of 7/16/2018     INR goal 2.0-3.0   Today's INR 2.1   Warfarin maintenance plan 7.5 mg (5 mg x 1.5) on Tue; 5 mg (5 mg x 1) all other days   Full warfarin instructions 7.5 mg on Tue; 5 mg all other days   Weekly warfarin total 37.5 mg   No change documented Amanda Fuentes, ARMANDO   Plan last modified Larisa Monae RN (6/26/2018)   Next INR check 7/26/2018   Target end date Indefinite    Indications   Atrial fibrillation (H) [I48.91] [I48.91]  Long-term (current) use of anticoagulants [Z79.01] [Z79.01]         Anticoagulation Episode Summary     INR check location     Preferred lab     Send INR reminders to [facility] HEART INR NURSE    Comments       Anticoagulation Care Providers     Provider Role Specialty Phone number    Ip, Tr Carrera MD Responsible Cardiology [phone]            See the Encounter Report to view Anticoagulation Flowsheet and Dosing Calendar (Go to Encounters tab in chart review, and find the Anticoagulation Therapy Visit)    INR 2.1 per CHRISTEL Hdez -[phone]. Pt has homecare RN for a couple of weeks because she was started on home OT/PT for balance issues. No change in meds. Still eating a lot of greens/veggies. Will continue current dosing of 7.5 mg Tuesdays and 5 mg all other days with recheck in 10 days by homecare nurse.     Amanda Fuentes, ARMANDO

## 2018-07-20 ENCOUNTER — TELEPHONE (OUTPATIENT)
Dept: INTERNAL MEDICINE | Facility: CLINIC | Age: 83
End: 2018-07-20

## 2018-07-20 NOTE — TELEPHONE ENCOUNTER
Form received from: [facility]    Form requesting following info/need: PT orders    SANCHEZ needed?: No    Location of form: Dr. Mccall's in basket    When completed the route for return: Fax

## 2018-07-24 ENCOUNTER — TELEPHONE (OUTPATIENT)
Dept: INTERNAL MEDICINE | Facility: CLINIC | Age: 83
End: 2018-07-24

## 2018-07-26 ENCOUNTER — ANTICOAGULATION THERAPY VISIT (OUTPATIENT)
Dept: CARDIOLOGY | Facility: CLINIC | Age: 83
End: 2018-07-26
Payer: COMMERCIAL

## 2018-07-26 DIAGNOSIS — I48.20 CHRONIC ATRIAL FIBRILLATION (H): ICD-10-CM

## 2018-07-26 DIAGNOSIS — Z79.01 LONG-TERM (CURRENT) USE OF ANTICOAGULANTS: ICD-10-CM

## 2018-07-26 LAB — INR PPP: 2.9

## 2018-07-26 PROCEDURE — 99207 ZZC NO CHARGE NURSE ONLY: CPT

## 2018-07-26 NOTE — PROGRESS NOTES
ANTICOAGULATION FOLLOW-UP CLINIC VISIT    Patient Name:  Veronica Bray  Date:  7/26/2018  Contact Type:  Telephone/ CHRISTEL Kauffman -[phone]    SUBJECTIVE:     Patient Findings     Positives Change in medications (took 2 gm of Amoxicillin on 7/24 before a dental appt)           OBJECTIVE    INR   Date Value Ref Range Status   07/26/2018 2.9  Final       ASSESSMENT / PLAN  INR assessment THER    Recheck INR In: 4 WEEKS    INR Location Clinic      Anticoagulation Summary as of 7/26/2018     INR goal 2.0-3.0   Today's INR 2.9   Warfarin maintenance plan 7.5 mg (5 mg x 1.5) on Tue; 5 mg (5 mg x 1) all other days   Full warfarin instructions 7.5 mg on Tue; 5 mg all other days   Weekly warfarin total 37.5 mg   No change documented Amanda Fuentes RN   Plan last modified Larisa Monae RN (6/26/2018)   Next INR check 8/24/2018   Target end date Indefinite    Indications   Atrial fibrillation (H) [I48.91] [I48.91]  Long-term (current) use of anticoagulants [Z79.01] [Z79.01]         Anticoagulation Episode Summary     INR check location     Preferred lab     Send INR reminders to [facility] HEART INR NURSE    Comments       Anticoagulation Care Providers     Provider Role Specialty Phone number    Kennedy, Tr Carrera MD Responsible Cardiology [phone]            See the Encounter Report to view Anticoagulation Flowsheet and Dosing Calendar (Go to Encounters tab in chart review, and find the Anticoagulation Therapy Visit)    INR 2.9 per CHRISTEL Kauffman -[phone]. Pt took 2 mg of Amoxicillin before a dental appt 2 days ago. No change in other meds or diet. No abnormal bleeding or bruising. Will continue current dosing of 7.5 mg Tuesdays and 5 mg all other days with recheck in 4 weeks. She is done with homecare visits so spoke with pt to review dosing instructions and schedule the next INR appt after her OV with Dr Benavides.    Amanda Fuentes, RN

## 2018-07-26 NOTE — MR AVS SNAPSHOT
Veronica DANNY Bray   7/26/2018   Anticoagulation Therapy Visit    Description:  87 year old female   Provider:  Amanda Fuentes, RN   Department:  [facility]           INR as of 7/26/2018     Today's INR 2.9      Anticoagulation Summary as of 7/26/2018     INR goal 2.0-3.0   Today's INR 2.9   Full warfarin instructions 7.5 mg on Tue; 5 mg all other days   Next INR check 8/24/2018    Indications   Atrial fibrillation (H) [I48.91] [I48.91]  Long-term (current) use of anticoagulants [Z79.01] [Z79.01]         Your next Anticoagulation Clinic appointment(s)     Aug 24, 2018 10:00 AM CDT   Anticoagulation Visit with RU ANTICOAGULATION   [facility] ([facility])    [address] [place] [phone]              Contact Numbers     Anticoagulant (INR) Clinic Number: [phone]          July 2018 Details    Sun Mon Tue Wed Thu Fri Sat     1               2               3               4               5               6               7                 8               9               10               11               12               13               14                 15               16               17               18               19               20               21                 22               23               24               25               26      5 mg   See details      27      5 mg         28      5 mg           29      5 mg         30      5 mg         31      7.5 mg              Date Details   07/26 This INR check               How to take your warfarin dose     To take:  5 mg Take 1 of the 5 mg tablets.    To take:  7.5 mg Take 1.5 of the 5 mg tablets.           August 2018 Details    Sun Mon Tue Wed Thu Fri Sat        1      5 mg         2      5 mg         3      5 mg         4      5 mg           5      5 mg         6      5 mg         7      7.5 mg         8      5 mg         9      5 mg         10      5  mg         11      5 mg           12      5 mg         13      5 mg         14      7.5 mg         15      5 mg         16      5 mg         17      5 mg         18      5 mg           19      5 mg         20      5 mg         21      7.5 mg         22      5 mg         23      5 mg         24            25                 26               27               28               29               30               31                 Date Details   No additional details    Date of next INR:  8/24/2018         How to take your warfarin dose     To take:  5 mg Take 1 of the 5 mg tablets.    To take:  7.5 mg Take 1.5 of the 5 mg tablets.

## 2018-08-17 ENCOUNTER — TELEPHONE (OUTPATIENT)
Dept: INTERNAL MEDICINE | Facility: CLINIC | Age: 83
End: 2018-08-17

## 2018-08-17 NOTE — TELEPHONE ENCOUNTER
Form received from: [facility]    Form requesting following info/need: Certification and POC from 7/12/18 to 9/9/18    SANCHEZ needed?: No    Location of form: Dr. Mccall's in basket    When completed the route for return: Fax

## 2018-08-22 DIAGNOSIS — Z53.9 DIAGNOSIS NOT YET DEFINED: Primary | ICD-10-CM

## 2018-08-22 PROCEDURE — G0180 MD CERTIFICATION HHA PATIENT: HCPCS | Performed by: INTERNAL MEDICINE

## 2018-08-24 ENCOUNTER — ANTICOAGULATION THERAPY VISIT (OUTPATIENT)
Dept: CARDIOLOGY | Facility: CLINIC | Age: 83
End: 2018-08-24
Payer: COMMERCIAL

## 2018-08-24 ENCOUNTER — OFFICE VISIT (OUTPATIENT)
Dept: CARDIOLOGY | Facility: CLINIC | Age: 83
End: 2018-08-24
Attending: INTERNAL MEDICINE
Payer: MEDICARE

## 2018-08-24 VITALS
BODY MASS INDEX: 26.31 KG/M2 | SYSTOLIC BLOOD PRESSURE: 122 MMHG | HEART RATE: 68 BPM | OXYGEN SATURATION: 92 % | HEIGHT: 62 IN | DIASTOLIC BLOOD PRESSURE: 64 MMHG | WEIGHT: 143 LBS

## 2018-08-24 DIAGNOSIS — Z79.01 LONG-TERM (CURRENT) USE OF ANTICOAGULANTS: ICD-10-CM

## 2018-08-24 DIAGNOSIS — I48.0 PAROXYSMAL ATRIAL FIBRILLATION (H): ICD-10-CM

## 2018-08-24 DIAGNOSIS — Z95.1 S/P CABG (CORONARY ARTERY BYPASS GRAFT): ICD-10-CM

## 2018-08-24 DIAGNOSIS — I48.91 ATRIAL FIBRILLATION, UNSPECIFIED TYPE (H): ICD-10-CM

## 2018-08-24 LAB — INR POINT OF CARE: 2 (ref 0.86–1.14)

## 2018-08-24 PROCEDURE — 85610 PROTHROMBIN TIME: CPT | Mod: QW | Performed by: INTERNAL MEDICINE

## 2018-08-24 PROCEDURE — 99214 OFFICE O/P EST MOD 30 MIN: CPT | Performed by: INTERNAL MEDICINE

## 2018-08-24 PROCEDURE — 36416 COLLJ CAPILLARY BLOOD SPEC: CPT | Performed by: INTERNAL MEDICINE

## 2018-08-24 NOTE — PROGRESS NOTES
ANTICOAGULATION FOLLOW-UP CLINIC VISIT    Patient Name:  Veronica Bray  Date:  8/24/2018  Contact Type:  Face to Face    SUBJECTIVE:     Patient Findings     Positives No Problem Findings           OBJECTIVE    INR Protime   Date Value Ref Range Status   08/24/2018 2.0 (A) 0.86 - 1.14 Final       ASSESSMENT / PLAN  INR assessment THER    Recheck INR In: 4 WEEKS    INR Location Clinic      Anticoagulation Summary as of 8/24/2018     INR goal 2.0-3.0   Today's INR 2.0   Warfarin maintenance plan 7.5 mg (5 mg x 1.5) on Tue; 5 mg (5 mg x 1) all other days   Full warfarin instructions 7.5 mg on Tue; 5 mg all other days   Weekly warfarin total 37.5 mg   No change documented Larisa Monae RN   Plan last modified Larisa Monae RN (6/26/2018)   Next INR check 9/21/2018   Target end date Indefinite    Indications   Atrial fibrillation (H) [I48.91] [I48.91]  Long-term (current) use of anticoagulants [Z79.01] [Z79.01]         Anticoagulation Episode Summary     INR check location     Preferred lab     Send INR reminders to [facility] HEART INR NURSE    Comments       Anticoagulation Care Providers     Provider Role Specialty Phone number    Kennedy, Tr Carrera MD Responsible Cardiology [phone]            See the Encounter Report to view Anticoagulation Flowsheet and Dosing Calendar (Go to Encounters tab in chart review, and find the Anticoagulation Therapy Visit)    INR 2.0.  Just saw Dr. Benavides and no changes.  Continue same schedule and recheck in 4 weeks.  She doesn't plan on eating high vit K foods so we will just watch and make sure INR doesn't go any lower next time.  Mike Monae, ARMANDO

## 2018-08-24 NOTE — LETTER
8/24/2018    Concepcion Mccall MD  [address]  [facility] [address]    RE: Veronica Bray       Dear Colleague,    I had the pleasure of seeing Veronica Bray in the [facility].    HPI and Plan:   See dictation    No orders of the defined types were placed in this encounter.      No orders of the defined types were placed in this encounter.      Encounter Diagnoses   Name Primary?     Paroxysmal atrial fibrillation (H)      S/P CABG (coronary artery bypass graft)        CURRENT MEDICATIONS:  Current Outpatient Prescriptions   Medication Sig Dispense Refill     albuterol (PROAIR HFA/PROVENTIL HFA/VENTOLIN HFA) 108 (90 BASE) MCG/ACT Inhaler Inhale 1-2 puffs into the lungs every 6 hours as needed for shortness of breath / dyspnea or wheezing 1 Inhaler 0     ASPIRIN PO Take 81 mg by mouth every other day        ATORVASTATIN CALCIUM PO Take 10 mg by mouth At Bedtime       carvedilol (COREG) 6.25 MG tablet Take 1 tablet (6.25 mg) by mouth 2 times daily (with meals) 180 tablet 4     Fexofenadine HCl (ALLEGRA PO) Take 180 mg by mouth daily        fluticasone (FLONASE) 50 MCG/ACT spray Spray 1 spray into both nostrils daily       lisinopril (PRINIVIL/ZESTRIL) 5 MG tablet Take 1 tablet (5 mg) by mouth daily 90 tablet 4     Multiple Vitamins-Minerals (PRESERVISION AREDS 2) CAPS Take 1 capsule by mouth 2 times daily        warfarin (COUMADIN) 5 MG tablet 1 tab daily as directed per INR Clinic 100 tablet 1       ALLERGIES     Allergies   Allergen Reactions     Aleve [Naproxen] Anaphylaxis     Celebrex [Celecoxib]      Codeine      Demerol [Meperidine]      Hydromorphone      Percocet [Oxycodone-Acetaminophen]      Tramadol      Vicodin [Hydrocodone-Acetaminophen]        PAST MEDICAL HISTORY:  Past Medical History:   Diagnosis Date     Atrial flutter (H)      CAD (coronary artery disease)     LIMA graft to the LAD and vein graft to the diagonal are patent.      Cardiomyopathy, unspecified      tachy induced     Hyperlipidemia      Hypertension      LBBB (left bundle branch block)      Mitral regurgitation      NSTEMI (non-ST elevated myocardial infarction) (H)      PAF (paroxysmal atrial fibrillation) (H)     converted on Amiodarone     Sleep apnea     CPAP     SVT (supraventricular tachycardia) (H)      Thrombocytopenia, unspecified 7/13/2017     Tobacco use        PAST SURGICAL HISTORY:  Past Surgical History:   Procedure Laterality Date     CHOLECYSTECTOMY       CORONARY ANGIOGRAPHY ADULT ORDER  1/2005    Stent: Distal cfx, Mid LAD, Stent: D -1     GYN SURGERY      hysterectomy     HC LEFT HEART CATHETERIZATION  11/2006    mid distal-anterior/ distal inferior/ Apical Yossi, 85     HC LEFT HEART CATHETERIZATION  5/2015    100% Proximal LAD, patent LIMA-LAD, SVG-D1, LVEDP 13, 10-18 mmHg AV gradient, 3+ MR after PVC     ORTHOPEDIC SURGERY      hip, knee, wrist       FAMILY HISTORY:  Family History   Problem Relation Age of Onset     Unknown/Adopted Mother      Unknown/Adopted Father        SOCIAL HISTORY:  Social History     Social History     Marital status:      Spouse name: N/A     Number of children: N/A     Years of education: N/A     Social History Main Topics     Smoking status: Former Smoker     Smokeless tobacco: Never Used      Comment: quit approx 1967      Alcohol use 2.4 oz/week     0 Standard drinks or equivalent, 4 Glasses of wine per week     Drug use: No     Sexual activity: Yes     Partners: Male     Other Topics Concern     Caffeine Concern No     Special Diet No     Exercise No     Social History Narrative       Review of Systems:  Skin:  Positive for     Eyes:  Positive for glasses  ENT:  Negative    Respiratory:  Positive for dyspnea on exertion  Cardiovascular:    lightheadedness;Positive for  Gastroenterology: Negative    Genitourinary:  Negative    Musculoskeletal:  Positive for back pain;arthritis;joint pain  Neurologic:  Positive for numbness or tingling of  "feet  Psychiatric:  Negative    Heme/Lymph/Imm:  Positive for easy bruising  Endocrine:  Negative      Physical Exam:  Vitals: /64 (BP Location: Right arm, Patient Position: Sitting, Cuff Size: Adult Regular)  Pulse 68  Ht 1.575 m (5' 2\")  Wt 64.9 kg (143 lb)  SpO2 92%  BMI 26.16 kg/m2    Constitutional:  cooperative, alert and oriented, well developed, well nourished, in no acute distress        Skin:  warm and dry to the touch, no apparent skin lesions or masses noted          Head:  normocephalic, no masses or lesions        Eyes:  pupils equal and round;conjunctivae and lids unremarkable        Lymph:No Cervical lymphadenopathy present     ENT:  no pallor or cyanosis, dentition good        Neck:  JVP normal        Respiratory:  normal breath sounds, clear to auscultation, normal A-P diameter, normal symmetry, normal respiratory excursion, no use of accessory muscles;healed median sternotomy scar         Cardiac: regular rhythm;normal S1 and S2       systolic murmur;grade 1        pulses full and equal                                        GI:  abdomen soft;BS normoactive;non-tender        Extremities and Muscular Skeletal:  no deformities, clubbing, cyanosis, erythema observed;no edema              Neurological:  no gross motor deficits;affect appropriate        Psych:  Alert and Oriented x 3        Recent Lab Results:  LIPID RESULTS:  Lab Results   Component Value Date    CHOL 172 07/11/2018    HDL 69 07/11/2018    LDL 86 07/11/2018    TRIG 87 07/11/2018       LIVER ENZYME RESULTS:  Lab Results   Component Value Date    AST 25 07/11/2018    ALT 27 07/11/2018       CBC RESULTS:  Lab Results   Component Value Date    WBC 4.4 03/21/2018    RBC 4.52 03/21/2018    HGB 13.2 03/21/2018    HCT 42.4 03/21/2018    MCV 94 03/21/2018    MCH 29.2 03/21/2018    MCHC 31.1 (L) 03/21/2018    RDW 13.7 03/21/2018     03/21/2018       BMP RESULTS:  Lab Results   Component Value Date     07/11/2018    " POTASSIUM 4.7 07/11/2018    CHLORIDE 103 07/11/2018    CO2 31 07/11/2018    ANIONGAP 5 07/11/2018    GLC 92 07/11/2018    BUN 15 07/11/2018    CR 0.62 07/11/2018    GFRESTIMATED >90 07/11/2018    GFRESTBLACK >90 07/11/2018    BARRETT 8.6 07/11/2018        A1C RESULTS:  No results found for: A1C    INR RESULTS:  Lab Results   Component Value Date    INR 2.0 (A) 08/24/2018    INR 2.9 07/26/2018    INR 2.1 07/16/2018           CC  Mook Benavides MD  [address]  [place] [address]                    Thank you for allowing me to participate in the care of your patient.      Sincerely,     DR MOOK BENAVIDES MD     [facility]    cc:   Mook Benavides MD  [address]  [place] [address]

## 2018-08-24 NOTE — MR AVS SNAPSHOT
"              After Visit Summary   8/24/2018    Veronica Bray    MRN: [id number]           Patient Information     Date Of Birth          5/31/1931        Visit Information        Provider Department      8/24/2018 9:15 AM Tr Benavides MD [facility]        Today's Diagnoses     Paroxysmal atrial fibrillation (H)        S/P CABG (coronary artery bypass graft)           Follow-ups after your visit        Your next 10 appointments already scheduled     Sep 21, 2018 10:00 AM CDT   Anticoagulation Visit with RU ANTICOAGULATION   [facility]    [address]  [facility] [address] [phone]              Who to contact     If you have questions or need follow up information about today's clinic visit or your schedule please contact [facility] directly at [phone].  Normal or non-critical lab and imaging results will be communicated to you by MyChart, letter or phone within 4 business days after the clinic has received the results. If you do not hear from us within 7 days, please contact the clinic through MyChart or phone. If you have a critical or abnormal lab result, we will notify you by phone as soon as possible.  Submit refill requests through [employer] or call your pharmacy and they will forward the refill request to us. Please allow 3 business days for your refill to be completed.          Additional Information About Your Visit        Care EveryWhere ID     This is your Care EveryWhere ID. This could be used by other organizations to access your [facility] medical records  [id number]        Your Vitals Were     Pulse Height Pulse Oximetry BMI (Body Mass Index)          68 1.575 m (5' 2\") 92% 26.16 kg/m2         Blood Pressure from Last 3 Encounters:   08/24/18 122/64   07/11/18 129/63   04/26/18 126/74    Weight from Last 3 " Encounters:   08/24/18 64.9 kg (143 lb)   07/11/18 64.3 kg (141 lb 12.8 oz)   04/26/18 67 kg (147 lb 11.2 oz)              We Performed the Following     Follow-Up with Cardiologist        Primary Care Provider Office Phone # Fax #    Concepcion Mccall [phone]       [address] [facility] [address]        Equal Access to Services     [facility] : Hadii aad ku hadasho Soomaali, waaxda luqadaha, qaybta kaalmada adeegyada, waxay idiin hayaan adeeg kharash la'aan . So [facility] [phone].    ATENCIÓN: Si j carlosla esphill, tiene a martinez disposición servicios gratuitos de asistencia lingüística. Llame al [phone].    We comply with applicable federal civil rights laws and [place] laws. We do not discriminate on the basis of race, color, national origin, age, disability, sex, sexual orientation, or gender identity.            Thank you!     Thank you for choosing [facility]  for your care. Our goal is always to provide you with excellent care. Hearing back from our patients is one way we can continue to improve our services. Please take a few minutes to complete the written survey that you may receive in the mail after your visit with us. Thank you!             Your Updated Medication List - Protect others around you: Learn how to safely use, store and throw away your medicines at www.disposemymeds.org.          This list is accurate as of 8/24/18 10:02 AM.  Always use your most recent med list.                   Brand Name Dispense Instructions for use Diagnosis    albuterol 108 (90 Base) MCG/ACT inhaler    PROAIR HFA/PROVENTIL HFA/VENTOLIN HFA    1 Inhaler    Inhale 1-2 puffs into the lungs every 6 hours as needed for shortness of breath / dyspnea or wheezing    Cough, Intermittent asthma with acute exacerbation, unspecified asthma severity       ALLEGRA PO      Take 180 mg by mouth daily        ASPIRIN PO      Take 81 mg by mouth every other day         ATORVASTATIN CALCIUM PO      Take 10 mg by mouth At Bedtime        carvedilol 6.25 MG tablet    COREG    180 tablet    Take 1 tablet (6.25 mg) by mouth 2 times daily (with meals)    Paroxysmal atrial fibrillation (H), S/P CABG (coronary artery bypass graft), Atrial flutter, paroxysmal (H), Benign essential hypertension       fluticasone 50 MCG/ACT spray    FLONASE     Spray 1 spray into both nostrils daily        lisinopril 5 MG tablet    PRINIVIL/ZESTRIL    90 tablet    Take 1 tablet (5 mg) by mouth daily    Benign essential hypertension       PRESERVISION AREDS 2 Caps      Take 1 capsule by mouth 2 times daily        warfarin 5 MG tablet    COUMADIN    100 tablet    1 tab daily as directed per INR Clinic    Long term current use of anticoagulant therapy

## 2018-08-24 NOTE — LETTER
8/24/2018      Concepcion Mccall MD  [address] [facility] [address]      RE: Veronica Bray       Dear Colleague,    I had the pleasure of seeing Veronica Bray in the [facility].    Service Date: 08/24/2018      HISTORY OF PRESENT ILLNESS:  It is a pleasure for me to see Ms. Bray today.  She is here for followup of multiple cardiac issues including paroxysmal atrial fibrillation, chronic coronary artery disease, cardiac risk factors.      She was last seen by my colleague, Bhavani Plaza, for an episode of atrial fibrillation which self-terminated.  I am happy to report that since that episode, she has had no further recurrences of atrial fibrillation symptomatically.  She is on warfarin.  There is a history of a retinal hemorrhage.  On further questioning, it appears that she has macular degeneration.  She follows up with Dr. Carrero of vitreoretinal surgery on a regular basis and she has been told it is fine for her to continue on warfarin as well as on aspirin.      She still climbs stairs.  She has no recurrence of chest pains and she is not short of breath.  There is no PND, orthopnea or ankle swelling.      PHYSICAL EXAMINATION:  Certainly, reveals no evidence of congestive heart failure.      IMPRESSION:   1.  Coronary artery disease, stable.  History of bypass surgery with LIMA graft to the LAD and vein graft to first diagonal.  Continue medical management.  No symptoms of angina.   2.  Paroxysmal atrial fibrillation.  On warfarin.  No issues with bleeding.   3.  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   4.  Atrial flutter status post ablation in 2009.   5.  Valvular heart disease with 1+ mitral and tricuspid regurgitation.  Not hemodynamically significant.   6.  Hypertension, under decent control.  As diastolics continue to be low, may consider decreasing dose of current medications.   7.  Sleep apnea.  Not using CPAP.   8.  Dyslipidemia.   On low-dose statin with adequate results.   9.  Pulmonary hypertension, likely secondary to multiple underlying causes.  Not symptomatic.   10.  Carotid disease.  Moderate disease bilaterally.   11.  Cough, likely due to allergies.  As such, there is no need to switch her from lisinopril at this time.      I will see her again in a year's time for further followup.         MOOK JIANG MD, [facility]             D: 2018   T: 2018   MT: TRAM      Name:     MICHELLE STAUFFER   MRN:      [id number]        Account:      [id number]   :      1931           Service Date: 2018      Document: [id number]         Outpatient Encounter Prescriptions as of 2018   Medication Sig Dispense Refill     albuterol (PROAIR HFA/PROVENTIL HFA/VENTOLIN HFA) 108 (90 BASE) MCG/ACT Inhaler Inhale 1-2 puffs into the lungs every 6 hours as needed for shortness of breath / dyspnea or wheezing 1 Inhaler 0     ASPIRIN PO Take 81 mg by mouth every other day        ATORVASTATIN CALCIUM PO Take 10 mg by mouth At Bedtime       carvedilol (COREG) 6.25 MG tablet Take 1 tablet (6.25 mg) by mouth 2 times daily (with meals) 180 tablet 4     Fexofenadine HCl (ALLEGRA PO) Take 180 mg by mouth daily        fluticasone (FLONASE) 50 MCG/ACT spray Spray 1 spray into both nostrils daily       lisinopril (PRINIVIL/ZESTRIL) 5 MG tablet Take 1 tablet (5 mg) by mouth daily 90 tablet 4     Multiple Vitamins-Minerals (PRESERVISION AREDS 2) CAPS Take 1 capsule by mouth 2 times daily        warfarin (COUMADIN) 5 MG tablet 1 tab daily as directed per INR Clinic 100 tablet 1     No facility-administered encounter medications on file as of 2018.        Again, thank you for allowing me to participate in the care of your patient.      Sincerely,    DR MOOK JIANG MD     [facility]

## 2018-08-24 NOTE — MR AVS SNAPSHOT
Veronica DANNY Bray   8/24/2018 10:00 AM   Anticoagulation Therapy Visit    Description:  87 year old female   Provider:  ENRI[facility] ANTICOAGULATION   Department:  [facility]           INR as of 8/24/2018     Today's INR 2.0      Anticoagulation Summary as of 8/24/2018     INR goal 2.0-3.0   Today's INR 2.0   Full warfarin instructions 7.5 mg on Tue; 5 mg all other days   Next INR check 9/21/2018    Indications   Atrial fibrillation (H) [I48.91] [I48.91]  Long-term (current) use of anticoagulants [Z79.01] [Z79.01]         Your next Anticoagulation Clinic appointment(s)     Aug 24, 2018 10:00 AM CDT   Anticoagulation Visit with  ANTICOAGULATION   [facility]    [address]  [facility] [address] [phone]            Sep 21, 2018 10:00 AM CDT   Anticoagulation Visit with  ANTICOAGULATION   [facility]    [address] [place] [address] [address]  [facility] [id number] [phone]              August 2018 Details    Sun Mon Tue Wed Thu Fri Sat        1               2               3               4                 5               6               7               8               9               10               11                 12               13               14               15               16               17               18                 19               20               21               22               23               24      5 mg   See details      25      5 mg           26      5 mg         27      5 mg         28      7.5 mg         29      5 mg         30      5 mg         31      5 mg           Date Details   08/24 This INR check               How to take your warfarin dose     To take:  5 mg Take 1 of the 5 mg tablets.    To take:  7.5 mg Take 1.5 of the 5 mg tablets.           September 2018 Details    Sun Mon Tue Wed Thu Fri Sat           1      5 mg            2      5 mg         3      5 mg         4      7.5 mg         5      5 mg         6      5 mg         7      5 mg         8      5 mg           9      5 mg         10      5 mg         11      7.5 mg         12      5 mg         13      5 mg         14      5 mg         15      5 mg           16      5 mg         17      5 mg         18      7.5 mg         19      5 mg         20      5 mg         21            22                 23               24               25               26               27               28               29                 30                      Date Details   No additional details    Date of next INR:  9/21/2018         How to take your warfarin dose     To take:  5 mg Take 1 of the 5 mg tablets.    To take:  7.5 mg Take 1.5 of the 5 mg tablets.

## 2018-08-24 NOTE — PROGRESS NOTES
Service Date: 08/24/2018      HISTORY OF PRESENT ILLNESS:  It is a pleasure for me to see Ms. Bray today.  She is here for followup of multiple cardiac issues including paroxysmal atrial fibrillation, chronic coronary artery disease, cardiac risk factors.      She was last seen by my colleague, Bhavani Plaza, for an episode of atrial fibrillation which self-terminated.  I am happy to report that since that episode, she has had no further recurrences of atrial fibrillation symptomatically.  She is on warfarin.  There is a history of a retinal hemorrhage.  On further questioning, it appears that she has macular degeneration.  She follows up with Dr. Carrero of vitreoretinal surgery on a regular basis and she has been told it is fine for her to continue on warfarin as well as on aspirin.      She still climbs stairs.  She has no recurrence of chest pains and she is not short of breath.  There is no PND, orthopnea or ankle swelling.      PHYSICAL EXAMINATION:  Certainly, reveals no evidence of congestive heart failure.      IMPRESSION:   1.  Coronary artery disease, stable.  History of bypass surgery with LIMA graft to the LAD and vein graft to first diagonal.  Continue medical management.  No symptoms of angina.   2.  Paroxysmal atrial fibrillation.  On warfarin.  No issues with bleeding.   3.  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   4.  Atrial flutter status post ablation in 2009.   5.  Valvular heart disease with 1+ mitral and tricuspid regurgitation.  Not hemodynamically significant.   6.  Hypertension, under decent control.  As diastolics continue to be low, may consider decreasing dose of current medications.   7.  Sleep apnea.  Not using CPAP.   8.  Dyslipidemia.  On low-dose statin with adequate results.   9.  Pulmonary hypertension, likely secondary to multiple underlying causes.  Not symptomatic.   10.  Carotid disease.  Moderate disease bilaterally.   11.  Cough, likely due to  allergies.  As such, there is no need to switch her from lisinopril at this time.      I will see her again in a year's time for further followup.         MOOK JIANG MD, [facility]             D: 2018   T: 2018   MT: TRAM      Name:     MICHELLE STAUFFER   MRN:      [id number]        Account:      [id number]   :      1931           Service Date: 2018      Document: [id number]

## 2018-08-24 NOTE — PROGRESS NOTES
HPI and Plan:   See dictation    No orders of the defined types were placed in this encounter.      No orders of the defined types were placed in this encounter.      Encounter Diagnoses   Name Primary?     Paroxysmal atrial fibrillation (H)      S/P CABG (coronary artery bypass graft)        CURRENT MEDICATIONS:  Current Outpatient Prescriptions   Medication Sig Dispense Refill     albuterol (PROAIR HFA/PROVENTIL HFA/VENTOLIN HFA) 108 (90 BASE) MCG/ACT Inhaler Inhale 1-2 puffs into the lungs every 6 hours as needed for shortness of breath / dyspnea or wheezing 1 Inhaler 0     ASPIRIN PO Take 81 mg by mouth every other day        ATORVASTATIN CALCIUM PO Take 10 mg by mouth At Bedtime       carvedilol (COREG) 6.25 MG tablet Take 1 tablet (6.25 mg) by mouth 2 times daily (with meals) 180 tablet 4     Fexofenadine HCl (ALLEGRA PO) Take 180 mg by mouth daily        fluticasone (FLONASE) 50 MCG/ACT spray Spray 1 spray into both nostrils daily       lisinopril (PRINIVIL/ZESTRIL) 5 MG tablet Take 1 tablet (5 mg) by mouth daily 90 tablet 4     Multiple Vitamins-Minerals (PRESERVISION AREDS 2) CAPS Take 1 capsule by mouth 2 times daily        warfarin (COUMADIN) 5 MG tablet 1 tab daily as directed per INR Clinic 100 tablet 1       ALLERGIES     Allergies   Allergen Reactions     Aleve [Naproxen] Anaphylaxis     Celebrex [Celecoxib]      Codeine      Demerol [Meperidine]      Hydromorphone      Percocet [Oxycodone-Acetaminophen]      Tramadol      Vicodin [Hydrocodone-Acetaminophen]        PAST MEDICAL HISTORY:  Past Medical History:   Diagnosis Date     Atrial flutter (H)      CAD (coronary artery disease)     LIMA graft to the LAD and vein graft to the diagonal are patent.      Cardiomyopathy, unspecified     tachy induced     Hyperlipidemia      Hypertension      LBBB (left bundle branch block)      Mitral regurgitation      NSTEMI (non-ST elevated myocardial infarction) (H)      PAF (paroxysmal atrial fibrillation) (H)      converted on Amiodarone     Sleep apnea     CPAP     SVT (supraventricular tachycardia) (H)      Thrombocytopenia, unspecified 7/13/2017     Tobacco use        PAST SURGICAL HISTORY:  Past Surgical History:   Procedure Laterality Date     CHOLECYSTECTOMY       CORONARY ANGIOGRAPHY ADULT ORDER  1/2005    Stent: Distal cfx, Mid LAD, Stent: D -1     GYN SURGERY      hysterectomy     HC LEFT HEART CATHETERIZATION  11/2006    mid distal-anterior/ distal inferior/ Apical Yossi, 85     HC LEFT HEART CATHETERIZATION  5/2015    100% Proximal LAD, patent LIMA-LAD, SVG-D1, LVEDP 13, 10-18 mmHg AV gradient, 3+ MR after PVC     ORTHOPEDIC SURGERY      hip, knee, wrist       FAMILY HISTORY:  Family History   Problem Relation Age of Onset     Unknown/Adopted Mother      Unknown/Adopted Father        SOCIAL HISTORY:  Social History     Social History     Marital status:      Spouse name: N/A     Number of children: N/A     Years of education: N/A     Social History Main Topics     Smoking status: Former Smoker     Smokeless tobacco: Never Used      Comment: quit approx 1967      Alcohol use 2.4 oz/week     0 Standard drinks or equivalent, 4 Glasses of wine per week     Drug use: No     Sexual activity: Yes     Partners: Male     Other Topics Concern     Caffeine Concern No     Special Diet No     Exercise No     Social History Narrative       Review of Systems:  Skin:  Positive for     Eyes:  Positive for glasses  ENT:  Negative    Respiratory:  Positive for dyspnea on exertion  Cardiovascular:    lightheadedness;Positive for  Gastroenterology: Negative    Genitourinary:  Negative    Musculoskeletal:  Positive for back pain;arthritis;joint pain  Neurologic:  Positive for numbness or tingling of feet  Psychiatric:  Negative    Heme/Lymph/Imm:  Positive for easy bruising  Endocrine:  Negative      Physical Exam:  Vitals: /64 (BP Location: Right arm, Patient Position: Sitting, Cuff Size: Adult Regular)  Pulse 68  Ht  "1.575 m (5' 2\")  Wt 64.9 kg (143 lb)  SpO2 92%  BMI 26.16 kg/m2    Constitutional:  cooperative, alert and oriented, well developed, well nourished, in no acute distress        Skin:  warm and dry to the touch, no apparent skin lesions or masses noted          Head:  normocephalic, no masses or lesions        Eyes:  pupils equal and round;conjunctivae and lids unremarkable        Lymph:No Cervical lymphadenopathy present     ENT:  no pallor or cyanosis, dentition good        Neck:  JVP normal        Respiratory:  normal breath sounds, clear to auscultation, normal A-P diameter, normal symmetry, normal respiratory excursion, no use of accessory muscles;healed median sternotomy scar         Cardiac: regular rhythm;normal S1 and S2       systolic murmur;grade 1        pulses full and equal                                        GI:  abdomen soft;BS normoactive;non-tender        Extremities and Muscular Skeletal:  no deformities, clubbing, cyanosis, erythema observed;no edema              Neurological:  no gross motor deficits;affect appropriate        Psych:  Alert and Oriented x 3        Recent Lab Results:  LIPID RESULTS:  Lab Results   Component Value Date    CHOL 172 07/11/2018    HDL 69 07/11/2018    LDL 86 07/11/2018    TRIG 87 07/11/2018       LIVER ENZYME RESULTS:  Lab Results   Component Value Date    AST 25 07/11/2018    ALT 27 07/11/2018       CBC RESULTS:  Lab Results   Component Value Date    WBC 4.4 03/21/2018    RBC 4.52 03/21/2018    HGB 13.2 03/21/2018    HCT 42.4 03/21/2018    MCV 94 03/21/2018    MCH 29.2 03/21/2018    MCHC 31.1 (L) 03/21/2018    RDW 13.7 03/21/2018     03/21/2018       BMP RESULTS:  Lab Results   Component Value Date     07/11/2018    POTASSIUM 4.7 07/11/2018    CHLORIDE 103 07/11/2018    CO2 31 07/11/2018    ANIONGAP 5 07/11/2018    GLC 92 07/11/2018    BUN 15 07/11/2018    CR 0.62 07/11/2018    GFRESTIMATED >90 07/11/2018    GFRESTBLACK >90 07/11/2018    BARRETT 8.6 " 07/11/2018        A1C RESULTS:  No results found for: A1C    INR RESULTS:  Lab Results   Component Value Date    INR 2.0 (A) 08/24/2018    INR 2.9 07/26/2018    INR 2.1 07/16/2018           CC  Tr Benavides MD  [id number]  [place] [id number]

## 2018-09-21 ENCOUNTER — ANTICOAGULATION THERAPY VISIT (OUTPATIENT)
Dept: CARDIOLOGY | Facility: CLINIC | Age: 83
End: 2018-09-21
Payer: COMMERCIAL

## 2018-09-21 DIAGNOSIS — Z79.01 LONG-TERM (CURRENT) USE OF ANTICOAGULANTS: ICD-10-CM

## 2018-09-21 DIAGNOSIS — I48.91 ATRIAL FIBRILLATION, UNSPECIFIED TYPE (H): ICD-10-CM

## 2018-09-21 LAB — INR POINT OF CARE: 2.9 (ref 0.86–1.14)

## 2018-09-21 PROCEDURE — 99207 ZZC NO CHARGE NURSE ONLY: CPT | Performed by: INTERNAL MEDICINE

## 2018-09-21 PROCEDURE — 36416 COLLJ CAPILLARY BLOOD SPEC: CPT | Performed by: INTERNAL MEDICINE

## 2018-09-21 PROCEDURE — 85610 PROTHROMBIN TIME: CPT | Mod: QW | Performed by: INTERNAL MEDICINE

## 2018-09-21 NOTE — MR AVS SNAPSHOT
Veronica DANNY Bray   9/21/2018 10:00 AM   Anticoagulation Therapy Visit    Description:  87 year old female   Provider:  [facility]   Department:  [facility]           INR as of 9/21/2018     Today's INR 2.9      Anticoagulation Summary as of 9/21/2018     INR goal 2.0-3.0   Today's INR 2.9   Full warfarin instructions 7.5 mg on Tue; 5 mg all other days   Next INR check 10/19/2018    Indications   Atrial fibrillation (H) [I48.91] [I48.91]  Long-term (current) use of anticoagulants [Z79.01] [Z79.01]         Your next Anticoagulation Clinic appointment(s)     Oct 19, 2018 10:00 AM CDT   Anticoagulation Visit with RU ANTICOAGULATION   [facility] ([facility])    [address] [address] [address] [phone]              September 2018 Details    Sun Mon Tue Wed Thu Fri Sat           1                 2               3               4               5               6               7               8                 9               10               11               12               13               14               15                 16               17               18               19               20               21      5 mg   See details      22      5 mg           23      5 mg         24      5 mg         25      7.5 mg         26      5 mg         27      5 mg         28      5 mg         29      5 mg           30      5 mg                Date Details   09/21 This INR check               How to take your warfarin dose     To take:  5 mg Take 1 of the 5 mg tablets.    To take:  7.5 mg Take 1.5 of the 5 mg tablets.           October 2018 Details    Sun Mon Tue Wed Thu Fri Sat      1      5 mg         2      7.5 mg         3      5 mg         4      5 mg         5      5 mg         6      5 mg           7      5 mg         8      5 mg         9      7.5 mg         10      5 mg         11      5 mg         12      5 mg         13       5 mg           14      5 mg         15      5 mg         16      7.5 mg         17      5 mg         18      5 mg         19            20                 21               22               23               24               25               26               27                 28               29               30               31                   Date Details   No additional details    Date of next INR:  10/19/2018         How to take your warfarin dose     To take:  5 mg Take 1 of the 5 mg tablets.    To take:  7.5 mg Take 1.5 of the 5 mg tablets.

## 2018-09-21 NOTE — PROGRESS NOTES
ANTICOAGULATION FOLLOW-UP CLINIC VISIT    Patient Name:  Veronica Bray  Date:  9/21/2018  Contact Type:  Face to Face    SUBJECTIVE:     Patient Findings     Positives No Problem Findings           OBJECTIVE    INR Protime   Date Value Ref Range Status   09/21/2018 2.9 (A) 0.86 - 1.14 Final       ASSESSMENT / PLAN  INR assessment THER    Recheck INR In: 4 WEEKS    INR Location Clinic      Anticoagulation Summary as of 9/21/2018     INR goal 2.0-3.0   Today's INR 2.9   Warfarin maintenance plan 7.5 mg (5 mg x 1.5) on Tue; 5 mg (5 mg x 1) all other days   Full warfarin instructions 7.5 mg on Tue; 5 mg all other days   Weekly warfarin total 37.5 mg   No change documented Larisa Monae RN   Plan last modified Larisa Monae RN (6/26/2018)   Next INR check 10/19/2018   Target end date Indefinite    Indications   Atrial fibrillation (H) [I48.91] [I48.91]  Long-term (current) use of anticoagulants [Z79.01] [Z79.01]         Anticoagulation Episode Summary     INR check location     Preferred lab     Send INR reminders to [facility] HEART INR NURSE    Comments       Anticoagulation Care Providers     Provider Role Specialty Phone number    Ip, Tr Morris Carrera MD Responsible Cardiology [phone]            See the Encounter Report to view Anticoagulation Flowsheet and Dosing Calendar (Go to Encounters tab in chart review, and find the Anticoagulation Therapy Visit)    INR 2.9.  She took some cold pills about 2 days ago.  Continue same schedule and recheck in 4 weeks.  Mike Monae RN

## 2018-10-15 ENCOUNTER — OFFICE VISIT (OUTPATIENT)
Dept: INTERNAL MEDICINE | Facility: CLINIC | Age: 83
End: 2018-10-15
Payer: COMMERCIAL

## 2018-10-15 VITALS
HEART RATE: 78 BPM | RESPIRATION RATE: 16 BRPM | DIASTOLIC BLOOD PRESSURE: 70 MMHG | OXYGEN SATURATION: 95 % | WEIGHT: 143.6 LBS | TEMPERATURE: 98.4 F | SYSTOLIC BLOOD PRESSURE: 138 MMHG | HEIGHT: 62 IN | BODY MASS INDEX: 26.43 KG/M2

## 2018-10-15 DIAGNOSIS — J45.21 INTERMITTENT ASTHMA WITH ACUTE EXACERBATION, UNSPECIFIED ASTHMA SEVERITY: ICD-10-CM

## 2018-10-15 PROCEDURE — 99214 OFFICE O/P EST MOD 30 MIN: CPT | Performed by: INTERNAL MEDICINE

## 2018-10-15 RX ORDER — ALBUTEROL SULFATE 90 UG/1
1-2 AEROSOL, METERED RESPIRATORY (INHALATION) EVERY 6 HOURS PRN
Qty: 1 INHALER | Refills: 1 | Status: ON HOLD | OUTPATIENT
Start: 2018-10-15 | End: 2019-12-13

## 2018-10-15 RX ORDER — PREDNISONE 20 MG/1
20 TABLET ORAL 2 TIMES DAILY
Qty: 10 TABLET | Refills: 0 | Status: SHIPPED | OUTPATIENT
Start: 2018-10-15 | End: 2018-11-06

## 2018-10-15 ASSESSMENT — PAIN SCALES - GENERAL: PAINLEVEL: NO PAIN (0)

## 2018-10-15 NOTE — PROGRESS NOTES
SUBJECTIVE:   Veronica Bray is a 87 year old female who presents to clinic today for the following health issues:    Asthma problems x 4 days after exposure to perfume.    HPI:   Veronica Bray a 87 year old female here for above. She is coughing and bringing up clear phlegm. albuteral inhaler is not helping. She does feel a little better today. She has not had a flare like this for a few years. An albuteral inhaler usually last a year for her. She has had prednisone in the past. Nothing in recent years.     Problem list and histories reviewed & adjusted, as indicated.  Additional history: as documented    Patient Active Problem List   Diagnosis     LBBB (left bundle branch block)     PAF (paroxysmal atrial fibrillation) (H)     SVT (supraventricular tachycardia) (H)     Atrial flutter (H)     Mitral regurgitation     Hyperlipidemia     Atrial fibrillation (H) [I48.91]     Long-term (current) use of anticoagulants [Z79.01]     Thrombocytopenia, unspecified (H)     Intermittent asthma without complication     Essential hypertension     Other hyperlipidemia     Past Surgical History:   Procedure Laterality Date     CHOLECYSTECTOMY       CORONARY ANGIOGRAPHY ADULT ORDER  1/2005    Stent: Distal cfx, Mid LAD, Stent: D -1     GYN SURGERY      hysterectomy     HC LEFT HEART CATHETERIZATION  11/2006    mid distal-anterior/ distal inferior/ Apical Yossi, 85     HC LEFT HEART CATHETERIZATION  5/2015    100% Proximal LAD, patent LIMA-LAD, SVG-D1, LVEDP 13, 10-18 mmHg AV gradient, 3+ MR after PVC     ORTHOPEDIC SURGERY      hip, knee, wrist       Social History   Substance Use Topics     Smoking status: Former Smoker     Smokeless tobacco: Never Used      Comment: quit approx 1967      Alcohol use 2.4 oz/week     0 Standard drinks or equivalent, 4 Glasses of wine per week     Family History   Problem Relation Age of Onset     Unknown/Adopted Mother      Myocardial Infarction Mother      Unknown/Adopted Father      Myocardial  "Infarction Brother      Rheumatic fever     Myocardial Infarction Brother      Myocardial Infarction Brother      HEART DISEASE Brother            Reviewed and updated as needed this visit by clinical staff  Tobacco  Allergies  Meds  Med Hx  Surg Hx  Fam Hx  Soc Hx      Reviewed and updated as needed this visit by Provider         ROS:  Constitutional, HEENT, cardiovascular, pulmonary, GI, , musculoskeletal, neuro, skin, endocrine and psych systems are negative, except as otherwise noted.    OBJECTIVE:     /70 (BP Location: Left arm, Patient Position: Chair, Cuff Size: Adult Large)  Pulse 78  Temp 98.4  F (36.9  C) (Oral)  Resp 16  Ht 5' 2\" (1.575 m)  Wt 143 lb 9.6 oz (65.1 kg)  SpO2 95%  BMI 26.26 kg/m2  Body mass index is 26.26 kg/(m^2).  GENERAL: healthy, alert and no distress  NECK: no adenopathy, no asymmetry, masses, or scars and thyroid normal to palpation  RESP: moving air well. Few quiet scattered wheezes   CV: regular rate and rhythm, normal S1 S2, no S3 or S4, no murmur, click or rub, no peripheral edema and peripheral pulses strong  ABDOMEN: soft, nontender, no hepatosplenomegaly, no masses and bowel sounds normal      ASSESSMENT/PLAN:       1. Intermittent asthma with acute exacerbation, unspecified asthma severity  Given prednisone burst refilled her albuteral inhaler Follow up if no improvement.   - albuterol (PROAIR HFA/PROVENTIL HFA/VENTOLIN HFA) 108 (90 Base) MCG/ACT inhaler; Inhale 1-2 puffs into the lungs every 6 hours as needed for shortness of breath / dyspnea or wheezing  Dispense: 1 Inhaler; Refill: 1  - predniSONE (DELTASONE) 20 MG tablet; Take 1 tablet (20 mg) by mouth 2 times daily  Dispense: 10 tablet; Refill: 0    Ch[facility] Chin MD  [facility]  "

## 2018-10-15 NOTE — MR AVS SNAPSHOT
"              After Visit Summary   10/15/2018    Veronica Bray    MRN: [id number]           Patient Information     Date Of Birth          5/31/1931        Visit Information        Provider Department      10/15/2018 2:00 PM Chloe Chin MD [facility]        Today's Diagnoses     Intermittent asthma with acute exacerbation, unspecified asthma severity           Follow-ups after your visit        Your next 10 appointments already scheduled     Oct 19, 2018 10:00 AM CDT   Anticoagulation Visit with RU ANTICOAGULATION   [facility] ([facility])    [address] [address]  [facility] [address] [phone]              Who to contact     If you have questions or need follow up information about today's clinic visit or your schedule please contact [facility] directly at [phone].  Normal or non-critical lab and imaging results will be communicated to you by MyChart, letter or phone within 4 business days after the clinic has received the results. If you do not hear from us within 7 days, please contact the clinic through MyChart or phone. If you have a critical or abnormal lab result, we will notify you by phone as soon as possible.  Submit refill requests through [employer] or call your pharmacy and they will forward the refill request to us. Please allow 3 business days for your refill to be completed.          Additional Information About Your Visit        Care EveryWhere ID     This is your Care EveryWhere ID. This could be used by other organizations to access your [facility] medical records  [id number]        Your Vitals Were     Pulse Temperature Respirations Height Pulse Oximetry BMI (Body Mass Index)    78 98.4  F (36.9  C) (Oral) 16 5' 2\" (1.575 m) 95% 26.26 kg/m2       Blood Pressure from Last 3 Encounters:   10/15/18 138/70   08/24/18 122/64   07/11/18 129/63    Weight from Last 3 Encounters:   10/15/18 " 143 lb 9.6 oz (65.1 kg)   08/24/18 143 lb (64.9 kg)   07/11/18 141 lb 12.8 oz (64.3 kg)              Today, you had the following     No orders found for display         Today's Medication Changes          These changes are accurate as of 10/15/18  2:34 PM.  If you have any questions, ask your nurse or doctor.               Start taking these medicines.        Dose/Directions    predniSONE 20 MG tablet   Commonly known as:  DELTASONE   Used for:  Intermittent asthma with acute exacerbation, unspecified asthma severity   Started by:  Chloe Chin MD        Dose:  20 mg   Take 1 tablet (20 mg) by mouth 2 times daily   Quantity:  10 tablet   Refills:  0            Where to get your medicines      These medications were sent to [facility] [address] [place] [address]     Phone:  [phone]     albuterol 108 (90 Base) MCG/ACT inhaler    predniSONE 20 MG tablet                Primary Care Provider Office Phone # Fax #    Concepcion Renny Mccall [phone]       [address] [place] [address]        Equal Access to Services     [facility]: Hadii aad ku hadasho Soomaali, waaxda luqadaha, qaybta kaalmada adeegyada, fang manzano . So [facility] [phone].    ATENCIÓN: Si habla español, tiene a martinez disposición servicios gratuitos de asistencia lingüística. Llame al [phone].    We comply with applicable federal civil rights laws and [place] laws. We do not discriminate on the basis of race, color, national origin, age, disability, sex, sexual orientation, or gender identity.            Thank you!     Thank you for choosing [facility]  for your care. Our goal is always to provide you with excellent care. Hearing back from our patients is one way we can continue to improve our services. Please take a few minutes to complete the written survey that you may receive in the mail after  your visit with us. Thank you!             Your Updated Medication List - Protect others around you: Learn how to safely use, store and throw away your medicines at www.disposemymeds.org.          This list is accurate as of 10/15/18  2:34 PM.  Always use your most recent med list.                   Brand Name Dispense Instructions for use Diagnosis    albuterol 108 (90 Base) MCG/ACT inhaler    PROAIR HFA/PROVENTIL HFA/VENTOLIN HFA    1 Inhaler    Inhale 1-2 puffs into the lungs every 6 hours as needed for shortness of breath / dyspnea or wheezing    Intermittent asthma with acute exacerbation, unspecified asthma severity       ALLEGRA PO      Take 180 mg by mouth daily        ASPIRIN PO      Take 81 mg by mouth every other day        ATORVASTATIN CALCIUM PO      Take 10 mg by mouth At Bedtime        carvedilol 6.25 MG tablet    COREG    180 tablet    Take 1 tablet (6.25 mg) by mouth 2 times daily (with meals)    Paroxysmal atrial fibrillation (H), S/P CABG (coronary artery bypass graft), Atrial flutter, paroxysmal (H), Benign essential hypertension       fluticasone 50 MCG/ACT spray    FLONASE     Spray 1 spray into both nostrils daily        lisinopril 5 MG tablet    PRINIVIL/ZESTRIL    90 tablet    Take 1 tablet (5 mg) by mouth daily    Benign essential hypertension       predniSONE 20 MG tablet    DELTASONE    10 tablet    Take 1 tablet (20 mg) by mouth 2 times daily    Intermittent asthma with acute exacerbation, unspecified asthma severity       PRESERVISION AREDS 2 Caps      Take 1 capsule by mouth 2 times daily        warfarin 5 MG tablet    COUMADIN    100 tablet    1 tab daily as directed per INR Clinic    Long term current use of anticoagulant therapy

## 2018-10-16 ENCOUNTER — TELEPHONE (OUTPATIENT)
Dept: INTERNAL MEDICINE | Facility: CLINIC | Age: 83
End: 2018-10-16

## 2018-10-16 ASSESSMENT — ASTHMA QUESTIONNAIRES: ACT_TOTALSCORE: 21

## 2018-10-16 NOTE — TELEPHONE ENCOUNTER
Patient calls, she saw Dr. Chin yesterday for asthma exacerbation and given prescription for Prednisone 20 mg BID x5 days. She has had to gradually taper off Prednisone in the past and asks to have message sent to Dr. Mccall to ask if she will need to do so again.

## 2018-10-17 NOTE — TELEPHONE ENCOUNTER
Called and spoke with patient's , Weston (Consent to Communicate on file), who states patient isn't home right now but will inform patient of below provider note. Informed  that patient can call us back with any questions.

## 2018-10-18 ENCOUNTER — TELEPHONE (OUTPATIENT)
Dept: INTERNAL MEDICINE | Facility: CLINIC | Age: 83
End: 2018-10-18

## 2018-10-18 NOTE — TELEPHONE ENCOUNTER
Patient seen in clinic Mon. 10/15/18 with cough.  Currently on Prednisone 20 mg twice daily x5 days, feeling a little better in that her coughing spells are not as long or as severe.  Using her Albuterol inhaler as directed.  States she still does have wheezing and does cough frequently, produces clear phlegm.  Denies fever, chest pain or SOB.  States she has not been on this high of a Prednisone dose before and expected to be feeling much better by now.   Wonders if she has [place] Fever again as the symptoms are the same.  Had this in the early 2000's in [place].  ERENDIRA Johnston R.N.

## 2018-10-19 ENCOUNTER — RADIANT APPOINTMENT (OUTPATIENT)
Dept: GENERAL RADIOLOGY | Facility: CLINIC | Age: 83
End: 2018-10-19
Attending: NURSE PRACTITIONER
Payer: COMMERCIAL

## 2018-10-19 ENCOUNTER — ANTICOAGULATION THERAPY VISIT (OUTPATIENT)
Dept: CARDIOLOGY | Facility: CLINIC | Age: 83
End: 2018-10-19
Payer: COMMERCIAL

## 2018-10-19 ENCOUNTER — OFFICE VISIT (OUTPATIENT)
Dept: INTERNAL MEDICINE | Facility: CLINIC | Age: 83
End: 2018-10-19
Payer: COMMERCIAL

## 2018-10-19 VITALS
SYSTOLIC BLOOD PRESSURE: 142 MMHG | WEIGHT: 146 LBS | TEMPERATURE: 98.6 F | BODY MASS INDEX: 27.56 KG/M2 | DIASTOLIC BLOOD PRESSURE: 60 MMHG | RESPIRATION RATE: 18 BRPM | OXYGEN SATURATION: 94 % | HEIGHT: 61 IN | HEART RATE: 62 BPM

## 2018-10-19 DIAGNOSIS — R05.9 COUGH: ICD-10-CM

## 2018-10-19 DIAGNOSIS — J06.9 UPPER RESPIRATORY TRACT INFECTION, UNSPECIFIED TYPE: ICD-10-CM

## 2018-10-19 DIAGNOSIS — I10 ESSENTIAL HYPERTENSION: ICD-10-CM

## 2018-10-19 DIAGNOSIS — R06.02 SOB (SHORTNESS OF BREATH): ICD-10-CM

## 2018-10-19 DIAGNOSIS — Z86.19: Primary | ICD-10-CM

## 2018-10-19 DIAGNOSIS — I48.91 ATRIAL FIBRILLATION, UNSPECIFIED TYPE (H): ICD-10-CM

## 2018-10-19 DIAGNOSIS — J45.20 MILD INTERMITTENT ASTHMA WITHOUT COMPLICATION: ICD-10-CM

## 2018-10-19 DIAGNOSIS — Z86.19: ICD-10-CM

## 2018-10-19 LAB — INR POINT OF CARE: 4.2 (ref 0.86–1.14)

## 2018-10-19 PROCEDURE — 71046 X-RAY EXAM CHEST 2 VIEWS: CPT

## 2018-10-19 PROCEDURE — 99214 OFFICE O/P EST MOD 30 MIN: CPT | Performed by: NURSE PRACTITIONER

## 2018-10-19 PROCEDURE — 85610 PROTHROMBIN TIME: CPT | Mod: QW | Performed by: INTERNAL MEDICINE

## 2018-10-19 PROCEDURE — 36416 COLLJ CAPILLARY BLOOD SPEC: CPT | Performed by: INTERNAL MEDICINE

## 2018-10-19 PROCEDURE — 99207 ZZC NO CHARGE NURSE ONLY: CPT | Performed by: INTERNAL MEDICINE

## 2018-10-19 RX ORDER — PREDNISONE 20 MG/1
20 TABLET ORAL DAILY
Qty: 5 TABLET | Refills: 0 | Status: SHIPPED | OUTPATIENT
Start: 2018-10-19 | End: 2018-11-06

## 2018-10-19 RX ORDER — AZITHROMYCIN 250 MG/1
TABLET, FILM COATED ORAL
Qty: 6 TABLET | Refills: 0 | Status: SHIPPED | OUTPATIENT
Start: 2018-10-19 | End: 2018-11-06

## 2018-10-19 NOTE — PROGRESS NOTES
ANTICOAGULATION FOLLOW-UP CLINIC VISIT    Patient Name:  Veronica Bray  Date:  10/19/2018  Contact Type:  Face to Face    SUBJECTIVE:     Patient Findings     Positives Change in medications    Comments Started prednisone 40mg for 5 days on Monday           OBJECTIVE    INR Protime   Date Value Ref Range Status   10/19/2018 4.2 (A) 0.86 - 1.14 Final       ASSESSMENT / PLAN  INR assessment SUPRA    Recheck INR In: 4 DAYS    INR Location Clinic      Anticoagulation Summary as of 10/19/2018     INR goal 2.0-3.0   Today's INR 4.2!   Warfarin maintenance plan 7.5 mg (5 mg x 1.5) on Tue; 5 mg (5 mg x 1) all other days   Full warfarin instructions 10/19: Hold; 10/20: Hold; Otherwise 7.5 mg on Tue; 5 mg all other days   Weekly warfarin total 37.5 mg   Plan last modified Larisa Monae, RN (6/26/2018)   Next INR check 10/23/2018   Target end date Indefinite    Indications   Atrial fibrillation (H) [I48.91] [I48.91]  Long-term (current) use of anticoagulants [Z79.01] [Z79.01]         Anticoagulation Episode Summary     INR check location     Preferred lab     Send INR reminders to [facility] HEART INR NURSE    Comments       Anticoagulation Care Providers     Provider Role Specialty Phone number    Ip, Tr Morris Carrera MD Responsible Cardiology [phone]            See the Encounter Report to view Anticoagulation Flowsheet and Dosing Calendar (Go to Encounters tab in chart review, and find the Anticoagulation Therapy Visit)    INR 4.2.  Her asthma flared up starting this past weekend.  She started Prednisone 40mg for 5 days on Monday.  She said she is still coughing so she is seeing her PMD today.  Hold Warfarin for 2 days and increase greens and recheck INR on Tuesday.  No bleeding.  Watch to see if antibiotics are started or prednisone is continued.  Mike Monae, RN

## 2018-10-19 NOTE — MR AVS SNAPSHOT
Veronica DANNY Bray   10/19/2018 10:00 AM   Anticoagulation Therapy Visit    Description:  87 year old female   Provider:  ENRIQUE ANTICOAGULATION   Department:  [facility]           INR as of 10/19/2018     Today's INR 4.2!      Anticoagulation Summary as of 10/19/2018     INR goal 2.0-3.0   Today's INR 4.2!   Full warfarin instructions 10/19: Hold; 10/20: Hold; Otherwise 7.5 mg on Tue; 5 mg all other days   Next INR check 10/23/2018    Indications   Atrial fibrillation (H) [I48.91] [I48.91]  Long-term (current) use of anticoagulants [Z79.01] [Z79.01]         Your next Anticoagulation Clinic appointment(s)     Oct 19, 2018 10:00 AM CDT   Anticoagulation Visit with  ANTICOAGULATION   [facility] ([facility])    [address]  [place] [address]   [phone]            Oct 23, 2018 11:40 AM CDT   Anticoagulation Visit with  ANTICOAGULATION   [facility] ([facility])    [address]  [place] [address]   [phone]              October 2018 Details    Sun Mon Tue Wed Thu Fri Sat      1               2               3               4               5               6                 7               8               9               10               11               12               13                 14               15               16               17               18               19      Hold   See details      20      Hold           21      5 mg         22      5 mg         23            24               25               26               27                 28               29               30               31                   Date Details   10/19 This INR check       Date of next INR:  10/23/2018         How to take your warfarin dose     To take:  5 mg Take 1 of the 5 mg tablets.    To take:  7.5 mg Take 1.5 of the 5 mg tablets.    Hold Do not take your warfarin dose.  See the Details table to the right for additional instructions.

## 2018-10-19 NOTE — PROGRESS NOTES
SUBJECTIVE:   Veronica Bray is a 87 year old female who presents to clinic today for the following health issues:    Patient seen in clinic Mon. 10/15/18 with cough.  Currently on Prednisone 20 mg twice daily x5 days, feeling a little better in that her coughing spells are not as long or as severe.  Using her Albuterol inhaler as directed.  States she still does have wheezing and does cough frequently, produces clear phlegm.  Denies fever, chest pain or SOB.  States she has not been on this high of a Prednisone dose before and expected to be feeling much better by now.   Wonders if she has Valley Fever again as the symptoms are the same.  Had this in the early 2000's in [place].  ERENDIRA Johnston R.N.    Thinks it was 2008 when she had valley fever     No wheezing   Still SOB and cough   Prednisone  20 mg twice daily for 5 days - has 1 pill left tonight               Problem list and histories reviewed & adjusted, as indicated.  Additional history: as documented    Patient Active Problem List   Diagnosis     LBBB (left bundle branch block)     PAF (paroxysmal atrial fibrillation) (H)     SVT (supraventricular tachycardia) (H)     Atrial flutter (H)     Mitral regurgitation     Hyperlipidemia     Atrial fibrillation (H) [I48.91]     Long-term (current) use of anticoagulants [Z79.01]     Thrombocytopenia, unspecified (H)     Intermittent asthma without complication     Essential hypertension     Other hyperlipidemia     Past Surgical History:   Procedure Laterality Date     CHOLECYSTECTOMY       CORONARY ANGIOGRAPHY ADULT ORDER  1/2005    Stent: Distal cfx, Mid LAD, Stent: D -1     GYN SURGERY      hysterectomy     HC LEFT HEART CATHETERIZATION  11/2006    mid distal-anterior/ distal inferior/ Apical Yossi, 85     HC LEFT HEART CATHETERIZATION  5/2015    100% Proximal LAD, patent LIMA-LAD, SVG-D1, LVEDP 13, 10-18 mmHg AV gradient, 3+ MR after PVC     ORTHOPEDIC SURGERY      hip, knee, wrist       Social History   Substance Use  "Topics     Smoking status: Former Smoker     Smokeless tobacco: Never Used      Comment: quit approx 1967      Alcohol use 2.4 oz/week     0 Standard drinks or equivalent, 4 Glasses of wine per week      Comment: rarely     Family History   Problem Relation Age of Onset     Unknown/Adopted Mother      Myocardial Infarction Mother      Unknown/Adopted Father      Myocardial Infarction Brother      Rheumatic fever     Myocardial Infarction Brother      Myocardial Infarction Brother      HEART DISEASE Brother            Reviewed and updated as needed this visit by clinical staff  Tobacco  Allergies  Meds  Med Hx  Surg Hx  Fam Hx  Soc Hx      Reviewed and updated as needed this visit by Provider         ROS:  Constitutional, HEENT, cardiovascular, pulmonary, gi and gu systems are negative, except as otherwise noted.    OBJECTIVE:     /60 (BP Location: Left arm, Patient Position: Chair, Cuff Size: Adult Large)  Pulse 62  Temp 98.6  F (37  C) (Oral)  Resp 18  Ht 5' 1\" (1.549 m)  Wt 146 lb (66.2 kg)  SpO2 94%  BMI 27.59 kg/m2  Body mass index is 27.59 kg/(m^2).  GENERAL:  alert and mild distress- here with    RESP: lungs with congestion heard on left side - hard for her to take deep breath related to cough - right side sounds clear   CV: regular rate and rhythm- systolic murmur   PSYCH: mentation appears normal, affect normal/bright    Diagnostic Test Results:  Chest x ray   IMPRESSION: Sternotomy. Elevation right hemidiaphragm which is new  compared to 3/21/2018. There is some minimal infiltrate or atelectasis  left lung base obscuring the diaphragm as well as a tiny left pleural  effusion.  ASSESSMENT/PLAN:             1. Hx of coccidioidomycosis  2008 probably   No fever no joint pain   No common symptoms of this   Will treat as normal pneumonia and if not improving do more testing    - XR Chest 2 Views; Future  - azithromycin (ZITHROMAX) 250 MG tablet; Two tablets first day, then one tablet " daily for four days.  Dispense: 6 tablet; Refill: 0  - predniSONE (DELTASONE) 20 MG tablet; Take 1 tablet (20 mg) by mouth daily  Dispense: 5 tablet; Refill: 0    2. SOB (shortness of breath)    - XR Chest 2 Views; Future  - azithromycin (ZITHROMAX) 250 MG tablet; Two tablets first day, then one tablet daily for four days.  Dispense: 6 tablet; Refill: 0  - predniSONE (DELTASONE) 20 MG tablet; Take 1 tablet (20 mg) by mouth daily  Dispense: 5 tablet; Refill: 0    3. Upper respiratory tract infection, unspecified type    - XR Chest 2 Views; Future  - azithromycin (ZITHROMAX) 250 MG tablet; Two tablets first day, then one tablet daily for four days.  Dispense: 6 tablet; Refill: 0  - predniSONE (DELTASONE) 20 MG tablet; Take 1 tablet (20 mg) by mouth daily  Dispense: 5 tablet; Refill: 0    4. Cough    - XR Chest 2 Views; Future  - azithromycin (ZITHROMAX) 250 MG tablet; Two tablets first day, then one tablet daily for four days.  Dispense: 6 tablet; Refill: 0  - predniSONE (DELTASONE) 20 MG tablet; Take 1 tablet (20 mg) by mouth daily  Dispense: 5 tablet; Refill: 0    5. Mild intermittent asthma without complication    - XR Chest 2 Views; Future  - azithromycin (ZITHROMAX) 250 MG tablet; Two tablets first day, then one tablet daily for four days.  Dispense: 6 tablet; Refill: 0    6. Essential hypertension  Elevated blood pressure probably related to illness and prednisone   Will keep on same medication for now       Patient Instructions   Chest x ray in [address]    Will treat with Zithromax 250mg, 2 tabs by mouth day 1, then 1 tab by mouth days 2-5. She is to watch for GI upset, diarrhea or rash.    Prednisone once daily for 5 more days  Take in morning with food      Please, call or return to clinic if signs or symptoms worsen or fail to improve as anticipated    short term follow up appointment made with Dr Mccall 11/6/2018 at 1020    NIKITA Bocanegra [facility]

## 2018-10-19 NOTE — PATIENT INSTRUCTIONS
Chest x ray in [address]    Will treat with Zithromax 250mg, 2 tabs by mouth day 1, then 1 tab by mouth days 2-5. She is to watch for GI upset, diarrhea or rash.    Prednisone once daily for 5 more days  Take in morning with food      Please, call or return to clinic if signs or symptoms worsen or fail to improve as anticipated

## 2018-10-23 ENCOUNTER — ANTICOAGULATION THERAPY VISIT (OUTPATIENT)
Dept: CARDIOLOGY | Facility: CLINIC | Age: 83
End: 2018-10-23
Payer: COMMERCIAL

## 2018-10-23 DIAGNOSIS — I48.91 ATRIAL FIBRILLATION, UNSPECIFIED TYPE (H): ICD-10-CM

## 2018-10-23 LAB — INR POINT OF CARE: 1.6 (ref 0.86–1.14)

## 2018-10-23 PROCEDURE — 85610 PROTHROMBIN TIME: CPT | Mod: QW | Performed by: INTERNAL MEDICINE

## 2018-10-23 PROCEDURE — 36416 COLLJ CAPILLARY BLOOD SPEC: CPT | Performed by: INTERNAL MEDICINE

## 2018-10-23 NOTE — MR AVS SNAPSHOT
Veronica DELGADO Asa   10/23/2018 11:40 AM   Anticoagulation Therapy Visit    Description:  87 year old female   Provider:  ENRIQUE ANTICOAGULATION   Department:  [facility]           INR as of 10/23/2018     Today's INR 1.6!      Anticoagulation Summary as of 10/23/2018     INR goal 2.0-3.0   Today's INR 1.6!   Full warfarin instructions 7.5 mg on Tue; 5 mg all other days   Next INR check 11/6/2018    Indications   Atrial fibrillation (H) [I48.91] [I48.91]  Long-term (current) use of anticoagulants [Z79.01] [Z79.01]         Your next Anticoagulation Clinic appointment(s)     Nov 06, 2018  9:40 AM CST   Anticoagulation Visit with RU ANTICOAGULATION   [facility] ([facility])    [address] [address] [address] [phone]              October 2018 Details    Sun Mon Tue Wed Thu Fri Sat      1               2               3               4               5               6                 7               8               9               10               11               12               13                 14               15               16               17               18               19               20                 21               22               23      7.5 mg   See details      24      5 mg         25      5 mg         26      5 mg         27      5 mg           28      5 mg         29      5 mg         30      7.5 mg         31      5 mg             Date Details   10/23 This INR check               How to take your warfarin dose     To take:  5 mg Take 1 of the 5 mg tablets.    To take:  7.5 mg Take 1.5 of the 5 mg tablets.           November 2018 Details    Sun Mon Tue Wed Thu Fri Sat         1      5 mg         2      5 mg         3      5 mg           4      5 mg         5      5 mg         6            7               8               9               10                 11               12               13               14                15               16               17                 18               19               20               21               22               23               24                 25               26               27               28               29               30                 Date Details   No additional details    Date of next INR:  11/6/2018         How to take your warfarin dose     To take:  5 mg Take 1 of the 5 mg tablets.    To take:  7.5 mg Take 1.5 of the 5 mg tablets.

## 2018-10-23 NOTE — PROGRESS NOTES
ANTICOAGULATION FOLLOW-UP CLINIC VISIT    Patient Name:  Veronica Bray  Date:  10/23/2018  Contact Type:  Face to Face    SUBJECTIVE:     Patient Findings     Positives Change in medications    Comments Finishing zpack today and tapering down on prednisone            OBJECTIVE    INR Protime   Date Value Ref Range Status   10/23/2018 1.6 (A) 0.86 - 1.14 Final       ASSESSMENT / PLAN  INR assessment SUB    Recheck INR In: 2 WEEKS    INR Location Clinic      Anticoagulation Summary as of 10/23/2018     INR goal 2.0-3.0   Today's INR 1.6!   Warfarin maintenance plan 7.5 mg (5 mg x 1.5) on Tue; 5 mg (5 mg x 1) all other days   Full warfarin instructions 7.5 mg on Tue; 5 mg all other days   Weekly warfarin total 37.5 mg   No change documented Larisa Monae RN   Plan last modified Larisa Monae RN (6/26/2018)   Next INR check 11/6/2018   Target end date Indefinite    Indications   Atrial fibrillation (H) [I48.91] [I48.91]  Long-term (current) use of anticoagulants [Z79.01] [Z79.01]         Anticoagulation Episode Summary     INR check location     Preferred lab     Send INR reminders to [facility] HEART INR NURSE    Comments       Anticoagulation Care Providers     Provider Role Specialty Phone number    Ip, Tr Morris Carrera MD Responsible Cardiology [phone]            See the Encounter Report to view Anticoagulation Flowsheet and Dosing Calendar (Go to Encounters tab in chart review, and find the Anticoagulation Therapy Visit)    INR 1.6.  She went to her PMD office on Friday and xray showed pneumonia.  She started Zpack on Friday and today is her last dose.  She was taking Prednisone last week and now she is tapering off and will be done soon.  Warfarin was held 2 days and she increased her greens.  She can now resume her normal dosing schedule and recheck in 2 weeks.  Mike Monae RN

## 2018-10-31 DIAGNOSIS — Z79.01 LONG TERM CURRENT USE OF ANTICOAGULANT THERAPY: ICD-10-CM

## 2018-10-31 RX ORDER — WARFARIN SODIUM 5 MG/1
TABLET ORAL
Qty: 110 TABLET | Refills: 1 | Status: SHIPPED | OUTPATIENT
Start: 2018-10-31 | End: 2019-01-10

## 2018-11-06 ENCOUNTER — OFFICE VISIT (OUTPATIENT)
Dept: INTERNAL MEDICINE | Facility: CLINIC | Age: 83
End: 2018-11-06
Payer: COMMERCIAL

## 2018-11-06 ENCOUNTER — ANTICOAGULATION THERAPY VISIT (OUTPATIENT)
Dept: CARDIOLOGY | Facility: CLINIC | Age: 83
End: 2018-11-06
Payer: COMMERCIAL

## 2018-11-06 VITALS
WEIGHT: 145 LBS | HEIGHT: 61 IN | DIASTOLIC BLOOD PRESSURE: 78 MMHG | TEMPERATURE: 98.7 F | BODY MASS INDEX: 27.38 KG/M2 | SYSTOLIC BLOOD PRESSURE: 110 MMHG | OXYGEN SATURATION: 95 % | HEART RATE: 65 BPM

## 2018-11-06 DIAGNOSIS — Z87.01 H/O: PNEUMONIA: Primary | ICD-10-CM

## 2018-11-06 DIAGNOSIS — I48.91 ATRIAL FIBRILLATION, UNSPECIFIED TYPE (H): ICD-10-CM

## 2018-11-06 DIAGNOSIS — I10 ESSENTIAL HYPERTENSION: ICD-10-CM

## 2018-11-06 LAB — INR POINT OF CARE: 2.4 (ref 0.86–1.14)

## 2018-11-06 PROCEDURE — 99213 OFFICE O/P EST LOW 20 MIN: CPT | Performed by: INTERNAL MEDICINE

## 2018-11-06 PROCEDURE — 36416 COLLJ CAPILLARY BLOOD SPEC: CPT | Performed by: INTERNAL MEDICINE

## 2018-11-06 PROCEDURE — 85610 PROTHROMBIN TIME: CPT | Mod: QW | Performed by: INTERNAL MEDICINE

## 2018-11-06 NOTE — NURSING NOTE
"/78  Pulse 65  Temp 98.7  F (37.1  C) (Oral)  Ht 5' 1\" (1.549 m)  Wt 145 lb (65.8 kg)  SpO2 95%  Breastfeeding? No  BMI 27.4 kg/m2    " oral

## 2018-11-06 NOTE — PROGRESS NOTES
ANTICOAGULATION FOLLOW-UP CLINIC VISIT    Patient Name:  Veronica Bray  Date:  11/6/2018  Contact Type:  Face to Face    SUBJECTIVE:     Patient Findings     Positives No Problem Findings           OBJECTIVE    INR Protime   Date Value Ref Range Status   11/06/2018 2.4 (A) 0.86 - 1.14 Final       ASSESSMENT / PLAN  INR assessment THER    Recheck INR In: 4 WEEKS    INR Location Clinic      Anticoagulation Summary as of 11/6/2018     INR goal 2.0-3.0   Today's INR 2.4   Warfarin maintenance plan 7.5 mg (5 mg x 1.5) on Tue; 5 mg (5 mg x 1) all other days   Full warfarin instructions 7.5 mg on Tue; 5 mg all other days   Weekly warfarin total 37.5 mg   No change documented Larisa Monae RN   Plan last modified Larisa Monae RN (6/26/2018)   Next INR check 12/4/2018   Target end date Indefinite    Indications   Atrial fibrillation (H) [I48.91] [I48.91]  Long-term (current) use of anticoagulants [Z79.01] [Z79.01]         Anticoagulation Episode Summary     INR check location     Preferred lab     Send INR reminders to [facility] HEART INR NURSE    Comments       Anticoagulation Care Providers     Provider Role Specialty Phone number    Ip, Tr Morris Carrera MD Responsible Cardiology [phone]            See the Encounter Report to view Anticoagulation Flowsheet and Dosing Calendar (Go to Encounters tab in chart review, and find the Anticoagulation Therapy Visit)    INR 2.4.  She is done with prednisone and antibiotics for pneumonia and feeling better.  Continue same schedule and recheck in 4 weeks.  Mike Monae RN

## 2018-11-06 NOTE — MR AVS SNAPSHOT
After Visit Summary   11/6/2018    Veronica Bray    MRN: [id number]           Patient Information     Date Of Birth          5/31/1931        Visit Information        Provider Department      11/6/2018 10:20 AM Concepcion Mccall MD [facility]        Today's Diagnoses     H/O: pneumonia    -  1    Atrial fibrillation, unspecified type (H)        Essential hypertension          Care Instructions    Xray only appointment the week of December 2017          Follow-ups after your visit        Your next 10 appointments already scheduled     Dec 04, 2018 10:00 AM CST   Anticoagulation Visit with RU ANTICOAGULATION   [facility] ([facility])    [address]  [facility] [id number] [phone]              Future tests that were ordered for you today     Open Future Orders        Priority Expected Expires Ordered    XR Chest 2 Views Routine 12/17/2018 11/6/2019 11/6/2018            Who to contact     If you have questions or need follow up information about today's clinic visit or your schedule please contact [facility] directly at [phone].  Normal or non-critical lab and imaging results will be communicated to you by MyChart, letter or phone within 4 business days after the clinic has received the results. If you do not hear from us within 7 days, please contact the clinic through MyChart or phone. If you have a critical or abnormal lab result, we will notify you by phone as soon as possible.  Submit refill requests through [employer] or call your pharmacy and they will forward the refill request to us. Please allow 3 business days for your refill to be completed.          Additional Information About Your Visit        Care EveryWhere ID     This is your Care EveryWhere ID. This could be used by other organizations to access your [facility] medical records  [id number]        Your Vitals Were     Pulse  "Temperature Height Pulse Oximetry Breastfeeding? BMI (Body Mass Index)    65 98.7  F (37.1  C) (Oral) 5' 1\" (1.549 m) 95% No 27.4 kg/m2       Blood Pressure from Last 3 Encounters:   11/06/18 110/78   10/19/18 142/60   10/15/18 138/70    Weight from Last 3 Encounters:   11/06/18 145 lb (65.8 kg)   10/19/18 146 lb (66.2 kg)   10/15/18 143 lb 9.6 oz (65.1 kg)               Primary Care Provider Office Phone # Fax #    Concepcion Mccall [phone]       [address] [facility] [address]        Equal Access to Services     SYLVIA ALLEN : Shabbir figueredoo Soomaali, waaxda luqadaha, qaybta kaalmada adeegyada, fang manzano . So [facility] [phone].    ATENCIÓN: Si habla español, tiene a martinez disposición servicios gratuitos de asistencia lingüística. [facility] [phone].    We comply with applicable federal civil rights laws and [place] laws. We do not discriminate on the basis of race, color, national origin, age, disability, sex, sexual orientation, or gender identity.            Thank you!     Thank you for choosing [facility]  for your care. Our goal is always to provide you with excellent care. Hearing back from our patients is one way we can continue to improve our services. Please take a few minutes to complete the written survey that you may receive in the mail after your visit with us. Thank you!             Your Updated Medication List - Protect others around you: Learn how to safely use, store and throw away your medicines at www.disposemymeds.org.          This list is accurate as of 11/6/18 11:11 AM.  Always use your most recent med list.                   Brand Name Dispense Instructions for use Diagnosis    albuterol 108 (90 Base) MCG/ACT inhaler    PROAIR HFA/PROVENTIL HFA/VENTOLIN HFA    1 Inhaler    Inhale 1-2 puffs into the lungs every 6 hours as needed for shortness of breath / dyspnea or wheezing    Intermittent asthma with " acute exacerbation, unspecified asthma severity       ALLEGRA PO      Take 180 mg by mouth daily        ASPIRIN PO      Take 81 mg by mouth every other day        ATORVASTATIN CALCIUM PO      Take 10 mg by mouth At Bedtime        carvedilol 6.25 MG tablet    COREG    180 tablet    Take 1 tablet (6.25 mg) by mouth 2 times daily (with meals)    Paroxysmal atrial fibrillation (H), S/P CABG (coronary artery bypass graft), Atrial flutter, paroxysmal (H), Benign essential hypertension       fluticasone 50 MCG/ACT spray    FLONASE     Spray 1 spray into both nostrils daily        lisinopril 5 MG tablet    PRINIVIL/ZESTRIL    90 tablet    Take 1 tablet (5 mg) by mouth daily    Benign essential hypertension       PRESERVISION AREDS 2 Caps      Take 1 capsule by mouth 2 times daily        warfarin 5 MG tablet    COUMADIN    110 tablet    Take 1 1/2 tabs on Tuesdays and 1 tab all other days or as directed per INR Clinic    Long term current use of anticoagulant therapy

## 2018-11-06 NOTE — MR AVS SNAPSHOT
Veronica DANNY Bray   11/6/2018 9:40 AM   Anticoagulation Therapy Visit    Description:  87 year old female   Provider:  ENRIQUE ANTICOAGULATION   Department:  [facility]           INR as of 11/6/2018     Today's INR 2.4      Anticoagulation Summary as of 11/6/2018     INR goal 2.0-3.0   Today's INR 2.4   Full warfarin instructions 7.5 mg on Tue; 5 mg all other days   Next INR check 12/4/2018    Indications   Atrial fibrillation (H) [I48.91] [I48.91]  Long-term (current) use of anticoagulants [Z79.01] [Z79.01]         Your next Anticoagulation Clinic appointment(s)     Dec 04, 2018 10:00 AM CST   Anticoagulation Visit with RU ANTICOAGULATION   [facility] ([facility])    [address] [address] [id number] [phone]              November 2018 Details    Sun Mon Tue Wed Thu Fri Sat         1               2               3                 4               5               6      7.5 mg   See details      7      5 mg         8      5 mg         9      5 mg         10      5 mg           11      5 mg         12      5 mg         13      7.5 mg         14      5 mg         15      5 mg         16      5 mg         17      5 mg           18      5 mg         19      5 mg         20      7.5 mg         21      5 mg         22      5 mg         23      5 mg         24      5 mg           25      5 mg         26      5 mg         27      7.5 mg         28      5 mg         29      5 mg         30      5 mg           Date Details   11/06 This INR check               How to take your warfarin dose     To take:  5 mg Take 1 of the 5 mg tablets.    To take:  7.5 mg Take 1.5 of the 5 mg tablets.           December 2018 Details    Sun Mon Tue Wed Thu Fri Sat           1      5 mg           2      5 mg         3      5 mg         4            5               6               7               8                 9               10               11               12                13               14               15                 16               17               18               19               20               21               22                 23               24               25               26               27               28               29                 30               31                     Date Details   No additional details    Date of next INR:  12/4/2018         How to take your warfarin dose     To take:  5 mg Take 1 of the 5 mg tablets.    To take:  7.5 mg Take 1.5 of the 5 mg tablets.

## 2018-11-06 NOTE — PROGRESS NOTES
"  SUBJECTIVE:   Veronica Bray is a 87 year old female who presents to clinic today for the following health issues:    Lower left lobe pneumonia.  10/15 she presented to the clinic with asthma exacerbation.  She received prednisone and still did not improve.  Presented to clinic again on 10/19.  Had a chest xray revealing infiltrate of left lower lobe.  The patient received zpak and more prednisone.  Her symptoms of cough and shortness of breath improved.  She did not have a fever or chills.  She did not have any muscle aches.     Atrial fibrillation.  No chest pain or palpitations. She is taking coumadin.          Problem list and histories reviewed & adjusted, as indicated.    Reviewed and updated as needed this visit by clinical staff  Tobacco  Allergies  Meds  Med Hx  Surg Hx  Fam Hx  Soc Hx      Reviewed and updated as needed this visit by Provider         ROS:  CONSTITUTIONAL: NEGATIVE for fever, chills, change in weight  RESP: NEGATIVE for significant cough or SOB  CV: NEGATIVE for chest pain, palpitations or peripheral edema    OBJECTIVE:     /78  Pulse 65  Temp 98.7  F (37.1  C) (Oral)  Ht 5' 1\" (1.549 m)  Wt 145 lb (65.8 kg)  SpO2 95%  Breastfeeding? No  BMI 27.4 kg/m2  Body mass index is 27.4 kg/(m^2).  GENERAL: healthy, alert and no distress  RESP: lungs clear to auscultation - no rales, rhonchi or wheezes  CV: regular rate and rhythm, normal S1 S2, no S3 or S4, no murmur, click or rub    ASSESSMENT/PLAN:       (Z87.01) H/O: pneumonia  (primary encounter diagnosis)  Comment: symptoms resolved  Plan: XR Chest 2 Views        -reassess chest xray in 6 weeks    (I48.91) Atrial fibrillation, unspecified type (H)  Comment: rate controlled  Plan: coumadin    (I10) Essential hypertension  Comment: at goal  Plan: lisinopril        Concepcion Mccall MD  [facility]    "

## 2018-12-04 ENCOUNTER — ANTICOAGULATION THERAPY VISIT (OUTPATIENT)
Dept: CARDIOLOGY | Facility: CLINIC | Age: 83
End: 2018-12-04
Payer: COMMERCIAL

## 2018-12-04 DIAGNOSIS — I48.91 ATRIAL FIBRILLATION, UNSPECIFIED TYPE (H): ICD-10-CM

## 2018-12-04 LAB — INR POINT OF CARE: 2.5 (ref 0.86–1.14)

## 2018-12-04 PROCEDURE — 85610 PROTHROMBIN TIME: CPT | Mod: QW | Performed by: INTERNAL MEDICINE

## 2018-12-04 PROCEDURE — 99207 ZZC NO CHARGE NURSE ONLY: CPT | Performed by: INTERNAL MEDICINE

## 2018-12-04 PROCEDURE — 36416 COLLJ CAPILLARY BLOOD SPEC: CPT | Performed by: INTERNAL MEDICINE

## 2018-12-04 NOTE — PROGRESS NOTES
ANTICOAGULATION FOLLOW-UP CLINIC VISIT    Patient Name:  Veronica Bray  Date:  12/4/2018  Contact Type:  Face to Face    SUBJECTIVE:     Patient Findings     Positives No Problem Findings           OBJECTIVE    INR Protime   Date Value Ref Range Status   12/04/2018 2.5 (A) 0.86 - 1.14 Final       ASSESSMENT / PLAN  INR assessment THER    Recheck INR In: 5 WEEKS    INR Location Clinic      Anticoagulation Summary as of 12/4/2018     INR goal 2.0-3.0   Today's INR 2.5   Warfarin maintenance plan 7.5 mg (5 mg x 1.5) on Tue; 5 mg (5 mg x 1) all other days   Full warfarin instructions 7.5 mg on Tue; 5 mg all other days   Weekly warfarin total 37.5 mg   No change documented Larisa Monae RN   Plan last modified Larisa Monae RN (6/26/2018)   Next INR check 1/8/2019   Target end date Indefinite    Indications   Atrial fibrillation (H) [I48.91] [I48.91]  Long-term (current) use of anticoagulants [Z79.01] [Z79.01]         Anticoagulation Episode Summary     INR check location     Preferred lab     Send INR reminders to [facility] HEART INR NURSE    Comments       Anticoagulation Care Providers     Provider Role Specialty Phone number    Ip, Tr Morris Carrera MD Responsible Cardiology [phone]            See the Encounter Report to view Anticoagulation Flowsheet and Dosing Calendar (Go to Encounters tab in chart review, and find the Anticoagulation Therapy Visit)    INR 2.5.  NO changes.  Continue same schedule and recheck in 5 weeks.  Mike Monae RN

## 2018-12-04 NOTE — MR AVS SNAPSHOT
Veronica DANNY Bray   12/4/2018 10:00 AM   Anticoagulation Therapy Visit    Description:  87 year old female   Provider:  [facility]   Department:  [facility]           INR as of 12/4/2018     Today's INR 2.5      Anticoagulation Summary as of 12/4/2018     INR goal 2.0-3.0   Today's INR 2.5   Full warfarin instructions 7.5 mg on Tue; 5 mg all other days   Next INR check 1/8/2019    Indications   Atrial fibrillation (H) [I48.91] [I48.91]  Long-term (current) use of anticoagulants [Z79.01] [Z79.01]         Your next Anticoagulation Clinic appointment(s)     Jan 08, 2019 10:00 AM CST   Anticoagulation Visit with [facility] ANTICOAGUL[facility]   [facility])    [id number] [place] [id number] [phone]              December 2018 Details    Sun Mon Tue Wed Thu Fri Sat           1                 2               3               4      7.5 mg   See details      5      5 mg         6      5 mg         7      5 mg         8      5 mg           9      5 mg         10      5 mg         11      7.5 mg         12      5 mg         13      5 mg         14      5 mg         15      5 mg           16      5 mg         17      5 mg         18      7.5 mg         19      5 mg         20      5 mg         21      5 mg         22      5 mg           23      5 mg         24      5 mg         25      7.5 mg         26      5 mg         27      5 mg         28      5 mg         29      5 mg           30      5 mg         31      5 mg               Date Details   12/04 This INR check               How to take your warfarin dose     To take:  5 mg Take 1 of the 5 mg tablets.    To take:  7.5 mg Take 1.5 of the 5 mg tablets.           January 2019 Details    Sun Mon Tue Wed Thu Fri Sat       1      7.5 mg         2      5 mg         3      5 mg         4      5 mg         5      5 mg           6      5 mg         7      5 mg         8            9                10               11               12                 13               14               15               16               17               18               19                 20               21               22               23               24               25               26                 27               28               29               30               31                  Date Details   No additional details    Date of next INR:  1/8/2019         How to take your warfarin dose     To take:  5 mg Take 1 of the 5 mg tablets.    To take:  7.5 mg Take 1.5 of the 5 mg tablets.

## 2019-01-10 DIAGNOSIS — Z79.01 LONG TERM CURRENT USE OF ANTICOAGULANT THERAPY: ICD-10-CM

## 2019-01-10 RX ORDER — WARFARIN SODIUM 5 MG/1
TABLET ORAL
Qty: 110 TABLET | Refills: 1 | Status: SHIPPED | OUTPATIENT
Start: 2019-01-10 | End: 2019-03-15

## 2019-01-15 ENCOUNTER — TELEPHONE (OUTPATIENT)
Dept: INTERNAL MEDICINE | Facility: CLINIC | Age: 84
End: 2019-01-15

## 2019-01-15 ENCOUNTER — ANTICOAGULATION THERAPY VISIT (OUTPATIENT)
Dept: CARDIOLOGY | Facility: CLINIC | Age: 84
End: 2019-01-15
Payer: MEDICARE

## 2019-01-15 DIAGNOSIS — I48.92 ATRIAL FLUTTER, PAROXYSMAL (H): ICD-10-CM

## 2019-01-15 DIAGNOSIS — E78.49 OTHER HYPERLIPIDEMIA: Primary | ICD-10-CM

## 2019-01-15 DIAGNOSIS — I48.0 PAROXYSMAL ATRIAL FIBRILLATION (H): ICD-10-CM

## 2019-01-15 DIAGNOSIS — I10 BENIGN ESSENTIAL HYPERTENSION: ICD-10-CM

## 2019-01-15 DIAGNOSIS — I48.91 ATRIAL FIBRILLATION, UNSPECIFIED TYPE (H): ICD-10-CM

## 2019-01-15 DIAGNOSIS — Z95.1 S/P CABG (CORONARY ARTERY BYPASS GRAFT): ICD-10-CM

## 2019-01-15 LAB — INR POINT OF CARE: 2.1 (ref 0.86–1.14)

## 2019-01-15 PROCEDURE — 85610 PROTHROMBIN TIME: CPT | Mod: QW

## 2019-01-15 PROCEDURE — 36416 COLLJ CAPILLARY BLOOD SPEC: CPT

## 2019-01-15 RX ORDER — CARVEDILOL 6.25 MG/1
6.25 TABLET ORAL 2 TIMES DAILY WITH MEALS
Qty: 180 TABLET | Refills: 1 | Status: SHIPPED | OUTPATIENT
Start: 2019-01-15 | End: 2019-07-05

## 2019-01-15 RX ORDER — LISINOPRIL 5 MG/1
5 TABLET ORAL DAILY
Qty: 90 TABLET | Refills: 1 | Status: SHIPPED | OUTPATIENT
Start: 2019-01-15 | End: 2019-07-05

## 2019-01-15 RX ORDER — ATORVASTATIN CALCIUM 10 MG/1
10 TABLET, FILM COATED ORAL AT BEDTIME
Qty: 90 TABLET | Refills: 1 | Status: SHIPPED | OUTPATIENT
Start: 2019-01-15 | End: 2019-07-05

## 2019-01-15 NOTE — TELEPHONE ENCOUNTER
Reason for Call:  Medication or medication refill:    Do you use a [facility]?  Name of the pharmacy and phone number for the current request:  [facility] RX mail orders fax [phone]   [facility] Value Script  Switched from [facility].      Name of the medication requested: lisinopril 5MG 90 day supply  Carvedilol 6.25MG tablet 90 day supply     Other request: atorvastatin Calcium 10MG  90 day supply    Can we leave a detailed message on this number? YES    Phone number patient can be reached at: Home number on file [phone] (home)    Best Time: any    Call taken on 1/15/2019 at 10:37 AM by Nenita Moncada

## 2019-01-15 NOTE — TELEPHONE ENCOUNTER
"Fax number provided by patient is for [employer]. Called patient, she confirms this is the fax number she was given for prescriptions - her Part D care lists her plan as [facility]. Advised patient we will send prescriptions to [facility].    Requested Prescriptions   Pending Prescriptions Disp Refills     carvedilol (COREG) 6.25 MG tablet  Last Written Prescription Date:  1/2/18  Last Fill Quantity: 180,  # refills: 4   Last office visit: 11/6/2018 with prescribing provider:  Cal   Future Office Visit:    180 tablet 4     Sig: Take 1 tablet (6.25 mg) by mouth 2 times daily (with meals)    Beta-Blockers Protocol Passed - 1/15/2019 11:16 AM       Passed - Blood pressure under 140/90 in past 12 months    BP Readings from Last 3 Encounters:   11/06/18 110/78   10/19/18 142/60   10/15/18 138/70                Passed - Patient is age 6 or older       Passed - Recent (12 mo) or future (30 days) visit within the authorizing provider's specialty    Patient had office visit in the last 12 months or has a visit in the next 30 days with authorizing provider or within the authorizing provider's specialty.  See \"Patient Info\" tab in inbasket, or \"Choose Columns\" in Meds & Orders section of the refill encounter.             Passed - Medication is active on med list        atorvastatin (LIPITOR) 10 MG tablet  Listed as historic following hospital stay, previously ordered by Dr. Mccall  Last Written Prescription Date:  1/2/18  Last Fill Quantity: 90,  # refills: 4   Last office visit: 11/6/2018 with prescribing provider:  Cal   Future Office Visit:    90 tablet 3     Sig: Take 1 tablet (10 mg) by mouth At Bedtime    Statins Protocol Passed - 1/15/2019 11:16 AM       Passed - LDL on file in past 12 months    Recent Labs   Lab Test 07/11/18  1010   LDL 86            Passed - No abnormal creatine kinase in past 12 months    No lab results found.            Passed - Recent (12 mo) or future (30 days) visit within " "the authorizing provider's specialty    Patient had office visit in the last 12 months or has a visit in the next 30 days with authorizing provider or within the authorizing provider's specialty.  See \"Patient Info\" tab in inbasket, or \"Choose Columns\" in Meds & Orders section of the refill encounter.             Passed - Medication is active on med list       Passed - Patient is age 18 or older       Passed - No active pregnancy on record       Passed - No positive pregnancy test in past 12 months        lisinopril (PRINIVIL/ZESTRIL) 5 MG tablet  Last Written Prescription Date:  1/2/18  Last Fill Quantity: 90,  # refills: 4   Last office visit: 11/6/2018 with prescribing provider:  Cal   Future Office Visit:    90 tablet 4     Sig: Take 1 tablet (5 mg) by mouth daily    ACE Inhibitors (Including Combos) Protocol Passed - 1/15/2019 11:16 AM       Passed - Blood pressure under 140/90 in past 12 months    BP Readings from Last 3 Encounters:   11/06/18 110/78   10/19/18 142/60   10/15/18 138/70                Passed - Recent (12 mo) or future (30 days) visit within the authorizing provider's specialty    Patient had office visit in the last 12 months or has a visit in the next 30 days with authorizing provider or within the authorizing provider's specialty.  See \"Patient Info\" tab in inbasket, or \"Choose Columns\" in Meds & Orders section of the refill encounter.             Passed - Medication is active on med list       Passed - Patient is age 18 or older       Passed - No active pregnancy on record       Passed - Normal serum creatinine on file in past 12 months    Recent Labs   Lab Test 07/11/18  1010   CR 0.62            Passed - Normal serum potassium on file in past 12 months    Recent Labs   Lab Test 07/11/18  1010   POTASSIUM 4.7            Passed - No positive pregnancy test within past 12 months      Prescription approved per [facility] Refill Protocol.   "

## 2019-01-15 NOTE — PROGRESS NOTES
ANTICOAGULATION FOLLOW-UP CLINIC VISIT    Patient Name:  Veronica Bray  Date:  1/15/2019  Contact Type:  Face to Face    SUBJECTIVE:     Patient Findings     Positives:   No Problem Findings           OBJECTIVE    INR Protime   Date Value Ref Range Status   01/15/2019 2.1 (A) 0.86 - 1.14 Final       ASSESSMENT / PLAN  INR assessment THER    Recheck INR In: 4 WEEKS    INR Location Clinic      Anticoagulation Summary  As of 1/15/2019    INR goal:   2.0-3.0   TTR:   78.2 % (1.8 y)   INR used for dosin.1 (1/15/2019)   Warfarin maintenance plan:   7.5 mg (5 mg x 1.5) every Tue; 5 mg (5 mg x 1) all other days   Full warfarin instructions:   7.5 mg every Tue; 5 mg all other days   Weekly warfarin total:   37.5 mg   No change documented:   Larisa Monae RN   Plan last modified:   Larisa Monae RN (2018)   Next INR check:   2/15/2019   Target end date:   Indefinite    Indications    Atrial fibrillation (H) [I48.91] [I48.91]  Long-term (current) use of anticoagulants [Z79.01] [Z79.01]             Anticoagulation Episode Summary     INR check location:       Preferred lab:       Send INR reminders to:   [facility] HEART INR NURSE    Comments:         Anticoagulation Care Providers     Provider Role Specialty Phone number    Ip, Tr Carrera MD Responsible Cardiology [phone]            See the Encounter Report to view Anticoagulation Flowsheet and Dosing Calendar (Go to Encounters tab in chart review, and find the Anticoagulation Therapy Visit)    INR 2.1.  NO changes.  Continue same schedule and recheck in 4 weeks.  Mike Mnoae RN

## 2019-02-15 ENCOUNTER — ANTICOAGULATION THERAPY VISIT (OUTPATIENT)
Dept: CARDIOLOGY | Facility: CLINIC | Age: 84
End: 2019-02-15
Payer: MEDICARE

## 2019-02-15 DIAGNOSIS — I48.91 ATRIAL FIBRILLATION, UNSPECIFIED TYPE (H): ICD-10-CM

## 2019-02-15 LAB — INR POINT OF CARE: 3.7 (ref 0.86–1.14)

## 2019-02-15 PROCEDURE — 85610 PROTHROMBIN TIME: CPT | Mod: QW | Performed by: INTERNAL MEDICINE

## 2019-02-15 PROCEDURE — 36416 COLLJ CAPILLARY BLOOD SPEC: CPT | Performed by: INTERNAL MEDICINE

## 2019-02-15 NOTE — PROGRESS NOTES
ANTICOAGULATION FOLLOW-UP CLINIC VISIT    Patient Name:  Veronica Bray  Date:  2/15/2019  Contact Type:  Face to Face    SUBJECTIVE:     Patient Findings     Positives:   No Problem Findings           OBJECTIVE    INR Protime   Date Value Ref Range Status   02/15/2019 3.7 (A) 0.86 - 1.14 Final       ASSESSMENT / PLAN  INR assessment SUPRA    Recheck INR In: 4 WEEKS    INR Location Clinic      Anticoagulation Summary  As of 2/15/2019    INR goal:   2.0-3.0   TTR:   77.2 % (1.9 y)   INR used for dosing:   3.7! (2/15/2019)   Warfarin maintenance plan:   7.5 mg (5 mg x 1.5) every Tue; 5 mg (5 mg x 1) all other days   Full warfarin instructions:   2/15: Hold; Otherwise 7.5 mg every Tue; 5 mg all other days   Weekly warfarin total:   37.5 mg   Plan last modified:   Larisa Monae RN (6/26/2018)   Next INR check:   3/15/2019   Target end date:   Indefinite    Indications    Atrial fibrillation (H) [I48.91] [I48.91]  Long-term (current) use of anticoagulants [Z79.01] [Z79.01]             Anticoagulation Episode Summary     INR check location:       Preferred lab:       Send INR reminders to:   [facility] HEART INR NURSE    Comments:         Anticoagulation Care Providers     Provider Role Specialty Phone number    Ip, Tr Carrera MD Responsible Cardiology [phone]            See the Encounter Report to view Anticoagulation Flowsheet and Dosing Calendar (Go to Encounters tab in chart review, and find the Anticoagulation Therapy Visit)    INR 3.7.  Less greens recently.  She is also taking 2 tylenol about daily.  No bleeding.  Hold dose today x1 then back to normal schedule and recheck in 4 weeks since she refused to come back any sooner since transportation is an issue.  She will be going to the grocery store today for veggies.  Watch to see if 5mg/day is needed if she continues the daily tylenol.  Mike Monae, RN

## 2019-02-18 ENCOUNTER — DOCUMENTATION ONLY (OUTPATIENT)
Dept: OTHER | Facility: CLINIC | Age: 84
End: 2019-02-18

## 2019-02-26 ENCOUNTER — TELEPHONE (OUTPATIENT)
Dept: INTERNAL MEDICINE | Facility: CLINIC | Age: 84
End: 2019-02-26

## 2019-02-26 NOTE — TELEPHONE ENCOUNTER
Form received at the  for [employer] for review and signature.  Put in Dr. Mccall's in basket.

## 2019-03-15 ENCOUNTER — ANTICOAGULATION THERAPY VISIT (OUTPATIENT)
Dept: CARDIOLOGY | Facility: CLINIC | Age: 84
End: 2019-03-15
Payer: MEDICARE

## 2019-03-15 DIAGNOSIS — Z79.01 LONG TERM CURRENT USE OF ANTICOAGULANT THERAPY: ICD-10-CM

## 2019-03-15 DIAGNOSIS — I48.91 ATRIAL FIBRILLATION, UNSPECIFIED TYPE (H): ICD-10-CM

## 2019-03-15 LAB — INR POINT OF CARE: 3.2 (ref 0.86–1.14)

## 2019-03-15 PROCEDURE — 85610 PROTHROMBIN TIME: CPT | Mod: QW | Performed by: INTERNAL MEDICINE

## 2019-03-15 PROCEDURE — 36416 COLLJ CAPILLARY BLOOD SPEC: CPT | Performed by: INTERNAL MEDICINE

## 2019-03-15 RX ORDER — WARFARIN SODIUM 5 MG/1
TABLET ORAL
Qty: 100 TABLET | Refills: 1 | Status: SHIPPED | OUTPATIENT
Start: 2019-03-15 | End: 2019-09-20

## 2019-03-15 NOTE — PROGRESS NOTES
ANTICOAGULATION FOLLOW-UP CLINIC VISIT    Patient Name:  Veronica Bray  Date:  3/15/2019  Contact Type:  Face to Face    SUBJECTIVE:     Patient Findings            OBJECTIVE    INR Protime   Date Value Ref Range Status   03/15/2019 3.2 (A) 0.86 - 1.14 Final       ASSESSMENT / PLAN  INR assessment SUPRA    Recheck INR In: 3 WEEKS    INR Location Clinic      Anticoagulation Summary  As of 3/15/2019    INR goal:   2.0-3.0   TTR:   74.3 % (2 y)   INR used for dosing:   3.2! (3/15/2019)   Warfarin maintenance plan:   5 mg (5 mg x 1) every day   Full warfarin instructions:   5 mg every day   Weekly warfarin total:   35 mg   Plan last modified:   Larisa Monae RN (3/15/2019)   Next INR check:   4/5/2019   Target end date:   Indefinite    Indications    Atrial fibrillation (H) [I48.91] [I48.91]  Long-term (current) use of anticoagulants [Z79.01] [Z79.01]             Anticoagulation Episode Summary     INR check location:       Preferred lab:       Send INR reminders to:   [facility] HEART INR NURSE    Comments:         Anticoagulation Care Providers     Provider Role Specialty Phone number    Ip, Tr Carrera MD Responsible Cardiology [phone]            See the Encounter Report to view Anticoagulation Flowsheet and Dosing Calendar (Go to Encounters tab in chart review, and find the Anticoagulation Therapy Visit)    INR 3.2.  Eating less salads and still taking tylenol about daily.  Decrease dosing by 2.5mg overall to 5mg/day and recheck in 3 weeks since patient wouldn't come back any sooner.  No signs of bleeding.  She needs refill sent to her mail order pharmacy.  Mike Monae, RN

## 2019-04-05 ENCOUNTER — ANTICOAGULATION THERAPY VISIT (OUTPATIENT)
Dept: CARDIOLOGY | Facility: CLINIC | Age: 84
End: 2019-04-05
Payer: MEDICARE

## 2019-04-05 DIAGNOSIS — Z79.01 LONG TERM CURRENT USE OF ANTICOAGULANT THERAPY: Primary | ICD-10-CM

## 2019-04-05 DIAGNOSIS — I48.91 ATRIAL FIBRILLATION, UNSPECIFIED TYPE (H): ICD-10-CM

## 2019-04-05 LAB — INR POINT OF CARE: 2.2 (ref 0.86–1.14)

## 2019-04-05 PROCEDURE — 36416 COLLJ CAPILLARY BLOOD SPEC: CPT | Performed by: INTERNAL MEDICINE

## 2019-04-05 PROCEDURE — 85610 PROTHROMBIN TIME: CPT | Mod: QW | Performed by: INTERNAL MEDICINE

## 2019-04-05 NOTE — PROGRESS NOTES
ANTICOAGULATION FOLLOW-UP CLINIC VISIT    Patient Name:  Veronica Bray  Date:  2019  Contact Type:  Face to Face    SUBJECTIVE:     Patient Findings            OBJECTIVE    INR Protime   Date Value Ref Range Status   2019 2.2 (A) 0.86 - 1.14 Final       ASSESSMENT / PLAN  INR assessment THER    Recheck INR In: 4 WEEKS    INR Location Clinic      Anticoagulation Summary  As of 2019    INR goal:   2.0-3.0   TTR:   74.4 % (2.1 y)   INR used for dosin.2 (2019)   Warfarin maintenance plan:   5 mg (5 mg x 1) every day   Full warfarin instructions:   5 mg every day   Weekly warfarin total:   35 mg   No change documented:   Larisa Monae RN   Plan last modified:   Larisa Monae RN (3/15/2019)   Next INR check:   5/3/2019   Target end date:   Indefinite    Indications    Atrial fibrillation (H) [I48.91] [I48.91]  Long-term (current) use of anticoagulants [Z79.01] [Z79.01]             Anticoagulation Episode Summary     INR check location:       Preferred lab:       Send INR reminders to:   [facility] HEART INR NURSE    Comments:         Anticoagulation Care Providers     Provider Role Specialty Phone number    Ip, Tr Morris Carrera MD Responsible Cardiology [phone]            See the Encounter Report to view Anticoagulation Flowsheet and Dosing Calendar (Go to Encounters tab in chart review, and find the Anticoagulation Therapy Visit)    INR 2.2.  Dosing was decreased to 5mg/day last time and now back in range.  Continue that schedule and recheck in 4 weeks.  Mike Monae, RN

## 2019-05-03 ENCOUNTER — ANTICOAGULATION THERAPY VISIT (OUTPATIENT)
Dept: CARDIOLOGY | Facility: CLINIC | Age: 84
End: 2019-05-03
Payer: MEDICARE

## 2019-05-03 DIAGNOSIS — I48.91 ATRIAL FIBRILLATION, UNSPECIFIED TYPE (H): ICD-10-CM

## 2019-05-03 LAB — INR POINT OF CARE: 1.6 (ref 0.86–1.14)

## 2019-05-03 PROCEDURE — 85610 PROTHROMBIN TIME: CPT | Mod: QW | Performed by: INTERNAL MEDICINE

## 2019-05-03 PROCEDURE — 36416 COLLJ CAPILLARY BLOOD SPEC: CPT | Performed by: INTERNAL MEDICINE

## 2019-05-03 NOTE — PROGRESS NOTES
ANTICOAGULATION FOLLOW-UP CLINIC VISIT    Patient Name:  Veronica Bray  Date:  5/3/2019  Contact Type:  Face to Face    SUBJECTIVE:     Patient Findings            OBJECTIVE    INR Protime   Date Value Ref Range Status   2019 1.6 (A) 0.86 - 1.14 Final       ASSESSMENT / PLAN  INR assessment SUB    Recheck INR In: 2 WEEKS    INR Location Clinic      Anticoagulation Summary  As of 5/3/2019    INR goal:   2.0-3.0   TTR:   72.9 % (2.1 y)   INR used for dosin.6! (5/3/2019)   Warfarin maintenance plan:   7.5 mg (5 mg x 1.5) every Fri; 5 mg (5 mg x 1) all other days   Full warfarin instructions:   5/3: 7.5 mg; Otherwise 7.5 mg every Fri; 5 mg all other days   Weekly warfarin total:   37.5 mg   Plan last modified:   Larisa Monae RN (5/3/2019)   Next INR check:   2019   Target end date:   Indefinite    Indications    Atrial fibrillation (H) [I48.91] [I48.91]  Long-term (current) use of anticoagulants [Z79.01] [Z79.01]             Anticoagulation Episode Summary     INR check location:       Preferred lab:       Send INR reminders to:   [facility] HEART INR NURSE    Comments:         Anticoagulation Care Providers     Provider Role Specialty Phone number    Ip, Tr Morris Carrera MD [facility] Cardiology [phone]            See the Encounter Report to view Anticoagulation Flowsheet and Dosing Calendar (Go to Encounters tab in chart review, and find the Anticoagulation Therapy Visit)    INR 1.6.  NO missed doses.  She ate broccoli 2x and salad recently.  We will increase dosing back to 1 day of 7.5mg and recheck in about 2 weeks.  Mike Monae, RN

## 2019-05-21 ENCOUNTER — OFFICE VISIT (OUTPATIENT)
Dept: INTERNAL MEDICINE | Facility: CLINIC | Age: 84
End: 2019-05-21
Payer: MEDICARE

## 2019-05-21 ENCOUNTER — ANTICOAGULATION THERAPY VISIT (OUTPATIENT)
Dept: CARDIOLOGY | Facility: CLINIC | Age: 84
End: 2019-05-21
Payer: MEDICARE

## 2019-05-21 VITALS
HEIGHT: 61 IN | TEMPERATURE: 97.6 F | WEIGHT: 147.9 LBS | RESPIRATION RATE: 16 BRPM | BODY MASS INDEX: 27.93 KG/M2 | OXYGEN SATURATION: 93 % | SYSTOLIC BLOOD PRESSURE: 138 MMHG | HEART RATE: 68 BPM | DIASTOLIC BLOOD PRESSURE: 70 MMHG

## 2019-05-21 DIAGNOSIS — I48.91 ATRIAL FIBRILLATION, UNSPECIFIED TYPE (H): ICD-10-CM

## 2019-05-21 DIAGNOSIS — R30.0 DYSURIA: Primary | ICD-10-CM

## 2019-05-21 LAB
ALBUMIN UR-MCNC: NEGATIVE MG/DL
APPEARANCE UR: CLEAR
BILIRUB UR QL STRIP: NEGATIVE
COLOR UR AUTO: YELLOW
GLUCOSE UR STRIP-MCNC: NEGATIVE MG/DL
HGB UR QL STRIP: ABNORMAL
INR POINT OF CARE: 3.3 (ref 0.86–1.14)
KETONES UR STRIP-MCNC: NEGATIVE MG/DL
LEUKOCYTE ESTERASE UR QL STRIP: ABNORMAL
NITRATE UR QL: NEGATIVE
PH UR STRIP: 7 PH (ref 5–7)
RBC #/AREA URNS AUTO: NORMAL /HPF
SOURCE: ABNORMAL
SP GR UR STRIP: 1.01 (ref 1–1.03)
UROBILINOGEN UR STRIP-ACNC: 0.2 EU/DL (ref 0.2–1)
WBC #/AREA URNS AUTO: NORMAL /HPF

## 2019-05-21 PROCEDURE — 81001 URINALYSIS AUTO W/SCOPE: CPT | Performed by: NURSE PRACTITIONER

## 2019-05-21 PROCEDURE — 36416 COLLJ CAPILLARY BLOOD SPEC: CPT | Performed by: INTERNAL MEDICINE

## 2019-05-21 PROCEDURE — 85610 PROTHROMBIN TIME: CPT | Mod: QW | Performed by: INTERNAL MEDICINE

## 2019-05-21 PROCEDURE — 99213 OFFICE O/P EST LOW 20 MIN: CPT | Performed by: NURSE PRACTITIONER

## 2019-05-21 ASSESSMENT — MIFFLIN-ST. JEOR: SCORE: 1043.25

## 2019-05-21 NOTE — PROGRESS NOTES
Subjective     Veronica Bray is a 87 year old female who presents to clinic today for the following health issues:    HPI   URINARY TRACT SYMPTOMS      Duration:  1 day    Description  dysuria    Intensity:  mild    Accompanying signs and symptoms:  Fever/chills: no   Flank pain no   Nausea and vomiting: no   Vaginal symptoms: none  Abdominal/Pelvic Pain: no     History  History of frequent UTI's: no   History of kidney stones: no   Sexually Active: no   Possibility of pregnancy: No    Precipitating or alleviating factors: None    Therapies tried and outcome: increase fluid intake       {  Patient Active Problem List   Diagnosis     LBBB (left bundle branch block)     PAF (paroxysmal atrial fibrillation) (H)     SVT (supraventricular tachycardia) (H)     Atrial flutter (H)     Mitral regurgitation     Hyperlipidemia     Atrial fibrillation (H) [I48.91]     Long-term (current) use of anticoagulants [Z79.01]     Thrombocytopenia, unspecified (H)     Intermittent asthma without complication     Essential hypertension     Other hyperlipidemia     Past Surgical History:   Procedure Laterality Date     CHOLECYSTECTOMY       CORONARY ANGIOGRAPHY ADULT ORDER  1/2005    Stent: Distal cfx, Mid LAD, Stent: D -1     GYN SURGERY      hysterectomy     HC LEFT HEART CATHETERIZATION  11/2006    mid distal-anterior/ distal inferior/ Apical Yossi, 85     HC LEFT HEART CATHETERIZATION  5/2015    100% Proximal LAD, patent LIMA-LAD, SVG-D1, LVEDP 13, 10-18 mmHg AV gradient, 3+ MR after PVC     ORTHOPEDIC SURGERY      hip, knee, wrist       Social History     Tobacco Use     Smoking status: Former Smoker     Smokeless tobacco: Never Used     Tobacco comment: quit approx 1967    Substance Use Topics     Alcohol use: Yes     Alcohol/week: 2.4 oz     Types: 4 Glasses of wine per week     Comment: rarely     Family History   Problem Relation Age of Onset     Unknown/Adopted Mother      Myocardial Infarction Mother      Unknown/Adopted Father  "     Myocardial Infarction Brother         Rheumatic fever     Myocardial Infarction Brother      Myocardial Infarction Brother      Heart Disease Brother          Current Outpatient Medications   Medication Sig Dispense Refill     albuterol (PROAIR HFA/PROVENTIL HFA/VENTOLIN HFA) 108 (90 Base) MCG/ACT inhaler Inhale 1-2 puffs into the lungs every 6 hours as needed for shortness of breath / dyspnea or wheezing 1 Inhaler 1     ASPIRIN PO Take 81 mg by mouth every other day        atorvastatin (LIPITOR) 10 MG tablet Take 1 tablet (10 mg) by mouth At Bedtime 90 tablet 1     carvedilol (COREG) 6.25 MG tablet Take 1 tablet (6.25 mg) by mouth 2 times daily (with meals) 180 tablet 1     Fexofenadine HCl (ALLEGRA PO) Take 180 mg by mouth daily        fluticasone (FLONASE) 50 MCG/ACT spray Spray 1 spray into both nostrils daily       lisinopril (PRINIVIL/ZESTRIL) 5 MG tablet Take 1 tablet (5 mg) by mouth daily 90 tablet 1     Multiple Vitamins-Minerals (PRESERVISION AREDS 2) CAPS Take 1 capsule by mouth 2 times daily        warfarin (COUMADIN) 5 MG tablet Take 1 tab daily or as directed per INR Clinic 100 tablet 1     BP Readings from Last 3 Encounters:   05/21/19 138/70   11/06/18 110/78   10/19/18 142/60    Wt Readings from Last 3 Encounters:   05/21/19 67.1 kg (147 lb 14.4 oz)   11/06/18 65.8 kg (145 lb)   10/19/18 66.2 kg (146 lb)                    Reviewed and updated as needed this visit by Provider         Review of Systems   ROS COMP: Constitutional, HEENT, cardiovascular, pulmonary, gi and gu systems are negative, except as otherwise noted.      Objective    /70 (BP Location: Right arm, Patient Position: Sitting, Cuff Size: Adult Regular)   Pulse 68   Temp 97.6  F (36.4  C) (Oral)   Resp 16   Ht 5' 1\" (1.549 m)   Wt 147 lb 14.4 oz (67.1 kg)   SpO2 93%   BMI 27.95 kg/m    Body mass index is 27.95 kg/m .  Physical Exam   GENERAL: alert, no distress, frail and elderly  RESP: lungs clear to auscultation - " "no rales, rhonchi or wheezes  CV: regular rate and rhythm, normal S1 S2, no S3 or S4, no murmur, click or rub, no peripheral edema and peripheral pulses strong  ABDOMEN: soft, nontender, no hepatosplenomegaly, no masses and bowel sounds normal  BACK: no CVA tenderness, no paralumbar tenderness    Results for orders placed or performed in visit on 05/21/19 (from the past 24 hour(s))   UA reflex to Microscopic and Culture   Result Value Ref Range    Color Urine Yellow     Appearance Urine Clear     Glucose Urine Negative NEG^Negative mg/dL    Bilirubin Urine Negative NEG^Negative    Ketones Urine Negative NEG^Negative mg/dL    Specific Gravity Urine 1.015 1.003 - 1.035    Blood Urine Small (A) NEG^Negative    pH Urine 7.0 5.0 - 7.0 pH    Protein Albumin Urine Negative NEG^Negative mg/dL    Urobilinogen Urine 0.2 0.2 - 1.0 EU/dL    Nitrite Urine Negative NEG^Negative    Leukocyte Esterase Urine Trace (A) NEG^Negative    Source Midstream Urine    Urine Microscopic   Result Value Ref Range    WBC Urine 0 - 5 OTO5^0 - 5 /HPF    RBC Urine O - 2 OTO2^O - 2 /HPF           Assessment & Plan       ICD-10-CM    1. Dysuria R30.0 UA reflex to Microscopic and Culture     Urine Microscopic      Push fluids, call prn      BMI:   Estimated body mass index is 27.95 kg/m  as calculated from the following:    Height as of this encounter: 1.549 m (5' 1\").    Weight as of this encounter: 67.1 kg (147 lb 14.4 oz).                 Imelda Duncan NP  [facility]      "

## 2019-05-21 NOTE — PROGRESS NOTES
ANTICOAGULATION FOLLOW-UP CLINIC VISIT    Patient Name:  Veronica Bray  Date:  5/21/2019  Contact Type:  Face to Face    SUBJECTIVE:  Patient Findings     Comments:   Possible UTI and going to her PCP later today        Clinical Outcomes     Negatives:   Major bleeding event, Thromboembolic event, Anticoagulation-related hospital admission, Anticoagulation-related ED visit, Anticoagulation-related fatality    Comments:   Possible UTI and going to her PCP later today           OBJECTIVE    INR Protime   Date Value Ref Range Status   05/21/2019 3.3 (A) 0.86 - 1.14 Final       ASSESSMENT / PLAN  INR assessment SUPRA    Recheck INR In: 2 WEEKS    INR Location Clinic      Anticoagulation Summary  As of 5/21/2019    INR goal:   2.0-3.0   TTR:   72.6 % (2.2 y)   INR used for dosing:   3.3! (5/21/2019)   Warfarin maintenance plan:   5 mg (5 mg x 1) every day   Full warfarin instructions:   5/21: Hold; 5/24: 5 mg; Otherwise 5 mg every day   Weekly warfarin total:   35 mg   Plan last modified:   Larisa Monae RN (5/21/2019)   Next INR check:   6/7/2019   Target end date:   Indefinite    Indications    Atrial fibrillation (H) [I48.91] [I48.91]  Long-term (current) use of anticoagulants [Z79.01] [Z79.01]             Anticoagulation Episode Summary     INR check location:       Preferred lab:       Send INR reminders to:   [facility] HEART INR NURSE    Comments:         Anticoagulation Care Providers     Provider Role Specialty Phone number    Ip, Tr Carrera MD Responsible Cardiology [phone]            See the Encounter Report to view Anticoagulation Flowsheet and Dosing Calendar (Go to Encounters tab in chart review, and find the Anticoagulation Therapy Visit)    INR 3.3.  Dosing had been increased from 5mg/day to 7.5mg on Friday.  She missed a dose early last week of Warfarin.  She is going to her PCP later today for UTI treatment.  Hold Warfarin today x1 then decrease to 5mg/day.  Check chart later to see if  antibiotic is started.  Schedule recheck for 2 weeks or sooner if needed.  Mike Monae RN

## 2019-05-22 ASSESSMENT — ASTHMA QUESTIONNAIRES: ACT_TOTALSCORE: 19

## 2019-06-07 ENCOUNTER — ANCILLARY PROCEDURE (OUTPATIENT)
Dept: GENERAL RADIOLOGY | Facility: CLINIC | Age: 84
End: 2019-06-07
Attending: INTERNAL MEDICINE
Payer: MEDICARE

## 2019-06-07 ENCOUNTER — OFFICE VISIT (OUTPATIENT)
Dept: INTERNAL MEDICINE | Facility: CLINIC | Age: 84
End: 2019-06-07
Payer: MEDICARE

## 2019-06-07 ENCOUNTER — ANTICOAGULATION THERAPY VISIT (OUTPATIENT)
Dept: CARDIOLOGY | Facility: CLINIC | Age: 84
End: 2019-06-07
Payer: MEDICARE

## 2019-06-07 VITALS
TEMPERATURE: 98 F | OXYGEN SATURATION: 92 % | BODY MASS INDEX: 27.56 KG/M2 | WEIGHT: 146 LBS | SYSTOLIC BLOOD PRESSURE: 131 MMHG | DIASTOLIC BLOOD PRESSURE: 65 MMHG | HEIGHT: 61 IN | RESPIRATION RATE: 12 BRPM | HEART RATE: 62 BPM

## 2019-06-07 DIAGNOSIS — E78.5 HYPERLIPIDEMIA, UNSPECIFIED HYPERLIPIDEMIA TYPE: ICD-10-CM

## 2019-06-07 DIAGNOSIS — Z87.01 HISTORY OF PNEUMONIA: Primary | ICD-10-CM

## 2019-06-07 DIAGNOSIS — J45.20 MILD INTERMITTENT ASTHMA WITHOUT COMPLICATION: ICD-10-CM

## 2019-06-07 DIAGNOSIS — R21 RASH: ICD-10-CM

## 2019-06-07 DIAGNOSIS — I48.0 PAF (PAROXYSMAL ATRIAL FIBRILLATION) (H): ICD-10-CM

## 2019-06-07 DIAGNOSIS — I48.91 ATRIAL FIBRILLATION, UNSPECIFIED TYPE (H): ICD-10-CM

## 2019-06-07 DIAGNOSIS — D69.6 THROMBOCYTOPENIA, UNSPECIFIED (H): ICD-10-CM

## 2019-06-07 DIAGNOSIS — Z87.01 HISTORY OF PNEUMONIA: ICD-10-CM

## 2019-06-07 DIAGNOSIS — I10 ESSENTIAL HYPERTENSION: ICD-10-CM

## 2019-06-07 LAB
ALBUMIN SERPL-MCNC: 3.3 G/DL (ref 3.4–5)
ALP SERPL-CCNC: 87 U/L (ref 40–150)
ALT SERPL W P-5'-P-CCNC: 22 U/L (ref 0–50)
ANION GAP SERPL CALCULATED.3IONS-SCNC: 6 MMOL/L (ref 3–14)
AST SERPL W P-5'-P-CCNC: 27 U/L (ref 0–45)
BILIRUB SERPL-MCNC: 0.5 MG/DL (ref 0.2–1.3)
BUN SERPL-MCNC: 12 MG/DL (ref 7–30)
CALCIUM SERPL-MCNC: 8.4 MG/DL (ref 8.5–10.1)
CHLORIDE SERPL-SCNC: 108 MMOL/L (ref 94–109)
CHOLEST SERPL-MCNC: 163 MG/DL
CO2 SERPL-SCNC: 26 MMOL/L (ref 20–32)
CREAT SERPL-MCNC: 0.64 MG/DL (ref 0.52–1.04)
GFR SERPL CREATININE-BSD FRML MDRD: 80 ML/MIN/{1.73_M2}
GLUCOSE SERPL-MCNC: 95 MG/DL (ref 70–99)
HDLC SERPL-MCNC: 56 MG/DL
INR POINT OF CARE: 3.4 (ref 0.86–1.14)
LDLC SERPL CALC-MCNC: 84 MG/DL
NONHDLC SERPL-MCNC: 107 MG/DL
POTASSIUM SERPL-SCNC: 4.6 MMOL/L (ref 3.4–5.3)
PROT SERPL-MCNC: 7.4 G/DL (ref 6.8–8.8)
SODIUM SERPL-SCNC: 140 MMOL/L (ref 133–144)
TRIGL SERPL-MCNC: 115 MG/DL
TSH SERPL DL<=0.005 MIU/L-ACNC: 1.15 MU/L (ref 0.4–4)

## 2019-06-07 PROCEDURE — 80061 LIPID PANEL: CPT | Performed by: INTERNAL MEDICINE

## 2019-06-07 PROCEDURE — 84443 ASSAY THYROID STIM HORMONE: CPT | Performed by: INTERNAL MEDICINE

## 2019-06-07 PROCEDURE — 85610 PROTHROMBIN TIME: CPT | Mod: QW | Performed by: INTERNAL MEDICINE

## 2019-06-07 PROCEDURE — 71046 X-RAY EXAM CHEST 2 VIEWS: CPT

## 2019-06-07 PROCEDURE — 99214 OFFICE O/P EST MOD 30 MIN: CPT | Performed by: INTERNAL MEDICINE

## 2019-06-07 PROCEDURE — 36416 COLLJ CAPILLARY BLOOD SPEC: CPT | Performed by: INTERNAL MEDICINE

## 2019-06-07 PROCEDURE — 80053 COMPREHEN METABOLIC PANEL: CPT | Performed by: INTERNAL MEDICINE

## 2019-06-07 RX ORDER — MUPIROCIN CALCIUM 20 MG/G
CREAM TOPICAL 3 TIMES DAILY
Qty: 15 G | Refills: 0 | Status: ON HOLD | OUTPATIENT
Start: 2019-06-07 | End: 2019-12-13

## 2019-06-07 ASSESSMENT — MIFFLIN-ST. JEOR: SCORE: 1029.63

## 2019-06-07 NOTE — PROGRESS NOTES
Subjective   Patient is here with her .   Veronica Bray is a 88 year old female who presents to clinic today for the following health issues:    HPI   Hyperlipidemia Follow-Up      Are you having any of the following symptoms? (Select all that apply)  Shortness of breath, Increased sweating or nausea with activity and Pain in calves when walking 1-2 blocks    Are you regularly taking any medication or supplement to lower your cholesterol?   Yes- Atorvastatin    Are you having muscle aches or other side effects that you think could be caused by your cholesterol lowering medication?  No      Hypertension Follow-up      Do you check your blood pressure regularly outside of the clinic? No     Are you following a low salt diet? Yes    Are your blood pressures ever more than 140 on the top number (systolic) OR more   than 90 on the bottom number (diastolic), for example 140/90? No    Amount of exercise or physical activity: Do stretches in bed for about 20 minutes.    Problems taking medications regularly: No    Medication side effects: none    Diet: regular (no restrictions)  BP Readings from Last 3 Encounters:   06/07/19 131/65   05/21/19 138/70   11/06/18 110/78   She does not check her blood pressure at home.     Rash   Patient reports that she had a rash around the right side of her mouth for a month. The rash is only on the external part of her mouth. Does not itch and is not painful.    Bruise  Patient has a bruise on the external upper part of her left arm. Notes that she had that for several weeks. Also, she has a bruise on her right shin. Denies any warmth in the area.     Chest x-ray   Patient had a chest x-ray on 10/19/2018 diagnosing pneumonia that she has not followed up. Symptoms have resolved.  IMPRESSION: Sternotomy. Elevation right hemidiaphragm which is new  compared to 3/21/2018. There is some minimal infiltrate or atelectasis  left lung base obscuring the diaphragm as well as a tiny left  "pleural  effusion.    AFIB  Patient is on Coumadin 5 mg. Reports that she experiences SOB intermittently. She follows up with cardiology.     Intermittent asthma w/o complication   Patient notes that she uses albuterol inhaler intermittently.     Other problems...  Patient reports that she gets eye injection b/c of her macular degeneration eye disease. She mentions that she will go in for another injection on July 2, 2019. Patient notes that she was informed by the eye doctor that her eyes were like that b/c of her coumadin medication.     Recent Labs   Lab Test 07/11/18  1010 03/04/18  0948  07/08/17  1025 08/05/16   LDL 86  --   --  83 89   HDL 69  --   --  70 66   TRIG 87  --   --  122 90   ALT 27 28  --  27 12   CR 0.62 0.63   < > 0.60 0.74   GFRESTIMATED >90 90   < > >90  Non  GFR Calc   74   GFRESTBLACK >90 >90   < > >90   GFR Calc   85   POTASSIUM 4.7 4.2   < > 4.0 4.6   TSH  --  0.83  --  1.15 3.180    < > = values in this interval not displayed.        Reviewed and updated as needed this visit by Provider  Meds         Review of Systems   ROS COMP: CONSTITUTIONAL: NEGATIVE for fever, chills, change in weight  RESP: NEGATIVE for significant cough or SOB  CV: NEGATIVE for chest pain, palpitations or peripheral edema  PSYCHIATRIC: NEGATIVE for changes in mood or affect  Skin: rash    The information in this document, created by the medical scribe for me, accurately reflects the services I personally performed and the decisions made by me. I have reviewed and approved this document for accuracy.   June 7, 2019 9:26 AM       Objective    /65 (BP Location: Left arm, Patient Position: Sitting, Cuff Size: Adult Regular)   Pulse 62   Temp 98  F (36.7  C) (Oral)   Resp 12   Ht 1.549 m (5' 1\")   Wt 66.2 kg (146 lb)   SpO2 92%   BMI 27.59 kg/m    Body mass index is 27.59 kg/m .  Physical Exam   GENERAL: healthy, alert and no distress  RESP: lungs clear to auscultation; " "bibasilar crackles appreciated  CV: regular rate and rhythm, normal S1 S2, no S3 or S4, no murmur, click or rub, no peripheral edema and peripheral pulses strong  PSYCH: mentation appears normal, affect normal/bright  Skin: rash near lower lip with skin flaking    Diagnostic Test Results:  Labs reviewed in Epic  No results found for this or any previous visit (from the past 24 hour(s)).        Assessment & Plan     (Z87.01) History of pneumonia  (primary encounter diagnosis)  Comment: Assess for resolution  Plan: XR Chest 2 Views            (D69.6) Thrombocytopenia, unspecified (H)  Comment: bruising  Plan: CBC    (R21) Rash  Comment: near lip  Plan: mupirocin (BACTROBAN) 2 % external cream            (I48.0) PAF (paroxysmal atrial fibrillation) (H)  Comment: Assess.  Plan: TSH with free T4 reflex            (I10) Essential hypertension  Comment: at goal  Plan: Continue with current medication.    (J45.20) Mild intermittent asthma without complication  Comment:at goal  Plan: Continue with current medication.    (E78.5) Hyperlipidemia, unspecified hyperlipidemia type  Comment: Assess  Plan: Comprehensive metabolic panel, Lipid panel         reflex to direct LDL Fasting               BMI:   Estimated body mass index is 27.59 kg/m  as calculated from the following:    Height as of this encounter: 1.549 m (5' 1\").    Weight as of this encounter: 66.2 kg (146 lb).         FUTURE APPOINTMENTS:     Return in about 6 months (around 12/7/2019) for Routine Visit.    The information in this document, created by the medical scribe for me, accurately reflects the services I personally performed and the decisions made by me. I have reviewed and approved this document for accuracy.   June 7, 2019 9:43 AM   Concepcion Mccall MD  [facility]        "

## 2019-06-07 NOTE — PROGRESS NOTES
ANTICOAGULATION FOLLOW-UP CLINIC VISIT    Patient Name:  Veronica Bray  Date:  6/7/2019  Contact Type:  Face to Face    SUBJECTIVE:  Patient Findings     Positives:   Other complaints    Comments:   Blood shot eye after eye injection on Tuesday         Clinical Outcomes     Negatives:   Major bleeding event, Thromboembolic event, Anticoagulation-related hospital admission, Anticoagulation-related ED visit, Anticoagulation-related fatality    Comments:   Blood shot eye after eye injection on Tuesday            OBJECTIVE    INR Protime   Date Value Ref Range Status   06/07/2019 3.4 (A) 0.86 - 1.14 Final       ASSESSMENT / PLAN  INR assessment SUPRA    Recheck INR In: 2 WEEKS    INR Location Clinic      Anticoagulation Summary  As of 6/7/2019    INR goal:   2.0-3.0   TTR:   71.1 % (2.2 y)   INR used for dosing:   3.4! (6/7/2019)   Warfarin maintenance plan:   2.5 mg (5 mg x 0.5) every Wed; 5 mg (5 mg x 1) all other days   Full warfarin instructions:   6/7: Hold; Otherwise 2.5 mg every Wed; 5 mg all other days   Weekly warfarin total:   32.5 mg   Plan last modified:   Larisa Monae RN (6/7/2019)   Next INR check:   6/25/2019   Target end date:   Indefinite    Indications    Atrial fibrillation (H) [I48.91] [I48.91]  Long-term (current) use of anticoagulants [Z79.01] [Z79.01]             Anticoagulation Episode Summary     INR check location:       Preferred lab:       Send INR reminders to:   [facility] HEART INR NURSE    Comments:         Anticoagulation Care Providers     Provider Role Specialty Phone number    Ip, Tr Morris Carrera MD [facility] Cardiology [phone]            See the Encounter Report to view Anticoagulation Flowsheet and Dosing Calendar (Go to Encounters tab in chart review, and find the Anticoagulation Therapy Visit)    INR 3.4.  She had her monthly eye injection on Tuesday and her eye is blood shot.  She just saw her PCP and she mentioned to patient that maybe she should hold her warfarin  for 3 days prior to eye injections.  We discussed decreasing her dose and getting INR closer to 2.0 for the injections would be the first step instead of holding her warfarin for 3 days every month.  Hold warfarin today x1 then decrease by 2.5mg overall and recheck in 2 weeks.  She has a bruised right shin from hitting a grocery cart and it has a little blood blister on the bruise.  She will also try to increase her greens slightly.  She said the DOACs are too expensive and she checked the pricing recently.  Mike Monae, RN

## 2019-06-07 NOTE — LETTER
June 11, 2019      Veronica Bray  [address]   [place] [address]        Dear ,    We are writing to inform you of your test results.  The electrolytes are normal.The kidney function (GFR) is normal.  The liver function tests (AST and ALT) are within normal limits.   The thyroid test (TSH) is within normal limits.The cholesterol is within normal limits.    Resulted Orders   Comprehensive metabolic panel   Result Value Ref Range    Sodium 140 133 - 144 mmol/L    Potassium 4.6 3.4 - 5.3 mmol/L    Chloride 108 94 - 109 mmol/L    Carbon Dioxide 26 20 - 32 mmol/L    Anion Gap 6 3 - 14 mmol/L    Glucose 95 70 - 99 mg/dL    Urea Nitrogen 12 7 - 30 mg/dL    Creatinine 0.64 0.52 - 1.04 mg/dL    GFR Estimate 80 >60 mL/min/[1.73_m2]      Comment:      Non  GFR Calc  Starting 12/18/2018, serum creatinine based estimated GFR (eGFR) will be   calculated using the Chronic Kidney Disease Epidemiology Collaboration   (CKD-EPI) equation.      GFR Estimate If Black >90 >60 mL/min/[1.73_m2]      Comment:       GFR Calc  Starting 12/18/2018, serum creatinine based estimated GFR (eGFR) will be   calculated using the Chronic Kidney Disease Epidemiology Collaboration   (CKD-EPI) equation.      Calcium 8.4 (L) 8.5 - 10.1 mg/dL    Bilirubin Total 0.5 0.2 - 1.3 mg/dL    Albumin 3.3 (L) 3.4 - 5.0 g/dL    Protein Total 7.4 6.8 - 8.8 g/dL    Alkaline Phosphatase 87 40 - 150 U/L    ALT 22 0 - 50 U/L    AST 27 0 - 45 U/L   Lipid panel reflex to direct LDL Fasting   Result Value Ref Range    Cholesterol 163 <200 mg/dL    Triglycerides 115 <150 mg/dL    HDL Cholesterol 56 >49 mg/dL    LDL Cholesterol Calculated 84 <100 mg/dL      Comment:      Desirable:       <100 mg/dl    Non HDL Cholesterol 107 <130 mg/dL   TSH with free T4 reflex   Result Value Ref Range    TSH 1.15 0.40 - 4.00 mU/L       If you have any questions or concerns, please call the clinic at the number listed above.        Sincerely,        Concepcion Mccall MD

## 2019-06-25 ENCOUNTER — ANTICOAGULATION THERAPY VISIT (OUTPATIENT)
Dept: CARDIOLOGY | Facility: CLINIC | Age: 84
End: 2019-06-25
Payer: MEDICARE

## 2019-06-25 DIAGNOSIS — Z79.01 LONG TERM CURRENT USE OF ANTICOAGULANT THERAPY: ICD-10-CM

## 2019-06-25 DIAGNOSIS — I48.91 ATRIAL FIBRILLATION, UNSPECIFIED TYPE (H): ICD-10-CM

## 2019-06-25 LAB — INR POINT OF CARE: 1.8 (ref 0.86–1.14)

## 2019-06-25 PROCEDURE — 85610 PROTHROMBIN TIME: CPT | Mod: QW | Performed by: INTERNAL MEDICINE

## 2019-06-25 PROCEDURE — 36416 COLLJ CAPILLARY BLOOD SPEC: CPT | Performed by: INTERNAL MEDICINE

## 2019-06-25 NOTE — PROGRESS NOTES
ANTICOAGULATION FOLLOW-UP CLINIC VISIT    Patient Name:  Veronica Bray  Date:  2019  Contact Type:  Telephone/ face to face    SUBJECTIVE:  Patient Findings         Clinical Outcomes     Negatives:   Major bleeding event, Thromboembolic event, Anticoagulation-related hospital admission, Anticoagulation-related ED visit, Anticoagulation-related fatality           OBJECTIVE    INR Protime   Date Value Ref Range Status   2019 1.8 (A) 0.86 - 1.14 Final       ASSESSMENT / PLAN  INR assessment SUB    Recheck INR In: 3 WEEKS    INR Location Clinic      Anticoagulation Summary  As of 2019    INR goal:   2.0-3.0   TTR:   70.9 % (2.3 y)   INR used for dosin.8! (2019)   Warfarin maintenance plan:   2.5 mg (5 mg x 0.5) every Wed; 5 mg (5 mg x 1) all other days   Full warfarin instructions:   2.5 mg every Wed; 5 mg all other days   Weekly warfarin total:   32.5 mg   No change documented:   Larisa Monae RN   Plan last modified:   Larisa Monae RN (2019)   Next INR check:   2019   Target end date:   Indefinite    Indications    Atrial fibrillation (H) [I48.91] [I48.91]  Long-term (current) use of anticoagulants [Z79.01] [Z79.01]             Anticoagulation Episode Summary     INR check location:       Preferred lab:       Send INR reminders to:   [facility] HEART INR NURSE    Comments:         Anticoagulation Care Providers     Provider Role Specialty Phone number    Ip, Tr Carrera MD Responsible Cardiology [phone]            See the Encounter Report to view Anticoagulation Flowsheet and Dosing Calendar (Go to Encounters tab in chart review, and find the Anticoagulation Therapy Visit)    INR 1.8.  Dosing was decreased last time since INR was >3 and her eye was blood shot after her monthly eye injection.  She is due for next eye injection in 1 week so we will continue with the lower dosing schedule and not increase dosing at this time to see if that helps prevent bleeding  in her eye.  Recheck in 3 weeks.  Mike Monae, RN

## 2019-07-05 DIAGNOSIS — I10 BENIGN ESSENTIAL HYPERTENSION: ICD-10-CM

## 2019-07-05 DIAGNOSIS — Z95.1 S/P CABG (CORONARY ARTERY BYPASS GRAFT): ICD-10-CM

## 2019-07-05 DIAGNOSIS — I48.92 ATRIAL FLUTTER, PAROXYSMAL (H): ICD-10-CM

## 2019-07-05 DIAGNOSIS — I48.0 PAROXYSMAL ATRIAL FIBRILLATION (H): ICD-10-CM

## 2019-07-05 DIAGNOSIS — E78.49 OTHER HYPERLIPIDEMIA: ICD-10-CM

## 2019-07-05 RX ORDER — CARVEDILOL 6.25 MG/1
6.25 TABLET ORAL 2 TIMES DAILY WITH MEALS
Qty: 180 TABLET | Refills: 1 | Status: SHIPPED | OUTPATIENT
Start: 2019-07-05 | End: 2019-07-08

## 2019-07-05 RX ORDER — ATORVASTATIN CALCIUM 10 MG/1
10 TABLET, FILM COATED ORAL AT BEDTIME
Qty: 90 TABLET | Refills: 1 | Status: SHIPPED | OUTPATIENT
Start: 2019-07-05 | End: 2019-07-08

## 2019-07-05 RX ORDER — LISINOPRIL 5 MG/1
5 TABLET ORAL DAILY
Qty: 90 TABLET | Refills: 1 | Status: SHIPPED | OUTPATIENT
Start: 2019-07-05 | End: 2019-07-08

## 2019-07-05 NOTE — TELEPHONE ENCOUNTER
Lisinopril, carvedilol, atorvastatin  Prescription approved per [facility] Refill Protocol.    Per 6/7/19 office visit note, patient was advised to return in 6 months

## 2019-07-05 NOTE — TELEPHONE ENCOUNTER
"Requested Prescriptions   Pending Prescriptions Disp Refills     atorvastatin (LIPITOR) 10 MG tablet  Last Written Prescription Date:  01/15/19  Last Fill Quantity: 90,  # refills: 1   Last office visit: 6/7/2019 with prescribing provider:  06/07/19   Future Office Visit:   Next 5 appointments (look out 90 days)    Sep 13, 2019  2:15 PM CDT  Return Visit with Tr Benavides MD  [facility] ([facility]) [address]  [place] [id number] [phone]   Sep 23, 2019  9:30 AM CDT  Nurse Only with RI IM NURSE  [facility] [address]  [place] [facility] [place] [id number] [phone]          90 tablet 1     Sig: Take 1 tablet (10 mg) by mouth At Bedtime       Statins Protocol Passed - 7/5/2019  1:13 PM        Passed - LDL on file in past 12 months     Recent Labs   Lab Test 06/07/19  1004   LDL 84             Passed - No abnormal creatine kinase in past 12 months     No lab results found.             Passed - Recent (12 mo) or future (30 days) visit within the authorizing provider's specialty     Patient had office visit in the last 12 months or has a visit in the next 30 days with authorizing provider or within the authorizing provider's specialty.  See \"Patient Info\" tab in inbasket, or \"Choose Columns\" in Meds & Orders section of the refill encounter.              Passed - Medication is active on med list        Passed - Patient is age 18 or older        Passed - No active pregnancy on record        Passed - No positive pregnancy test in past 12 months        carvedilol (COREG) 6.25 MG tablet  Last Written Prescription Date:  01/15/19  Last Fill Quantity: 180,  # refills: 1   Last office visit: 6/7/2019 with prescribing provider:  06/07/19   Future Office Visit:   Next 5 appointments (look out 90 days)    Sep 13, 2019  2:15 PM CDT  Return Visit with Tr Benavides MD  [facility] " "[facility] [place] ([facility]) [address]  [place] [address]  [phone]   Sep 23, 2019  9:30 AM CDT  Nurse Only with RI IM NURSE  [facility] ([facility]) [address]  [place] [id number] [phone]            180 tablet 1     Sig: Take 1 tablet (6.25 mg) by mouth 2 times daily (with meals)       Beta-Blockers Protocol Passed - 7/5/2019  1:13 PM        Passed - Blood pressure under 140/90 in past 12 months     BP Readings from Last 3 Encounters:   06/07/19 131/65   05/21/19 138/70   11/06/18 110/78                 Passed - Patient is age 6 or older        Passed - Recent (12 mo) or future (30 days) visit within the authorizing provider's specialty     Patient had office visit in the last 12 months or has a visit in the next 30 days with authorizing provider or within the authorizing provider's specialty.  See \"Patient Info\" tab in inbasket, or \"Choose Columns\" in Meds & Orders section of the refill encounter.              Passed - Medication is active on med list        lisinopril (PRINIVIL/ZESTRIL) 5 MG tablet  Last Written Prescription Date:  01/15/19  Last Fill Quantity: 90,  # refills: 1   Last office visit: 6/7/2019 with prescribing provider:  06/07/19   Future Office Visit:   Next 5 appointments (look out 90 days)    Sep 13, 2019  2:15 PM CDT  Return Visit with Tr Benavides MD  [facility] ([facility]) [address]  [place] [address]  [phone]   Sep 23, 2019  9:30 AM CDT  Nurse Only with RI IM NURSE  [facility] ([facility]) [address]  [place] [address] [phone]          90 tablet 1     Sig: Take 1 tablet (5 mg) by mouth daily       ACE Inhibitors (Including Combos) Protocol Passed - 7/5/2019  1:13 PM        Passed - Blood pressure under 140/90 in past 12 months     BP " "Readings from Last 3 Encounters:   06/07/19 131/65   05/21/19 138/70   11/06/18 110/78                 Passed - Recent (12 mo) or future (30 days) visit within the authorizing provider's specialty     Patient had office visit in the last 12 months or has a visit in the next 30 days with authorizing provider or within the authorizing provider's specialty.  See \"Patient Info\" tab in inbasket, or \"Choose Columns\" in Meds & Orders section of the refill encounter.              Passed - Medication is active on med list        Passed - Patient is age 18 or older        Passed - No active pregnancy on record        Passed - Normal serum creatinine on file in past 12 months     Recent Labs   Lab Test 06/07/19  1004   CR 0.64             Passed - Normal serum potassium on file in past 12 months     Recent Labs   Lab Test 06/07/19  1004   POTASSIUM 4.6             Passed - No positive pregnancy test within past 12 months          "

## 2019-07-08 ENCOUNTER — TELEPHONE (OUTPATIENT)
Dept: INTERNAL MEDICINE | Facility: CLINIC | Age: 84
End: 2019-07-08

## 2019-07-08 DIAGNOSIS — I10 BENIGN ESSENTIAL HYPERTENSION: ICD-10-CM

## 2019-07-08 DIAGNOSIS — I48.0 PAROXYSMAL ATRIAL FIBRILLATION (H): ICD-10-CM

## 2019-07-08 DIAGNOSIS — I48.92 ATRIAL FLUTTER, PAROXYSMAL (H): ICD-10-CM

## 2019-07-08 DIAGNOSIS — Z95.1 S/P CABG (CORONARY ARTERY BYPASS GRAFT): ICD-10-CM

## 2019-07-08 DIAGNOSIS — E78.49 OTHER HYPERLIPIDEMIA: ICD-10-CM

## 2019-07-08 RX ORDER — LISINOPRIL 5 MG/1
5 TABLET ORAL DAILY
Qty: 90 TABLET | Refills: 1 | Status: SHIPPED | OUTPATIENT
Start: 2019-07-08 | End: 2019-12-09

## 2019-07-08 RX ORDER — CARVEDILOL 6.25 MG/1
6.25 TABLET ORAL 2 TIMES DAILY WITH MEALS
Qty: 180 TABLET | Refills: 1 | Status: SHIPPED | OUTPATIENT
Start: 2019-07-08 | End: 2019-12-09

## 2019-07-08 RX ORDER — ATORVASTATIN CALCIUM 10 MG/1
10 TABLET, FILM COATED ORAL AT BEDTIME
Qty: 90 TABLET | Refills: 1 | Status: SHIPPED | OUTPATIENT
Start: 2019-07-08 | End: 2019-12-09

## 2019-07-08 NOTE — TELEPHONE ENCOUNTER
Patient calls stating she wants the Lisinopril, Lipitor, and Coreg refilled last week sent to [facility] and not to the local [facility].  Patient states they would not transfer.

## 2019-07-16 ENCOUNTER — ANTICOAGULATION THERAPY VISIT (OUTPATIENT)
Dept: CARDIOLOGY | Facility: CLINIC | Age: 84
End: 2019-07-16
Payer: MEDICARE

## 2019-07-16 DIAGNOSIS — I48.91 ATRIAL FIBRILLATION, UNSPECIFIED TYPE (H): ICD-10-CM

## 2019-07-16 DIAGNOSIS — Z79.01 LONG TERM CURRENT USE OF ANTICOAGULANT THERAPY: ICD-10-CM

## 2019-07-16 LAB — INR POINT OF CARE: 2.3 (ref 0.86–1.14)

## 2019-07-16 PROCEDURE — 85610 PROTHROMBIN TIME: CPT | Mod: QW | Performed by: INTERNAL MEDICINE

## 2019-07-16 PROCEDURE — 36416 COLLJ CAPILLARY BLOOD SPEC: CPT | Performed by: INTERNAL MEDICINE

## 2019-07-16 NOTE — PROGRESS NOTES
ANTICOAGULATION FOLLOW-UP CLINIC VISIT    Patient Name:  Veronica Bray  Date:  2019  Contact Type:  Face to Face    SUBJECTIVE:  Patient Findings         Clinical Outcomes     Negatives:   Major bleeding event, Thromboembolic event, Anticoagulation-related hospital admission, Anticoagulation-related ED visit, Anticoagulation-related fatality           OBJECTIVE    INR Protime   Date Value Ref Range Status   2019 2.3 (A) 0.86 - 1.14 Final       ASSESSMENT / PLAN  INR assessment THER    Recheck INR In: 3 WEEKS    INR Location Clinic      Anticoagulation Summary  As of 2019    INR goal:   2.0-3.0   TTR:   70.7 % (2.3 y)   INR used for dosin.3 (2019)   Warfarin maintenance plan:   5 mg (5 mg x 1) every day   Full warfarin instructions:   : 5 mg; : 2.5 mg; Otherwise 5 mg every day   Weekly warfarin total:   35 mg   Plan last modified:   Larisa Monae RN (2019)   Next INR check:   2019   Target end date:   Indefinite    Indications    Atrial fibrillation (H) [I48.91] [I48.91]  Long-term (current) use of anticoagulants [Z79.01] [Z79.01]             Anticoagulation Episode Summary     INR check location:       Preferred lab:       Send INR reminders to:   [facility] HEART INR NURSE    Comments:         Anticoagulation Care Providers     Provider Role Specialty Phone number    Ip, Tr Carrera MD [facility] Cardiology [phone]            See the Encounter Report to view Anticoagulation Flowsheet and Dosing Calendar (Go to Encounters tab in chart review, and find the Anticoagulation Therapy Visit)    INR 2.3.  She had her monthly eye injection 2 weeks ago and she had less bleeding in her eye when keeping her INR on the lower side.  She resumed her dosing of 5mg/day and not the 2.5mg on  and hasn't been eating many greens but INR is down to 2.3.  She will continue the 5mg/day and recheck INR in 3 weeks.  The Friday before the next eye injection on  she will  take 2.5mg instead of 5mg.  Mike Monae, RN

## 2019-07-22 ENCOUNTER — TELEPHONE (OUTPATIENT)
Dept: INTERNAL MEDICINE | Facility: CLINIC | Age: 84
End: 2019-07-22

## 2019-07-22 DIAGNOSIS — K08.9 DENTAL DISORDER: Primary | ICD-10-CM

## 2019-07-22 RX ORDER — AMOXICILLIN 500 MG/1
2000 CAPSULE ORAL ONCE
Qty: 4 CAPSULE | Refills: 0 | Status: ON HOLD | OUTPATIENT
Start: 2019-07-22 | End: 2019-12-13

## 2019-07-22 NOTE — TELEPHONE ENCOUNTER
Pt calling requesting an antibiotic for an upcoming dentis appointment she will be having. She stated this is needed because the patient had a previous knee replacement. Prescription can be sent to pharmacy that has been teed up. Pt can be reached at [phone]. OK to leave a detailed message. Please advise. Thanks.

## 2019-08-06 ENCOUNTER — ANTICOAGULATION THERAPY VISIT (OUTPATIENT)
Dept: CARDIOLOGY | Facility: CLINIC | Age: 84
End: 2019-08-06
Payer: MEDICARE

## 2019-08-06 DIAGNOSIS — Z79.01 LONG TERM CURRENT USE OF ANTICOAGULANT THERAPY: ICD-10-CM

## 2019-08-06 DIAGNOSIS — I48.91 ATRIAL FIBRILLATION, UNSPECIFIED TYPE (H): ICD-10-CM

## 2019-08-06 LAB — INR POINT OF CARE: 1.5 (ref 0.86–1.14)

## 2019-08-06 PROCEDURE — 85610 PROTHROMBIN TIME: CPT | Mod: QW | Performed by: INTERNAL MEDICINE

## 2019-08-06 PROCEDURE — 36416 COLLJ CAPILLARY BLOOD SPEC: CPT | Performed by: INTERNAL MEDICINE

## 2019-08-06 NOTE — PROGRESS NOTES
ANTICOAGULATION FOLLOW-UP CLINIC VISIT    Patient Name:  Veronica Bray  Date:  2019  Contact Type:  Face to Face    SUBJECTIVE:  Patient Findings         Clinical Outcomes     Negatives:   Major bleeding event, Thromboembolic event, Anticoagulation-related hospital admission, Anticoagulation-related ED visit, Anticoagulation-related fatality           OBJECTIVE    INR Protime   Date Value Ref Range Status   2019 1.5 (A) 0.86 - 1.14 Final       ASSESSMENT / PLAN  INR assessment SUB    Recheck INR In: 2 WEEKS    INR Location Clinic      Anticoagulation Summary  As of 2019    INR goal:   2.0-3.0   TTR:   69.9 % (2.4 y)   INR used for dosin.5! (2019)   Warfarin maintenance plan:   5 mg (5 mg x 1) every day   Full warfarin instructions:   : 7.5 mg; Otherwise 5 mg every day   Weekly warfarin total:   35 mg   Plan last modified:   Larisa Monae RN (2019)   Next INR check:   2019   Target end date:   Indefinite    Indications    Atrial fibrillation (H) [I48.91] [I48.91]  Long-term (current) use of anticoagulants [Z79.01] [Z79.01]             Anticoagulation Episode Summary     INR check location:       Preferred lab:       Send INR reminders to:   [facility] HEART INR NURSE    Comments:         Anticoagulation Care Providers     Provider Role Specialty Phone number    Ip, Tr Morris Carrera MD Responsible Cardiology [phone]            See the Encounter Report to view Anticoagulation Flowsheet and Dosing Calendar (Go to Encounters tab in chart review, and find the Anticoagulation Therapy Visit)    INR 1.5.  She had her monthly eye injection on  so she took smaller dose of 2.5mg instead of 5mg on .  She doesn't think she missed any doses but she was traveling and pill box did open up so she isn't 100% sure.  Boost today from 5mg to 7.5mg x1 then back to 5mg/day and recheck in 2 weeks.  Next eye injection is .  Mike Monae RN

## 2019-08-20 ENCOUNTER — ANTICOAGULATION THERAPY VISIT (OUTPATIENT)
Dept: CARDIOLOGY | Facility: CLINIC | Age: 84
End: 2019-08-20
Payer: MEDICARE

## 2019-08-20 DIAGNOSIS — I48.91 ATRIAL FIBRILLATION, UNSPECIFIED TYPE (H): ICD-10-CM

## 2019-08-20 DIAGNOSIS — Z79.01 LONG TERM CURRENT USE OF ANTICOAGULANT THERAPY: ICD-10-CM

## 2019-08-20 LAB — INR POINT OF CARE: 2.2 (ref 0.86–1.14)

## 2019-08-20 PROCEDURE — 36416 COLLJ CAPILLARY BLOOD SPEC: CPT | Performed by: INTERNAL MEDICINE

## 2019-08-20 PROCEDURE — 85610 PROTHROMBIN TIME: CPT | Mod: QW | Performed by: INTERNAL MEDICINE

## 2019-08-20 NOTE — PROGRESS NOTES
ANTICOAGULATION FOLLOW-UP CLINIC VISIT    Patient Name:  Veronica Bray  Date:  2019  Contact Type:  Face to Face    SUBJECTIVE:  Patient Findings         Clinical Outcomes     Negatives:   Major bleeding event, Thromboembolic event, Anticoagulation-related hospital admission, Anticoagulation-related ED visit, Anticoagulation-related fatality           OBJECTIVE    INR Protime   Date Value Ref Range Status   2019 2.2 (A) 0.86 - 1.14 Final       ASSESSMENT / PLAN  INR assessment THER    Recheck INR In: 3 WEEKS    INR Location Clinic      Anticoagulation Summary  As of 2019    INR goal:   2.0-3.0   TTR:   69.2 % (2.4 y)   INR used for dosin.2 (2019)   Warfarin maintenance plan:   5 mg (5 mg x 1) every day   Full warfarin instructions:   5 mg every day   Weekly warfarin total:   35 mg   No change documented:   Larisa Monae RN   Plan last modified:   Larisa Monae RN (2019)   Next INR check:   2019   Target end date:   Indefinite    Indications    Atrial fibrillation (H) [I48.91] [I48.91]  Long-term (current) use of anticoagulants [Z79.01] [Z79.01]             Anticoagulation Episode Summary     INR check location:       Preferred lab:       Send INR reminders to:   [facility] HEART INR NURSE    Comments:         Anticoagulation Care Providers     Provider Role Specialty Phone number    Ip, Tr Carrera MD Responsible Cardiology [phone]            See the Encounter Report to view Anticoagulation Flowsheet and Dosing Calendar (Go to Encounters tab in chart review, and find the Anticoagulation Therapy Visit)    INR 2.2.  Continue same 5mg/day and recheck in about 3 weeks when back to see Dr. Benavides.  Next eye injection is .  She will increase the greens the weekend prior to the eye injection.  Mike Monae RN

## 2019-09-13 ENCOUNTER — OFFICE VISIT (OUTPATIENT)
Dept: CARDIOLOGY | Facility: CLINIC | Age: 84
End: 2019-09-13
Payer: MEDICARE

## 2019-09-13 ENCOUNTER — ANTICOAGULATION THERAPY VISIT (OUTPATIENT)
Dept: CARDIOLOGY | Facility: CLINIC | Age: 84
End: 2019-09-13
Payer: MEDICARE

## 2019-09-13 VITALS
WEIGHT: 148 LBS | DIASTOLIC BLOOD PRESSURE: 64 MMHG | SYSTOLIC BLOOD PRESSURE: 134 MMHG | OXYGEN SATURATION: 98 % | HEIGHT: 61 IN | HEART RATE: 62 BPM | BODY MASS INDEX: 27.94 KG/M2

## 2019-09-13 DIAGNOSIS — I48.0 PAROXYSMAL ATRIAL FIBRILLATION (H): Primary | ICD-10-CM

## 2019-09-13 DIAGNOSIS — Z95.1 S/P CABG (CORONARY ARTERY BYPASS GRAFT): ICD-10-CM

## 2019-09-13 DIAGNOSIS — Z79.01 LONG TERM CURRENT USE OF ANTICOAGULANT THERAPY: ICD-10-CM

## 2019-09-13 DIAGNOSIS — I10 BENIGN ESSENTIAL HYPERTENSION: ICD-10-CM

## 2019-09-13 DIAGNOSIS — I48.91 ATRIAL FIBRILLATION, UNSPECIFIED TYPE (H): ICD-10-CM

## 2019-09-13 LAB — INR POINT OF CARE: 2.7 (ref 0.86–1.14)

## 2019-09-13 PROCEDURE — 99214 OFFICE O/P EST MOD 30 MIN: CPT | Performed by: INTERNAL MEDICINE

## 2019-09-13 PROCEDURE — 36416 COLLJ CAPILLARY BLOOD SPEC: CPT | Performed by: INTERNAL MEDICINE

## 2019-09-13 PROCEDURE — 85610 PROTHROMBIN TIME: CPT | Mod: QW | Performed by: INTERNAL MEDICINE

## 2019-09-13 ASSESSMENT — MIFFLIN-ST. JEOR: SCORE: 1038.44

## 2019-09-13 NOTE — PROGRESS NOTES
ANTICOAGULATION FOLLOW-UP CLINIC VISIT    Patient Name:  Veronica Bray  Date:  2019  Contact Type:  Face to Face    SUBJECTIVE:  Patient Findings         Clinical Outcomes     Negatives:   Major bleeding event, Thromboembolic event, Anticoagulation-related hospital admission, Anticoagulation-related ED visit, Anticoagulation-related fatality           OBJECTIVE    INR Protime   Date Value Ref Range Status   2019 2.7 (A) 0.86 - 1.14 Final       ASSESSMENT / PLAN  INR assessment THER    Recheck INR In: 4 WEEKS    INR Location Clinic      Anticoagulation Summary  As of 2019    INR goal:   2.0-3.0   TTR:   70.0 % (2.5 y)   INR used for dosin.7 (2019)   Warfarin maintenance plan:   5 mg (5 mg x 1) every day   Full warfarin instructions:   5 mg every day   Weekly warfarin total:   35 mg   No change documented:   Larisa Monae RN   Plan last modified:   Larisa Monae RN (2019)   Next INR check:   10/11/2019   Target end date:   Indefinite    Indications    Atrial fibrillation (H) [I48.91] [I48.91]  Long-term (current) use of anticoagulants [Z79.01] [Z79.01]             Anticoagulation Episode Summary     INR check location:       Preferred lab:       Send INR reminders to:   [facility] HEART INR NURSE    Comments:         Anticoagulation Care Providers     Provider Role Specialty Phone number    Tr Benavides MD [facility] Cardiology [phone]            See the Encounter Report to view Anticoagulation Flowsheet and Dosing Calendar (Go to Encounters tab in chart review, and find the Anticoagulation Therapy Visit)    INR 2.7.  She has continued to take tylenol on a daily basis.  She will continue the same dosing schedule and recheck in 4 weeks.  She will increase her greens if the tylenol continues.  It also has helped to prevent bleeding in her eye during her monthly eye injections to increase greens the couple days prior to her injection.  Seeing Dr. Benavides today.  Mike  RN    Larisa Monae, RN
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
## 2019-09-20 DIAGNOSIS — Z79.01 LONG TERM CURRENT USE OF ANTICOAGULANT THERAPY: ICD-10-CM

## 2019-09-20 RX ORDER — WARFARIN SODIUM 5 MG/1
TABLET ORAL
Qty: 100 TABLET | Refills: 1 | Status: SHIPPED | OUTPATIENT
Start: 2019-09-20 | End: 2020-04-22

## 2019-10-15 ENCOUNTER — ANTICOAGULATION THERAPY VISIT (OUTPATIENT)
Dept: CARDIOLOGY | Facility: CLINIC | Age: 84
End: 2019-10-15
Payer: MEDICARE

## 2019-10-15 DIAGNOSIS — I48.21 PERMANENT ATRIAL FIBRILLATION (H): ICD-10-CM

## 2019-10-15 DIAGNOSIS — Z79.01 LONG TERM CURRENT USE OF ANTICOAGULANT THERAPY: ICD-10-CM

## 2019-10-15 LAB — INR POINT OF CARE: 2.8 (ref 0.86–1.14)

## 2019-10-15 PROCEDURE — 36416 COLLJ CAPILLARY BLOOD SPEC: CPT | Performed by: INTERNAL MEDICINE

## 2019-10-15 PROCEDURE — 85610 PROTHROMBIN TIME: CPT | Mod: QW | Performed by: INTERNAL MEDICINE

## 2019-10-15 NOTE — PROGRESS NOTES
ANTICOAGULATION FOLLOW-UP CLINIC VISIT    Patient Name:  Veronica Bray  Date:  10/15/2019  Contact Type:  Face to Face    SUBJECTIVE:  Patient Findings         Clinical Outcomes     Negatives:   Major bleeding event, Thromboembolic event, Anticoagulation-related hospital admission, Anticoagulation-related ED visit, Anticoagulation-related fatality           OBJECTIVE    INR Protime   Date Value Ref Range Status   10/15/2019 2.8 (A) 0.86 - 1.14 Final       ASSESSMENT / PLAN  INR assessment THER    Recheck INR In: 4 WEEKS    INR Location Clinic      Anticoagulation Summary  As of 10/15/2019    INR goal:   2.0-3.0   TTR:   71.0 % (2.6 y)   INR used for dosin.8 (10/15/2019)   Warfarin maintenance plan:   5 mg (5 mg x 1) every day   Full warfarin instructions:   5 mg every day   Weekly warfarin total:   35 mg   No change documented:   Larisa Monae RN   Plan last modified:   Larisa Monae RN (2019)   Next INR check:   2019   Target end date:   Indefinite    Indications    Atrial fibrillation (H) [I48.91] [I48.91]  Long-term (current) use of anticoagulants [Z79.01] [Z79.01]             Anticoagulation Episode Summary     INR check location:       Preferred lab:       Send INR reminders to:   [facility] HEART INR NURSE    Comments:         Anticoagulation Care Providers     Provider Role Specialty Phone number    Ip, Tr Carrera MD Responsible Cardiology [phone]            See the Encounter Report to view Anticoagulation Flowsheet and Dosing Calendar (Go to Encounters tab in chart review, and find the Anticoagulation Therapy Visit)    INR 2.8.  No changes.  No bleeding.  Continue same schedule and recheck in 4 weeks.  Mike Monae RN

## 2019-10-29 ENCOUNTER — TRANSFERRED RECORDS (OUTPATIENT)
Dept: HEALTH INFORMATION MANAGEMENT | Facility: CLINIC | Age: 84
End: 2019-10-29

## 2019-11-12 ENCOUNTER — ANTICOAGULATION THERAPY VISIT (OUTPATIENT)
Dept: CARDIOLOGY | Facility: CLINIC | Age: 84
End: 2019-11-12
Payer: MEDICARE

## 2019-11-12 DIAGNOSIS — Z79.01 LONG TERM CURRENT USE OF ANTICOAGULANT THERAPY: ICD-10-CM

## 2019-11-12 DIAGNOSIS — I48.21 PERMANENT ATRIAL FIBRILLATION (H): ICD-10-CM

## 2019-11-12 LAB — INR POINT OF CARE: 2.2 (ref 0.86–1.14)

## 2019-11-12 PROCEDURE — 36416 COLLJ CAPILLARY BLOOD SPEC: CPT | Performed by: INTERNAL MEDICINE

## 2019-11-12 PROCEDURE — 85610 PROTHROMBIN TIME: CPT | Mod: QW | Performed by: INTERNAL MEDICINE

## 2019-11-12 NOTE — PROGRESS NOTES
ANTICOAGULATION FOLLOW-UP CLINIC VISIT    Patient Name:  Veronica Bray  Date:  2019  Contact Type:  Face to Face    SUBJECTIVE:  Patient Findings         Clinical Outcomes     Negatives:   Major bleeding event, Thromboembolic event, Anticoagulation-related hospital admission, Anticoagulation-related ED visit, Anticoagulation-related fatality           OBJECTIVE    INR Protime   Date Value Ref Range Status   2019 2.2 (A) 0.86 - 1.14 Final       ASSESSMENT / PLAN  INR assessment THER    Recheck INR In: 4 WEEKS    INR Location Clinic      Anticoagulation Summary  As of 2019    INR goal:   2.0-3.0   TTR:   71.9 % (2.7 y)   INR used for dosin.2 (2019)   Warfarin maintenance plan:   5 mg (5 mg x 1) every day   Full warfarin instructions:   5 mg every day   Weekly warfarin total:   35 mg   No change documented:   Larisa Monae RN   Plan last modified:   Larisa Monae RN (2019)   Next INR check:   12/10/2019   Target end date:   Indefinite    Indications    Atrial fibrillation (H) [I48.91] [I48.91]  Long-term (current) use of anticoagulants [Z79.01] [Z79.01]             Anticoagulation Episode Summary     INR check location:       Preferred lab:       Send INR reminders to:   [facility] HEART INR NURSE    Comments:         Anticoagulation Care Providers     Provider Role Specialty Phone number    Ip, Tr Carrera MD Responsible Cardiology [phone]            See the Encounter Report to view Anticoagulation Flowsheet and Dosing Calendar (Go to Encounters tab in chart review, and find the Anticoagulation Therapy Visit)    INR 2.2.  No changes.  Continue same schedule and recheck in 4 weeks.  Mike Monae RN

## 2019-12-09 DIAGNOSIS — I10 BENIGN ESSENTIAL HYPERTENSION: ICD-10-CM

## 2019-12-09 DIAGNOSIS — I48.92 ATRIAL FLUTTER, PAROXYSMAL (H): ICD-10-CM

## 2019-12-09 DIAGNOSIS — Z95.1 S/P CABG (CORONARY ARTERY BYPASS GRAFT): ICD-10-CM

## 2019-12-09 DIAGNOSIS — E78.49 OTHER HYPERLIPIDEMIA: ICD-10-CM

## 2019-12-09 DIAGNOSIS — I48.0 PAROXYSMAL ATRIAL FIBRILLATION (H): ICD-10-CM

## 2019-12-10 ENCOUNTER — ANTICOAGULATION THERAPY VISIT (OUTPATIENT)
Dept: CARDIOLOGY | Facility: CLINIC | Age: 84
End: 2019-12-10
Payer: MEDICARE

## 2019-12-10 DIAGNOSIS — Z79.01 LONG TERM CURRENT USE OF ANTICOAGULANT THERAPY: ICD-10-CM

## 2019-12-10 DIAGNOSIS — I48.0 PAROXYSMAL ATRIAL FIBRILLATION (H): ICD-10-CM

## 2019-12-10 LAB — INR POINT OF CARE: 2.6 (ref 0.86–1.14)

## 2019-12-10 PROCEDURE — 85610 PROTHROMBIN TIME: CPT | Mod: QW | Performed by: INTERNAL MEDICINE

## 2019-12-10 PROCEDURE — 36416 COLLJ CAPILLARY BLOOD SPEC: CPT | Performed by: INTERNAL MEDICINE

## 2019-12-10 NOTE — TELEPHONE ENCOUNTER
"Requested Prescriptions   Pending Prescriptions Disp Refills     atorvastatin (LIPITOR) 10 MG tablet [Pharmacy Med Name: ATORVASTATIN TAB 10MG]    Last Written Prescription Date:  7/8/19  Last Fill Quantity: 90,  # refills: 1   Last office visit: 6/7/2019 with prescribing provider:  Cal   Future Office Visit:   Next 5 appointments (look out 90 days)    Jan 14, 2020 11:00 AM CST  SHORT with Concepcion Mccall MD  [facility] [address]  [facility] [id number] [phone]          90 tablet 1     Sig: TAKE 1 TABLET AT BEDTIME       Statins Protocol Passed - 12/9/2019  1:12 PM        Passed - LDL on file in past 12 months     Recent Labs   Lab Test 06/07/19  1004   LDL 84             Passed - No abnormal creatine kinase in past 12 months     No lab results found.             Passed - Recent (12 mo) or future (30 days) visit within the authorizing provider's specialty     Patient has had an office visit with the authorizing provider or a provider within the authorizing providers department within the previous 12 mos or has a future within next 30 days. See \"Patient Info\" tab in inbasket, or \"Choose Columns\" in Meds & Orders section of the refill encounter.              Passed - Medication is active on med list        Passed - Patient is age 18 or older        Passed - No active pregnancy on record        Passed - No positive pregnancy test in past 12 months        carvedilol (COREG) 6.25 MG tablet [Pharmacy Med Name: CARVEDILOL TAB 6.25MG]    Last Written Prescription Date:  7/8/19  Last Fill Quantity: 180,  # refills: 1   Last office visit: 6/7/2019 with prescribing provider:  Cal    Future Office Visit:   Next 5 appointments (look out 90 days)    Jan 14, 2020 11:00 AM CST  SHORT with Concepcion Mccall MD  [facility] [address]  [facility] [id number] [phone]          180 " "tablet 1     Sig: TAKE 1 TABLET TWICE DAILY  WITH MEALS       Beta-Blockers Protocol Passed - 12/9/2019  1:12 PM        Passed - Blood pressure under 140/90 in past 12 months     BP Readings from Last 3 Encounters:   09/13/19 134/64   06/07/19 131/65   05/21/19 138/70                 Passed - Patient is age 6 or older        Passed - Recent (12 mo) or future (30 days) visit within the authorizing provider's specialty     Patient has had an office visit with the authorizing provider or a provider within the authorizing providers department within the previous 12 mos or has a future within next 30 days. See \"Patient Info\" tab in inbasket, or \"Choose Columns\" in Meds & Orders section of the refill encounter.              Passed - Medication is active on med list        lisinopril (PRINIVIL/ZESTRIL) 5 MG tablet [Pharmacy Med Name: LISINOPRIL TAB 5MG]    Last Written Prescription Date:  7/8/19  Last Fill Quantity: 90,  # refills: 1   Last office visit: 6/7/2019 with prescribing provider:  Cal   Future Office Visit:   Next 5 appointments (look out 90 days)    Jan 14, 2020 11:00 AM CST  SHORT with Concepcion Mccall MD  [facility] ([facility]) [address] [place] [address] [phone]          90 tablet 1     Sig: TAKE 1 TABLET DAILY       ACE Inhibitors (Including Combos) Protocol Passed - 12/9/2019  1:12 PM        Passed - Blood pressure under 140/90 in past 12 months     BP Readings from Last 3 Encounters:   09/13/19 134/64   06/07/19 131/65   05/21/19 138/70                 Passed - Recent (12 mo) or future (30 days) visit within the authorizing provider's specialty     Patient has had an office visit with the authorizing provider or a provider within the authorizing providers department within the previous 12 mos or has a future within next 30 days. See \"Patient Info\" tab in inbasket, or \"Choose Columns\" in Meds & Orders section of the refill encounter.  "             Passed - Medication is active on med list        Passed - Patient is age 18 or older        Passed - No active pregnancy on record        Passed - Normal serum creatinine on file in past 12 months     Recent Labs   Lab Test 06/07/19  1004   CR 0.64             Passed - Normal serum potassium on file in past 12 months     Recent Labs   Lab Test 06/07/19  1004   POTASSIUM 4.6             Passed - No positive pregnancy test within past 12 months

## 2019-12-10 NOTE — PROGRESS NOTES
ANTICOAGULATION FOLLOW-UP CLINIC VISIT    Patient Name:  Veronica Bray  Date:  12/10/2019  Contact Type:  Face to Face    SUBJECTIVE:  Patient Findings         Clinical Outcomes     Negatives:   Major bleeding event, Thromboembolic event, Anticoagulation-related hospital admission, Anticoagulation-related ED visit, Anticoagulation-related fatality           OBJECTIVE    INR Protime   Date Value Ref Range Status   12/10/2019 2.6 (A) 0.86 - 1.14 Final       ASSESSMENT / PLAN  INR assessment THER    Recheck INR In: 4 WEEKS    INR Location Clinic      Anticoagulation Summary  As of 12/10/2019    INR goal:   2.0-3.0   TTR:   65.2 % (1 y)   INR used for dosin.6 (12/10/2019)   Warfarin maintenance plan:   5 mg (5 mg x 1) every day   Full warfarin instructions:   5 mg every day   Weekly warfarin total:   35 mg   No change documented:   Larisa Monae RN   Plan last modified:   Larisa Monae RN (2019)   Next INR check:   1/10/2020   Target end date:   Indefinite    Indications    Atrial fibrillation (H) [I48.91] [I48.91]  Long-term (current) use of anticoagulants [Z79.01] [Z79.01]             Anticoagulation Episode Summary     INR check location:       Preferred lab:       Send INR reminders to:   [facility] HEART INR NURSE    Comments:         Anticoagulation Care Providers     Provider Role Specialty Phone number    Ip, Tr Morris Carrera MD Responsible Cardiology [phone]            See the Encounter Report to view Anticoagulation Flowsheet and Dosing Calendar (Go to Encounters tab in chart review, and find the Anticoagulation Therapy Visit)    INR 2.6.  No changes.  Continue same schedule and recheck in 4 weeks.  Eye injection on .  Mike Monae RN

## 2019-12-11 RX ORDER — LISINOPRIL 5 MG/1
TABLET ORAL
Qty: 90 TABLET | Refills: 0 | Status: SHIPPED | OUTPATIENT
Start: 2019-12-11 | End: 2020-01-14

## 2019-12-11 RX ORDER — ATORVASTATIN CALCIUM 10 MG/1
TABLET, FILM COATED ORAL
Qty: 90 TABLET | Refills: 0 | Status: SHIPPED | OUTPATIENT
Start: 2019-12-11 | End: 2020-01-14

## 2019-12-11 RX ORDER — CARVEDILOL 6.25 MG/1
TABLET ORAL
Qty: 180 TABLET | Refills: 0 | Status: SHIPPED | OUTPATIENT
Start: 2019-12-11 | End: 2020-01-14

## 2019-12-11 NOTE — TELEPHONE ENCOUNTER
Prescription approved per [facility] Refill Protocol.    Next 5 appointments (look out 90 days)    Jan 14, 2020 11:00 AM CST  [facility] [address]  [facility] [id number] [phone]

## 2019-12-13 ENCOUNTER — APPOINTMENT (OUTPATIENT)
Dept: GENERAL RADIOLOGY | Facility: CLINIC | Age: 84
DRG: 563 | End: 2019-12-13
Attending: EMERGENCY MEDICINE
Payer: MEDICARE

## 2019-12-13 ENCOUNTER — HOSPITAL ENCOUNTER (INPATIENT)
Facility: CLINIC | Age: 84
LOS: 2 days | Discharge: HOME HEALTH | DRG: 563 | End: 2019-12-17
Attending: EMERGENCY MEDICINE | Admitting: INTERNAL MEDICINE
Payer: MEDICARE

## 2019-12-13 DIAGNOSIS — S42.292A HUMERAL HEAD FRACTURE, LEFT, CLOSED, INITIAL ENCOUNTER: Primary | ICD-10-CM

## 2019-12-13 DIAGNOSIS — I48.0 PAROXYSMAL ATRIAL FIBRILLATION (H): ICD-10-CM

## 2019-12-13 DIAGNOSIS — S42.215A CLOSED NONDISPLACED FRACTURE OF SURGICAL NECK OF LEFT HUMERUS, UNSPECIFIED FRACTURE MORPHOLOGY, INITIAL ENCOUNTER: ICD-10-CM

## 2019-12-13 PROBLEM — S42.291A HUMERAL HEAD FRACTURE, RIGHT, CLOSED, INITIAL ENCOUNTER: Status: ACTIVE | Noted: 2019-12-13

## 2019-12-13 PROCEDURE — 25000128 H RX IP 250 OP 636: Performed by: EMERGENCY MEDICINE

## 2019-12-13 PROCEDURE — 73060 X-RAY EXAM OF HUMERUS: CPT | Mod: LT

## 2019-12-13 PROCEDURE — 96376 TX/PRO/DX INJ SAME DRUG ADON: CPT

## 2019-12-13 PROCEDURE — 73030 X-RAY EXAM OF SHOULDER: CPT | Mod: LT

## 2019-12-13 PROCEDURE — G0378 HOSPITAL OBSERVATION PER HR: HCPCS

## 2019-12-13 PROCEDURE — 96375 TX/PRO/DX INJ NEW DRUG ADDON: CPT

## 2019-12-13 PROCEDURE — 96374 THER/PROPH/DIAG INJ IV PUSH: CPT

## 2019-12-13 PROCEDURE — 25000132 ZZH RX MED GY IP 250 OP 250 PS 637: Mod: GY | Performed by: PHYSICIAN ASSISTANT

## 2019-12-13 PROCEDURE — 99285 EMERGENCY DEPT VISIT HI MDM: CPT | Mod: 25

## 2019-12-13 PROCEDURE — 99220 ZZC INITIAL OBSERVATION CARE,LEVL III: CPT | Performed by: PHYSICIAN ASSISTANT

## 2019-12-13 RX ORDER — ACETAMINOPHEN 650 MG/1
650 SUPPOSITORY RECTAL EVERY 4 HOURS PRN
Status: DISCONTINUED | OUTPATIENT
Start: 2019-12-13 | End: 2019-12-13

## 2019-12-13 RX ORDER — LISINOPRIL 5 MG/1
5 TABLET ORAL DAILY
Status: DISCONTINUED | OUTPATIENT
Start: 2019-12-14 | End: 2019-12-17 | Stop reason: HOSPADM

## 2019-12-13 RX ORDER — ONDANSETRON 4 MG/1
4 TABLET, ORALLY DISINTEGRATING ORAL EVERY 8 HOURS PRN
Status: DISCONTINUED | OUTPATIENT
Start: 2019-12-13 | End: 2019-12-17 | Stop reason: HOSPADM

## 2019-12-13 RX ORDER — ATORVASTATIN CALCIUM 10 MG/1
10 TABLET, FILM COATED ORAL AT BEDTIME
Status: DISCONTINUED | OUTPATIENT
Start: 2019-12-13 | End: 2019-12-17 | Stop reason: HOSPADM

## 2019-12-13 RX ORDER — MORPHINE SULFATE 4 MG/ML
4 INJECTION, SOLUTION INTRAMUSCULAR; INTRAVENOUS ONCE
Status: COMPLETED | OUTPATIENT
Start: 2019-12-13 | End: 2019-12-13

## 2019-12-13 RX ORDER — PROCHLORPERAZINE 25 MG
12.5 SUPPOSITORY, RECTAL RECTAL EVERY 12 HOURS PRN
Status: DISCONTINUED | OUTPATIENT
Start: 2019-12-13 | End: 2019-12-17 | Stop reason: HOSPADM

## 2019-12-13 RX ORDER — ONDANSETRON 2 MG/ML
4 INJECTION INTRAMUSCULAR; INTRAVENOUS EVERY 8 HOURS PRN
Status: DISCONTINUED | OUTPATIENT
Start: 2019-12-13 | End: 2019-12-17 | Stop reason: HOSPADM

## 2019-12-13 RX ORDER — MORPHINE SULFATE 15 MG/1
15 TABLET ORAL
Status: DISCONTINUED | OUTPATIENT
Start: 2019-12-13 | End: 2019-12-14

## 2019-12-13 RX ORDER — FEXOFENADINE HCL 180 MG/1
180 TABLET ORAL DAILY
Status: DISCONTINUED | OUTPATIENT
Start: 2019-12-14 | End: 2019-12-17 | Stop reason: HOSPADM

## 2019-12-13 RX ORDER — ASPIRIN 81 MG/1
81 TABLET ORAL EVERY OTHER DAY
COMMUNITY

## 2019-12-13 RX ORDER — MORPHINE SULFATE 2 MG/ML
2 INJECTION, SOLUTION INTRAMUSCULAR; INTRAVENOUS ONCE
Status: COMPLETED | OUTPATIENT
Start: 2019-12-13 | End: 2019-12-13

## 2019-12-13 RX ORDER — CARVEDILOL 6.25 MG/1
6.25 TABLET ORAL 2 TIMES DAILY WITH MEALS
Status: DISCONTINUED | OUTPATIENT
Start: 2019-12-14 | End: 2019-12-13

## 2019-12-13 RX ORDER — FEXOFENADINE HCL 180 MG/1
180 TABLET ORAL DAILY
COMMUNITY
End: 2021-03-27

## 2019-12-13 RX ORDER — MORPHINE SULFATE 15 MG/1
15 TABLET, FILM COATED, EXTENDED RELEASE ORAL EVERY 12 HOURS SCHEDULED
Status: DISCONTINUED | OUTPATIENT
Start: 2019-12-13 | End: 2019-12-14

## 2019-12-13 RX ORDER — BISACODYL 10 MG
10 SUPPOSITORY, RECTAL RECTAL DAILY PRN
Status: DISCONTINUED | OUTPATIENT
Start: 2019-12-13 | End: 2019-12-17 | Stop reason: HOSPADM

## 2019-12-13 RX ORDER — ACETAMINOPHEN 500 MG
1000 TABLET ORAL 3 TIMES DAILY
Status: DISCONTINUED | OUTPATIENT
Start: 2019-12-14 | End: 2019-12-17 | Stop reason: HOSPADM

## 2019-12-13 RX ORDER — PROCHLORPERAZINE MALEATE 5 MG
5 TABLET ORAL EVERY 6 HOURS PRN
Status: DISCONTINUED | OUTPATIENT
Start: 2019-12-13 | End: 2019-12-17 | Stop reason: HOSPADM

## 2019-12-13 RX ORDER — ONDANSETRON 2 MG/ML
4 INJECTION INTRAMUSCULAR; INTRAVENOUS EVERY 30 MIN PRN
Status: DISCONTINUED | OUTPATIENT
Start: 2019-12-13 | End: 2019-12-13

## 2019-12-13 RX ORDER — WARFARIN SODIUM 5 MG/1
5 TABLET ORAL ONCE
Status: COMPLETED | OUTPATIENT
Start: 2019-12-13 | End: 2019-12-13

## 2019-12-13 RX ORDER — AMOXICILLIN 250 MG
1 CAPSULE ORAL 2 TIMES DAILY
Status: DISCONTINUED | OUTPATIENT
Start: 2019-12-13 | End: 2019-12-17 | Stop reason: HOSPADM

## 2019-12-13 RX ORDER — ACETAMINOPHEN 325 MG/1
650 TABLET ORAL EVERY 4 HOURS PRN
Status: DISCONTINUED | OUTPATIENT
Start: 2019-12-13 | End: 2019-12-13

## 2019-12-13 RX ORDER — CARVEDILOL 6.25 MG/1
6.25 TABLET ORAL 2 TIMES DAILY WITH MEALS
Status: DISCONTINUED | OUTPATIENT
Start: 2019-12-13 | End: 2019-12-17 | Stop reason: HOSPADM

## 2019-12-13 RX ORDER — AMOXICILLIN 250 MG
2 CAPSULE ORAL 2 TIMES DAILY
Status: DISCONTINUED | OUTPATIENT
Start: 2019-12-13 | End: 2019-12-17 | Stop reason: HOSPADM

## 2019-12-13 RX ORDER — NALOXONE HYDROCHLORIDE 0.4 MG/ML
.1-.4 INJECTION, SOLUTION INTRAMUSCULAR; INTRAVENOUS; SUBCUTANEOUS
Status: DISCONTINUED | OUTPATIENT
Start: 2019-12-13 | End: 2019-12-17 | Stop reason: HOSPADM

## 2019-12-13 RX ORDER — HYDROXYZINE HYDROCHLORIDE 25 MG/1
25 TABLET, FILM COATED ORAL EVERY 4 HOURS PRN
Status: DISCONTINUED | OUTPATIENT
Start: 2019-12-13 | End: 2019-12-17 | Stop reason: HOSPADM

## 2019-12-13 RX ADMIN — MORPHINE SULFATE 2 MG: 2 INJECTION, SOLUTION INTRAMUSCULAR; INTRAVENOUS at 18:33

## 2019-12-13 RX ADMIN — MORPHINE SULFATE 4 MG: 4 INJECTION INTRAVENOUS at 16:17

## 2019-12-13 RX ADMIN — HYDROXYZINE HYDROCHLORIDE 25 MG: 25 TABLET, FILM COATED ORAL at 22:44

## 2019-12-13 RX ADMIN — ACETAMINOPHEN 650 MG: 325 TABLET, FILM COATED ORAL at 22:10

## 2019-12-13 RX ADMIN — ONDANSETRON HYDROCHLORIDE 4 MG: 2 INJECTION, SOLUTION INTRAMUSCULAR; INTRAVENOUS at 15:06

## 2019-12-13 RX ADMIN — ONDANSETRON HYDROCHLORIDE 4 MG: 2 INJECTION, SOLUTION INTRAMUSCULAR; INTRAVENOUS at 18:33

## 2019-12-13 RX ADMIN — WARFARIN SODIUM 5 MG: 5 TABLET ORAL at 22:44

## 2019-12-13 RX ADMIN — CARVEDILOL 6.25 MG: 6.25 TABLET, FILM COATED ORAL at 23:11

## 2019-12-13 RX ADMIN — ATORVASTATIN CALCIUM 10 MG: 10 TABLET, FILM COATED ORAL at 22:44

## 2019-12-13 RX ADMIN — SENNOSIDES AND DOCUSATE SODIUM 1 TABLET: 8.6; 5 TABLET ORAL at 21:23

## 2019-12-13 RX ADMIN — MORPHINE SULFATE 15 MG: 15 TABLET, EXTENDED RELEASE ORAL at 23:11

## 2019-12-13 RX ADMIN — MORPHINE SULFATE 2 MG: 2 INJECTION, SOLUTION INTRAMUSCULAR; INTRAVENOUS at 14:50

## 2019-12-13 ASSESSMENT — ENCOUNTER SYMPTOMS
HEADACHES: 0
NECK PAIN: 0

## 2019-12-13 NOTE — ED NOTES
Bed: ED14  Expected date: 12/13/19  Expected time: 2:04 PM  Means of arrival: Ambulance  Comments:  [id number] 88F fall

## 2019-12-13 NOTE — ED PROVIDER NOTES
History     Chief Complaint:    JULIAN Bray is a right hand dominant 88 year old female on warfarin who presents via EMS from her independent living community with left shoulder pain. The patient has a history of neuropathy and normally ambulates with a cane or walker. She was in a friend's apartment today when she was bumped into.  She caught her foot on a rug after the light impact and fell down onto her left shoulder. She did not hit her head or lose consciousness. The pain in her left shoulder is currently rated as a 8 of 10. The patient denies neck pain and has no orthopedic surgeon locally.     Allergies:  Aleve [Naproxen]  Celebrex [Celecoxib]  Codeine  Demerol [Meperidine]  Hydromorphone  Percocet [Oxycodone-Acetaminophen]  Tramadol  Vicodin [Hydrocodone-Acetaminophen]     Medications:    albuterol inhaler  aspirin  atorvastatin   carvedilol   lisinopril   warfarin     Past Medical History:    Hypertension  Hyperlipidemia  Asthma  History of thrombocytopenia  Paroxysmal atrial fibrillation  Left bundle branch block  SVT  Mitral regurgitation  Chronic ischemic heart disease  Obstructive sleep apnea  coagulation disorder  Coronary atherosclerosis  Osteoarthrosis  Stented coronary artery  Stress incontinence  Macular degeneration  Atrial flutter  Cardiomyopathy  History of NSTEMI    Past Surgical History:    CABG x 2  Cholecystectomy  Coronary angiography  Hysterectomy  Wrist, hip, and knee orthopedic surgery   Hernia repair    Family History:    Mother - MI  Father - heart disease  Brother - MI    Social History:  Negative for current tobacco use - former smoker  Alcohol use: 4 glasses of wine per week  Marital Status:   [2]    Review of Systems   Musculoskeletal: Negative for neck pain.        Left shoulder pain    Neurological: Negative for headaches.   All other systems reviewed and are negative.        Physical Exam     Patient Vitals for the past 24 hrs:   BP Temp Temp src Pulse Heart  Rate Resp SpO2   12/13/19 1710 (!) 149/82 98.5  F (36.9  C) Oral 70 -- 18 96 %   12/13/19 1507 (!) 155/83 -- -- 63 -- -- 95 %   12/13/19 1500 -- -- -- -- -- -- 95 %   12/13/19 1430 (!) 180/74 97.9  F (36.6  C) Oral -- 74 22 96 %        Physical Exam   GEN- alert, cooperative  HEENT- atraumatic, PERRL, EOMI, MMM, oral pharynx without abnormalities, no dental injuries, midface stable, TM's clear bilaterally  NECK- ROM, soft, supple, no midline C spine tenderness to palpation, no abrasions  RESP- CTAB, no w/r/r, chest wall nontender, no crepitus, symmetrical chest wall movement  CV- RRR, no m/r/g  ABD- soft, NT/ND, +BS  MSK- normal ROM in all extremities, pelvis stable to AP and lateral compression, no T and L spinal tenderness in the midline, 5/5 strength in all extremities except LUE. Left shoulder tenderness. Limited ROM due to pain.  NEURO- GCS 15, speech normal, alert, 5/5 strength x 4, sensation to light touch intact in all extremities,  strong bilaterally  SKIN- no rash, no bruising  PSYCH- normal mood      Emergency Department Course     Imaging:  Radiology findings were communicated with the patient who voiced understanding of the findings.    XR left humerus G/E 2 views:  Moderately impacted fracture surgical neck of the humerus.  No other fractures identified, as per radiology.     XR left shoulder G/E 3 views:  Moderately impacted fracture surgical neck of the humerus.  No other fractures identified, as per radiology.      Interventions:  1450 Morphine 2 mg IV  1506 Zofran 4 mg IV  1617 Morphine 4 mg IV     Emergency Department Course:  Past medical records, nursing notes, and vitals reviewed.    1426 I performed an exam of the patient as documented above.     The patient was sent for left humerus and left shoulder x-rays while in the emergency department, results above.     1618 Patient rechecked and updated.      1708 Patient rechecked and updated.     1855 Patient rechecked and updated.     1805: I  consulted with Dr. Traylor of the hospitalist services. She was in agreement to accept the patient for admission.    Findings and plan explained to the Patient who consents to admission. Discussed the patient with Dr. Traylor, who will admit the patient to a obs bed for further monitoring, evaluation, and treatment.    1809 I consulted with oneida Hinton, regarding the patient's history and presentation here in the emergency department. Patient will not need surgery.     Impression & Plan         Medical Decision Making:  Veronica Bray is a 88 year old female who presents to the emergency department today after fall on the left shoulder at a friend's house. She has obvious left shoulder and upper humerus tenderness on evaluation. Otherwise head to toe trauma exam was negative. Patient did have a surgical neck fracture to the left humerus. She is requiring IV morphine for pain control, and she's requiring frequent doses and would require monitoring. I don't believe at this point that's appropriate for discharge and still in significant pain after 3 doses. Patient is admitted to observation for pain control. She comfortable with the plan. Patient's admitted to Ketty Traylor of hospitalist service. Ortho was consulted prior to admission.     Discharge Diagnosis:    ICD-10-CM    1. Closed nondisplaced fracture of surgical neck of left humerus, unspecified fracture morphology, initial encounter S42.215A      Disposition:  Admitted to obs    Scribe Disclosure:  I, Rondasa You, am serving as a scribe at 2:37 PM on 12/13/2019 to document services personally performed by Aleyda Haq MD based on my observations and the provider's statements to me.      Aleyda Haq MD  12/13/19 2127

## 2019-12-13 NOTE — ED TRIAGE NOTES
Presents via EMS from [facility] after sustaining a fall. The patient's friend turned around quickly bumping into her. The patient's foot caught on a rug and she fell landing onto her left shoulder. Denies LOC or hitting head. C-collar in place per EMS protocol. +CMS in [place]. ABC's intact. A&Ox4.

## 2019-12-14 LAB
ANION GAP SERPL CALCULATED.3IONS-SCNC: 3 MMOL/L (ref 3–14)
BUN SERPL-MCNC: 22 MG/DL (ref 7–30)
CALCIUM SERPL-MCNC: 8.3 MG/DL (ref 8.5–10.1)
CHLORIDE SERPL-SCNC: 105 MMOL/L (ref 94–109)
CO2 SERPL-SCNC: 28 MMOL/L (ref 20–32)
CREAT SERPL-MCNC: 0.61 MG/DL (ref 0.52–1.04)
ERYTHROCYTE [DISTWIDTH] IN BLOOD BY AUTOMATED COUNT: 13 % (ref 10–15)
GFR SERPL CREATININE-BSD FRML MDRD: 81 ML/MIN/{1.73_M2}
GLUCOSE SERPL-MCNC: 154 MG/DL (ref 70–99)
HCT VFR BLD AUTO: 40.1 % (ref 35–47)
HGB BLD-MCNC: 12.3 G/DL (ref 11.7–15.7)
INR PPP: 2.61 (ref 0.86–1.14)
MCH RBC QN AUTO: 29.9 PG (ref 26.5–33)
MCHC RBC AUTO-ENTMCNC: 30.7 G/DL (ref 31.5–36.5)
MCV RBC AUTO: 98 FL (ref 78–100)
PLATELET # BLD AUTO: 170 10E9/L (ref 150–450)
POTASSIUM SERPL-SCNC: 4.8 MMOL/L (ref 3.4–5.3)
RBC # BLD AUTO: 4.11 10E12/L (ref 3.8–5.2)
SODIUM SERPL-SCNC: 136 MMOL/L (ref 133–144)
WBC # BLD AUTO: 6.4 10E9/L (ref 4–11)

## 2019-12-14 PROCEDURE — 99225 ZZC SUBSEQUENT OBSERVATION CARE,LEVEL II: CPT | Performed by: INTERNAL MEDICINE

## 2019-12-14 PROCEDURE — 25000132 ZZH RX MED GY IP 250 OP 250 PS 637: Mod: GY | Performed by: PHYSICIAN ASSISTANT

## 2019-12-14 PROCEDURE — 93010 ELECTROCARDIOGRAM REPORT: CPT | Performed by: INTERNAL MEDICINE

## 2019-12-14 PROCEDURE — 99207 ZZC CDG-CHARGE REQUIRED MANUAL ENTRY: CPT | Performed by: INTERNAL MEDICINE

## 2019-12-14 PROCEDURE — 85610 PROTHROMBIN TIME: CPT | Performed by: PHYSICIAN ASSISTANT

## 2019-12-14 PROCEDURE — 25000128 H RX IP 250 OP 636: Performed by: PHYSICIAN ASSISTANT

## 2019-12-14 PROCEDURE — 80048 BASIC METABOLIC PNL TOTAL CA: CPT | Performed by: PHYSICIAN ASSISTANT

## 2019-12-14 PROCEDURE — G0378 HOSPITAL OBSERVATION PER HR: HCPCS

## 2019-12-14 PROCEDURE — 93005 ELECTROCARDIOGRAM TRACING: CPT

## 2019-12-14 PROCEDURE — 25000128 H RX IP 250 OP 636: Performed by: INTERNAL MEDICINE

## 2019-12-14 PROCEDURE — 36415 COLL VENOUS BLD VENIPUNCTURE: CPT | Performed by: PHYSICIAN ASSISTANT

## 2019-12-14 PROCEDURE — 96375 TX/PRO/DX INJ NEW DRUG ADDON: CPT

## 2019-12-14 PROCEDURE — 96376 TX/PRO/DX INJ SAME DRUG ADON: CPT

## 2019-12-14 PROCEDURE — 25000132 ZZH RX MED GY IP 250 OP 250 PS 637: Mod: GY | Performed by: INTERNAL MEDICINE

## 2019-12-14 PROCEDURE — 85027 COMPLETE CBC AUTOMATED: CPT | Performed by: PHYSICIAN ASSISTANT

## 2019-12-14 RX ORDER — MORPHINE SULFATE 2 MG/ML
2 INJECTION, SOLUTION INTRAMUSCULAR; INTRAVENOUS ONCE
Status: COMPLETED | OUTPATIENT
Start: 2019-12-14 | End: 2019-12-14

## 2019-12-14 RX ORDER — WARFARIN SODIUM 5 MG/1
5 TABLET ORAL
Status: COMPLETED | OUTPATIENT
Start: 2019-12-14 | End: 2019-12-14

## 2019-12-14 RX ADMIN — ACETAMINOPHEN 1000 MG: 500 TABLET, FILM COATED ORAL at 13:56

## 2019-12-14 RX ADMIN — MORPHINE SULFATE 15 MG: 15 TABLET ORAL at 01:33

## 2019-12-14 RX ADMIN — LISINOPRIL 5 MG: 5 TABLET ORAL at 08:30

## 2019-12-14 RX ADMIN — NALOXONE HYDROCHLORIDE 0.1 MG: 0.4 INJECTION, SOLUTION INTRAMUSCULAR; INTRAVENOUS; SUBCUTANEOUS at 12:02

## 2019-12-14 RX ADMIN — SENNOSIDES AND DOCUSATE SODIUM 1 TABLET: 8.6; 5 TABLET ORAL at 08:30

## 2019-12-14 RX ADMIN — MORPHINE SULFATE 15 MG: 15 TABLET, EXTENDED RELEASE ORAL at 08:31

## 2019-12-14 RX ADMIN — ACETAMINOPHEN 1000 MG: 500 TABLET, FILM COATED ORAL at 20:09

## 2019-12-14 RX ADMIN — ACETAMINOPHEN 1000 MG: 500 TABLET, FILM COATED ORAL at 08:35

## 2019-12-14 RX ADMIN — ATORVASTATIN CALCIUM 10 MG: 10 TABLET, FILM COATED ORAL at 22:23

## 2019-12-14 RX ADMIN — CARVEDILOL 6.25 MG: 6.25 TABLET, FILM COATED ORAL at 08:30

## 2019-12-14 RX ADMIN — HYDROXYZINE HYDROCHLORIDE 25 MG: 25 TABLET, FILM COATED ORAL at 22:23

## 2019-12-14 RX ADMIN — MORPHINE SULFATE 2 MG: 2 INJECTION, SOLUTION INTRAMUSCULAR; INTRAVENOUS at 05:08

## 2019-12-14 RX ADMIN — WARFARIN SODIUM 5 MG: 5 TABLET ORAL at 20:07

## 2019-12-14 RX ADMIN — FEXOFENADINE HYDROCHLORIDE 180 MG: 180 TABLET ORAL at 08:31

## 2019-12-14 RX ADMIN — MORPHINE SULFATE 15 MG: 15 TABLET ORAL at 06:07

## 2019-12-14 RX ADMIN — SENNOSIDES AND DOCUSATE SODIUM 1 TABLET: 8.6; 5 TABLET ORAL at 20:07

## 2019-12-14 RX ADMIN — HYDROXYZINE HYDROCHLORIDE 25 MG: 25 TABLET, FILM COATED ORAL at 03:54

## 2019-12-14 NOTE — PLAN OF CARE
PRIMARY DIAGNOSIS: Left humeral fx   OUTPATIENT/OBSERVATION GOALS TO BE MET BEFORE DISCHARGE:  1. Stable vital signs Yes  2. Tolerating diet:Yes. Regular   3. Pain controlled with oral pain medications:  Yes  4. Positive bowel sounds:  Yes  5. Voiding without difficulty:  Yes  6. Able to ambulate:  Yes. SBA   7. Provider specific discharge goals met:  Yes    Pt arrived to floor at 1920.  A&Ox4. PRN Atarax and morphine IR given x2 for pain. One time dose of IV morphine given. Ambulating with SBA. Voiding adequately. PT/OT and ortho consults today. Continue to monitor pain and supportive cares.     Discharge Planner Nurse   Safe discharge environment identified: Yes  Barriers to discharge: Yes. Ortho consult          Please review provider order for any additional goals.   Nurse to notify provider when observation goals have been met and patient is ready for discharge.

## 2019-12-14 NOTE — PHARMACY-ANTICOAGULATION SERVICE
Clinical Pharmacy - Warfarin Dosing Consult     Pharmacy has been consulted to manage this patient s warfarin therapy.  Indication: Atrial Fibrillation  Therapy Goal: INR 2-3  Provider/Team: Dr. Myke OROZCO [facility]: [facility] HEART INR NURSE  Warfarin Prior to Admission: Yes  Warfarin PTA Regimen: 5 mg daily  Significant drug interactions: atorvastatin  Recent documented change in oral intake/nutrition: Unknown    INR   Date Value Ref Range Status   12/14/2019 2.61 (H) 0.86 - 1.14 Final     INR Protime   Date Value Ref Range Status   12/10/2019 2.6 (A) 0.86 - 1.14 Final       Recommend warfarin 5 mg today.  Pharmacy will monitor Veronica Bray daily and order warfarin doses to achieve specified goal.  Please contact pharmacy as soon as possible if the warfarin needs to be held for a procedure or if the warfarin goals change.

## 2019-12-14 NOTE — PLAN OF CARE
PRIMARY DIAGNOSIS: Left humeral fx   OUTPATIENT/OBSERVATION GOALS TO BE MET BEFORE DISCHARGE:  1. Stable vital signs Yes  2. Tolerating diet:Yes. Regular   3. Pain controlled with oral pain medications:  Yes  4. Positive bowel sounds:  Yes  5. Voiding without difficulty:  Yes  6. Able to ambulate:  Yes. SBA   7. Provider specific discharge goals met:  Yes    Discharge Planner Nurse   Safe discharge environment identified: Yes  Barriers to discharge: Yes. Ortho consult          Please review provider order for any additional goals.   Nurse to notify provider when observation goals have been met and patient is ready for discharge.

## 2019-12-14 NOTE — PLAN OF CARE
RRT called at 1156. Dr Stringer and RRT team responded. Pt was feeling nauseated and felt funny. Keep closing her eyes. Oxygen saturation checked and she was down to 76% RA. BP 94/41. RR 10.  Oxygen started at 4L. Narcan given. Remote tele started and capnography started. EKG ordered and done. Remote tele SR/SB with BBB. Pt continues to be very sleepy but awakens easily and converses. O2 down to 2L NC at 1500 with Capnography WNL. All morphine pain medication discontinued and only using Tylenol and Ice to LUE.     1515 VSS. 2L NC. Cms intact. Pt still very sleepy. Pt sat in chair for breakfast and BSC to void x1 this morning. Will possibly discharge with  tomorrow if medically stable and pain managed. Will continue to monitor.

## 2019-12-14 NOTE — PROGRESS NOTES
[facility]  Hospitalist Progress Note  Jarek Stringer MD 12/14/19    Reason for Stay (Diagnosis): Left humerus fracture         Assessment and Plan:      Summary of Stay: Veronica Bray is a 88 year old female with complex cardiac history including paroxysmal A. fib/flutter on warfarin, CAD s/p MI and CABG in 2006, mild diastolic CHF, HTN, VALENTIN, HLD, pulmonary HTN, PAD, and carotid stenosis who was admitted to observation on 12/13/2019  following a mechanical fall resulting in a impacted left humerus fracture.  Having significant amount of pain and was on morphine due to her multiple pain medication allergies.  Somnolent and then hypoxic with some apnea secondary to opiates this afternoon which have been discontinued.  Did receive Narcan following an RRT.  Capnography added.  Orthopedic surgery consulted and nonoperative management recommended.  PT and OT consulted.    Problem List/Assessment and Plan:   Left humerus fracture:   mechanical fall.  X-ray of the left shoulder and arm shows a moderately impacted fracture surgical neck of the humerus.  -Orthopedics consulted, nonoperative management  -Scheduled acetaminophen and ice pack every shift  -Was on MS Contin and morphine sulfate due to multiple pain medication allergies, required Narcan and RRT due to apnea.  Stop opiates for now.  If much more alert later on and having pain can retrial morphine sulfate, but no MS Contin  -Capnography  -Nonweightbearing to left upper extremity  -PT/OT/SW consult  -Follow-up with shoulder or trauma specialist in [facility] orthopedics in 2 weeks    Paroxysmal A. fib, a flutter: Chronically on warfarin and INR is therapeutic.  He is on carvedilol as well.  -Continue carvedilol and warfarin, pharmacy to dose warfarin and daily INR    CAD, HTN, HLD, chronic diastolic CHF: Previous CABG in 2006 following MI.  EF from 2000 1855-60%.  Grade 1 diastolic CHF.  PTA medications include aspirin 81 mg daily,  warfarin, atorvastatin 10 mg at bedtime, carvedilol 6.25 mg twice daily, lisinopril 5 mg daily.  -Continue home medications      DVT Prophylaxis: Warfarin  Code Status: Full Code  FEN: Regular diet  Discharge Dispo: TBD.  PT/OT/social work consulted  Estimated Disch Date / # of Days until Disch: Pending improvement in pain control hopefully tomorrow.  Currently observation status as her fracture is nonoperative management        Interval History (Subjective):      Significant pain during the morning despite MS Contin and morphine sulfate.  Progressively somnolent during the day.  RRT called due to hypoxia to 76%.  Evaluated patient at the bedside.  She was waking up to stimuli.  Blood pressure slightly low than above 100 systolic.  Received 0.1 of Narcan and then more alert.  Remains lethargic, but slowly improving.  Capnography added and morphine stopped.  Still having severe left shoulder pain with any movement.  Denies any chest pain or pressure sensation.  Did have some nausea earlier that has resolved.                  Physical Exam:      Last Vital Signs:  /40 (BP Location: Right arm)   Pulse 68   Temp 96  F (35.6  C) (Oral)   Resp 12   SpO2 95%       Intake/Output Summary (Last 24 hours) at 12/14/2019 1502  Last data filed at 12/14/2019 0539  Gross per 24 hour   Intake 240 ml   Output --   Net 240 ml       Constitutional: Somnolent  Eyes: sclera white   HEENT: Dry mucous membranes  Respiratory: Periods of apnea, no focal crackles or wheeze  Cardiovascular: RRR.  1/6 systolic murmur  GI: non-tender, not distended, bowel sounds present  Skin: no rash    Musculoskeletal/extremities: Left arm in sling, pain with any movement, no edema  Neurologic: Somnolent, will awaken to questions and stimuli  Psychiatric: calm          Medications:      All current medications were reviewed with changes reflected in problem list.         Data:      All new lab and imaging data was reviewed.   Labs:  Recent Labs   Lab  12/14/19  0558      POTASSIUM 4.8   CHLORIDE 105   CO2 28   ANIONGAP 3   *   BUN 22   CR 0.61   GFRESTIMATED 81   GFRESTBLACK >90   BARRETT 8.3*     Recent Labs   Lab 12/14/19  0558   WBC 6.4   HGB 12.3   HCT 40.1   MCV 98        Recent Labs   Lab 12/14/19  0558 12/10/19   INR 2.61* 2.6*      Imaging:   Recent Results (from the past 48 hour(s))   XR Shoulder Left G/E 3 Views    Narrative    SHOULDER LEFT THREE OR MORE VIEWS   12/13/2019 3:42 PM     HISTORY: Fall    COMPARISON: None.      Impression    IMPRESSION: Moderately impacted fracture surgical neck of the humerus.  No other fractures identified.    DEEPTI CHOWDHURY MD   XR Humerus Left G/E 2 Views    Narrative    HUMERUS LEFT TWO OR MORE VIEWS   12/13/2019 3:43 PM    HISTORY: Fall    COMPARISON: None.      Impression    IMPRESSION: Moderately impacted fracture surgical neck of the humerus.  No other fractures identified.    MD Jarek HAIRSTON MD

## 2019-12-14 NOTE — PLAN OF CARE
"PRIMARY DIAGNOSIS: \"GENERIC\" NURSING  OUTPATIENT/OBSERVATION GOALS TO BE MET BEFORE DISCHARGE:  ADLs back to baseline: NO, assist of 1 with ADLs    Activity and level of assistance: assist of 1 gait belt, walker/cane    Pain status: Pain managed with MS Contin, scheduled Tylenol, Prn morphine.    Return to near baseline physical activity: NO     Discharge Planner Nurse   Safe discharge environment identified: Yes  Barriers to discharge: Yes, Pain management and assistance with ADLs       Entered by: NOBLE STYLES 12/14/2019 10:03 AM     Please review provider order for any additional goals.   Nurse to notify provider when observation goals have been met and patient is ready for discharge.  "

## 2019-12-14 NOTE — ED NOTES
[facility]  ED Nurse Handoff Report    Veronica Bray is a 88 year old female   ED Chief complaint: Fall  . ED Diagnosis:   Final diagnoses:   Closed nondisplaced fracture of surgical neck of left humerus, unspecified fracture morphology, initial encounter     Allergies:   Allergies   Allergen Reactions     Aleve [Naproxen] Anaphylaxis     Celebrex [Celecoxib]      Codeine      Demerol [Meperidine]      Hydromorphone      Percocet [Oxycodone-Acetaminophen]      Tramadol      Vicodin [Hydrocodone-Acetaminophen]        Code Status: Full Code  Activity level - Baseline/Home:  Independent. Activity Level - Current:   Stand by Assist. Lift room needed: No. Bariatric: No   Needed: No   Isolation: No. Infection: Not Applicable.     Vital Signs:   Vitals:    12/13/19 1430 12/13/19 1500 12/13/19 1507 12/13/19 1710   BP: (!) 180/74  (!) 155/83 (!) 149/82   Pulse:   63 70   Resp: 22   18   Temp: 97.9  F (36.6  C)   98.5  F (36.9  C)   TempSrc: Oral   Oral   SpO2: 96% 95% 95% 96%       Cardiac Rhythm:  ,      Pain level: 0-10 Pain Scale: 9  Patient confused: No. Patient Falls Risk: Yes.   Elimination Status: Has voided   Patient Report - Initial Complaint: Left shoulder pain. Focused Assessment: Left humeral neck fracture. Pain with movement   Tests Performed: Labs Ordered and Resulted from Time of ED Arrival Up to the Time of Departure from the ED - No data to display  .  XR Humerus Left G/E 2 Views   Final Result   IMPRESSION: Moderately impacted fracture surgical neck of the humerus.   No other fractures identified.      DEEPTI CHOWDHURY MD      XR Shoulder Left G/E 3 Views   Final Result   IMPRESSION: Moderately impacted fracture surgical neck of the humerus.   No other fractures identified.      DEEPTI CHOWDHURY MD        . Abnormal Results: Fractured left humerus.   Treatments provided: Splint, morphine, zofran  Family Comments:  at bedside  OBS brochure/video discussed/provided to patient:   Yes  ED Medications:   Medications   ondansetron (ZOFRAN) injection 4 mg (4 mg Intravenous Given 12/13/19 1506)   morphine (PF) injection 2 mg (has no administration in time range)   morphine (PF) injection 2 mg (2 mg Intravenous Given 12/13/19 1450)   morphine (PF) injection 4 mg (4 mg Intravenous Given 12/13/19 1617)     Drips infusing:  No  For the majority of the shift, the patient's behavior Green. Interventions performed were N/A.     Severe Sepsis OR Septic Shock Diagnosis Present: No      ED Nurse Name/Phone Number: Ruddy Auguste RN,   6:24 PM    RECEIVING UNIT ED HANDOFF REVIEW    Above ED Nurse Handoff Report was reviewed: Yes  Reviewed by: Iron Samuels RN on December 13, 2019 at 6:42 PM

## 2019-12-14 NOTE — H&P
[facility]  History and Physical  Hospitalist       Date of Admission:  12/13/2019    Assessment & Plan   Veronica Bray is a 88 year old female with paroxysmal atrial fibrillation/flutter on chronic Coumadin, ASCVD s/p MI and CABG 2006, mild systolic CHF, hypertension, sleep apnea, dyslipidemia, pulmonary hypertension, PAD/carotid stenosis who fell with sudden onset left arm pain and was found to have an impacted left humeral head fracture.      Impacted left humeral head fracture  ED MD spoke to Ortho who believe this is non-surgical and will see patient in AM.  Admitting to Obs for pain control; would admit as Inpatient is going to Surgery.  Patient has multiple medical intolerances including NSAIDs and most analgesics.  Is able to tolerate morphine.  Morphine IS ordered 15 mg q3h PRN pain.  Still having quite a bit of pain and is having nausea from frequent short-acting morhpine so will add low-dose MS contin.  Hydroxyzine available for adjuvant effect.  Continuous pulse ox.  Ortho consult.  ODETTE Mayo.  PT/SW     Paroxysmal afib/flutter on Coumadin  Continue PTA carvedilol.  Had no labs while in ED.  As patient is being managed non-op, continue PTA Coumadin 5 mg tonight.  INR in AM.  CBC in AM to ensure no acute hemoglobin drop that would raise concern of bleeding into shoulder space.      Hypertension  Continue PTA carvedilol and lisinopril.       Mild systolic CHF / G1LVDD  Per TTE 3/2018.  Use fluids with caution.  Continuing home BB, ACE, ASA, and statin.       Dyslipidemia  PTA statin    DVT Prophylaxis: Warfarin  Code Status: Full Code    Disposition: Expected discharge in 1-2 days once pain controlled and safe dispo plan.    Ketty Traylor [facility]    PRIMARY CARE PROVIDER: Concepcion Mccall    CHIEF COMPLAINT  Arm pain    History is obtained from the patient and Epic    HISTORY OF PRESENT ILLNESS  Veronica Bray is a 88 year old female with paroxysmal atrial fibrillation/flutter on chronic  Coumadin, ASCVD s/p MI and CABG 2006, mild systolic CHF, hypertension, sleep apnea, dyslipidemia, pulmonary hypertension, PAD/carotid stenosis who fell with sudden onset left arm pain.  Patient was over at her friend's apartment today when they bumped into each other and she caught her food on the rug, falling on her left side.  She had sudden onset left arm pain.       In the ED, evaluated by Dr Haq.  XR Left Humerus showing moderately impacted fracture surgical neck of the humerus.  ED MD spoke to Ortho on-call who felt this to likely be non-op.  Patient unable to DC home due to pain.  Admission requested for pain control.    Patient seen on the floor where at present time she is resting comfortably.  She has been in her usual state of health leading up to today.  No fevers, chills, shortness of breath, abdominal pain.  Recent INR therapeutic.  Is typically very active within her social [place] at the [employer].  Currently continues to have quite a bit of pain.  Multiple medication intolerances but able to take morphine.  Received multiple doses of IV morphine in the ED but was very nauseated when arrived on the floor.  Continues to have quite a bit of discomfort even at rest.      PAST MEDICAL HISTORY  I have reviewed this patient's medical history and updated it with pertinent information if needed.   Past Medical History:   Diagnosis Date     Atrial flutter (H)      CAD (coronary artery disease)     LIMA graft to the LAD and vein graft to the diagonal are patent.      Cardiomyopathy, unspecified (H)     tachy induced     Hyperlipidemia      Hypertension      LBBB (left bundle branch block)      Mitral regurgitation      NSTEMI (non-ST elevated myocardial infarction) (H)      PAF (paroxysmal atrial fibrillation) (H)     converted on Amiodarone     Sleep apnea     CPAP     SVT (supraventricular tachycardia) (H)      Thrombocytopenia, unspecified (H) 7/13/2017     Tobacco use      mild systolic ChF.  Follows with  Dr Benavides.     G1 LVDD    PAF/flutter on Coumadin    Pulmonary hypertension    Carotid stenosis    Macular degeneration    PAST SURGICAL HISTORY  I have reviewed this patient's surgical history and updated it with pertinent information if needed.  Past Surgical History:   Procedure Laterality Date     CHOLECYSTECTOMY       CORONARY ANGIOGRAPHY ADULT ORDER  1/2005    Stent: Distal cfx, Mid LAD, Stent: D -1     GYN SURGERY      hysterectomy     HC LEFT HEART CATHETERIZATION  11/2006    mid distal-anterior/ distal inferior/ Apical Yossi, 85     HC LEFT HEART CATHETERIZATION  5/2015    100% Proximal LAD, patent LIMA-LAD, SVG-D1, LVEDP 13, 10-18 mmHg AV gradient, 3+ MR after PVC     ORTHOPEDIC SURGERY      hip, knee, wrist       PRIOR TO ADMISSION MEDICATIONS  Prior to Admission Medications   Prescriptions Last Dose Informant Patient Reported? Taking?   ASPIRIN PO   Yes No   Sig: Take 81 mg by mouth every other day    Fexofenadine HCl (ALLEGRA PO)  Self Yes No   Sig: Take 180 mg by mouth daily    Multiple Vitamins-Minerals (PRESERVISION AREDS 2) CAPS   Yes No   Sig: Take 1 capsule by mouth 2 times daily    albuterol (PROAIR HFA/PROVENTIL HFA/VENTOLIN HFA) 108 (90 Base) MCG/ACT inhaler   No No   Sig: Inhale 1-2 puffs into the lungs every 6 hours as needed for shortness of breath / dyspnea or wheezing   Patient not taking: Reported on 9/13/2019   amoxicillin (AMOXIL) 500 MG capsule   No No   Sig: Take 4 capsules (2,000 mg) by mouth once for 1 dose   atorvastatin (LIPITOR) 10 MG tablet   No No   Sig: TAKE 1 TABLET AT BEDTIME   carvedilol (COREG) 6.25 MG tablet   No No   Sig: TAKE 1 TABLET TWICE DAILY  WITH MEALS   fluticasone (FLONASE) 50 MCG/ACT spray  Self Yes No   Sig: Spray 1 spray into both nostrils daily   lisinopril (PRINIVIL/ZESTRIL) 5 MG tablet   No No   Sig: TAKE 1 TABLET DAILY   mupirocin (BACTROBAN) 2 % external cream   No No   Sig: Apply topically 3 times daily   Patient not taking: Reported on 9/13/2019   warfarin  ANTICOAGULANT (COUMADIN) 5 MG tablet   No No   Sig: Take 1 tab daily or as directed per INR Clinic      Facility-Administered Medications: None       ALLERGIES  Allergies   Allergen Reactions     Aleve [Naproxen] Anaphylaxis     Celebrex [Celecoxib]      Codeine      Demerol [Meperidine]      Hydromorphone      Percocet [Oxycodone-Acetaminophen]      Tramadol      Vicodin [Hydrocodone-Acetaminophen]        SOCIAL HISTORY  I have reviewed this patient's social history and updated it with pertinent information if needed.   Patient is .  Lives with her  in independent [facility] living at the [facility]  No living relatives nearby but has a good support system of friends.  Former social smoker, quit many years ago.  Drinks alcohol, 1 glass daily or every other day.  Unable to drive due to macular degeneration but is able to complete ADLs without difficulty.       FAMILY HISTORY  I have reviewed this patient's family history and updated it with pertinent information if needed.   Family History   Problem Relation Age of Onset     Unknown/Adopted Mother      Myocardial Infarction Mother      Unknown/Adopted Father      Myocardial Infarction Brother         Rheumatic fever     Myocardial Infarction Brother      Myocardial Infarction Brother      Heart Disease Brother        REVIEW OF SYSTEMS:  14 point comprehensive ROS undertaken with pertinent positives and negatives as above and otherwise unremarkable.     PHYSICAL EXAM  Temp: 98.5  F (36.9  C) Temp src: Oral BP: (!) 150/69 Pulse: 68 Heart Rate: 74 Resp: 18 SpO2: 97 % O2 Device: None (Room air)    Vital Signs with Ranges  Temp:  [97.9  F (36.6  C)-98.5  F (36.9  C)] 98.5  F (36.9  C)  Pulse:  [63-70] 68  Heart Rate:  [74] 74  Resp:  [18-22] 18  BP: (149-180)/(69-83) 150/69  SpO2:  [95 %-97 %] 97 %  0 lbs 0 oz    GENERAL:  Pleasant, cooperative, alert. Well developed, well nourished.  Appears younger than stated age.   HEENT: Normocephalic, atraumatic.  Extra  occular mm intact.  Sclera clear. PERRL.  Mucous membranes moist.    PULMONARY: Clear to auscultation bilaterally    CARDIAC: Regular rate and rhythm.  No appreciated murmur.     ABDOMEN: Soft, nontender non distended.    MUSCULOSKELETAL:  Moving x 4 spontaneously with CMS intact x4.  Left arm in sling; good ROM of wrist, hand and fingers.    NEURO: Alert and oriented x3.  CN II-XII grossly intact and symmetric.  No ataxia or tremor.  Nonfocal exam.  SKIN:  Warm, pink, dry.    DATA  Data reviewed today:  I personally reviewed the XR humerus image(s) showing impacted humeral head fracture.    Recent Labs   Lab 12/10/19   INR 2.6*       Recent Results (from the past 24 hour(s))   XR Shoulder Left G/E 3 Views    Narrative    SHOULDER LEFT THREE OR MORE VIEWS   12/13/2019 3:42 PM     HISTORY: Fall    COMPARISON: None.      Impression    IMPRESSION: Moderately impacted fracture surgical neck of the humerus.  No other fractures identified.    DEEPTI CHOWDHURY MD   XR Humerus Left G/E 2 Views    Narrative    HUMERUS LEFT TWO OR MORE VIEWS   12/13/2019 3:43 PM    HISTORY: Fall    COMPARISON: None.      Impression    IMPRESSION: Moderately impacted fracture surgical neck of the humerus.  No other fractures identified.    DEEPTI CHOWDHURY MD

## 2019-12-14 NOTE — CONSULTS
[facility]  Orthopaedics/Foot and Ankle Surgery Consultation         Quincy San MD    Veronica Bray MRN# [id number]   YOB: 1931 Age: 88 year old      Date of Admission:  12/13/2019  Date of Consult: 12/14/2019           Assessment and Plan:   88-year-old right-hand-dominant female status post mechanical fall onto her left shoulder with a minimally displaced, impacted left proximal humerus surgical neck fracture.  Nonoperative management is recommended for this fracture pattern.  The patient may utilize a sling when upright but does not require a sling while at rest.  The patient should avoid weightbearing activity with the left upper extremity.  The patient may move the elbow and wrist to limit stiffness and swelling.  Agree with current pain control regimen provided by the hospitalist team in light of the patient's significant narcotic sensitivities.  May continue use of ice or heat as needed.  The patient would be cleared for discharge from an orthopedic standpoint once deemed stable to return to home or to a rehabilitation center.  Follow-up recommendations will be provided in the discharge orders but recommend follow-up with one of our shoulder or trauma specialists in [place] in 2 weeks.  Please contact the orthopedic trauma team with any further questions or concerns during the patient's hospital stay.            Code Status:   Full Code         Primary Care Physician:   Concepcion Mccall [phone]         Requesting Physician:      Daly Traylor PA-C         Chief Complaint:   Left shoulder pain    History is obtained from the patient and medical chart.         History of Present Illness:   Veronica Bray is a 88 year old fe right-hand-dominant female who sustained a mechanical fall onto her left shoulder yesterday.  The patient was touring apartments in her assisted living center when a friend bumped her and she lost her balance and slipped and fell.  The  patient fell onto the left shoulder.  The patient had difficulty getting up and noted immediate pain throughout the shoulder.  The patient was brought to the emergency department where radiographs demonstrated a minimally displaced left surgical neck fracture.  The patient was admitted to the hospitalist service for pain control.  The patient denies any prior history of problems with the left shoulder.  The patient denies any other significant discomfort as a result of her recent fall.  The patient denies numbness or tingling distally in the left hand since the fall.           Past Medical History:     Patient Active Problem List   Diagnosis     LBBB (left bundle branch block)     PAF (paroxysmal atrial fibrillation) (H)     SVT (supraventricular tachycardia) (H)     Atrial flutter (H)     Mitral regurgitation     Hyperlipidemia     Atrial fibrillation (H) [I48.91]     Long-term (current) use of anticoagulants [Z79.01]     Thrombocytopenia, unspecified (H)     Intermittent asthma without complication     Essential hypertension     Other hyperlipidemia     Chronic ischemic heart disease     Coagulation disorder (H)     Coronary atherosclerosis     Hemothorax, left     Macular degeneration (senile) of retina     VALENTIN (obstructive sleep apnea)     Osteoarthrosis     S/P CABG x 2     Senile nuclear sclerosis     Stented coronary artery     Stress incontinence     Humeral head fracture, left, closed, initial encounter      Past Medical History:   Diagnosis Date     Atrial flutter (H)      CAD (coronary artery disease)     LIMA graft to the LAD and vein graft to the diagonal are patent.      Cardiomyopathy, unspecified (H)     tachy induced     Hyperlipidemia      Hypertension      LBBB (left bundle branch block)      Mitral regurgitation      NSTEMI (non-ST elevated myocardial infarction) (H)      PAF (paroxysmal atrial fibrillation) (H)     converted on Amiodarone     Sleep apnea     CPAP     SVT (supraventricular  tachycardia) (H)      Thrombocytopenia, unspecified (H) 7/13/2017     Tobacco use              Past Surgical History:     Past Surgical History:   Procedure Laterality Date     CHOLECYSTECTOMY       CORONARY ANGIOGRAPHY ADULT ORDER  1/2005    Stent: Distal cfx, Mid LAD, Stent: D -1     GYN SURGERY      hysterectomy     HC LEFT HEART CATHETERIZATION  11/2006    mid distal-anterior/ distal inferior/ Apical Yossi, 85     HC LEFT HEART CATHETERIZATION  5/2015    100% Proximal LAD, patent LIMA-LAD, SVG-D1, LVEDP 13, 10-18 mmHg AV gradient, 3+ MR after PVC     ORTHOPEDIC SURGERY      hip, knee, wrist            Home Medications:     Prior to Admission medications    Medication Sig Last Dose Taking? Auth Provider   aspirin 81 MG EC tablet Take 81 mg by mouth every other day  Yes Unknown, Entered By History   atorvastatin (LIPITOR) 10 MG tablet TAKE 1 TABLET AT BEDTIME 12/12/2019 at Unknown time Yes Concepcion Mccall MD   carvedilol (COREG) 6.25 MG tablet TAKE 1 TABLET TWICE DAILY  WITH MEALS 12/13/2019 at am Yes Concepcion Mccall MD   fexofenadine (ALLEGRA) 180 MG tablet Take 180 mg by mouth daily 12/13/2019 at am Yes Unknown, Entered By History   lisinopril (PRINIVIL/ZESTRIL) 5 MG tablet TAKE 1 TABLET DAILY 12/13/2019 at am Yes Concepcion Mccall MD   Multiple Vitamins-Minerals (PRESERVISION AREDS 2) CAPS Take 1 capsule by mouth 2 times daily  12/13/2019 at am Yes Reported, Patient   warfarin ANTICOAGULANT (COUMADIN) 5 MG tablet Take 1 tab daily or as directed per INR Clinic 12/12/2019 at Unknown time Yes Ip, Tr Carrera MD            Current Medications:           acetaminophen  1,000 mg Oral TID     atorvastatin  10 mg Oral At Bedtime     carvedilol  6.25 mg Oral BID w/meals     fexofenadine  180 mg Oral Daily     lisinopril  5 mg Oral Daily     morphine  15 mg Oral Q12H EUNICE     senna-docusate  1 tablet Oral BID    Or     senna-docusate  2 tablet Oral BID     bisacodyl, hydrOXYzine, magnesium hydroxide,  morphine, naloxone, ondansetron **OR** ondansetron, prochlorperazine **OR** prochlorperazine **OR** prochlorperazine, Warfarin Therapy Reminder         Allergies:     Allergies   Allergen Reactions     Aleve [Naproxen] Anaphylaxis     Celebrex [Celecoxib]      Codeine      Demerol [Meperidine]      Hydromorphone      Percocet [Oxycodone-Acetaminophen]      Tramadol      Vicodin [Hydrocodone-Acetaminophen]             Social History:     Social History     Tobacco Use     Smoking status: Former Smoker     Smokeless tobacco: Never Used     Tobacco comment: quit approx 1967    Substance Use Topics     Alcohol use: Yes     Alcohol/week: 4.0 standard drinks     Types: 4 Glasses of wine per week     Comment: rarely             Family History:     Family History   Problem Relation Age of Onset     Unknown/Adopted Mother      Myocardial Infarction Mother      Unknown/Adopted Father      Myocardial Infarction Brother         Rheumatic fever     Myocardial Infarction Brother      Myocardial Infarction Brother      Heart Disease Brother               Review of Systems:   The 10 point Review of Systems is negative other than noted in the HPI            Physical Exam:   Blood pressure 118/67, pulse 68, temperature 96  F (35.6  C), temperature source Oral, resp. rate 16, SpO2 (!) 88 %, not currently breastfeeding.  0 lbs 0 oz    Constitutional:   Awake, alert, cooperative, no apparent distress, and appears stated age.     Lungs:   No increased work of breathing, good air exchange.     Musculoskeletal:   There is limited soft tissue edema around the left shoulder.  Mild ecchymosis is present but there is no significant skin injury around the shoulder.  Palpation directly around the humeral head is irritable with diminished tenderness distally in the arm.  The patient demonstrates active elbow flexion and extension, wrist flexion and extension, thumbs up, A-OK, finger crossing, and finger spreading.  Sensation is fully intact in  superficial radial, median, ulnar, and axillary nerve distributions.  Fingers are all warm and well-perfused with a palpable radial pulse.              Data:   All new lab and imaging data was reviewed.  Radiographs of the left shoulder and humerus obtained in the emergency department yesterday were personally reviewed without limitation and confirm the presence of a minimally displaced, impacted surgical neck fracture.  There is no evidence of other acute osseous injury.  There are no significant glenohumeral degenerative changes.    Results for orders placed or performed during the hospital encounter of 12/13/19 (from the past 24 hour(s))   XR Shoulder Left G/E 3 Views    Narrative    SHOULDER LEFT THREE OR MORE VIEWS   12/13/2019 3:42 PM     HISTORY: Fall    COMPARISON: None.      Impression    IMPRESSION: Moderately impacted fracture surgical neck of the humerus.  No other fractures identified.    DEEPTI CHOWDHURY MD   XR Humerus Left G/E 2 Views    Narrative    HUMERUS LEFT TWO OR MORE VIEWS   12/13/2019 3:43 PM    HISTORY: Fall    COMPARISON: None.      Impression    IMPRESSION: Moderately impacted fracture surgical neck of the humerus.  No other fractures identified.    DEEPTI CHOWDHURY MD   INR   Result Value Ref Range    INR 2.61 (H) 0.86 - 1.14   CBC with platelets   Result Value Ref Range    WBC 6.4 4.0 - 11.0 10e9/L    RBC Count 4.11 3.8 - 5.2 10e12/L    Hemoglobin 12.3 11.7 - 15.7 g/dL    Hematocrit 40.1 35.0 - 47.0 %    MCV 98 78 - 100 fl    MCH 29.9 26.5 - 33.0 pg    MCHC 30.7 (L) 31.5 - 36.5 g/dL    RDW 13.0 10.0 - 15.0 %    Platelet Count 170 150 - 450 10e9/L   Basic metabolic panel   Result Value Ref Range    Sodium 136 133 - 144 mmol/L    Potassium 4.8 3.4 - 5.3 mmol/L    Chloride 105 94 - 109 mmol/L    Carbon Dioxide 28 20 - 32 mmol/L    Anion Gap 3 3 - 14 mmol/L    Glucose 154 (H) 70 - 99 mg/dL    Urea Nitrogen 22 7 - 30 mg/dL    Creatinine 0.61 0.52 - 1.04 mg/dL    GFR Estimate 81 >60 mL/min/[1.73_m2]     GFR Estimate If Black >90 >60 mL/min/[1.73_m2]    Calcium 8.3 (L) 8.5 - 10.1 mg/dL

## 2019-12-14 NOTE — PHARMACY-ADMISSION MEDICATION HISTORY
Admission medication history interview status for this patient is complete. See [facility] admission navigator for allergy information, prior to admission medications and immunization status.     Medication history interview source(s):Patient  Medication history resources (including written lists, pill bottles, clinic record):None  Primary pharmacy:[facility]    Changes made to PTA medication list:  Added: none  Deleted: Ventolin, Flonase, Amoxicillin, Mupirocin  Changed: none    Actions taken by pharmacist (provider contacted, etc):None     Additional medication history information: Patient needs to look at her pill-box to know if she took her Aspirin 81mg today (she takes Aspirin 81mg every other day)     Medication reconciliation/reorder completed by provider prior to medication history?  No     Do you take OTC medications (eg tylenol, ibuprofen, fish oil, eye/ear drops, etc)? Yes     For patients on insulin therapy: No      Prior to Admission medications    Medication Sig Last Dose Taking? Auth Provider   atorvastatin (LIPITOR) 10 MG tablet TAKE 1 TABLET AT BEDTIME 12/12/2019 at Unknown time Yes Concepcion Mccall MD   carvedilol (COREG) 6.25 MG tablet TAKE 1 TABLET TWICE DAILY  WITH MEALS 12/13/2019 at am Yes Concepcion Mccall MD   fexofenadine (ALLEGRA) 180 MG tablet Take 180 mg by mouth daily 12/13/2019 at am Yes Unknown, Entered By History   lisinopril (PRINIVIL/ZESTRIL) 5 MG tablet TAKE 1 TABLET DAILY 12/13/2019 at am Yes Concepcion Mccall MD   Multiple Vitamins-Minerals (PRESERVISION AREDS 2) CAPS Take 1 capsule by mouth 2 times daily  12/13/2019 at am Yes Reported, Patient   warfarin ANTICOAGULANT (COUMADIN) 5 MG tablet Take 1 tab daily or as directed per INR Clinic 12/12/2019 at Unknown time Yes IpTr MD   aspirin 81 MG EC tablet Take 81 mg by mouth every other day She needs her pill-box to confirm if she took it today or yesterday Yes Unknown, Entered By History

## 2019-12-14 NOTE — PLAN OF CARE
PT: Orders received. Pt approached for PT evaluation. Pt fatigued at this time, and attempting to finish breakfast. MD present for part of discussion and identifies that the pt will be staying the night. Given that the pt will be here overnight and is currently quite fatigued, will hold formal PT evaluation at this time.

## 2019-12-14 NOTE — PROGRESS NOTES
SPIRITUAL HEALTH SERVICES Progress Note  [facility] S[facility] 6    Spiritual Health visit requested per routine admission hospital  request.  Rapid Response called for pt and pt sleeping later in the day.  Will advise unit  of attempts and request appropriate follow up as able.    Plan: Spiritual Health Services remains available for additional emotional/spiritual support.    Ortiz Perkins MA  Staff   Pager: [phone]  Phone: [phone]

## 2019-12-15 ENCOUNTER — APPOINTMENT (OUTPATIENT)
Dept: OCCUPATIONAL THERAPY | Facility: CLINIC | Age: 84
DRG: 563 | End: 2019-12-15
Attending: INTERNAL MEDICINE
Payer: MEDICARE

## 2019-12-15 PROBLEM — S42.212A CLOSED FRACTURE OF NECK OF LEFT HUMERUS: Status: ACTIVE | Noted: 2019-12-15

## 2019-12-15 LAB
INR PPP: 4.82 (ref 0.86–1.14)
MAGNESIUM SERPL-MCNC: 2.2 MG/DL (ref 1.6–2.3)
POTASSIUM SERPL-SCNC: 5.2 MMOL/L (ref 3.4–5.3)
POTASSIUM SERPL-SCNC: 5.3 MMOL/L (ref 3.4–5.3)

## 2019-12-15 PROCEDURE — 99232 SBSQ HOSP IP/OBS MODERATE 35: CPT | Performed by: INTERNAL MEDICINE

## 2019-12-15 PROCEDURE — 25000132 ZZH RX MED GY IP 250 OP 250 PS 637: Mod: GY | Performed by: PHYSICIAN ASSISTANT

## 2019-12-15 PROCEDURE — 84132 ASSAY OF SERUM POTASSIUM: CPT | Performed by: INTERNAL MEDICINE

## 2019-12-15 PROCEDURE — 25000132 ZZH RX MED GY IP 250 OP 250 PS 637: Mod: GY | Performed by: INTERNAL MEDICINE

## 2019-12-15 PROCEDURE — 85610 PROTHROMBIN TIME: CPT | Performed by: INTERNAL MEDICINE

## 2019-12-15 PROCEDURE — 97535 SELF CARE MNGMENT TRAINING: CPT | Mod: GO | Performed by: STUDENT IN AN ORGANIZED HEALTH CARE EDUCATION/TRAINING PROGRAM

## 2019-12-15 PROCEDURE — 12000000 ZZH R&B MED SURG/OB

## 2019-12-15 PROCEDURE — G0378 HOSPITAL OBSERVATION PER HR: HCPCS

## 2019-12-15 PROCEDURE — 36415 COLL VENOUS BLD VENIPUNCTURE: CPT | Performed by: INTERNAL MEDICINE

## 2019-12-15 PROCEDURE — 97166 OT EVAL MOD COMPLEX 45 MIN: CPT | Mod: GO | Performed by: STUDENT IN AN ORGANIZED HEALTH CARE EDUCATION/TRAINING PROGRAM

## 2019-12-15 PROCEDURE — 83735 ASSAY OF MAGNESIUM: CPT | Performed by: INTERNAL MEDICINE

## 2019-12-15 RX ORDER — MORPHINE SULFATE 15 MG/1
15 TABLET ORAL EVERY 4 HOURS PRN
Status: DISCONTINUED | OUTPATIENT
Start: 2019-12-15 | End: 2019-12-17 | Stop reason: HOSPADM

## 2019-12-15 RX ADMIN — ATORVASTATIN CALCIUM 10 MG: 10 TABLET, FILM COATED ORAL at 21:27

## 2019-12-15 RX ADMIN — CARVEDILOL 6.25 MG: 6.25 TABLET, FILM COATED ORAL at 08:47

## 2019-12-15 RX ADMIN — ACETAMINOPHEN 1000 MG: 500 TABLET, FILM COATED ORAL at 08:49

## 2019-12-15 RX ADMIN — SENNOSIDES AND DOCUSATE SODIUM 1 TABLET: 8.6; 5 TABLET ORAL at 08:47

## 2019-12-15 RX ADMIN — HYDROXYZINE HYDROCHLORIDE 25 MG: 25 TABLET, FILM COATED ORAL at 18:26

## 2019-12-15 RX ADMIN — FEXOFENADINE HYDROCHLORIDE 180 MG: 180 TABLET ORAL at 08:47

## 2019-12-15 RX ADMIN — HYDROXYZINE HYDROCHLORIDE 25 MG: 25 TABLET, FILM COATED ORAL at 02:44

## 2019-12-15 RX ADMIN — SENNOSIDES AND DOCUSATE SODIUM 1 TABLET: 8.6; 5 TABLET ORAL at 21:26

## 2019-12-15 RX ADMIN — LISINOPRIL 5 MG: 5 TABLET ORAL at 08:48

## 2019-12-15 RX ADMIN — MORPHINE SULFATE 15 MG: 15 TABLET ORAL at 14:14

## 2019-12-15 RX ADMIN — ACETAMINOPHEN 1000 MG: 500 TABLET, FILM COATED ORAL at 21:27

## 2019-12-15 RX ADMIN — ACETAMINOPHEN 1000 MG: 500 TABLET, FILM COATED ORAL at 14:13

## 2019-12-15 RX ADMIN — CARVEDILOL 6.25 MG: 6.25 TABLET, FILM COATED ORAL at 18:22

## 2019-12-15 ASSESSMENT — ACTIVITIES OF DAILY LIVING (ADL)
ADLS_ACUITY_SCORE: 15
PREVIOUS_RESPONSIBILITIES: MEAL PREP;HOUSEKEEPING;LAUNDRY;MEDICATION MANAGEMENT;FINANCES

## 2019-12-15 NOTE — PLAN OF CARE
Pt A&OX4. Hypotensive BP at times. Other VSS and afebrile. RA her sats drop to 84-86% but we are encouraging IS use now and sitting in chair. Capnography continues. PT tried fast acting morphine again but did not like it. About 45 minutes later she felt nauseated, tongue and mouth felt tingling/different. Pt would like to just continue with Tylenol only for pain. Pain is only with movement of the arm. Cms intact. Sling in place to LUE. Passing flatus. No BM but feels she could have one. Tolerated regular diet. LS clear. Beginning of shift tele tech said pt had run of V Tach pt was not symptomatic. Potassium levels 4.8, 5.2 and at 1400 5.3. INR 4.82. Magnesium 2.2.  MD is aware and monitoring. Social work consulted and pt will go home with [facility] when medically stable. Pt up with assist of 1 gait belt and walker. Will continue to monitor.

## 2019-12-15 NOTE — PROGRESS NOTES
12/15/19 0957   Quick Adds   Type of Visit Initial Occupational Therapy Evaluation   Living Environment   Lives With spouse   Living Arrangements independent living facility   Transportation Anticipated family or friend will provide   Living Environment Comment Pt and spouse live in independent living facility, spouse unable to assist. Walk in shower with shower chair, grab bars present. Comfort height toilet, no grab bar.    Self-Care   Usual Activity Tolerance moderate   Current Activity Tolerance fair   Equipment Currently Used at Home   (has shower chair at home ( uses) )   Activity/Exercise/Self-Care Comment previously IND with direct self cares, no AE   Functional Level   Ambulation 1-->assistive equipment  (4ww or cane when outside, no AD in appartment )   Transferring 0-->independent   Toileting 0-->independent   Bathing 0-->independent   Dressing 0-->independent   Eating 0-->independent   Communication 0-->understands/communicates without difficulty   Swallowing 0-->swallows foods/liquids without difficulty   Cognition 0 - no cognition issues reported   Fall history within last six months no   Which of the above functional risks had a recent onset or change? ambulation;transferring;toileting;bathing;dressing   General Information   Onset of Illness/Injury or Date of Surgery - Date 12/13/19   Referring Physician MD Myke   Patient/Family Goals Statement home    Additional Occupational Profile Info/Pertinent History of Current Problem Pt is a 88 year old female with complex cardiac history (see H&P for details), who was admitted on 12/13/19 for a L humerus fracture due to a mechanical fall. Pt IND with direct self cares at baseline, only equipment use has been 4ww or SEC use when outside of apartment.   Precautions/Limitations fall precautions   Weight-Bearing Status - LUE nonweight-bearing   General Info Comments Pt may wear sling when upright, not required in bed    Cognitive Status Examination    Orientation orientation to person, place and time   Level of Consciousness alert;lethargic/somnolent  (pt closed eyes frequently during session)   Follows Commands (Cognition) WFL   Memory intact   Visual Perception   Visual Perception Wears glasses   Pain Assessment   Patient Currently in Pain No   Posture   Posture not impaired   Range of Motion (ROM)   ROM Comment R UE WFL   Strength   Strength Comments R UE WFL   Hand Strength   Hand Strength Comments R WFL   Coordination   Coordination Comments R UE WFL   Mobility   Bed Mobility Comments supine<>sit CGA   Transfer Skill: Sit to Stand   Level of [place]: Sit/Stand contact guard   Transfer Skill: Sit to Stand full weight-bearing   Assistive Device for Transfer: Sit/Stand straight cane   Toilet Transfer   Toilet Transfer Comments addressed in OT session, see note   Balance   Balance Comments frequent slight LOB during session, no falls occurred    Instrumental Activities of Daily Living (IADL)   Previous Responsibilities meal prep;housekeeping;laundry;medication management;finances   IADL Comments have some meals provided by facility, can be increased. Does not drive, goes shopping when someone else drives her.    Activities of Daily Living Analysis   Impairments Contributing to Impaired Activities of Daily Living balance impaired;pain;ROM decreased;strength decreased   General Therapy Interventions   Planned Therapy Interventions ADL retraining;progressive activity/exercise;transfer training   Clinical Impression   Criteria for Skilled Therapeutic Interventions Met yes, treatment indicated   OT Diagnosis decerased IND with ADLs/IADLs   Influenced by the following impairments balance impaired;pain;ROM decreased;strength decreased   Assessment of Occupational Performance 5 or more Performance Deficits   Identified Performance Deficits TB dressing, toileting/transfers, showering/transfers, community mobility, meal prep.   Clinical Decision Making (Complexity)  "Moderate complexity   Therapy Frequency Daily   Predicted Duration of Therapy Intervention (days/wks) 2-3 days   Anticipated Discharge Disposition Transitional Care Facility   Risks and Benefits of Treatment have been explained. Yes   Patient, Family & other staff in agreement with plan of care Yes   [facility] AM-PAC  \"6 Clicks\" Daily Activity Inpatient Short Form   1. Putting on and taking off regular lower body clothing? 3 - A Little   2. Bathing (including washing, rinsing, drying)? 3 - A Little   3. Toileting, which includes using toilet, bedpan or urinal? 3 - A Little   4. Putting on and taking off regular upper body clothing? 2 - A Lot   5. Taking care of personal grooming such as brushing teeth? 3 - A Little   6. Eating meals? 4 - None   Daily Activity Raw Score (Score out of 24.Lower scores equate to lower levels of function) 18   Total Evaluation Time   Total Evaluation Time (Minutes) 8     "

## 2019-12-15 NOTE — CONSULTS
Care Transition Initial Assessment -      Met with: Patient, , friend and MD  Principal Problem:    Humeral head fracture, left, closed, initial encounter       DATA  Lives With: spouse   Living Arrangements: independent living facility  Quality of Family Relationships: involved, supportive  Description of Support System: Supportive, Involved  Who is your support system?: , Children, Neighbor  Support Assessment: Adequate family and caregiver support. Lives with  and friend who helps out lives in the same apartment building. Children live out of state.  Identified issues/concerns regarding health management: Concerns with observation care and Medicare not paying for a TCU Placement.   Quality of Family Relationships: involved, supportive  Transportation Anticipated: family or friend will provide    ASSESSMENT  Cognitive Status: alert and oriented x3, able to verbalize her needs appropriately and is able to make own decisions.   Concerns to be addressed: Reviewed observation status, explained that Medicare would not pay for a TCU stay due to observation status.  She reports that she can not pay for a TCU, she prefers to go home with home care, her neighbor, Marine has agreed to assist as need at home. She wishes for PT, nursing and HHA. MD aware of what home care services patients wants. Patient lives with her , who is not physically able to help her out at home. Her neighbor, Marine has agreed to assist her as needed. Her children live out of state.   PLAN  Financial costs for the patient includes reviewed possible cost involved with observation care.  Patient given options and choices for discharge yes.  Pt/family declined the Medicare Compare list for TCU, with associated star ratings to assist with choice for referrals/discharge planning.  Education was given to pt/family that star ratings are updated/maintained by Medicare and can be reviewed by visiting www.medicare.gov  yes    Patient/family is agreeable to the plan?  YES  Transportation/person available to transport on day of discharge  is friend, Marine will transport home.  Patient Goals and Preferences: Home .  Patient anticipates discharging to:  Home.    NNAMDI Shukla   Inpatient Care Coordination   Supervisor  [facility]  [phone]

## 2019-12-15 NOTE — PROGRESS NOTES
[facility]  Hospitalist Progress Note  Jarek Stringer MD 12/15/19    Reason for Stay (Diagnosis): Left humerus fracture         Assessment and Plan:      Summary of Stay: Veronica Bray is a 88 year old female with complex cardiac history including paroxysmal A. fib/flutter on warfarin, CAD s/p MI and CABG in 2006, mild diastolic CHF, HTN, VALENTIN, HLD, pulmonary HTN, PAD, and carotid stenosis who was admitted to observation on 12/13/2019  following a mechanical fall resulting in a impacted left humerus fracture.  Having significant amount of pain and was on morphine due to her multiple pain medication allergies.  Somnolent and then hypoxic with some apnea secondary to opiates this afternoon which have been discontinued.  Did receive Narcan following an RRT.  Capnography added.  Orthopedic surgery consulted and nonoperative management recommended.  PT and OT consulted.  Re-trialing morphine sulfate for pain control and to make sure not overly sedated.  Monitor overnight anticipate discharge home tomorrow with home care.    Problem List/Assessment and Plan:   Left humerus fracture:   mechanical fall.  X-ray of the left shoulder and arm shows a moderately impacted fracture surgical neck of the humerus.  -Orthopedics consulted, nonoperative management  -Scheduled acetaminophen 1 g 3 times daily and ice pack every shift  -Was on MS Contin and morphine sulfate due to multiple pain medication allergies, required Narcan and RRT due to apnea.  Retrial morphine sulfate 15 mg every 4 hours as needed and monitor for any excessive sedation or apnea  -Capnography  -Nonweightbearing to left upper extremity  -PT/OT/SW consult  -Follow-up with shoulder or trauma specialist in [facility] orthopedics in 2 weeks    Hypoxia: Mild hypoxia noted at 86% when oxygen removed.  Highly suspect atelectasis.  She is a former smoker.  She denies any shortness of breath when the oxygen goes low.  Has not been coughing to  suspect infection.  Can continue supplemental oxygen here, however do not anticipate will need on discharge.    Paroxysmal A. fib, a flutter: Chronically on warfarin and INR is therapeutic.  He is on carvedilol as well.  -Continue carvedilol and warfarin, pharmacy to dose warfarin and daily INR  -Hold warfarin today as INR is now up to 4.8  -Telemetry tech notified me of episode of V. tach.  I reviewed the telemetry and it looks more like artifact to me and she had just been woken up by nursing in the room.  Patient was asymptomatic from this.  V. tach possible with a history of CAD, however I suspect this was artifact.  Continue carvedilol.  Continue telemetry for now.    CAD, HTN, HLD, chronic diastolic CHF: Previous CABG in 2006 following MI.  EF from 2000 1855-60%.  Grade 1 diastolic CHF.  PTA medications include aspirin 81 mg daily, warfarin, atorvastatin 10 mg at bedtime, carvedilol 6.25 mg twice daily, lisinopril 5 mg daily.  -Continue home medications      DVT Prophylaxis: Warfarin  Code Status: Full Code  FEN: Regular diet  Discharge Dispo: PT and OT consulted.  OT recommending TCU, however patient is observation status and cannot afford self-pay.  She will have support of her friend Tomasa that can help her with much of her ADLs during the day.  Likely will discharge home with home RN/PT/OT.  Social work assisting  Estimated Disch Date / # of Days until Disch: Pending improvement in pain control hopefully tomorrow.  Currently observation status as her fracture is nonoperative management.    ADDENDUM:  discussed with UR.  Change to inpatient status give 3rd night hospitalization for ongoing uncontrolled pain and hypoxia.        Interval History (Subjective):      Still having severe pain with movement despite scheduled tylenol.  Cautiously trialing morphine sulfate again.  Ongoing mild hypoxia, but not short of breath.  No excessive somnolence.                  Physical Exam:      Last Vital Signs:  BP (!)  112/35 (BP Location: Right arm)   Pulse 67   Temp 96.8  F (36  C) (Oral)   Resp 15   SpO2 95%     Intake/Output Summary (Last 24 hours) at 12/15/2019 1429  Last data filed at 12/15/2019 0738  Gross per 24 hour   Intake 240 ml   Output 650 ml   Net -410 ml       Constitutional: Somnolent  Eyes: sclera white   HEENT: MMM  Respiratory:  no focal crackles or wheeze  Cardiovascular: RRR.  1/6 systolic murmur  GI: non-tender, not distended, bowel sounds present  Skin: no rash    Musculoskeletal/extremities: Left arm in sling, pain with any movement, no edema  Neurologic: Alert and oriented  Psychiatric: Tearful when discussing financial status         Medications:      All current medications were reviewed with changes reflected in problem list.         Data:      All new lab and imaging data was reviewed.   Labs:  Recent Labs   Lab 12/15/19  0558 12/14/19  0558   NA  --  136   POTASSIUM 5.2 4.8   CHLORIDE  --  105   CO2  --  28   ANIONGAP  --  3   GLC  --  154*   BUN  --  22   CR  --  0.61   GFRESTIMATED  --  81   GFRESTBLACK  --  >90   BARRETT  --  8.3*       Recent Labs   Lab 12/15/19  0558 12/14/19  0558 12/10/19   INR 4.82* 2.61* 2.6*      Imaging:   None today      Jarek Stringer MD

## 2019-12-15 NOTE — PLAN OF CARE
OT - eval completed and treatment initiated per orders. Pt is a 88 year old female with complex cardiac history (see H&P for details), who was admitted on 12/13/19 for a L humerus fracture due to a mechanical fall. Pt IND with direct self cares at baseline, only equipment use has been 4ww or SEC use when outside of apartment. Pt lives in a independent living apartment with her spouse who is not able to physically assist. Walk in shower with shower chair, grab bars present. Comfort height toilet, no grab bar.     Discharge Planner OT   Patient plan for discharge: not stated   Current status: supine<>sit with CGA, ambulated with CGA and SEC. Pt completed toileting process with comfort height toilet and CGA throughout with reliance on grab bar to facilitate transfer off toilet. Process took extensive time due to very slow gait speed, fatigue, and balance concerns. Pt had difficulty reporting how she was feeling, became tearful at end of session due to current performance, was provided with reassurance and was receptive. Pt had frequent small LOB during session while standing or ambulation, no falls occurred and pt did not require additional assistance to prevent falls. BP was 96/35 following activity, and was 112/35 following return to supine. Pt required 2L of O2 during session to maintain sats above 90%. Nursing informed of pt status.   Barriers to return to prior living situation: below baseline with ADLs/IADLs, requires assist for all mobility, decreased activity tolerance   Recommendations for discharge: TCU   Rationale for recommendations: Pt below baseline with ADLs and IADLs, currently requires assistance with all mobility tasks. Would benefit from continued OT while IP and at TCU setting to help increase safety/IND with ADLs and functional mobility. If pt discharges to home, recommend pt to receive A with all ADLs and mobility to help ensure safety, HH OT/PT/HHA/RN services recommended. Patient requires home  OT at this time as attending OT in a clinic setting would be a considerable and significantly taxing effort, limiting her ability to participate in therapy session.       Entered by: Sachin Cooper 12/15/2019 10:43 AM

## 2019-12-15 NOTE — PROVIDER NOTIFICATION
Page to Dr. Stringer    Pt had run of V tach at 7:06. Tele tech just called. Please advise. Thanks    Dr Stringer called. Will add Potassium and Magnesium lab. Thinks it may be artifact on Tele. Will continue to monitor.

## 2019-12-15 NOTE — PLAN OF CARE
PRIMARY DIAGNOSIS: ACUTE PAIN  OUTPATIENT/OBSERVATION GOALS TO BE MET BEFORE DISCHARGE:  1. Pain Status: Improved-controlled with oral pain medications.    2. Return to near baseline physical activity: No    3. Cleared for discharge by consultants (if involved): No    Discharge Planner Nurse   Safe discharge environment identified: No  Barriers to discharge: Yes       Entered by: Rose Deleon 12/15/2019 6:26 AM     Pt is A/O, VSS. Pain managed with PO Tylenol and Atarax. CMS intact. RUE in sling. Tolerating regular diet. Up with Ax1 gait belt and cane to bedside commode. Will continue plan of care.     Please review provider order for any additional goals.   Nurse to notify provider when observation goals have been met and patient is ready for discharge.

## 2019-12-15 NOTE — PROGRESS NOTES
PRIMARY DIAGNOSIS: ACUTE PAIN  OUTPATIENT/OBSERVATION GOALS TO BE MET BEFORE DISCHARGE:  1. Pain Status: Improved-controlled with oral pain medications.    2. Return to near baseline physical activity: No    3. Cleared for discharge by consultants (if involved): No    Discharge Planner Nurse   Safe discharge environment identified: Yes  Barriers to discharge: Yes       Entered by: Karan Hudson 12/14/2019 10:39 PM   Coreg held BP soft, pain only with movement. Ambulating to the bathroom with A1. Hydroxyzine 25mg administered for itching all over the body no respiratory issues or rash noted. Will keep monitoring.      Please review provider order for any additional goals.   Nurse to notify provider when observation goals have been met and patient is ready for discharge.

## 2019-12-15 NOTE — PROGRESS NOTES
"SPIRITUAL HEALTH SERVICES Progress Note  [facility] - Ortho/Spine 6th Floor    Spiritual Health Services follow-up visit per routine admission hospital  request.  Facilitated opportunity for Veronica to share illness and spiritual narratives.  Explored loss of [employer] community due to health and mobility issues and apprehension that \"No  in [place] knows us...\" \"we have no  that knows us to do our funerals\"   Pt names family as primary support and states gratitude for \"how eloy I've been.\" Pt and spouse live at [email or web address] and states \"they are so loving, like family.\" Pt accepted Chaplain Resident's off to share prayer, sing  [place] songs and blessing.    Spiritual Health remains available for additional emotional/ spiritual support.    Amanda Gifford  Chaplain Resident  [phone]  "

## 2019-12-15 NOTE — PROGRESS NOTES
PRIMARY DIAGNOSIS: ACUTE PAIN  OUTPATIENT/OBSERVATION GOALS TO BE MET BEFORE DISCHARGE:  1. Pain Status: controlled  2. Return to near baseline physical activity: No no     3. Cleared for discharge by consultants (if involved): No    Discharge Planner Nurse   Safe discharge environment identified: No  Barriers to discharge: Yes       Entered by: Karan Hudson 12/14/2019 10:37 PM   Pt resting comfortably for most of the shift     Please review provider order for any additional goals.   Nurse to notify provider when observation goals have been met and patient is ready for discharge.

## 2019-12-16 ENCOUNTER — APPOINTMENT (OUTPATIENT)
Dept: PHYSICAL THERAPY | Facility: CLINIC | Age: 84
DRG: 563 | End: 2019-12-16
Attending: PHYSICIAN ASSISTANT
Payer: MEDICARE

## 2019-12-16 ENCOUNTER — APPOINTMENT (OUTPATIENT)
Dept: OCCUPATIONAL THERAPY | Facility: CLINIC | Age: 84
DRG: 563 | End: 2019-12-16
Payer: MEDICARE

## 2019-12-16 LAB
ANION GAP SERPL CALCULATED.3IONS-SCNC: 3 MMOL/L (ref 3–14)
BUN SERPL-MCNC: 26 MG/DL (ref 7–30)
CALCIUM SERPL-MCNC: 8.6 MG/DL (ref 8.5–10.1)
CHLORIDE SERPL-SCNC: 104 MMOL/L (ref 94–109)
CO2 SERPL-SCNC: 30 MMOL/L (ref 20–32)
CREAT SERPL-MCNC: 0.59 MG/DL (ref 0.52–1.04)
GFR SERPL CREATININE-BSD FRML MDRD: 82 ML/MIN/{1.73_M2}
GLUCOSE SERPL-MCNC: 111 MG/DL (ref 70–99)
INR PPP: 3.4 (ref 0.86–1.14)
POTASSIUM SERPL-SCNC: 5.3 MMOL/L (ref 3.4–5.3)
SODIUM SERPL-SCNC: 137 MMOL/L (ref 133–144)

## 2019-12-16 PROCEDURE — 36415 COLL VENOUS BLD VENIPUNCTURE: CPT | Performed by: INTERNAL MEDICINE

## 2019-12-16 PROCEDURE — 97161 PT EVAL LOW COMPLEX 20 MIN: CPT | Mod: GP

## 2019-12-16 PROCEDURE — 85610 PROTHROMBIN TIME: CPT | Performed by: INTERNAL MEDICINE

## 2019-12-16 PROCEDURE — 12000000 ZZH R&B MED SURG/OB

## 2019-12-16 PROCEDURE — 25000132 ZZH RX MED GY IP 250 OP 250 PS 637: Mod: GY | Performed by: PHYSICIAN ASSISTANT

## 2019-12-16 PROCEDURE — 25000132 ZZH RX MED GY IP 250 OP 250 PS 637: Mod: GY | Performed by: INTERNAL MEDICINE

## 2019-12-16 PROCEDURE — 97535 SELF CARE MNGMENT TRAINING: CPT | Mod: GO

## 2019-12-16 PROCEDURE — 80048 BASIC METABOLIC PNL TOTAL CA: CPT | Performed by: INTERNAL MEDICINE

## 2019-12-16 PROCEDURE — 99232 SBSQ HOSP IP/OBS MODERATE 35: CPT | Performed by: INTERNAL MEDICINE

## 2019-12-16 PROCEDURE — 97116 GAIT TRAINING THERAPY: CPT | Mod: GP

## 2019-12-16 RX ORDER — WARFARIN SODIUM 1 MG/1
1 TABLET ORAL
Status: COMPLETED | OUTPATIENT
Start: 2019-12-16 | End: 2019-12-16

## 2019-12-16 RX ADMIN — FEXOFENADINE HYDROCHLORIDE 180 MG: 180 TABLET ORAL at 08:59

## 2019-12-16 RX ADMIN — SENNOSIDES AND DOCUSATE SODIUM 2 TABLET: 8.6; 5 TABLET ORAL at 20:01

## 2019-12-16 RX ADMIN — ACETAMINOPHEN 1000 MG: 500 TABLET, FILM COATED ORAL at 20:01

## 2019-12-16 RX ADMIN — LISINOPRIL 5 MG: 5 TABLET ORAL at 08:59

## 2019-12-16 RX ADMIN — ACETAMINOPHEN 1000 MG: 500 TABLET, FILM COATED ORAL at 13:44

## 2019-12-16 RX ADMIN — WARFARIN SODIUM 1 MG: 1 TABLET ORAL at 17:59

## 2019-12-16 RX ADMIN — ATORVASTATIN CALCIUM 10 MG: 10 TABLET, FILM COATED ORAL at 22:23

## 2019-12-16 RX ADMIN — ACETAMINOPHEN 1000 MG: 500 TABLET, FILM COATED ORAL at 08:59

## 2019-12-16 RX ADMIN — MAGNESIUM HYDROXIDE 30 ML: 400 SUSPENSION ORAL at 20:29

## 2019-12-16 RX ADMIN — HYDROXYZINE HYDROCHLORIDE 25 MG: 25 TABLET, FILM COATED ORAL at 17:59

## 2019-12-16 RX ADMIN — SENNOSIDES AND DOCUSATE SODIUM 1 TABLET: 8.6; 5 TABLET ORAL at 08:59

## 2019-12-16 RX ADMIN — CARVEDILOL 6.25 MG: 6.25 TABLET, FILM COATED ORAL at 08:59

## 2019-12-16 RX ADMIN — CARVEDILOL 6.25 MG: 6.25 TABLET, FILM COATED ORAL at 17:58

## 2019-12-16 ASSESSMENT — ACTIVITIES OF DAILY LIVING (ADL)
ADLS_ACUITY_SCORE: 16
ADLS_ACUITY_SCORE: 15
ADLS_ACUITY_SCORE: 15
ADLS_ACUITY_SCORE: 16
ADLS_ACUITY_SCORE: 16
ADLS_ACUITY_SCORE: 15

## 2019-12-16 NOTE — PROGRESS NOTES
12/16/19 1500   Quick Adds   Type of Visit Initial PT Evaluation   Living Environment   Lives With spouse   Living Arrangements independent living facility   Home Accessibility no concerns   Transportation Anticipated family or friend will provide   Living Environment Comment Pt reports she lives in an ILF with no stairs to manage.    Self-Care   Usual Activity Tolerance moderate   Current Activity Tolerance moderate   Equipment Currently Used at Home cane, straight   Functional Level Prior   Ambulation 1-->assistive equipment  (4WW or cane outside of the apartment)   Fall history within last six months yes   Number of times patient has fallen within last six months 1   General Information   Onset of Illness/Injury or Date of Surgery - Date 12/13/19   Referring Physician Layton SANTIAGO   Patient/Family Goals Statement Return home   Pertinent History of Current Problem (include personal factors and/or comorbidities that impact the POC) Pt is a 88 year old female with complex cardiac history (see H&P for details), who was admitted on 12/13/19 for a L humerus fracture due to a mechanical fall.    Precautions/Limitations fall precautions   Weight-Bearing Status - LUE nonweight-bearing   Cognitive Status Examination   Orientation orientation to person, place and time   Level of Consciousness alert   Follows Commands and Answers Questions able to follow single-step instructions   Pain Assessment   Patient Currently in Pain No   Integumentary/Edema   Integumentary/Edema Comments L arm in sling throughout session.    Strength   Strength Comments Sufficient for mobility with CGA/SBA.    Transfer Skills   Transfer Comments Sit>stand with SBA   Gait   Gait Comments Ambulates with various AD's and CGA/SBA   Balance   Balance Comments Requires unilateral UE support on cane for safe dynamic mobility.    General Therapy Interventions   Planned Therapy Interventions gait training   Clinical Impression   Criteria for Skilled  "Therapeutic Intervention yes, treatment indicated   PT Diagnosis Impaired gait   Influenced by the following impairments Impaired balance, decreased activity tolerance   Functional limitations due to impairments Decreased IND with gait   Clinical Presentation Stable/Uncomplicated   Clinical Presentation Rationale Stable   Clinical Decision Making (Complexity) Low complexity   Therapy Frequency Other (see comments)  (One treat)   Predicted Duration of Therapy Intervention (days/wks) One day   Anticipated Discharge Disposition Home with Assist;Home with Home Therapy   Risk & Benefits of therapy have been explained Yes   Patient, Family & other staff in agreement with plan of care Yes   [facility] TM \"6 Clicks\"   2016, Trustees of [facility], under license to [employer].  All rights reserved.   6 Clicks Short Forms Basic Mobility Inpatient Short Form   [facility]  \"6 Clicks\" V.2 Basic Mobility Inpatient Short Form   1. Turning from your back to your side while in a flat bed without using bedrails? 3 - A Little   2. Moving from lying on your back to sitting on the side of a flat bed without using bedrails? 3 - A Little   3. Moving to and from a bed to a chair (including a wheelchair)? 3 - A Little   4. Standing up from a chair using your arms (e.g., wheelchair, or bedside chair)? 3 - A Little   5. To walk in hospital room? 3 - A Little   6. Climbing 3-5 steps with a railing? 2 - A Lot   Basic Mobility Raw Score (Score out of 24.Lower scores equate to lower levels of function) 17   Total Evaluation Time   Total Evaluation Time (Minutes) 8     "

## 2019-12-16 NOTE — PROGRESS NOTES
Pain controlled with ice, vistaril and scheduled Tylenol. Able to sit up in the chair for about three hours and ambulating to the bathroom. Voiding adequate amounts, good appetite. Still on 1L of oxygen as she drops to the high 80s when sleeping. Pt still on capnography. Coumadin held today, coreg administered. Will keep monitoring.

## 2019-12-16 NOTE — PLAN OF CARE
VSS:  On Remote Tele. Capno with I L oxygen nc, sats 90's. Up with AxLaron, guerda and cane to [facility]. Sling on LUE. Denies pain when not moving, ice pack on.  Voiding adequately. Plans to dc'd home today.

## 2019-12-16 NOTE — PLAN OF CARE
PT: Orders received, evaluation completed and treatment completed. Pt is a 88 year old female with complex cardiac history (see H&P for details), who was admitted on 12/13/19 for a L humerus fracture due to a mechanical fall. Pt IND with direct self cares at baseline, only equipment use has been 4ww or SEC use when outside of apartment. Pt lives in a independent living apartment with her spouse who is not able to physically assist.    Discharge Planner PT   Patient plan for discharge: Home with assist and HHPT  Current status: Sit>stand with SBA. Patient ambulates 80'x2 wtih straight cane and CGA progressing to SBA. Significant improvement of gait speed and stability on second bout of walking. Pt also trials ambulation with small based quad cane and large based quad cane. More gait instabilities with quad cane. Educated on recommendation to amb 3x/day in sanchez with Rn staff outside of PT.   Barriers to return to prior living situation: None with assist for mobility   Recommendations for discharge: Home with SEC, assist for mobility as needed and HHPT  Rationale for recommendations: Moving well. Appropriate to defer remainder of treatment to HHPT. Patient requires home PT at this time as attending PT in a clinic setting would be a considerable and significantly taxing effort; limiting the patient's ability to participate in therapy session.       Entered by: Dick Gutierrez 12/16/2019 3:11 PM       Recommend pt to ambulate 3x/day in sanchez with RN staff to continue to maintain strength.     Physical Therapy Discharge Summary    Reason for therapy discharge:    All goals and outcomes met, no further needs identified.    Progress towards therapy goal(s). See goals on Care Plan in [facility] electronic health record for goal details.  Goals met    Therapy recommendation(s):    See recommendations above.

## 2019-12-16 NOTE — DISCHARGE INSTRUCTIONS
Your hospital follow up  Orthopedic appointment has been schedule for you with Dr. Barth at [facility] for Tuesday 12/31/19 at 1:00pm. Arrive 30 minutes prior to your appointment time. Please bring your hospital discharge instructions, a photo ID, insurance information and a list of your medications with you to your appointment. Please call the clinic at [phone] if you need to reschedule.    [facility]  [address]   [address]  [facility] [address]  [phone]      Home care services will be provided by [facility], an agency representative will contact you to discuss initial visits. Agency contact number is [phone].

## 2019-12-16 NOTE — PROGRESS NOTES
SPIRITUAL HEALTH SERVICES Progress Note  FR Ortho 6th floor    SH consult per staff/pt request.   Attempted to see pt twice this afternoon. Pt was receiving visitors and then involved with other cares (SW/PT). Will attempt to see tomorrow for assessment of spiritual/emotional needs.     KELSIE Henry.  Staff    Pager #[phone]   Pronouns: he/him/his

## 2019-12-16 NOTE — PROGRESS NOTES
[facility]  Hospitalist Progress Note  Jarek Stringer MD 12/16/19    Reason for Stay (Diagnosis): Left humerus fracture         Assessment and Plan:      Summary of Stay: Veronica Bray is a 88 year old female with complex cardiac history including paroxysmal A. fib/flutter on warfarin, CAD s/p MI and CABG in 2006, mild diastolic CHF, HTN, VALENTIN, HLD, pulmonary HTN, PAD, and carotid stenosis who was admitted to observation on 12/13/2019  following a mechanical fall resulting in a impacted left humerus fracture.  Having significant amount of pain and was on morphine due to her multiple pain medication allergies.  Somnolent and then hypoxic with some apnea secondary to opiates this afternoon which have been discontinued.  Did receive Narcan following an RRT.  Capnography added.  Orthopedic surgery consulted and nonoperative management recommended.  PT and OT consulted.  Therapy again this evening and tomorrow morning with plans for home with home care tomorrow.      Problem List/Assessment and Plan:   Left humerus fracture:   mechanical fall.  X-ray of the left shoulder and arm shows a moderately impacted fracture surgical neck of the humerus.  -Orthopedics consulted, nonoperative management  -Scheduled acetaminophen 1 g 3 times daily and ice pack every shift  -Was on MS Contin and morphine sulfate due to multiple pain medication allergies, required Narcan and RRT due to apnea.  Retrialed morphine sulfate 15 mg every 4 hours as needed but she was again sleepy and she does not want to take any morphine so this has been discontinued.  Pain is a little better today.  -Nonweightbearing to left upper extremity  -PT/OT/SW consult  -Follow-up with shoulder or trauma specialist in [facility] orthopedics in 2 weeks    Hypoxia: Mild hypoxia noted at 86% when oxygen removed.  Highly suspect atelectasis.  She is a former smoker.  She denies any shortness of breath when the oxygen goes low.  Has not been  coughing to suspect infection.  Can continue supplemental oxygen here, however do not anticipate will need on discharge.  Continue to try to wean oxygen as able.    Paroxysmal A. fib, a flutter: Chronically on warfarin and INR is therapeutic.  He is on carvedilol as well.  -Continue carvedilol and warfarin, pharmacy to dose warfarin and daily INR  -Telemetry tech notified me of episode of V. tach.  I reviewed the telemetry and it looks more like artifact to me and she had just been woken up by nursing in the room.  Patient was asymptomatic from this.  V. tach possible with a history of CAD, however I suspect this was artifact.  Continue carvedilol.  Continue telemetry for now.    CAD, HTN, HLD, chronic diastolic CHF: Previous CABG in 2006 following MI.  EF from 2000 1855-60%.  Grade 1 diastolic CHF.  PTA medications include aspirin 81 mg daily, warfarin, atorvastatin 10 mg at bedtime, carvedilol 6.25 mg twice daily, lisinopril 5 mg daily.  -Continue home medications      DVT Prophylaxis: Warfarin  Code Status: Full Code  FEN: Regular diet  Discharge Dispo: PT and OT consulted.  She will have support of her friend Tomasa that can help her with much of her ADLs during the day.  Ordered home RN/PT/OT.  Social work assisting  Estimated Disch Date / # of Days until Disch: Work with therapy this afternoon and again tomorrow morning in anticipation of discharge home with home therapies        Interval History (Subjective):      Initially very tearful and the plan was to go home with home therapy.  Extensive time spent with the patient counseling her and trying to motivate her to work with therapy today to see how close.  She actually did better this afternoon and had less pain when working with therapy and could walk in the halls.  She is tolerating a regular diet.  Still needing a little bit of oxygen here and there to keep saturations above 90%.  She does not feel short of breath.                  Physical Exam:       Last Vital Signs:  /41 (BP Location: Right arm)   Pulse 60   Temp 97.7  F (36.5  C) (Oral)   Resp 14   Wt 69 kg (152 lb 1.6 oz)   SpO2 94%   BMI 28.75 kg/m      Intake/Output Summary (Last 24 hours) at 12/16/2019 1534  Last data filed at 12/16/2019 1232  Gross per 24 hour   Intake 610 ml   Output 1650 ml   Net -1040 ml       Constitutional: alert, NAD  Eyes: sclera white   HEENT: MMM  Respiratory:  no focal crackles or wheeze  Cardiovascular: RRR.  1/6 systolic murmur  GI: non-tender, not distended, bowel sounds present  Skin: no rash    Musculoskeletal/extremities: Left arm in sling, less pain with movement, no edema  Neurologic: Alert and oriented  Psychiatric: Tearful when discussing home vs tcu         Medications:      All current medications were reviewed with changes reflected in problem list.         Data:      All new lab and imaging data was reviewed.   Labs:  Recent Labs   Lab 12/16/19  0645 12/15/19  1425 12/15/19  0558 12/14/19  0558     --   --  136   POTASSIUM 5.3 5.3 5.2 4.8   CHLORIDE 104  --   --  105   CO2 30  --   --  28   ANIONGAP 3  --   --  3   *  --   --  154*   BUN 26  --   --  22   CR 0.59  --   --  0.61   GFRESTIMATED 82  --   --  81   GFRESTBLACK >90  --   --  >90   BARRETT 8.6  --   --  8.3*       Recent Labs   Lab 12/16/19  0645 12/15/19  0558 12/14/19  0558   INR 3.40* 4.82* 2.61*      Imaging:   None today      Jarek Stringer MD

## 2019-12-16 NOTE — PLAN OF CARE
Discharge Planner OT   Patient plan for discharge: home  Current status: Pt completed bed mobility SBA vc's for technique and use of bedrail. Pt completes sit<> stand CGA. Pt ambulates CGA with use of cane bed<>bathroom progressing to SBA. Educated on technique for toilet transfer simulated to home set up. Pt completes including clothing management and pericares with SBA. Pt completes shower transfer CGA with use of grab bars simulated to home set-up. Pt completes dressing seated EOB min A from friend who will be able to dress her daily. Educated on UE elbow flexion/extenison and finger flexion/extension and importance to do it to tolerance.   Barriers to return to prior living situation: none anticipated  Recommendations for discharge: home with home health OT, home health RN and home health aide. Daily assist for dressing, bathing, home management and cooking from friend.   Rationale for recommendations: Pt feeling more confident in ability to return home following session. Pt has good support from her friend who will be able to provide A. Home health OT and home health RN. Home health required as pt would benefit from caregiver education in her home setting, and it would be burden to attend OP therapy with current level of activity tolerance       Entered by: Patience Whitten 12/16/2019 2:36 PM

## 2019-12-16 NOTE — PROGRESS NOTES
"SWS     D: Discharge planning continuing.. discussions today with MD regarding planning for pt's discharge home vs TCU, based on pt's progress in therapy sessions MD has informed this afternoon of the anticipation of pt's discharge to home tomorrow with home care services. Noted in-pt admission as of 12/16.     I: Met several times today with pt,  and a family friend also present in room. With mid morning discussion pt expressed that she would like to consider going to rehab facility but she was reluctant due to the anticipation of private pay for her rehab stay. Reviewed with them the criteria for Medicare/SNF coverage and the need for a three day qualifying hospital stay which they acknowledged, discussed also that the observation care status does \"not count\" toward the three day hospital stay and addressed their questions accordingly. Discussed opportunity for the home care services, and that there would be assessments by the agency representative ( RN, PT, OT) to determine plan of care and frequency of visits based on their initial visits. uses walker so his ability to physically assist pt is limited, but he did indicate he could drive and help around the house, friend noted of her availability as needed. Based on the initial conversation with them it was determined that pt would see how she did in therapy sessions, if transfer to rehab facility would be needed facilities identified were [facility] and [facility].     .Pt/family was given the Medicare Compare list for SNF, with associated star ratings to assist with choice for referrals/discharge planning Yes    Education was given to pt/family that star ratings are updated/maintained by Medicare and can be reviewed by visiting www.medicare.gov Yes    After therapy session and pt/family further discussion with MD it was determined with their agreement that pt would be elinor to discharge to home with family/friends support and home care " services. SW has met again this afternoon with pt/ to discuss the home care services and identify agency. Pt identified [facility] as agency of preference stating she has used agency services in the past, referral made. Pt was provided with agency brochure and contact number.      .Pt/family declined the Medicare Compare list for Home Care, with associated star ratings to assist with choice for referrals/discharge planning Yes.     Education was given to pt/family that star ratings are updated/maintained by Medicare and can be reviewed by visiting www.medicare.gov Yes    A/P: Anticipate no problem with arrangements made for pt's discharge home with home care services, SW available until discharge should adjustments be needed in plan as noted.       .    EDMUND Romero   Care Transitions Services   [facility]     [phone]

## 2019-12-17 VITALS
HEART RATE: 65 BPM | OXYGEN SATURATION: 92 % | SYSTOLIC BLOOD PRESSURE: 150 MMHG | BODY MASS INDEX: 28.75 KG/M2 | DIASTOLIC BLOOD PRESSURE: 40 MMHG | RESPIRATION RATE: 14 BRPM | TEMPERATURE: 97.4 F | WEIGHT: 152.1 LBS

## 2019-12-17 LAB
INR PPP: 2.75 (ref 0.86–1.14)
INTERPRETATION ECG - MUSE: NORMAL

## 2019-12-17 PROCEDURE — 36415 COLL VENOUS BLD VENIPUNCTURE: CPT | Performed by: INTERNAL MEDICINE

## 2019-12-17 PROCEDURE — 25000132 ZZH RX MED GY IP 250 OP 250 PS 637: Mod: GY | Performed by: PHYSICIAN ASSISTANT

## 2019-12-17 PROCEDURE — 97535 SELF CARE MNGMENT TRAINING: CPT | Mod: GO

## 2019-12-17 PROCEDURE — 99238 HOSP IP/OBS DSCHRG MGMT 30/<: CPT | Performed by: INTERNAL MEDICINE

## 2019-12-17 PROCEDURE — 85610 PROTHROMBIN TIME: CPT | Performed by: INTERNAL MEDICINE

## 2019-12-17 RX ORDER — HYDROXYZINE HYDROCHLORIDE 25 MG/1
25 TABLET, FILM COATED ORAL EVERY 4 HOURS PRN
Qty: 30 TABLET | Refills: 0 | Status: SHIPPED | OUTPATIENT
Start: 2019-12-17 | End: 2020-07-14

## 2019-12-17 RX ORDER — WARFARIN SODIUM 5 MG/1
5 TABLET ORAL
Status: DISCONTINUED | OUTPATIENT
Start: 2019-12-17 | End: 2019-12-17 | Stop reason: HOSPADM

## 2019-12-17 RX ORDER — ACETAMINOPHEN 500 MG
1000 TABLET ORAL 3 TIMES DAILY
Qty: 180 TABLET | Refills: 0 | Status: ON HOLD | OUTPATIENT
Start: 2019-12-17 | End: 2021-03-27

## 2019-12-17 RX ADMIN — HYDROXYZINE HYDROCHLORIDE 25 MG: 25 TABLET, FILM COATED ORAL at 12:21

## 2019-12-17 RX ADMIN — FEXOFENADINE HYDROCHLORIDE 180 MG: 180 TABLET ORAL at 09:51

## 2019-12-17 RX ADMIN — ACETAMINOPHEN 1000 MG: 500 TABLET, FILM COATED ORAL at 09:51

## 2019-12-17 RX ADMIN — SENNOSIDES AND DOCUSATE SODIUM 1 TABLET: 8.6; 5 TABLET ORAL at 09:52

## 2019-12-17 RX ADMIN — HYDROXYZINE HYDROCHLORIDE 25 MG: 25 TABLET, FILM COATED ORAL at 01:30

## 2019-12-17 RX ADMIN — LISINOPRIL 5 MG: 5 TABLET ORAL at 09:52

## 2019-12-17 RX ADMIN — CARVEDILOL 6.25 MG: 6.25 TABLET, FILM COATED ORAL at 09:52

## 2019-12-17 ASSESSMENT — ACTIVITIES OF DAILY LIVING (ADL)
ADLS_ACUITY_SCORE: 15

## 2019-12-17 NOTE — DISCHARGE SUMMARY
[facility]  Discharge Summary  Name: Veronica Bray    MRN: [id number]  YOB: 1931    Age: 88 year old  Date of Discharge:  12/17/2019  2:18 PM  Date of Admission: 12/13/2019  Primary Care Provider: Concepcion Mccall  Discharge Physician:  Jarek Stringer MD  Discharging Service:  Hospitalist      Discharge Diagnoses:  Mechanical fall  Left humerus fracture  Hypoxia  Paroxysmal A. fib, a flutter  CAD  HTN  HLD  Chronic diastolic CHF     Hospital Course:  Summary of Stay: Veronica Bray is a 88 year old female with complex cardiac history including paroxysmal A. fib/flutter on warfarin, CAD s/p MI and CABG in 2006, mild diastolic CHF, HTN, VALENTIN, HLD, pulmonary HTN, PAD, and carotid stenosis who was admitted to observation on 12/13/2019  following a mechanical fall resulting in a impacted left humerus fracture.  Having significant amount of pain and was on morphine due to her multiple pain medication allergies.  Somnolent and then hypoxic with some apnea secondary to opiates this afternoon which have been discontinued.  Did receive Narcan following an RRT.  Capnography added.  Orthopedic surgery consulted and nonoperative management recommended.  PT and OT consulted.  Slowly improved with therapy to the point where she was able to discharge home with home RN/PT/OT/home health aide.  Pain at a tolerable level with scheduled acetaminophen and hydroxyzine as needed.  Follow-up in orthopedics clinic arranged for 2 weeks from now.  Her INR will be drawn in 2 days by home RN and is all sent to her anticoagulation clinic.        Problem List/Assessment and Plan:   Left humerus fracture:   mechanical fall.  X-ray of the left shoulder and arm shows a moderately impacted fracture surgical neck of the humerus.  -Orthopedics consulted, nonoperative management  -Was on MS Contin and morphine sulfate due to multiple pain medication allergies, required Narcan and RRT due to apnea.  Retrialed morphine  sulfate 15 mg every 4 hours as needed but she was again sleepy and she does not want to take any morphine so this has been discontinued.  Pain now tolerable on scheduled acetaminophen 1 g 3 times daily, ice packs, and hydroxyzine as needed that have been prescribed  -Nonweightbearing to left upper extremity  -PT/OT/SW consult, home RN/PT/OT/home health aide recommended and ordered  -Follow-up with shoulder or trauma specialist in [facility] orthopedics in 2 weeks arranged     Hypoxia: Mild hypoxia noted at 86% when oxygen removed.  Highly suspect atelectasis.  She is a former smoker.  She denies any shortness of breath when the oxygen goes low.  Has not been coughing to suspect infection.  Can continue supplemental oxygen here, however do not anticipate will need on discharge.    Able to wean to room air on day of discharge.     Paroxysmal A. fib, a flutter: Chronically on warfarin and INR is therapeutic.  He is on carvedilol as well.  -Continued carvedilol and warfarin.  2 days of supratherapeutic INR then back to 2.7 on discharge.  Discussed with pharmacy.  Plan to discharge home on PTA regimen of 5 mg daily along with an INR draw in 2 days by her home RN with results sent to her anticoagulation clinic     CAD, HTN, HLD, chronic diastolic CHF: Previous CABG in 2006 following MI.  EF from 2000 1855-60%.  Grade 1 diastolic CHF.  PTA medications include aspirin 81 mg daily, warfarin, atorvastatin 10 mg at bedtime, carvedilol 6.25 mg twice daily, lisinopril 5 mg daily.  -Continue home medications     Discharge Disposition:  Discharged to home     Allergies:  Allergies   Allergen Reactions     Aleve [Naproxen] Anaphylaxis     Celebrex [Celecoxib]      Codeine      Demerol [Meperidine]      Hydromorphone      Percocet [Oxycodone-Acetaminophen]      Tramadol      Vicodin [Hydrocodone-Acetaminophen]         Discharge Medications:   Discharge Medication List as of 12/17/2019  1:34 PM      START taking these  medications    Details   acetaminophen (TYLENOL) 500 MG tablet Take 2 tablets (1,000 mg) by mouth 3 times daily, Disp-180 tablet, R-0, E-Prescribe      hydrOXYzine (ATARAX) 25 MG tablet Take 1 tablet (25 mg) by mouth every 4 hours as needed for itching (adjuvant pain), Disp-30 tablet, R-0, E-Prescribe         CONTINUE these medications which have NOT CHANGED    Details   aspirin 81 MG EC tablet Take 81 mg by mouth every other day, Historical      atorvastatin (LIPITOR) 10 MG tablet TAKE 1 TABLET AT BEDTIME, Disp-90 tablet, R-0, E-Prescribe      carvedilol (COREG) 6.25 MG tablet TAKE 1 TABLET TWICE DAILY  WITH MEALS, Disp-180 tablet, R-0, E-Prescribe      fexofenadine (ALLEGRA) 180 MG tablet Take 180 mg by mouth daily, Historical      lisinopril (PRINIVIL/ZESTRIL) 5 MG tablet TAKE 1 TABLET DAILY, Disp-90 tablet, R-0, E-Prescribe      Multiple Vitamins-Minerals (PRESERVISION AREDS 2) CAPS Take 1 capsule by mouth 2 times daily , Historical      warfarin ANTICOAGULANT (COUMADIN) 5 MG tablet Take 1 tab daily or as directed per INR Clinic, Disp-100 tablet, R-1, E-Prescribe              Condition on Discharge:  Discharge condition: Stable   Discharge vitals: Blood pressure (!) 150/40, pulse 65, temperature 97.4  F (36.3  C), temperature source Oral, resp. rate 14, weight 69 kg (152 lb 1.6 oz), SpO2 92 %, not currently breastfeeding.   Code status on discharge: Full Code     History of Illness:  See detailed admission note for full details.    Physical Exam:  Blood pressure (!) 150/40, pulse 65, temperature 97.4  F (36.3  C), temperature source Oral, resp. rate 14, weight 69 kg (152 lb 1.6 oz), SpO2 92 %, not currently breastfeeding.  Wt Readings from Last 1 Encounters:   12/16/19 69 kg (152 lb 1.6 oz)     Constitutional: alert, NAD  Eyes: sclera white   HEENT: MMM  Respiratory:  no focal crackles or wheeze  Cardiovascular: RRR.  1/6 systolic murmur  GI: non-tender, not distended, bowel sounds present  Skin: no rash     Musculoskeletal/extremities: Left arm in sling, less pain with movement, no edema  Neurologic: Alert and oriented, light touch sensation intact in arms, good  strength bilaterally  Psychiatric:  Much more calm today, cooperative    Procedures other than Imaging:  None     Imaging:  Results for orders placed or performed during the hospital encounter of 12/13/19   XR Shoulder Left G/E 3 Views    Narrative    SHOULDER LEFT THREE OR MORE VIEWS   12/13/2019 3:42 PM     HISTORY: Fall    COMPARISON: None.      Impression    IMPRESSION: Moderately impacted fracture surgical neck of the humerus.  No other fractures identified.    DEEPTI CHOWDHURY MD   XR Humerus Left G/E 2 Views    Narrative    HUMERUS LEFT TWO OR MORE VIEWS   12/13/2019 3:43 PM    HISTORY: Fall    COMPARISON: None.      Impression    IMPRESSION: Moderately impacted fracture surgical neck of the humerus.  No other fractures identified.    DEEPTI CHOWDHURY MD        Consultations:  Consultation during this admission received from orthopedics.       Recent Lab Results:  Recent Labs   Lab 12/14/19  0558   WBC 6.4   HGB 12.3   HCT 40.1   MCV 98        Recent Labs   Lab 12/16/19  0645 12/15/19  1425 12/15/19  0558 12/14/19  0558     --   --  136   POTASSIUM 5.3 5.3 5.2 4.8   CHLORIDE 104  --   --  105   CO2 30  --   --  28   ANIONGAP 3  --   --  3   *  --   --  154*   BUN 26  --   --  22   CR 0.59  --   --  0.61   GFRESTIMATED 82  --   --  81   GFRESTBLACK >90  --   --  >90   BARRETT 8.6  --   --  8.3*     Recent Labs   Lab 12/17/19  0726 12/16/19  0645 12/15/19  0558   INR 2.75* 3.40* 4.82*          Pending Results:    Unresulted Labs Ordered in the Past 30 Days of this Admission     No orders found from 11/13/2019 to 12/14/2019.         These results will be followed up by patient's primary care provider.    Discharge Instructions and Follow-Up:   Discharge Procedure Orders   Home Care PT Referral for Hospital Discharge   Referral Priority:  Routine Referral Type: Home Health Therapies & Aides   Number of Visits Requested: 1     Home Care OT Referral for Hospital Discharge   Referral Priority: Routine   Number of Visits Requested: 1     Home care nursing referral   Referral Priority: Routine Referral Type: Home Health Therapies & Aides   Number of Visits Requested: 1     MD face to face encounter   Order Comments: Documentation of Face to Face and Certification for Home Health Services    I certify that patient: Veronica Bray is under my care and that I, or a nurse practitioner or physician's assistant working with me, had a face-to-face encounter that meets the physician face-to-face encounter requirements with this patient on: 12/16/2019.    This encounter with the patient was in whole, or in part, for the following medical condition, which is the primary reason for home health care: acute left humerus fracture, peripheral neuropathy, atrial fibrillation.    I certify that, based on my findings, the following services are medically necessary home health services: Nursing, Occupational Therapy and Physical Therapy.    My clinical findings support the need for the above services because: Nurse is needed: To assess pain and mobility after changes in medications or other medical regimen, Occupational Therapy Services are needed to assess and treat cognitive ability and address ADL safety due to impairment in ADLs due to humerus fracture and home safety. and Physical Therapy Services are needed to assess and treat the following functional impairments: deconditioning from hospitalization and acute humerus fracture.    Further, I certify that my clinical findings support that this patient is homebound (i.e. absences from home require considerable and taxing effort and are for medical reasons or [employer] services or infrequently or of short duration when for other reasons) because: Leaving home is medically contraindicated for the following reason(s): Side  effects of increased pain medication including: sedation.. and Requires assistance of another person or specialized equipment to access medical services because patient: Range of motion limitations prevents ability to exit home safely...    Based on the above findings. I certify that this patient is confined to the home and needs intermittent skilled nursing care, physical therapy and/or speech therapy.  The patient is under my care, and I have initiated the establishment of the plan of care.  This patient will be followed by a physician who will periodically review the plan of care.  Physician/Provider to provide follow up care: Concepcion Mccall    Attending hospital physician (the Medicare certified [place] provider): Jarek Stringer MD  Physician Signature: See electronic signature associated with these discharge orders.  Date: 12/16/2019     Reason for your hospital stay   Order Comments: You were hospitalized for a left humerus fracture that will be managed non-operatively.  For pain you will be taking acetaminophen 1000mg three times daily.  Do not exceed 4000mg in a 24 hour period.  Hydroxyzine as needed can be used as well, but this can cause drowsiness.  You will need your INR check in 2 or 3 days and the home nurse should be able to do this for you and send results to your anticoagulation clinic.  You will also have home PT and OT.  You will have follow up with orthopedics.     Follow-up and recommended labs and tests    Order Comments: Follow up with primary care provider, Concepcion Mccall, within 7-14 days for hospital follow- up.  The following labs/tests are recommended: INR    Follow-up in orthopedics clinic one of the shoulder or trauma specialists in [place] in 2 weeks.     Activity   Order Comments: Your activity upon discharge: activity as tolerated.  Non weight bearing to left arm     Order Specific Question Answer Comments   Is discharge order? Yes      Full Code     Order Specific  Question Answer Comments   Code status determined by: Discussion with patient/legal decision maker      Diet   Order Comments: Follow this diet upon discharge: Orders Placed This Encounter      Regular Diet Adult     Order Specific Question Answer Comments   Is discharge order? Yes            I, Jarek Stringer MD, personally saw the patient today and spent less than or equal to 30 minutes discharging this patient.    Jarek Stringer MD

## 2019-12-17 NOTE — PROGRESS NOTES
[facility]  Met with pt to discuss plans for HC.  Pt to be discharged home today and has agreed to have [facility] follow with services of SN, PT, OT and HHA.  Patient care support center processing referral.  Pt verbalized understanding that initial visit is scheduled for 12/18 or 12/19/19.  Pt has 24 hour phone number for [facility] for any questions or concerns.

## 2019-12-17 NOTE — PHARMACY - DISCHARGE MEDICATION RECONCILIATION
Clinical Pharmacy- Warfarin Discharge Note    Patient is discharging on prior to admission warfarin dose with INR in 2 to 3 days.    Anticoagulation Dose History     Recent Dosing and Labs Latest Ref Rng & Units 11/12/2019 12/10/2019 12/13/2019 12/14/2019 12/15/2019 12/16/2019 12/17/2019    Warfarin 1 mg - - - - - - 1 mg -    Warfarin 5 mg - - - 5 mg 5 mg - - -    INR 0.86 - 1.14 - - - 2.61(H) 4.82(H) 3.40(H) 2.75(H)    INR 0.86 - 1.14 2.2(A) 2.6(A) - - - - -

## 2019-12-17 NOTE — CONSULTS
CTS  CM met with pt at the bedside for discharge planning. MD request assistance to schedule ortho follow up appointment in 2 weeks. Appointment scheduled for the following:  Your hospital follow up  Orthopedic appointment has been schedule for you with Dr. Barth at [facility] for Tuesday 12/31/19 at 1:00pm. Arrive 30 minutes prior to your appointment time. Please bring your hospital discharge instructions, a photo ID, insurance information and a list of your medications with you to your appointment. Please call the clinic at [phone] if you need to reschedule.    [facility]  [address]   [address]  [facility] [address]  [phone]    AVS updated. Pt aware of appointment.     Germaine Villarreal RN, BSN CTS  Care Coordinator  [facility]   [phone]

## 2019-12-17 NOTE — PLAN OF CARE
Discharge Planner OT   Patient plan for discharge: home  Current status: Pt just completed shower upon arrival of OT. Pt required min A for drying of in shower. CGA for shower transfer. Mod A and vc's for how to passively move LUE to dress arm. Max A for sling. Pt completes Le dressing with mod A, feliciano to pull pants up above hips.  Ambulates bathroom>chair with cane. Educated on one handed dressing techniques. Pt feeling more confident to return home.   Barriers to return to prior living situation: none anticipated  Recommendations for discharge: home with home health OT, home health RN and home health aide. Daily assist for dressing, bathing, home management and cooking from friend.   Rationale for recommendations: Pt feeling more confident in ability to return home following session. Pt has good support from her friend who will be able to provide A. Home health OT and home health RN. Home health required as pt would benefit from caregiver education in her home setting, and it would be burden to attend OP therapy with current level of activity tolerance       Entered by: Patience Whitten 12/17/2019 3:24 PM     Occupational Therapy Discharge Summary    Reason for therapy discharge:    Discharged to home with home therapy.    Progress towards therapy goal(s). See goals on Care Plan in [facility] electronic health record for goal details.  Goals met    Therapy recommendation(s):    Continued therapy is recommended.  Rationale/Recommendations:  Home health OT and home health RN. Home health required as pt would benefit from caregiver education in her home setting, and it would be burden to attend OP therapy with current level of activity tolerance..

## 2019-12-17 NOTE — PROGRESS NOTES
Alert and oriented. Anxious and tearful intermittently throughout the day. On 1L O2, desats to 86% on RA. IS encouraged. On remote tele monitoring. Tolerating regular diet. Ambulated in hallway with Ax1, gait belt, and cane. Sling to LUE. Voiding in adequate amounts. Pain managed with scheduled Tylenol and prn Atarax. Plans d/c to home tomorrow with home cares.

## 2019-12-17 NOTE — PLAN OF CARE
A/O, VSS pain control with tylenol and atarx.  Up with assist of 1 voiding adequate ambulated hallway.  LUE in sling CMS+ Plans to discharge home with home care

## 2019-12-18 ENCOUNTER — DOCUMENTATION ONLY (OUTPATIENT)
Dept: CARE COORDINATION | Facility: CLINIC | Age: 84
End: 2019-12-18

## 2019-12-18 ENCOUNTER — TELEPHONE (OUTPATIENT)
Dept: INTERNAL MEDICINE | Facility: CLINIC | Age: 84
End: 2019-12-18

## 2019-12-18 NOTE — PROGRESS NOTES
[facility] now requests orders and shares plan of care/discharge summaries for some patients through [employer].  Please REPLY TO THIS MESSAGE OR ROUTE BACK TO THE AUTHOR in order to give authorization for orders when needed.  This is considered a verbal order, you will still receive a faxed copy of orders for signature.  Thank you for your assistance in improving collaboration for our patients.    ORDER    Requesting start of care orders.     Skilled Nursing 1 week x 4, 3 prn visits for medication reconciliation, INR rechecks, CP assessment, pain mgmt.     Recheck INR on 12/19.     PT to eval/treat for fall prevention and strengthening.   OT to eval/treat for home safety assessment.   HHA 1 week x 2, 2 week x 2 to assist with bathing and personal cares.     Thank you,   Ketty Montelongo, RN  [phone]

## 2019-12-18 NOTE — TELEPHONE ENCOUNTER
IP F/U    Date: 12/17/19  Diagnosis: Closed nondisplaced fracture of surgical neck of left humerus  Is patient active in care coordination? No  Was patient in TCU? No    Next 5 appointments (look out 90 days)    Jan 14, 2020 11:00 AM CST  SHORT with Concepcion Mccall MD  [facility] ([facility]) [address]  [facility] [id number] [phone]

## 2019-12-19 ENCOUNTER — TELEPHONE (OUTPATIENT)
Dept: INTERNAL MEDICINE | Facility: CLINIC | Age: 84
End: 2019-12-19

## 2019-12-19 ENCOUNTER — ANTICOAGULATION THERAPY VISIT (OUTPATIENT)
Dept: CARDIOLOGY | Facility: CLINIC | Age: 84
End: 2019-12-19
Payer: MEDICARE

## 2019-12-19 DIAGNOSIS — Z79.01 LONG TERM CURRENT USE OF ANTICOAGULANT THERAPY: ICD-10-CM

## 2019-12-19 DIAGNOSIS — I48.91 ATRIAL FIBRILLATION (H): ICD-10-CM

## 2019-12-19 LAB — INR PPP: 1.8 (ref 0.9–1.1)

## 2019-12-19 PROCEDURE — 99207 ZZC NO CHARGE NURSE ONLY: CPT

## 2019-12-19 NOTE — TELEPHONE ENCOUNTER
[facility] now requests orders and shares plan of care/discharge summaries for some patients through [employer].  Please REPLY TO THIS MESSAGE OR ROUTE BACK TO THE AUTHOR in order to give authorization for orders when needed.  This is considered a verbal order, you will still receive a faxed copy of orders for signature.  Thank you for your assistance in improving collaboration for our patients.    ORDER  PT 1W2, 2W2, 1W1 for therex, shoulder ROM/ex as indicated, education, balance and transfer training.     MD SUMMARY/PLAN OF CARE  Pt. presents with overall decreased mobility secondary to recent humerus fracture, macular degeneration, decreased balance and decreased strength.  Pt. at this time is in a sling, and was instructed on Codman's exercise, elbow and wrist exercises. Will be seeing ortho in 2 weeks. Pt. will benefit from PT 1W2, 2W2, 1W1 for therex, shoulder ROM/ex as indicated, education, balance and transfer training.     Cata Patel, PT  [facility]  [phone]  [email or web address]

## 2019-12-19 NOTE — TELEPHONE ENCOUNTER
"Hospital/TCU/ED for chronic condition Discharge Protocol    \"Hi, my name is Jeannie Russo RN, a registered nurse, and I am calling from [facility].  I am calling to follow up and see how things are going for you after your recent emergency visit/hospital/TCU stay.\"    Tell me how you are doing now that you are home?\" im doing really quite well.  Took only 2 tyl and muscle relaxer and slept well.      Discharge Instructions    \"Let's review your discharge instructions.  What is/are the follow-up recommendations?  Pt. Response: with in 7 days    \"Has an appointment with your primary care provider been scheduled?\"   Yes. (confirm)   Next 5 appointments (look out 90 days)    Jan 14, 2020 11:00 AM CST  SHORT with Concepcion Mccall MD  [facility] [address]  [facility] [address]  [phone]            \"When you see the provider, I would recommend that you bring your medications with you.\"    Medications    \"Tell me what changed about your medicines when you discharged?\"    Changes to chronic meds?    0-1    \"What questions do you have about your medications?\"    None     New diagnoses of heart failure, COPD, diabetes, or MI?    No          On warfarin: \"Were you given any recommendations for follow-up with the anticoagulation clinic?\" has homecare coming out today to draw her INR    Post Discharge Medication Reconciliation Status: discharge medications reconciled and changed, per note/orders (see AVS).    Was MTM referral placed (*Make sure to put transitions as reason for referral)?   No    Call Summary    \"What questions or concerns do you have about your recent visit and your follow-up care?\"     none    \"If you have questions or things don't continue to improve, we encourage you contact us through the main clinic number (give number).  Even if the clinic is not open, triage nurses are available 24/7 to help you.     We would like you to know " "that our clinic has extended hours (provide information).  We also have urgent care (provide details on closest location and hours/contact info)\"      \"Thank you for your time and take care!\"           "

## 2019-12-19 NOTE — PROGRESS NOTES
ANTICOAGULATION FOLLOW-UP CLINIC VISIT    Patient Name:  Veronica Bray  Date:  2019  Contact Type:  Telephone    SUBJECTIVE:  Patient Findings     Positives:   Hospital admission    Comments:   Inpatient from  to  for mechanical fall and impacted left humerus fracture         Clinical Outcomes     Negatives:   Major bleeding event, Thromboembolic event, Anticoagulation-related hospital admission, Anticoagulation-related ED visit, Anticoagulation-related fatality    Comments:   Inpatient from  to  for mechanical fall and impacted left humerus fracture            OBJECTIVE    INR   Date Value Ref Range Status   2019 1.8 (A) 0.90 - 1.10 Final       ASSESSMENT / PLAN  INR assessment SUB    Recheck INR In: 1 WEEK    INR Location Homecare INR      Anticoagulation Summary  As of 2019    INR goal:   2.0-3.0   TTR:   64.6 % (1 y)   INR used for dosin.8! (2019)   Warfarin maintenance plan:   5 mg (5 mg x 1) every day   Full warfarin instructions:   5 mg every day   Weekly warfarin total:   35 mg   No change documented:   Larisa Monae RN   Plan last modified:   Larisa Monae RN (2019)   Next INR check:   2019   Target end date:   Indefinite    Indications    Atrial fibrillation (H) [I48.91] [I48.91]  Long-term (current) use of anticoagulants [Z79.01] [Z79.01]             Anticoagulation Episode Summary     INR check location:       Preferred lab:       Send INR reminders to:   [facility] HEART INR NURSE    Comments:         Anticoagulation Care Providers     Provider Role Specialty Phone number    Ip, Tr Morris Carrera MD Responsible Cardiology [phone]            See the Encounter Report to view Anticoagulation Flowsheet and Dosing Calendar (Go to Encounters tab in chart review, and find the Anticoagulation Therapy Visit)    INR 1.8 via [facility] nurse  Patient was inpatient from  to .  She had mechanical fall resulting in an impacted left  humerus fracture.  Ortho consulted and non operative management recommended. She was discharged on Tuesday and she isn't sure if she took the Warfarin Tuesday night but took 5mg on Wednesday.  She is taking Tylenol and Hydroxyzine but hasn't taken either yet today.  No Warfarin was given in the hospital on 12/15 and only 1mg on 12/16.  We will just continue her normal 5mg/day and let the INR come up slowly and recheck in 1 week.  She is out of town on 12/26 so they will check the INR at home on 12/27.  Mike Monae RN

## 2019-12-26 ENCOUNTER — TELEPHONE (OUTPATIENT)
Dept: INTERNAL MEDICINE | Facility: CLINIC | Age: 84
End: 2019-12-26

## 2019-12-27 ENCOUNTER — TELEPHONE (OUTPATIENT)
Dept: INTERNAL MEDICINE | Facility: CLINIC | Age: 84
End: 2019-12-27

## 2019-12-27 ENCOUNTER — ANTICOAGULATION THERAPY VISIT (OUTPATIENT)
Dept: CARDIOLOGY | Facility: CLINIC | Age: 84
End: 2019-12-27
Payer: MEDICARE

## 2019-12-27 DIAGNOSIS — I48.91 ATRIAL FIBRILLATION, UNSPECIFIED TYPE (H): ICD-10-CM

## 2019-12-27 DIAGNOSIS — Z79.01 LONG TERM CURRENT USE OF ANTICOAGULANT THERAPY: ICD-10-CM

## 2019-12-27 LAB — INR PPP: 3 (ref 0.9–1.1)

## 2019-12-27 PROCEDURE — 99207 ZZC NO CHARGE LOS: CPT | Mod: 26 | Performed by: INTERNAL MEDICINE

## 2019-12-27 NOTE — PROGRESS NOTES
ANTICOAGULATION FOLLOW-UP CLINIC VISIT    Patient Name:  Veronica Bray  Date:  12/27/2019  Contact Type:  Telephone/CHRISTEL Sanches -[phone]    SUBJECTIVE:  Patient Findings     Positives:   Change in health (left humerus fracture), Change in medications (hydroxyzine at bedtime on occasion)        Clinical Outcomes     Negatives:   Major bleeding event, Thromboembolic event, Anticoagulation-related hospital admission, Anticoagulation-related ED visit, Anticoagulation-related fatality           OBJECTIVE    INR   Date Value Ref Range Status   12/27/2019 3.0 (A) 0.90 - 1.10 Final       ASSESSMENT / PLAN  INR assessment THER    Recheck INR In: 1 WEEK    INR Location [facility]      Anticoagulation Summary  As of 12/27/2019    INR goal:   2.0-3.0   TTR:   64.2 % (1 y)   INR used for dosing:   3.0 (12/27/2019)   Warfarin maintenance plan:   5 mg (5 mg x 1) every day   Full warfarin instructions:   12/27: 2.5 mg; Otherwise 5 mg every day   Weekly warfarin total:   35 mg   Plan last modified:   Larisa Monae RN (7/16/2019)   Next INR check:   1/3/2020   Target end date:   Indefinite    Indications    Atrial fibrillation (H) [I48.91] [I48.91]  Long-term (current) use of anticoagulants [Z79.01] [Z79.01]             Anticoagulation Episode Summary     INR check location:       Preferred lab:       Send INR reminders to:   [facility] HEART INR NURSE    Comments:         Anticoagulation Care Providers     Provider Role Specialty Phone number    Ip, Tr Morris Carrera MD Responsible Cardiology [phone]            See the Encounter Report to view Anticoagulation Flowsheet and Dosing Calendar (Go to Encounters tab in chart review, and find the Anticoagulation Therapy Visit)    INR 3.0 per CHRISTEL Sanches -[phone]. Pt was hospitalized 12/13/19-12/17/19 after a mechanical fall and left humerus fracture. She is now wearing a sling on the left arm. Taking Hydroxyzine at bedtime on occasion. Not taking Tylenol for pain any  longer. Ate a large salad yesterday. Denies abnormal bleeding or bruising. Would prefer that her INR not be at the upper end of the range right now so will decrease today's dose to 2.5 mg then resume 5 mg daily with recheck in 1 week. She will eat an extra serving of greens this weekend. Dosage adjustment made based on physician directed care plan.    Amanda Fuentes RN

## 2020-01-02 ENCOUNTER — TELEPHONE (OUTPATIENT)
Dept: INTERNAL MEDICINE | Facility: CLINIC | Age: 85
End: 2020-01-02

## 2020-01-03 ENCOUNTER — ANTICOAGULATION THERAPY VISIT (OUTPATIENT)
Dept: CARDIOLOGY | Facility: CLINIC | Age: 85
End: 2020-01-03
Payer: MEDICARE

## 2020-01-03 DIAGNOSIS — I48.91 ATRIAL FIBRILLATION, UNSPECIFIED TYPE (H): ICD-10-CM

## 2020-01-03 DIAGNOSIS — Z79.01 LONG TERM CURRENT USE OF ANTICOAGULANT THERAPY: ICD-10-CM

## 2020-01-03 LAB — INR PPP: 3.5 (ref 0.9–1.1)

## 2020-01-03 PROCEDURE — 99207 ZZC NO CHARGE NURSE ONLY: CPT

## 2020-01-03 NOTE — PROGRESS NOTES
ANTICOAGULATION FOLLOW-UP CLINIC VISIT    Patient Name:  Veronica Bray  Date:  1/3/2020  Contact Type:  Telephone/ CHRISTEL RN    SUBJECTIVE:  Patient Findings     Positives:   Change in health (Recovering from humerous fracture), Change in medications (Has been taking 650 mg of Tylenol daily for pain)        Clinical Outcomes     Negatives:   Major bleeding event, Thromboembolic event, Anticoagulation-related hospital admission, Anticoagulation-related ED visit, Anticoagulation-related fatality           OBJECTIVE    INR   Date Value Ref Range Status   01/03/2020 3.5 (A) 0.90 - 1.10 Final       ASSESSMENT / PLAN  INR assessment SUPRA    Recheck INR In: 6 DAYS    INR Location [facility]      Anticoagulation Summary  As of 1/3/2020    INR goal:   2.0-3.0   TTR:   62.3 % (1 y)   INR used for dosing:   3.5! (1/3/2020)   Warfarin maintenance plan:   5 mg (5 mg x 1) every day   Full warfarin instructions:   1/3: Hold; 1/6: 2.5 mg; Otherwise 5 mg every day   Weekly warfarin total:   35 mg   Plan last modified:   Larisa Monae RN (7/16/2019)   Next INR check:   1/9/2020   Target end date:   Indefinite    Indications    Atrial fibrillation (H) [I48.91] [I48.91]  Long-term (current) use of anticoagulants [Z79.01] [Z79.01]             Anticoagulation Episode Summary     INR check location:       Preferred lab:       Send INR reminders to:   [facility] HEART INR NURSE    Comments:         Anticoagulation Care Providers     Provider Role Specialty Phone number    Ip, Tr Morris Carrera MD Responsible Cardiology [phone]            See the Encounter Report to view Anticoagulation Flowsheet and Dosing Calendar (Go to Encounters tab in chart review, and find the Anticoagulation Therapy Visit)    INR was 3.5 today as reported by CHRISTEL Mann RN at [phone]. Pt has not had any abnormal bleeding or bruising. Has been taking 650 mg of Tylenol daily for pain secondary to recent humerus fx. Pt has poor vision. . Eats approximately  2 servings of greens weekly. Will hold today's 5 mg dose of warfarin. Pt to eat a serving of greens today. Decrease weekly maintenance dose by 2.5 mg or 7.1% secondary to pt's fall risk. New maintenance dose will be 2.5 mg on Mon and 5 mg ROW. Next INR check is scheduled in 6 days. RN verbalized understanding. Dosage adjustment made based on physician directed care plan    Jodi Alaniz RN

## 2020-01-08 ENCOUNTER — TELEPHONE (OUTPATIENT)
Dept: INTERNAL MEDICINE | Facility: CLINIC | Age: 85
End: 2020-01-08

## 2020-01-09 ENCOUNTER — TELEPHONE (OUTPATIENT)
Dept: INTERNAL MEDICINE | Facility: CLINIC | Age: 85
End: 2020-01-09

## 2020-01-09 DIAGNOSIS — I48.91 ATRIAL FIBRILLATION, UNSPECIFIED TYPE (H): ICD-10-CM

## 2020-01-09 DIAGNOSIS — Z79.01 LONG TERM CURRENT USE OF ANTICOAGULANT THERAPY: ICD-10-CM

## 2020-01-09 LAB — INR PPP: 2.4 (ref 0.9–1.1)

## 2020-01-09 NOTE — TELEPHONE ENCOUNTER
ANTICOAGULATION MANAGEMENT     Patient Name:  Veronica Bray  Date:  2020    ASSESSMENT /SUBJECTIVE:      Today's INR result of 2.4 is therapeutic. Goal INR of 2.0-3.0      Warfarin dose taken: Warfarin taken as previously instructed    Diet: No new diet changes affecting INR    Medication changes/ interactions: No new medications/supplements affecting INR    Previous INR: Supratherapeutic     S/S of bleeding or thromboembolism: No    New injury or illness:  No    Upcoming surgery, procedure or cardioversion:  No    Additional findings: None      PLAN:    Spoke with Liyah home care nurse regarding INR result and instructed:     Warfarin Dosing Instructions: Continue your current warfarin dose of 2.5 mg on  and 5 mg all the other days    Instructed patient to follow up no later than: 2 weeks    Education provided: No      Liyah home care nurse, verbalizes understanding and agrees to warfarin dosing plan.    Instructed to call the Anticoagulation Clinic for any changes, questions or concerns. (#[phone])        OBJECTIVE:  INR   Date Value Ref Range Status   2020 2.4 (A) 0.90 - 1.10 Final             Anticoagulation Summary  As of 2020    INR goal:   2.0-3.0   TTR:   61.5 % (1 y)   INR used for dosin.4 (2020)   Warfarin maintenance plan:   2.5 mg (5 mg x 0.5) every Mon; 5 mg (5 mg x 1) all other days   Full warfarin instructions:   2.5 mg every Mon; 5 mg all other days   Weekly warfarin total:   32.5 mg   Plan last modified:   Jodi Alaniz RN (1/3/2020)   Next INR check:   2020   Target end date:   Indefinite    Indications    Atrial fibrillation (H) [I48.91] [I48.91]  Long-term (current) use of anticoagulants [Z79.01] [Z79.01]             Anticoagulation Episode Summary     INR check location:       Preferred lab:       Send INR reminders to:   [facility] HEART INR NURSE    Comments:         Anticoagulation Care Providers     Provider Role Specialty Phone number    Tr Benavides  MD Deandre [facility] Cardiology [phone]

## 2020-01-10 DIAGNOSIS — Z53.9 DIAGNOSIS NOT YET DEFINED: Primary | ICD-10-CM

## 2020-01-10 PROCEDURE — G0180 MD CERTIFICATION HHA PATIENT: HCPCS | Performed by: INTERNAL MEDICINE

## 2020-01-14 ENCOUNTER — DOCUMENTATION ONLY (OUTPATIENT)
Dept: INTERNAL MEDICINE | Facility: CLINIC | Age: 85
End: 2020-01-14

## 2020-01-14 ENCOUNTER — TRANSFERRED RECORDS (OUTPATIENT)
Dept: HEALTH INFORMATION MANAGEMENT | Facility: CLINIC | Age: 85
End: 2020-01-14

## 2020-01-14 ENCOUNTER — OFFICE VISIT (OUTPATIENT)
Dept: INTERNAL MEDICINE | Facility: CLINIC | Age: 85
End: 2020-01-14
Payer: MEDICARE

## 2020-01-14 VITALS
SYSTOLIC BLOOD PRESSURE: 128 MMHG | DIASTOLIC BLOOD PRESSURE: 72 MMHG | RESPIRATION RATE: 12 BRPM | HEIGHT: 61 IN | OXYGEN SATURATION: 93 % | WEIGHT: 150 LBS | TEMPERATURE: 98.4 F | HEART RATE: 67 BPM | BODY MASS INDEX: 28.32 KG/M2

## 2020-01-14 DIAGNOSIS — E55.9 VITAMIN D DEFICIENCY: ICD-10-CM

## 2020-01-14 DIAGNOSIS — Z95.1 S/P CABG (CORONARY ARTERY BYPASS GRAFT): ICD-10-CM

## 2020-01-14 DIAGNOSIS — E78.49 OTHER HYPERLIPIDEMIA: ICD-10-CM

## 2020-01-14 DIAGNOSIS — I10 BENIGN ESSENTIAL HYPERTENSION: ICD-10-CM

## 2020-01-14 DIAGNOSIS — I48.0 PAROXYSMAL ATRIAL FIBRILLATION (H): ICD-10-CM

## 2020-01-14 DIAGNOSIS — I48.92 ATRIAL FLUTTER, PAROXYSMAL (H): Primary | ICD-10-CM

## 2020-01-14 PROCEDURE — 82306 VITAMIN D 25 HYDROXY: CPT | Performed by: INTERNAL MEDICINE

## 2020-01-14 PROCEDURE — 99214 OFFICE O/P EST MOD 30 MIN: CPT | Performed by: INTERNAL MEDICINE

## 2020-01-14 PROCEDURE — 36415 COLL VENOUS BLD VENIPUNCTURE: CPT | Performed by: INTERNAL MEDICINE

## 2020-01-14 RX ORDER — ATORVASTATIN CALCIUM 10 MG/1
10 TABLET, FILM COATED ORAL AT BEDTIME
Qty: 90 TABLET | Refills: 0 | Status: SHIPPED | OUTPATIENT
Start: 2020-01-14 | End: 2020-03-30

## 2020-01-14 RX ORDER — CARVEDILOL 6.25 MG/1
TABLET ORAL
Qty: 180 TABLET | Refills: 0 | Status: SHIPPED | OUTPATIENT
Start: 2020-01-14 | End: 2020-03-30

## 2020-01-14 RX ORDER — LISINOPRIL 5 MG/1
5 TABLET ORAL DAILY
Qty: 90 TABLET | Refills: 0 | Status: SHIPPED | OUTPATIENT
Start: 2020-01-14 | End: 2020-03-30

## 2020-01-14 ASSESSMENT — MIFFLIN-ST. JEOR: SCORE: 1043.81

## 2020-01-14 NOTE — NURSING NOTE
"/72   Pulse 67   Temp 98.4  F (36.9  C) (Oral)   Resp 12   Ht 1.543 m (5' 0.75\")   Wt 68 kg (150 lb)   SpO2 93%   BMI 28.58 kg/m      "

## 2020-01-14 NOTE — PROGRESS NOTES
Please approve my home care orders.    SN visit one visit for next week. - O/E comfort and CV resp status.  Teach medications, CHF and nutrition.      HHA 2 times for this week and 1 time for next week- assist with safe showers.

## 2020-01-14 NOTE — PROGRESS NOTES
Subjective     Veronica Bray is a 88 year old female who presents to clinic today for the following health issues:    HPI     Hypertension Follow-up      Do you check your blood pressure regularly outside of the clinic? No     Are you following a low salt diet? Yes    Are your blood pressures ever more than 140 on the top number (systolic) OR more   than 90 on the bottom number (diastolic), for example 140/90? No    CAD.  She denies any chest pain.   She is on aspirin, coreg, lisinopril, statin.    Atrial fibrillation.  She is on coumadin and wondering if her INR's can be checked at home.    Left humerus fracture - 12/13/19.  The patient is seeing Dr. Lackey from ortho today.  She is seeing physical therapy regularly, and things are improving.  She has right leg numbness following knee surgery construction.  She also has macular degeneration.   She has homecare for nursing since she cannot drive.    She is wondering if she could have her INR checked monthly at her home.    She plans on using a walker.     She had hit her arm a refridgerator after slipping on a rug.      She does not want to have a bone density at this time.    TSH was normal in 6/2019.  vitamin D not recently checked.     Asthma Follow-Up    Was ACT completed today?    Yes    ACT Total Scores 5/21/2019   ACT TOTAL SCORE (Goal Greater than or Equal to 20) 19   In the past 12 months, how many times did you visit the emergency room for your asthma without being admitted to the hospital? 0   In the past 12 months, how many times were you hospitalized overnight because of your asthma? 0       How many days per week do you miss taking your asthma controller medication?  7    Please describe any recent triggers for your asthma: None    Have you had any Emergency Room Visits, Urgent Care Visits, or Hospital Admissions since your last office visit?  No      How many servings of fruits and vegetables do you eat daily?  0-1 recently not as much    On  "average, how many sweetened beverages do you drink each day (Examples: soda, juice, sweet tea, etc.  Do NOT count diet or artificially sweetened beverages)?   0    How many days per week do you miss taking your medication? 0        Reviewed and updated as needed this visit by Provider         Review of Systems   ROS COMP: CONSTITUTIONAL: NEGATIVE for fever, chills, change in weight  RESP: NEGATIVE for significant cough or SOB  CV: NEGATIVE for chest pain, palpitations or peripheral edema      Objective    /72   Pulse 67   Temp 98.4  F (36.9  C) (Oral)   Resp 12   Ht 1.543 m (5' 0.75\")   Wt 68 kg (150 lb)   SpO2 93%   BMI 28.58 kg/m    Body mass index is 28.58 kg/m .  Physical Exam   GENERAL: healthy, alert and no distress  RESP: lungs clear to auscultation - no rales, rhonchi or wheezes  CV: regular rate and rhythm, normal S1 S2, no S3 or S4, no murmur, click or rub    Diagnostic Test Results:  Labs reviewed in Epic        Assessment & Plan       (I48.92) Atrial flutter, paroxysmal (H)  Comment: rate-controlled  Plan: carvedilol (COREG) 6.25 MG tablet    (E78.49) Other hyperlipidemia  Comment:   Plan: atorvastatin (LIPITOR) 10 MG tablet                (Z95.1) S/P CABG (coronary artery bypass graft)  Comment:stable  Plan: carvedilol (COREG) 6.25 MG tablet         (I10) Benign essential hypertension  Comment: at goal  Plan: carvedilol (COREG) 6.25 MG tablet, lisinopril         (PRINIVIL/ZESTRIL) 5 MG tablet          (E55.9) Vitamin D deficiency  (primary encounter diagnosis)  Comment: assess with recent humerus fracture  Plan: Vitamin D Deficiency        Pt declines wanting a bone density at this time       BMI:   Estimated body mass index is 28.58 kg/m  as calculated from the following:    Height as of this encounter: 1.543 m (5' 0.75\").    Weight as of this encounter: 68 kg (150 lb).           See Patient Instructions    No follow-ups on file.    Concepcion Mccall MD  [facility]    "

## 2020-01-14 NOTE — LETTER
January 16, 2020      Veronica Bray  [address]   [place] [address]        Dear ,    We are writing to inform you of your test results.    The vitamin D is very low.   Recommend vitamin D 50,000 international unit(s) weekly for 2 months. Then take vitamin D 2000 international unit(s) daily    Resulted Orders   Vitamin D Deficiency   Result Value Ref Range    Vitamin D Deficiency screening 7 (L) 20 - 75 ug/L      Comment:      Season, race, dietary intake, and treatment affect the concentration of   25-hydroxy-Vitamin D. Values may decrease during winter months and increase   during summer months. Values 20-29 ug/L may indicate Vitamin D insufficiency   and values <20 ug/L may indicate Vitamin D deficiency.  Vitamin D determination is routinely performed by an immunoassay specific for   25 hydroxyvitamin D3.  If an individual is on vitamin D2 (ergocalciferol)   supplementation, please specify 25 OH vitamin D2 and D3 level determination by   LCMSMS test [id number].         If you have any questions or concerns, please call the clinic at the number listed above.       Sincerely,        Concepcion Mccall MD

## 2020-01-15 ENCOUNTER — TELEPHONE (OUTPATIENT)
Dept: INTERNAL MEDICINE | Facility: CLINIC | Age: 85
End: 2020-01-15

## 2020-01-15 LAB — DEPRECATED CALCIDIOL+CALCIFEROL SERPL-MC: 7 UG/L (ref 20–75)

## 2020-01-15 ASSESSMENT — ASTHMA QUESTIONNAIRES: ACT_TOTALSCORE: 22

## 2020-01-20 ENCOUNTER — TELEPHONE (OUTPATIENT)
Dept: INTERNAL MEDICINE | Facility: CLINIC | Age: 85
End: 2020-01-20

## 2020-01-21 ENCOUNTER — DOCUMENTATION ONLY (OUTPATIENT)
Dept: INTERNAL MEDICINE | Facility: CLINIC | Age: 85
End: 2020-01-21

## 2020-01-23 ENCOUNTER — ANTICOAGULATION THERAPY VISIT (OUTPATIENT)
Dept: CARDIOLOGY | Facility: CLINIC | Age: 85
End: 2020-01-23
Payer: MEDICARE

## 2020-01-23 DIAGNOSIS — I48.91 ATRIAL FIBRILLATION, UNSPECIFIED TYPE (H): ICD-10-CM

## 2020-01-23 DIAGNOSIS — Z79.01 LONG TERM CURRENT USE OF ANTICOAGULANT THERAPY: ICD-10-CM

## 2020-01-23 LAB — INR PPP: 4 (ref 0.9–1.1)

## 2020-01-23 PROCEDURE — 99207 ZZC NO CHARGE LOS: CPT

## 2020-01-23 NOTE — PROGRESS NOTES
ANTICOAGULATION FOLLOW-UP CLINIC VISIT    Patient Name:  Veronica Bray  Date:  2020  Contact Type:  Telephone/CHRISTEL Mann -[phone]    SUBJECTIVE:         OBJECTIVE    INR   Date Value Ref Range Status   2020 4.0 (A) 0.90 - 1.10 Final       ASSESSMENT / PLAN  INR assessment SUPRA    Recheck INR In: 10 DAYS    INR Location [facility]      Anticoagulation Summary  As of 2020    INR goal:   2.0-3.0   TTR:   59.1 % (1 y)   INR used for dosin.0! (2020)   Warfarin maintenance plan:   2.5 mg (5 mg x 0.5) every Mon, Thu; 5 mg (5 mg x 1) all other days   Full warfarin instructions:   : Hold; Otherwise 2.5 mg every Mon, Thu; 5 mg all other days   Weekly warfarin total:   30 mg   Plan last modified:   Amanda Fuentes RN (2020)   Next INR check:   2020   Target end date:   Indefinite    Indications    Atrial fibrillation (H) [I48.91] [I48.91]  Long-term (current) use of anticoagulants [Z79.01] [Z79.01]             Anticoagulation Episode Summary     INR check location:       Preferred lab:       Send INR reminders to:   [facility] HEART INR NURSE    Comments:         Anticoagulation Care Providers     Provider Role Specialty Phone number    Ip, Tr Carrera MD Responsible Cardiology [phone]            See the Encounter Report to view Anticoagulation Flowsheet and Dosing Calendar (Go to Encounters tab in chart review, and find the Anticoagulation Therapy Visit)    INR 4.0 per message from CHRISTEL Mann -[phone]. This is pt's last homecare visit.  INR was in range on  after a one time hold then INR emilie after taking usual dosing schedule for 2 weeks. Left message asking Mackenzie to call back to review pt's status -- any change in pain meds or diet? Any bleeding or bruising? Will plan to hold today's dose then decrease dosing to 2.5 mg MTh and 5 mg all other days with recheck in the clinic in 12 days.   2:15 pm Spoke with Mackenzie homecare nurse. No changes in pain meds or diet.  She is taking Vitamin D every week. Denies abnormal bleeding or bruising. Will hold today's dose then decrease dosing to 2.5 mg MTh and 5 mg all other days with recheck in clinic in 10-14 days. Dosage adjustment made based on physician directed care plan.    Amanda Fuentes RN

## 2020-01-24 ENCOUNTER — TELEPHONE (OUTPATIENT)
Dept: INTERNAL MEDICINE | Facility: CLINIC | Age: 85
End: 2020-01-24

## 2020-01-27 ENCOUNTER — TELEPHONE (OUTPATIENT)
Dept: INTERNAL MEDICINE | Facility: CLINIC | Age: 85
End: 2020-01-27

## 2020-01-29 ENCOUNTER — TELEPHONE (OUTPATIENT)
Dept: INTERNAL MEDICINE | Facility: CLINIC | Age: 85
End: 2020-01-29

## 2020-01-29 NOTE — PROGRESS NOTES
Rosangela Mccall,  We are continuing to work with Veronica, and would like to provide a nurse visit in February to recheck her INR, whenever that due date is.  She is making progress with our PT, and we can send the nurse one more time in Feb just for INR check, as long as the PT is still providing a skilled need, which they are.  We are requesting an order for SN 1 x visit in February to check her INR.   Please REPLY TO THIS MESSAGE in order to give authorization for orders when needed.  This is considered a verbal order, you will still receive a faxed copy of orders for signature.  Thank you for your timely assistance.    Order for Veronica Bray; MRN [id number]  Sincerely [facility]  Nancy Valle  [phone]

## 2020-01-29 NOTE — TELEPHONE ENCOUNTER
Verónica [facility] Nursing calling to verify next INR date for patient which was given. No further questions.  Zehra Cooper, RN  Anticoagulation Nurse - [facility] [place] [place]     10-Apr-2019 22:00

## 2020-01-30 ENCOUNTER — TELEPHONE (OUTPATIENT)
Dept: INTERNAL MEDICINE | Facility: CLINIC | Age: 85
End: 2020-01-30

## 2020-01-30 DIAGNOSIS — Z79.01 LONG TERM CURRENT USE OF ANTICOAGULANT THERAPY: ICD-10-CM

## 2020-01-30 DIAGNOSIS — I48.91 ATRIAL FIBRILLATION, UNSPECIFIED TYPE (H): ICD-10-CM

## 2020-01-30 LAB — INR PPP: 2.1 (ref 0.9–1.1)

## 2020-01-30 NOTE — TELEPHONE ENCOUNTER
ANTICOAGULATION MANAGEMENT     Patient Name:  Veronica Bray  Date:  2020    ASSESSMENT /SUBJECTIVE:      Today's INR result of 2.1 is therapeutic. Goal INR of 2.0-3.0      Warfarin dose taken: Warfarin taken as previously instructed    Diet: No new diet changes affecting INR    Medication changes/ interactions: No new medications/supplements affecting INR    Previous INR: Supratherapeutic     S/S of bleeding or thromboembolism: No    New injury or illness:  No    Upcoming surgery, procedure or cardioversion:  No    Additional findings: None      PLAN:    Spoke with Liyah regarding INR result and instructed:     Warfarin Dosing Instructions: Continue your current warfarin dose of 2.5 mg on mon/thurs and 5 mg all other days    Instructed patient to follow up no later than: 1 week  Orders given to  Homecare nurse/facility to recheck    Education provided: No      Liyah, home care nurse verbalizes understanding and agrees to warfarin dosing plan.    Instructed to call the Anticoagulation Clinic for any changes, questions or concerns. (#[phone])        OBJECTIVE:  INR   Date Value Ref Range Status   2020 2.1 (A) 0.90 - 1.10 Final             Anticoagulation Summary  As of 2020    INR goal:   2.0-3.0   TTR:   58.1 % (1 y)   INR used for dosin.1 (2020)   Warfarin maintenance plan:   2.5 mg (5 mg x 0.5) every Mon, Thu; 5 mg (5 mg x 1) all other days   Full warfarin instructions:   2.5 mg every Mon, Thu; 5 mg all other days   Weekly warfarin total:   30 mg   Plan last modified:   Amanda Fuentes RN (2020)   Next INR check:      Target end date:   Indefinite    Indications    Atrial fibrillation (H) [I48.91] [I48.91]  Long-term (current) use of anticoagulants [Z79.01] [Z79.01]             Anticoagulation Episode Summary     INR check location:       Preferred lab:       Send INR reminders to:   [facility] HEART INR NURSE    Comments:         Anticoagulation Care Providers     Provider Role  Specialty Phone number    Ip, Tr Carrera MD Responsible Cardiology [phone]

## 2020-01-30 NOTE — TELEPHONE ENCOUNTER
Patient Home care called, Paola, requesting that INR be rechecked on 2/5/20 instead of 2/4/20.  Writer called Paola back to request INR be check today 1/30/20 or tomorrow 1/31/20 due to last INR was 4.0 and should be checked sooner than 2/4/20 per protocol.  Paola reports this can be checked today or tomorrow.    Maria De Jesus Dias RN  Anticoagulation Clinic

## 2020-02-05 ENCOUNTER — TELEPHONE (OUTPATIENT)
Dept: INTERNAL MEDICINE | Facility: CLINIC | Age: 85
End: 2020-02-05

## 2020-02-05 DIAGNOSIS — Z79.01 LONG TERM CURRENT USE OF ANTICOAGULANT THERAPY: ICD-10-CM

## 2020-02-05 DIAGNOSIS — I48.91 ATRIAL FIBRILLATION (H): ICD-10-CM

## 2020-02-05 LAB — INR PPP: 2 (ref 0.9–1.1)

## 2020-02-05 NOTE — TELEPHONE ENCOUNTER
ANTICOAGULATION MANAGEMENT     Patient Name:  Veronica Bray  Date:  2020    ASSESSMENT /SUBJECTIVE:      Today's INR result of 2.0 is therapeutic. Goal INR of 2.0-3.0      Warfarin dose taken: Warfarin taken as previously instructed    Diet: No new diet changes affecting INR    Medication changes/ interactions: No new medications/supplements affecting INR    Previous INR: Therapeutic     S/S of bleeding or thromboembolism: No    New injury or illness:  No    Upcoming surgery, procedure or cardioversion:  No    Additional findings: None      PLAN:    Spoke with Verónica Home care Nurse regarding INR result and instructed:     Warfarin Dosing Instructions: Continue your current warfarin dose    Instructed patient to follow up no later than: 1 week  Orders given to  Homecare nurse/facility to recheck    Education provided: No      Verónica Nurse verbalizes understanding and agrees to warfarin dosing plan.    Instructed to call the Anticoagulation Clinic for any changes, questions or concerns. (#[phone])        OBJECTIVE:  INR   Date Value Ref Range Status   2020 2.0 (A) 0.90 - 1.10 Final             Anticoagulation Summary  As of 2020    INR goal:   2.0-3.0   TTR:   59.1 % (1 y)   INR used for dosin.0 (2020)   Warfarin maintenance plan:   2.5 mg (5 mg x 0.5) every Mon, Thu; 5 mg (5 mg x 1) all other days   Full warfarin instructions:   2.5 mg every Mon, Thu; 5 mg all other days   Weekly warfarin total:   30 mg   Plan last modified:   Amanda Fuentes RN (2020)   Next INR check:   2020   Priority:   Maintenance   Target end date:   Indefinite    Indications    Atrial fibrillation (H) [I48.91] [I48.91]  Long-term (current) use of anticoagulants [Z79.01] [Z79.01]             Anticoagulation Episode Summary     INR check location:       Preferred lab:       Send INR reminders to:   [facility] HEART INR NURSE    Comments:         Anticoagulation Care Providers     Provider Role Specialty Phone  number    Ip, Tr Carrera MD Responsible Cardiology [phone]

## 2020-02-07 ENCOUNTER — TELEPHONE (OUTPATIENT)
Dept: INTERNAL MEDICINE | Facility: CLINIC | Age: 85
End: 2020-02-07

## 2020-02-13 ENCOUNTER — TRANSFERRED RECORDS (OUTPATIENT)
Dept: HEALTH INFORMATION MANAGEMENT | Facility: CLINIC | Age: 85
End: 2020-02-13

## 2020-02-14 ENCOUNTER — TELEPHONE (OUTPATIENT)
Dept: INTERNAL MEDICINE | Facility: CLINIC | Age: 85
End: 2020-02-14

## 2020-02-14 DIAGNOSIS — I48.91 ATRIAL FIBRILLATION (H): ICD-10-CM

## 2020-02-14 DIAGNOSIS — Z79.01 LONG TERM CURRENT USE OF ANTICOAGULANT THERAPY: ICD-10-CM

## 2020-02-14 LAB — INR PPP: 2.9 (ref 0.9–1.1)

## 2020-02-14 NOTE — TELEPHONE ENCOUNTER
ANTICOAGULATION MANAGEMENT     Patient Name:  Veronica Bray  Date:  2/14/2020    ASSESSMENT /SUBJECTIVE:      Today's INR result of 2.9 is therapeutic. Goal INR of 2.0-3.0      Warfarin dose taken: Warfarin taken as previously instructed    Diet: No new diet changes affecting INR    Medication changes/ interactions: No new medications/supplements affecting INR    Previous INR: Therapeutic     S/S of bleeding or thromboembolism: No    New injury or illness:  No    Upcoming surgery, procedure or cardioversion:  No    Additional findings: No      PLAN:    Spoke with Verónica (home care nurse) regarding INR result and instructed:     Warfarin Dosing Instructions: Continue your current warfarin dose    Instructed patient to follow up no later than: 1 week  Orders given to  Homecare nurse/facility to recheck    Education provided: Yes: significance of inr result      Verónica verbalizes understanding and agrees to warfarin dosing plan.    Instructed to call the Anticoagulation Clinic for any changes, questions or concerns. (#[phone])        OBJECTIVE:  INR   Date Value Ref Range Status   02/05/2020 2.0 (A) 0.90 - 1.10 Final

## 2020-02-19 ENCOUNTER — DOCUMENTATION ONLY (OUTPATIENT)
Dept: INTERNAL MEDICINE | Facility: CLINIC | Age: 85
End: 2020-02-19

## 2020-02-19 NOTE — PROGRESS NOTES
[facility] now requests orders and shares plan of care/discharge summaries for some patients through [email or web address].  Please REPLY TO THIS MESSAGE OR ROUTE BACK TO THE AUTHOR in order to give authorization for orders when needed.  This is considered a verbal order, you will still receive a faxed copy of orders for signature.  Thank you for your assistance in improving collaboration for our patients.    ORDER    Requesting order for Nursing 1 time a week for 4 weeks and 2 PRN.

## 2020-02-21 ENCOUNTER — TELEPHONE (OUTPATIENT)
Dept: INTERNAL MEDICINE | Facility: CLINIC | Age: 85
End: 2020-02-21

## 2020-02-21 ENCOUNTER — ANTICOAGULATION THERAPY VISIT (OUTPATIENT)
Dept: CARDIOLOGY | Facility: CLINIC | Age: 85
End: 2020-02-21
Payer: MEDICARE

## 2020-02-21 DIAGNOSIS — Z79.01 LONG TERM CURRENT USE OF ANTICOAGULANT THERAPY: ICD-10-CM

## 2020-02-21 DIAGNOSIS — I48.91 ATRIAL FIBRILLATION (H): ICD-10-CM

## 2020-02-21 LAB — INR PPP: 2.2 (ref 0.9–1.1)

## 2020-02-21 PROCEDURE — 99207 ZZC NO CHARGE NURSE ONLY: CPT

## 2020-02-21 NOTE — PROGRESS NOTES
ANTICOAGULATION FOLLOW-UP CLINIC VISIT    Patient Name:  Veronica Bray  Date:  2020  Contact Type:  Telephone/ CHRISTEL Miller -[phone]    SUBJECTIVE:  Patient Findings         Clinical Outcomes     Negatives:   Major bleeding event, Thromboembolic event, Anticoagulation-related hospital admission, Anticoagulation-related ED visit, Anticoagulation-related fatality           OBJECTIVE    INR   Date Value Ref Range Status   2020 2.2 (A) 0.90 - 1.10 Final       ASSESSMENT / PLAN  INR assessment THER    Recheck INR In: 2 WEEKS    INR Location [facility] INR      Anticoagulation Summary  As of 2020    INR goal:   2.0-3.0   TTR:   63.5 % (1 y)   INR used for dosin.2 (2020)   Warfarin maintenance plan:   2.5 mg (5 mg x 0.5) every Mon, Thu; 5 mg (5 mg x 1) all other days   Full warfarin instructions:   2.5 mg every Mon, Thu; 5 mg all other days   Weekly warfarin total:   30 mg   No change documented:   Amanda Fuentes RN   Plan last modified:   Amanda Fuentes RN (2020)   Next INR check:   3/6/2020   Priority:   Maintenance   Target end date:   Indefinite    Indications    Atrial fibrillation (H) [I48.91] [I48.91]  Long-term (current) use of anticoagulants [Z79.01] [Z79.01]             Anticoagulation Episode Summary     INR check location:       Preferred lab:       Send INR reminders to:   [facility] HEART INR NURSE    Comments:         Anticoagulation Care Providers     Provider Role Specialty Phone number    Ip, Tr Carrera MD Responsible Cardiology [phone]            See the Encounter Report to view Anticoagulation Flowsheet and Dosing Calendar (Go to Encounters tab in chart review, and find the Anticoagulation Therapy Visit)        INR 2.2 per CHRISTEL Miller -[phone]. No change in meds or diet. Denies abnormal bleeding or bruising. Will continue current dosing of 2.5 mg MTh and 5 mg all other days with recheck in 2 weeks.  Amanda Fuentes RN

## 2020-03-06 ENCOUNTER — ANTICOAGULATION THERAPY VISIT (OUTPATIENT)
Dept: CARDIOLOGY | Facility: CLINIC | Age: 85
End: 2020-03-06
Payer: MEDICARE

## 2020-03-06 DIAGNOSIS — I48.91 ATRIAL FIBRILLATION (H): ICD-10-CM

## 2020-03-06 DIAGNOSIS — Z79.01 LONG TERM CURRENT USE OF ANTICOAGULANT THERAPY: ICD-10-CM

## 2020-03-06 LAB — INR PPP: 2.5 (ref 0.9–1.1)

## 2020-03-06 PROCEDURE — 99207 ZZC NO CHARGE NURSE ONLY: CPT

## 2020-03-06 NOTE — PROGRESS NOTES
ANTICOAGULATION FOLLOW-UP CLINIC VISIT    Patient Name:  Veronica Bray  Date:  3/6/2020  Contact Type:  Telephone/ [facility] RN Adriane [phone]    SUBJECTIVE:  Patient Findings         Clinical Outcomes     Negatives:   Major bleeding event, Thromboembolic event, Anticoagulation-related hospital admission, Anticoagulation-related ED visit, Anticoagulation-related fatality           OBJECTIVE    INR   Date Value Ref Range Status   2020 2.5 (A) 0.90 - 1.10 Final       ASSESSMENT / PLAN  INR assessment THER    Recheck INR In: 2 WEEKS    INR Location Clinic      Anticoagulation Summary  As of 3/6/2020    INR goal:   2.0-3.0   TTR:   67.4 % (1 y)   INR used for dosin.5 (3/6/2020)   Warfarin maintenance plan:   2.5 mg (5 mg x 0.5) every Mon, Thu; 5 mg (5 mg x 1) all other days   Full warfarin instructions:   2.5 mg every Mon, Thu; 5 mg all other days   Weekly warfarin total:   30 mg   No change documented:   Jenny Hoang RN   Plan last modified:   Amanda Fuentes RN (2020)   Next INR check:   3/20/2020   Priority:   Maintenance   Target end date:   Indefinite    Indications    Atrial fibrillation (H) [I48.91] [I48.91]  Long-term (current) use of anticoagulants [Z79.01] [Z79.01]             Anticoagulation Episode Summary     INR check location:       Preferred lab:       Send INR reminders to:   [facility] HEART INR NURSE    Comments:         Anticoagulation Care Providers     Provider Role Specialty Phone number    Ip, Tr Morris Carrera MD Responsible Cardiology [phone]            See the Encounter Report to view Anticoagulation Flowsheet and Dosing Calendar (Go to Encounters tab in chart review, and find the Anticoagulation Therapy Visit)    INR 2.5 today. No abnormal bleeding or bruising. No changes to diet or medications. Will continue with current maintenance dosing of 2.5mg Mon/Thurs and 5mg all other days. Adriane to relay dosing instructions to patient.     Patient has last [facility] visit next Friday  3/13, will plan for next INR check to be in the clinic.     Jenny Hoang RN

## 2020-03-11 ENCOUNTER — TELEPHONE (OUTPATIENT)
Dept: INTERNAL MEDICINE | Facility: CLINIC | Age: 85
End: 2020-03-11

## 2020-03-12 ENCOUNTER — TRANSFERRED RECORDS (OUTPATIENT)
Dept: HEALTH INFORMATION MANAGEMENT | Facility: CLINIC | Age: 85
End: 2020-03-12

## 2020-03-16 DIAGNOSIS — S42.212A: Primary | ICD-10-CM

## 2020-03-17 ENCOUNTER — PATIENT OUTREACH (OUTPATIENT)
Dept: CARE COORDINATION | Facility: CLINIC | Age: 85
End: 2020-03-17

## 2020-03-17 NOTE — PROGRESS NOTES
Clinic Care Coordination Contact  The clinic Community Health Worker spoke with the patient today due to Home Care Discharge to discuss possible Clinic Care Coordination enrollment.  The service was described to the patient and immediate needs were discussed.  The patient declined enrollment at this time.  The PCP is encouraged to refer  in the future if the patient's needs change.

## 2020-03-26 ENCOUNTER — TELEPHONE (OUTPATIENT)
Dept: CARDIOLOGY | Facility: CLINIC | Age: 85
End: 2020-03-26

## 2020-03-26 DIAGNOSIS — Z79.01 LONG TERM CURRENT USE OF ANTICOAGULANT THERAPY: Primary | ICD-10-CM

## 2020-03-26 NOTE — TELEPHONE ENCOUNTER
Office Visit: INR     COVID-19 Symptom Screening ~ Reviewed with patient     Do you or does patient have one of the following new symptoms:      Fever or reported chills?  No      A new cough (started within the past 14 days)? No     Shortness of breath (started within the past 14 days)? no     Nausea, vomiting or diarrhea? no     Within the past 3 weeks, have you been exposed to someone with a known positive illness below?      COVID - 19 (known or suspected) no     Chicken pox?  no     Measles? No      Pertussis? no     Patient notified of visitor restriction: no     Patient's appointment status: Patient will go to  clinic lab for INR check on 3/31 and [facility] INR nurse will call patient with warfarin dosing instructions.  Standing order placed.  Mike ROBERTS

## 2020-03-30 DIAGNOSIS — I48.92 ATRIAL FLUTTER, PAROXYSMAL (H): ICD-10-CM

## 2020-03-30 DIAGNOSIS — Z95.1 S/P CABG (CORONARY ARTERY BYPASS GRAFT): ICD-10-CM

## 2020-03-30 DIAGNOSIS — E78.49 OTHER HYPERLIPIDEMIA: ICD-10-CM

## 2020-03-30 DIAGNOSIS — I48.0 PAROXYSMAL ATRIAL FIBRILLATION (H): ICD-10-CM

## 2020-03-30 DIAGNOSIS — I10 BENIGN ESSENTIAL HYPERTENSION: ICD-10-CM

## 2020-03-30 RX ORDER — ATORVASTATIN CALCIUM 10 MG/1
TABLET, FILM COATED ORAL
Qty: 90 TABLET | Refills: 0 | Status: SHIPPED | OUTPATIENT
Start: 2020-03-30 | End: 2020-06-23

## 2020-03-30 RX ORDER — CARVEDILOL 6.25 MG/1
TABLET ORAL
Qty: 180 TABLET | Refills: 0 | Status: SHIPPED | OUTPATIENT
Start: 2020-03-30 | End: 2020-06-23

## 2020-03-30 RX ORDER — LISINOPRIL 5 MG/1
TABLET ORAL
Qty: 90 TABLET | Refills: 0 | Status: SHIPPED | OUTPATIENT
Start: 2020-03-30 | End: 2020-06-23

## 2020-03-30 NOTE — TELEPHONE ENCOUNTER
"Requested Prescriptions   Pending Prescriptions Disp Refills     carvedilol (COREG) 6.25 MG tablet [Pharmacy Med Name: CARVEDILOL TAB 6.25MG] 180 tablet 0     Sig: TAKE 1 TABLET TWICE DAILY  WITH MEALS       Beta-Blockers Protocol Passed - 3/30/2020 11:46 AM        Passed - Blood pressure under 140/90 in past 12 months     BP Readings from Last 3 Encounters:   01/14/20 128/72   12/17/19 (!) 150/40   09/13/19 134/64                 Passed - Patient is age 6 or older        Passed - Recent (12 mo) or future (30 days) visit within the authorizing provider's specialty     Patient has had an office visit with the authorizing provider or a provider within the authorizing providers department within the previous 12 mos or has a future within next 30 days. See \"Patient Info\" tab in inbasket, or \"Choose Columns\" in Meds & Orders section of the refill encounter.              Passed - Medication is active on med list           atorvastatin (LIPITOR) 10 MG tablet [Pharmacy Med Name: ATORVASTATIN TAB 10MG] 90 tablet 0     Sig: TAKE 1 TABLET AT BEDTIME       Statins Protocol Passed - 3/30/2020 11:46 AM        Passed - LDL on file in past 12 months     Recent Labs   Lab Test 06/07/19  1004   LDL 84             Passed - No abnormal creatine kinase in past 12 months     No lab results found.             Passed - Recent (12 mo) or future (30 days) visit within the authorizing provider's specialty     Patient has had an office visit with the authorizing provider or a provider within the authorizing providers department within the previous 12 mos or has a future within next 30 days. See \"Patient Info\" tab in inbasket, or \"Choose Columns\" in Meds & Orders section of the refill encounter.              Passed - Medication is active on med list        Passed - Patient is age 18 or older        Passed - No active pregnancy on record        Passed - No positive pregnancy test in past 12 months           lisinopril (ZESTRIL) 5 MG tablet " "[Pharmacy Med Name: LISINOPRIL TAB 5MG] 90 tablet 0     Sig: TAKE 1 TABLET DAILY       ACE Inhibitors (Including Combos) Protocol Passed - 3/30/2020 11:46 AM        Passed - Blood pressure under 140/90 in past 12 months     BP Readings from Last 3 Encounters:   01/14/20 128/72   12/17/19 (!) 150/40   09/13/19 134/64                 Passed - Recent (12 mo) or future (30 days) visit within the authorizing provider's specialty     Patient has had an office visit with the authorizing provider or a provider within the authorizing providers department within the previous 12 mos or has a future within next 30 days. See \"Patient Info\" tab in inbasket, or \"Choose Columns\" in Meds & Orders section of the refill encounter.              Passed - Medication is active on med list        Passed - Patient is age 18 or older        Passed - No active pregnancy on record        Passed - Normal serum creatinine on file in past 12 months     Recent Labs   Lab Test 12/16/19  0645   CR 0.59       Ok to refill medication if creatinine is low          Passed - Normal serum potassium on file in past 12 months     Recent Labs   Lab Test 12/16/19  0645   POTASSIUM 5.3             Passed - No positive pregnancy test within past 12 months           Rxs approved per [facility] protocol.      Adriane Villasenor RN BSN, Pap Tracking    "

## 2020-03-30 NOTE — TELEPHONE ENCOUNTER
"Requested Prescriptions   Pending Prescriptions Disp Refills     carvedilol (COREG) 6.25 MG tablet [Pharmacy Med Name: CARVEDILOL TAB 6.25MG] 180 tablet 0     Sig: TAKE 1 TABLET TWICE DAILY  WITH MEALS   Last Written Prescription Date:  01/14/2020  Last Fill Quantity: 180,  # refills: 0   Last office visit: 1/14/2020 with prescribing provider:     Future Office Visit:   Next 5 appointments (look out 90 days)    May 12, 2020 10:00 AM CDT  SHORT with Concepcion Mccall MD  [facility] ([facility]) [address]  [facility] [address]  [phone]           Beta-Blockers Protocol Passed - 3/30/2020  5:41 AM        Passed - Blood pressure under 140/90 in past 12 months     BP Readings from Last 3 Encounters:   01/14/20 128/72   12/17/19 (!) 150/40   09/13/19 134/64                 Passed - Patient is age 6 or older        Passed - Recent (12 mo) or future (30 days) visit within the authorizing provider's specialty     Patient has had an office visit with the authorizing provider or a provider within the authorizing providers department within the previous 12 mos or has a future within next 30 days. See \"Patient Info\" tab in inbasket, or \"Choose Columns\" in Meds & Orders section of the refill encounter.              Passed - Medication is active on med list           atorvastatin (LIPITOR) 10 MG tablet [Pharmacy Med Name: ATORVASTATIN TAB 10MG] 90 tablet 0     Sig: TAKE 1 TABLET AT BEDTIME   Last Written Prescription Date:  01/14/2020  Last Fill Quantity: 90,  # refills: 0   Last office visit: 1/14/2020 with prescribing provider:     Future Office Visit:   Next 5 appointments (look out 90 days)    May 12, 2020 10:00 AM CDT  SHORT with Concepcion Mccall MD  [facility] ([facility]) [address]  [facility] [address]  [phone]           Statins Protocol Passed - 3/30/2020  5:41 AM        Passed - LDL on file in past 12 " "months     Recent Labs   Lab Test 06/07/19  1004   LDL 84             Passed - No abnormal creatine kinase in past 12 months     No lab results found.             Passed - Recent (12 mo) or future (30 days) visit within the authorizing provider's specialty     Patient has had an office visit with the authorizing provider or a provider within the authorizing providers department within the previous 12 mos or has a future within next 30 days. See \"Patient Info\" tab in inbasket, or \"Choose Columns\" in Meds & Orders section of the refill encounter.              Passed - Medication is active on med list        Passed - Patient is age 18 or older        Passed - No active pregnancy on record        Passed - No positive pregnancy test in past 12 months           lisinopril (ZESTRIL) 5 MG tablet [Pharmacy Med Name: LISINOPRIL TAB 5MG] 90 tablet 0     Sig: TAKE 1 TABLET DAILY   Last Written Prescription Date:  01/14/2020  Last Fill Quantity: 90,  # refills: 0   Last office visit: 1/14/2020 with prescribing provider:     Future Office Visit:   Next 5 appointments (look out 90 days)    May 12, 2020 10:00 AM CDT  SHORT with Concepcion Mccall MD  [facility] [address]  [facility] [address] [phone]           ACE Inhibitors (Including Combos) Protocol Passed - 3/30/2020  5:41 AM        Passed - Blood pressure under 140/90 in past 12 months     BP Readings from Last 3 Encounters:   01/14/20 128/72   12/17/19 (!) 150/40   09/13/19 134/64                 Passed - Recent (12 mo) or future (30 days) visit within the authorizing provider's specialty     Patient has had an office visit with the authorizing provider or a provider within the authorizing providers department within the previous 12 mos or has a future within next 30 days. See \"Patient Info\" tab in inbasket, or \"Choose Columns\" in Meds & Orders section of the refill encounter.              Passed - " Medication is active on med list        Passed - Patient is age 18 or older        Passed - No active pregnancy on record        Passed - Normal serum creatinine on file in past 12 months     Recent Labs   Lab Test 12/16/19  0645   CR 0.59       Ok to refill medication if creatinine is low          Passed - Normal serum potassium on file in past 12 months     Recent Labs   Lab Test 12/16/19  0645   POTASSIUM 5.3             Passed - No positive pregnancy test within past 12 months

## 2020-03-31 ENCOUNTER — ANTICOAGULATION THERAPY VISIT (OUTPATIENT)
Dept: CARDIOLOGY | Facility: CLINIC | Age: 85
End: 2020-03-31

## 2020-03-31 DIAGNOSIS — I48.0 PAROXYSMAL ATRIAL FIBRILLATION (H): ICD-10-CM

## 2020-03-31 DIAGNOSIS — Z79.01 LONG TERM CURRENT USE OF ANTICOAGULANT THERAPY: ICD-10-CM

## 2020-03-31 LAB
CAPILLARY BLOOD COLLECTION: NORMAL
INR PPP: 1.9 (ref 0.86–1.14)

## 2020-03-31 PROCEDURE — 85610 PROTHROMBIN TIME: CPT | Performed by: INTERNAL MEDICINE

## 2020-03-31 PROCEDURE — 99207 ZZC NO CHARGE NURSE ONLY: CPT

## 2020-03-31 PROCEDURE — 36416 COLLJ CAPILLARY BLOOD SPEC: CPT | Performed by: INTERNAL MEDICINE

## 2020-03-31 NOTE — PROGRESS NOTES
ANTICOAGULATION FOLLOW-UP CLINIC VISIT    Patient Name:  Veronica Bray  Date:  3/31/2020  Contact Type:  Telephone    SUBJECTIVE:  Patient Findings         Clinical Outcomes     Negatives:   Major bleeding event, Thromboembolic event, Anticoagulation-related hospital admission, Anticoagulation-related ED visit, Anticoagulation-related fatality           OBJECTIVE    INR   Date Value Ref Range Status   2020 1.90 (H) 0.86 - 1.14 Final     Comment:     This test is intended for monitoring Coumadin therapy.  Results are not   accurate in patients with prolonged INR due to factor deficiency.         ASSESSMENT / PLAN  INR assessment SUB    Recheck INR In: 2 WEEKS    INR Location Outside lab      Anticoagulation Summary  As of 3/31/2020    INR goal:   2.0-3.0   TTR:   69.6 % (1 y)   INR used for dosin.90! (3/31/2020)   Warfarin maintenance plan:   2.5 mg (5 mg x 0.5) every Mon, Thu; 5 mg (5 mg x 1) all other days   Full warfarin instructions:   3/31: 7.5 mg; Otherwise 2.5 mg every Mon, Thu; 5 mg all other days   Weekly warfarin total:   30 mg   Plan last modified:   Amanda Fuentes RN (2020)   Next INR check:   2020   Priority:   Maintenance   Target end date:   Indefinite    Indications    Atrial fibrillation (H) [I48.91] [I48.91]  Long-term (current) use of anticoagulants [Z79.01] [Z79.01]             Anticoagulation Episode Summary     INR check location:       Preferred lab:       Send INR reminders to:   [facility] HEART INR NURSE    Comments:         Anticoagulation Care Providers     Provider Role Specialty Phone number    Ip, Tr Carrera MD [facility] Cardiology [phone]            See the Encounter Report to view Anticoagulation Flowsheet and Dosing Calendar (Go to Encounters tab in chart review, and find the Anticoagulation Therapy Visit)    INR 1.9.  Called and left message on patient's cell phone.  Told patient to take an extra 2.5mg dose today x1 then back to normal schedule and  recheck in 2 weeks.  Asked that she call if there are changes.  Mike Monae RN

## 2020-04-02 ENCOUNTER — TELEPHONE (OUTPATIENT)
Dept: INTERNAL MEDICINE | Facility: CLINIC | Age: 85
End: 2020-04-02

## 2020-04-13 ENCOUNTER — ANTICOAGULATION THERAPY VISIT (OUTPATIENT)
Dept: CARDIOLOGY | Facility: CLINIC | Age: 85
End: 2020-04-13

## 2020-04-13 DIAGNOSIS — I48.0 PAROXYSMAL ATRIAL FIBRILLATION (H): ICD-10-CM

## 2020-04-13 DIAGNOSIS — Z79.01 LONG TERM CURRENT USE OF ANTICOAGULANT THERAPY: ICD-10-CM

## 2020-04-13 LAB
CAPILLARY BLOOD COLLECTION: NORMAL
INR PPP: 2.3 (ref 0.86–1.14)

## 2020-04-13 PROCEDURE — 85610 PROTHROMBIN TIME: CPT | Performed by: INTERNAL MEDICINE

## 2020-04-13 PROCEDURE — 36416 COLLJ CAPILLARY BLOOD SPEC: CPT | Performed by: INTERNAL MEDICINE

## 2020-04-13 NOTE — PROGRESS NOTES
ANTICOAGULATION FOLLOW-UP CLINIC VISIT    Patient Name:  Veronica Bray  Date:  2020  Contact Type:  Telephone    SUBJECTIVE:         OBJECTIVE    INR   Date Value Ref Range Status   2020 2.30 (H) 0.86 - 1.14 Final     Comment:     This test is intended for monitoring Coumadin therapy.  Results are not   accurate in patients with prolonged INR due to factor deficiency.         ASSESSMENT / PLAN  INR assessment THER    Recheck INR In: 2 WEEKS    INR Location Outside lab      Anticoagulation Summary  As of 2020    INR goal:   2.0-3.0   TTR:   68.7 % (1 y)   INR used for dosin.30 (2020)   Warfarin maintenance plan:   2.5 mg (5 mg x 0.5) every Mon, Thu; 5 mg (5 mg x 1) all other days   Full warfarin instructions:   2.5 mg every Mon, Thu; 5 mg all other days   Weekly warfarin total:   30 mg   No change documented:   Amanda Fuentes RN   Plan last modified:   Amanda Fuentes RN (2020)   Next INR check:   2020   Priority:   Maintenance   Target end date:   Indefinite    Indications    Atrial fibrillation (H) [I48.91] [I48.91]  Long-term (current) use of anticoagulants [Z79.01] [Z79.01]             Anticoagulation Episode Summary     INR check location:       Preferred lab:       Send INR reminders to:   [facility] HEART INR NURSE    Comments:         Anticoagulation Care Providers     Provider Role Specialty Phone number    Ip, Tr Carrera MD Responsible Cardiology [phone]            See the Encounter Report to view Anticoagulation Flowsheet and Dosing Calendar (Go to Encounters tab in chart review, and find the Anticoagulation Therapy Visit)    INR 2.30 Left message asking pt to call back to review results, her current status, ask if any change in meds or diet and if she has had any bleeding issues.   1pm Spoke with pt. No change in meds or diet. No abnormal bleeding or bruising. Will continue current dosing of 2.5 mg MTh and 5 mg all other days with recheck in  4 weeks.  INR  clinic referral renewal order submitted for signature.  Has the patient previously taken warfarin? yes  If yes, for what indication? afib    Does the patient have any of the following indications for a higher range of 2.5-3.5:    Mitral position mechanical valve? no    Radhika-Shiley, Ball and Cage or Monoleaflet valve (regardless of position) no    Other (if yes, please explain) no    Amanda Fuentes RN

## 2020-04-22 DIAGNOSIS — Z79.01 LONG TERM CURRENT USE OF ANTICOAGULANT THERAPY: ICD-10-CM

## 2020-04-22 RX ORDER — WARFARIN SODIUM 5 MG/1
TABLET ORAL
Qty: 90 TABLET | Refills: 1 | Status: SHIPPED | OUTPATIENT
Start: 2020-04-22 | End: 2020-09-10

## 2020-05-11 ENCOUNTER — ANTICOAGULATION THERAPY VISIT (OUTPATIENT)
Dept: CARDIOLOGY | Facility: CLINIC | Age: 85
End: 2020-05-11
Payer: MEDICARE

## 2020-05-11 DIAGNOSIS — Z79.01 LONG TERM CURRENT USE OF ANTICOAGULANT THERAPY: ICD-10-CM

## 2020-05-11 DIAGNOSIS — I48.0 PAROXYSMAL ATRIAL FIBRILLATION (H): ICD-10-CM

## 2020-05-11 LAB
CAPILLARY BLOOD COLLECTION: NORMAL
INR PPP: 2.2 (ref 0.86–1.14)

## 2020-05-11 PROCEDURE — 99207 ZZC NO CHARGE NURSE ONLY: CPT | Performed by: INTERNAL MEDICINE

## 2020-05-11 PROCEDURE — 85610 PROTHROMBIN TIME: CPT | Performed by: INTERNAL MEDICINE

## 2020-05-11 PROCEDURE — 36416 COLLJ CAPILLARY BLOOD SPEC: CPT | Performed by: INTERNAL MEDICINE

## 2020-05-11 NOTE — PROGRESS NOTES
ANTICOAGULATION FOLLOW-UP CLINIC VISIT    Patient Name:  Veronica Bray  Date:  2020  Contact Type:  Telephone    SUBJECTIVE:         OBJECTIVE    INR   Date Value Ref Range Status   2020 2.20 (H) 0.86 - 1.14 Final     Comment:     This test is intended for monitoring Coumadin therapy.  Results are not   accurate in patients with prolonged INR due to factor deficiency.         ASSESSMENT / PLAN  INR assessment THER    Recheck INR In: 4 WEEKS    INR Location Outside lab      Anticoagulation Summary  As of 2020    INR goal:   2.0-3.0   TTR:   75.0 % (1 y)   INR used for dosin.20 (2020)   Warfarin maintenance plan:   2.5 mg (5 mg x 0.5) every Mon, Thu; 5 mg (5 mg x 1) all other days   Full warfarin instructions:   2.5 mg every Mon, Thu; 5 mg all other days   Weekly warfarin total:   30 mg   No change documented:   Amanda Fuentes RN   Plan last modified:   Amanda Fuentes RN (2020)   Next INR check:   2020   Priority:   Maintenance   Target end date:   Indefinite    Indications    Atrial fibrillation (H) [I48.91] [I48.91]  Long-term (current) use of anticoagulants [Z79.01] [Z79.01]  Paroxysmal atrial fibrillation (H) [I48.0]             Anticoagulation Episode Summary     INR check location:       Preferred lab:       Send INR reminders to:   [facility] HEART INR NURSE    Comments:         Anticoagulation Care Providers     Provider Role Specialty Phone number    Ip, Tr Morris Carrera MD Referring Cardiology [phone]            See the Encounter Report to view Anticoagulation Flowsheet and Dosing Calendar (Go to Encounters tab in chart review, and find the Anticoagulation Therapy Visit)    INR 2.20 Pt not available by phone yet. Will try to reach pt later.  11:35 am Spoke with pt. No change in meds or diet. Denies abnormal bleeding or bruising. Will continue current dosing of 2.5 mg MTh and 5 mg all other days with recheck in 4 weeks.     Amanda Fuentes RN

## 2020-06-08 ENCOUNTER — ANTICOAGULATION THERAPY VISIT (OUTPATIENT)
Dept: CARDIOLOGY | Facility: CLINIC | Age: 85
End: 2020-06-08
Payer: MEDICARE

## 2020-06-08 DIAGNOSIS — Z79.01 LONG TERM CURRENT USE OF ANTICOAGULANT THERAPY: ICD-10-CM

## 2020-06-08 DIAGNOSIS — I48.0 PAROXYSMAL ATRIAL FIBRILLATION (H): ICD-10-CM

## 2020-06-08 LAB
CAPILLARY BLOOD COLLECTION: NORMAL
INR PPP: 2.9 (ref 0.86–1.14)

## 2020-06-08 PROCEDURE — 36416 COLLJ CAPILLARY BLOOD SPEC: CPT | Performed by: INTERNAL MEDICINE

## 2020-06-08 PROCEDURE — 99207 ZZC NO CHARGE NURSE ONLY: CPT | Performed by: INTERNAL MEDICINE

## 2020-06-08 PROCEDURE — 85610 PROTHROMBIN TIME: CPT | Performed by: INTERNAL MEDICINE

## 2020-06-08 NOTE — PROGRESS NOTES
ANTICOAGULATION FOLLOW-UP CLINIC VISIT    Patient Name:  Veronica Bray  Date:  2020  Contact Type:  Telephone    SUBJECTIVE:         OBJECTIVE    Recent labs: (last 7 days)     20  0950   INR 2.90*       ASSESSMENT / PLAN  No question data found.  Anticoagulation Summary  As of 2020    INR goal:   2.0-3.0   TTR:   81.2 % (1 y)   INR used for dosin.90 (2020)   Warfarin maintenance plan:   2.5 mg (5 mg x 0.5) every Mon, Thu; 5 mg (5 mg x 1) all other days   Full warfarin instructions:   2.5 mg every Mon, Thu; 5 mg all other days   Weekly warfarin total:   30 mg   Plan last modified:   Amanda Fuentes RN (2020)   Next INR check:      Priority:   Maintenance   Target end date:   Indefinite    Indications    Atrial fibrillation (H) [I48.91] [I48.91]  Long-term (current) use of anticoagulants [Z79.01] [Z79.01]  Paroxysmal atrial fibrillation (H) [I48.0]             Anticoagulation Episode Summary     INR check location:       Preferred lab:       Send INR reminders to:   [facility] HEART INR NURSE    Comments:         Anticoagulation Care Providers     Provider Role Specialty Phone number    Ip, Tr Carrera MD Referring Cardiology [phone]            See the Encounter Report to view Anticoagulation Flowsheet and Dosing Calendar (Go to Encounters tab in chart review, and find the Anticoagulation Therapy Visit)    INR 2.90 today at outside clinic. Pt's last two INRs have been closer to the 2.0-end of her range. Spoke to patient who denies any abnormal bleeding/bruising. No changes to medications and no OTC meds. Pt states no changes to diet, eats bonita salad or green beans 2x/wk. Pt drinks chadwick juice occasionally but this is not new for her. Drinks wine every other day, not increased, had blackberry merlot last night. Will continue on current maintenance dosing of 2.5mg MTh and 5mg all other days with a recheck in 4wks.     Jenny Hoang RN

## 2020-06-21 DIAGNOSIS — I48.0 PAROXYSMAL ATRIAL FIBRILLATION (H): ICD-10-CM

## 2020-06-21 DIAGNOSIS — I48.92 ATRIAL FLUTTER, PAROXYSMAL (H): ICD-10-CM

## 2020-06-21 DIAGNOSIS — I10 BENIGN ESSENTIAL HYPERTENSION: ICD-10-CM

## 2020-06-21 DIAGNOSIS — Z95.1 S/P CABG (CORONARY ARTERY BYPASS GRAFT): ICD-10-CM

## 2020-06-21 DIAGNOSIS — E78.49 OTHER HYPERLIPIDEMIA: ICD-10-CM

## 2020-06-23 RX ORDER — CARVEDILOL 6.25 MG/1
TABLET ORAL
Qty: 180 TABLET | Refills: 1 | Status: SHIPPED | OUTPATIENT
Start: 2020-06-23 | End: 2020-12-15

## 2020-06-23 RX ORDER — LISINOPRIL 5 MG/1
TABLET ORAL
Qty: 90 TABLET | Refills: 1 | Status: SHIPPED | OUTPATIENT
Start: 2020-06-23 | End: 2020-09-10

## 2020-06-23 RX ORDER — ATORVASTATIN CALCIUM 10 MG/1
TABLET, FILM COATED ORAL
Qty: 90 TABLET | Refills: 0 | Status: SHIPPED | OUTPATIENT
Start: 2020-06-23 | End: 2020-07-29

## 2020-06-23 NOTE — TELEPHONE ENCOUNTER
Lisinopril & carvedilol  Prescription approved per [facility] Refill Protocol.    Atorvastatin  Routing refill request to provider for review/approval because:  Labs not current:  LDL  LDL Cholesterol Calculated   Date Value Ref Range Status   06/07/2019 84 <100 mg/dL Final     Comment:     Desirable:       <100 mg/dl

## 2020-06-23 NOTE — TELEPHONE ENCOUNTER
"Requested Prescriptions   Pending Prescriptions Disp Refills     carvedilol (COREG) 6.25 MG tablet [Pharmacy Med Name: CARVEDILOL TAB 6.25MG] 180 tablet 0     Sig: TAKE 1 TABLET TWICE DAILY  WITH MEALS       Beta-Blockers Protocol Passed - 6/21/2020  8:52 AM        Passed - Blood pressure under 140/90 in past 12 months     BP Readings from Last 3 Encounters:   01/14/20 128/72   12/17/19 (!) 150/40   09/13/19 134/64                 Passed - Patient is age 6 or older        Passed - Recent (12 mo) or future (30 days) visit within the authorizing provider's specialty     Patient has had an office visit with the authorizing provider or a provider within the authorizing providers department within the previous 12 mos or has a future within next 30 days. See \"Patient Info\" tab in inbasket, or \"Choose Columns\" in Meds & Orders section of the refill encounter.              Passed - Medication is active on med list           atorvastatin (LIPITOR) 10 MG tablet [Pharmacy Med Name: ATORVASTATIN TAB 10MG] 90 tablet 0     Sig: TAKE 1 TABLET AT BEDTIME       Statins Protocol Failed - 6/21/2020  8:52 AM        Failed - LDL on file in past 12 months     Recent Labs   Lab Test 06/07/19  1004   LDL 84             Passed - No abnormal creatine kinase in past 12 months     No lab results found.             Passed - Recent (12 mo) or future (30 days) visit within the authorizing provider's specialty     Patient has had an office visit with the authorizing provider or a provider within the authorizing providers department within the previous 12 mos or has a future within next 30 days. See \"Patient Info\" tab in inbasket, or \"Choose Columns\" in Meds & Orders section of the refill encounter.              Passed - Medication is active on med list        Passed - Patient is age 18 or older        Passed - No active pregnancy on record        Passed - No positive pregnancy test in past 12 months           lisinopril (ZESTRIL) 5 MG tablet " "[Pharmacy Med Name: LISINOPRIL TAB 5MG] 90 tablet 0     Sig: TAKE 1 TABLET DAILY       ACE Inhibitors (Including Combos) Protocol Passed - 6/21/2020  8:52 AM        Passed - Blood pressure under 140/90 in past 12 months     BP Readings from Last 3 Encounters:   01/14/20 128/72   12/17/19 (!) 150/40   09/13/19 134/64                 Passed - Recent (12 mo) or future (30 days) visit within the authorizing provider's specialty     Patient has had an office visit with the authorizing provider or a provider within the authorizing providers department within the previous 12 mos or has a future within next 30 days. See \"Patient Info\" tab in inbasket, or \"Choose Columns\" in Meds & Orders section of the refill encounter.              Passed - Medication is active on med list        Passed - Patient is age 18 or older        Passed - No active pregnancy on record        Passed - Normal serum creatinine on file in past 12 months     Recent Labs   Lab Test 12/16/19  0645   CR 0.59       Ok to refill medication if creatinine is low          Passed - Normal serum potassium on file in past 12 months     Recent Labs   Lab Test 12/16/19  0645   POTASSIUM 5.3             Passed - No positive pregnancy test within past 12 months             "

## 2020-07-06 ENCOUNTER — ANTICOAGULATION THERAPY VISIT (OUTPATIENT)
Dept: CARDIOLOGY | Facility: CLINIC | Age: 85
End: 2020-07-06
Payer: MEDICARE

## 2020-07-06 DIAGNOSIS — Z79.01 LONG TERM CURRENT USE OF ANTICOAGULANT THERAPY: ICD-10-CM

## 2020-07-06 DIAGNOSIS — I48.0 PAROXYSMAL ATRIAL FIBRILLATION (H): ICD-10-CM

## 2020-07-06 LAB
CAPILLARY BLOOD COLLECTION: NORMAL
INR PPP: 3 (ref 0.86–1.14)

## 2020-07-06 PROCEDURE — 99207 ZZC NO CHARGE NURSE ONLY: CPT

## 2020-07-06 PROCEDURE — 36416 COLLJ CAPILLARY BLOOD SPEC: CPT | Performed by: INTERNAL MEDICINE

## 2020-07-06 PROCEDURE — 85610 PROTHROMBIN TIME: CPT | Performed by: INTERNAL MEDICINE

## 2020-07-06 NOTE — PROGRESS NOTES
ANTICOAGULATION FOLLOW-UP CLINIC VISIT    Patient Name:  Veronica Bray  Date:  7/6/2020  Contact Type:  Telephone    SUBJECTIVE:         OBJECTIVE    Recent labs: (last 7 days)     07/06/20  0946   INR 3.00*       ASSESSMENT / PLAN  INR assessment THER    Recheck INR In: 4 WEEKS    INR Location Outside lab      Anticoagulation Summary  As of 7/6/2020    INR goal:   2.0-3.0   TTR:   84.8 % (1 y)   INR used for dosing:   3.00 (7/6/2020)   Warfarin maintenance plan:   2.5 mg (5 mg x 0.5) every Mon, Thu; 5 mg (5 mg x 1) all other days   Full warfarin instructions:   2.5 mg every Mon, Thu; 5 mg all other days   Weekly warfarin total:   30 mg   No change documented:   Jenny Hoang RN   Plan last modified:   Amanda Fuentes RN (1/23/2020)   Next INR check:      Priority:   Maintenance   Target end date:   Indefinite    Indications    Atrial fibrillation (H) [I48.91] [I48.91]  Long-term (current) use of anticoagulants [Z79.01] [Z79.01]  Paroxysmal atrial fibrillation (H) [I48.0]             Anticoagulation Episode Summary     INR check location:       Preferred lab:       Send INR reminders to:   [facility] HEART INR NURSE    Comments:         Anticoagulation Care Providers     Provider Role Specialty Phone number    Ip, Tr Carrera MD Referring Cardiology [phone]            See the Encounter Report to view Anticoagulation Flowsheet and Dosing Calendar (Go to Encounters tab in chart review, and find the Anticoagulation Therapy Visit)    INR 3.0 today at outside clinic. Spoke to patient, no abnormal bleeding/bruising. Pt states she did switch types of allegra but no interaction with warfarin per Micromedex. Pt states she has been taking more tylenol lately for her arthritis, about 2tabs/day for several weeks. No other med changes. No changes to diet, will usually eat a bonita salad 2x/wk. No changes to ETOH use. Given that INR is creeping up with tylenol use (2.9 last check and 3.0 today), recommended that  patient increase her greens to 3x/wk while she is taking the tylenol and continue on her current dosing of 2.5mg Mon/Thurs and 5mg all other days. Pt agreeable to plan. Reviewed that if she stops the tylenol she should go back to greens 2x/wk. Pt verbalized understanding. Recommended recheck in 3wks but pt refused, states due to transportation issues it is very difficult to go any sooner than every 4wks. Recheck in 4wks.     Jenny Hoang RN

## 2020-07-14 ENCOUNTER — OFFICE VISIT (OUTPATIENT)
Dept: INTERNAL MEDICINE | Facility: CLINIC | Age: 85
End: 2020-07-14
Payer: MEDICARE

## 2020-07-14 VITALS
WEIGHT: 147 LBS | RESPIRATION RATE: 12 BRPM | HEART RATE: 68 BPM | OXYGEN SATURATION: 92 % | SYSTOLIC BLOOD PRESSURE: 130 MMHG | DIASTOLIC BLOOD PRESSURE: 70 MMHG | TEMPERATURE: 98 F | HEIGHT: 61 IN | BODY MASS INDEX: 27.75 KG/M2

## 2020-07-14 DIAGNOSIS — I48.91 ATRIAL FIBRILLATION, UNSPECIFIED TYPE (H): ICD-10-CM

## 2020-07-14 DIAGNOSIS — J45.20 MILD INTERMITTENT ASTHMA WITHOUT COMPLICATION: Primary | ICD-10-CM

## 2020-07-14 DIAGNOSIS — E55.9 VITAMIN D DEFICIENCY: ICD-10-CM

## 2020-07-14 DIAGNOSIS — G62.9 NEUROPATHY: ICD-10-CM

## 2020-07-14 DIAGNOSIS — E78.5 HYPERLIPIDEMIA LDL GOAL <100: ICD-10-CM

## 2020-07-14 PROCEDURE — 99214 OFFICE O/P EST MOD 30 MIN: CPT | Performed by: INTERNAL MEDICINE

## 2020-07-14 RX ORDER — GABAPENTIN 100 MG/1
100-300 CAPSULE ORAL DAILY PRN
Qty: 90 CAPSULE | Refills: 0 | Status: SHIPPED | OUTPATIENT
Start: 2020-07-14 | End: 2020-07-29

## 2020-07-14 ASSESSMENT — MIFFLIN-ST. JEOR: SCORE: 1025.2

## 2020-07-14 NOTE — PROGRESS NOTES
Subjective     Veronica Bray is a 89 year old female who presents to clinic today for the following health issues:    HPI       Hyperlipidemia Follow-Up      Are you regularly taking any medication or supplement to lower your cholesterol?   Yes- lipitor    Are you having muscle aches or other side effects that you think could be caused by your cholesterol lowering medication?  No    Hypertension Follow-up      Do you check your blood pressure regularly outside of the clinic? No     Are you following a low salt diet? Yes    Are your blood pressures ever more than 140 on the top number (systolic) OR more   than 90 on the bottom number (diastolic), for example 140/90? No     Atrial fibrillation. No chest pain.  Sometimes has breathing issues- long term.  Sometimes lightheadedness. She is on coumadin and having INRs checked.   CAD. She is on lipitor, aspirin, and coreg. She follows with cardiology.     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 She has had nerve damage for more than 10 years. She has not tried gabapentin.    Asthma.  She reports that her asthma is stable.       How many servings of fruits and vegetables do you eat daily?  2-3    On average, how many sweetened beverages do you drink each day (Examples: soda, juice, sweet tea, etc.  Do NOT count diet or artificially sweetened beverages)?   0    How many days per week do you exercise enough to make your heart beat faster? Walks some    How many minutes a day do you exercise enough to make your heart beat faster? 10 - 19    How many days per week do you miss taking your medication? 0        Patient Active Problem List   Diagnosis     LBBB (left bundle branch block)     PAF (paroxysmal atrial fibrillation) (H)     SVT (supraventricular tachycardia) (H)     Atrial flutter (H)     Mitral regurgitation     Hyperlipidemia     Atrial fibrillation (H) [I48.91]     Long-term (current) use of anticoagulants [Z79.01]     Thrombocytopenia, unspecified (H)     Intermittent  asthma without complication     Essential hypertension     Other hyperlipidemia     Chronic ischemic heart disease     Coagulation disorder (H)     Coronary atherosclerosis     Hemothorax, left     Macular degeneration (senile) of retina     VALENTIN (obstructive sleep apnea)     Osteoarthrosis     S/P CABG x 2     Senile nuclear sclerosis     Stented coronary artery     Stress incontinence     Humeral head fracture, left, closed, initial encounter     Closed fracture of neck of left humerus     Paroxysmal atrial fibrillation (H)     Neuropathy     Past Surgical History:   Procedure Laterality Date     CHOLECYSTECTOMY       CORONARY ANGIOGRAPHY ADULT ORDER  1/2005    Stent: Distal cfx, Mid LAD, Stent: D -1     GYN SURGERY      hysterectomy     HC LEFT HEART CATHETERIZATION  11/2006    mid distal-anterior/ distal inferior/ Apical Yossi, 85     HC LEFT HEART CATHETERIZATION  5/2015    100% Proximal LAD, patent LIMA-LAD, SVG-D1, LVEDP 13, 10-18 mmHg AV gradient, 3+ MR after PVC     ORTHOPEDIC SURGERY      hip, knee, wrist       Social History     Tobacco Use     Smoking status: Former Smoker     Smokeless tobacco: Never Used     Tobacco comment: quit approx 1967    Substance Use Topics     Alcohol use: Yes     Alcohol/week: 4.0 standard drinks     Types: 4 Glasses of wine per week     Comment: rarely     Family History   Problem Relation Age of Onset     Unknown/Adopted Mother      Myocardial Infarction Mother      Unknown/Adopted Father      Myocardial Infarction Brother         Rheumatic fever     Myocardial Infarction Brother      Myocardial Infarction Brother      Heart Disease Brother            Reviewed and updated as needed this visit by Provider         Review of Systems   CONSTITUTIONAL: NEGATIVE for fever, chills, change in weight  RESP: NEGATIVE for significant cough or SOB  CV: NEGATIVE for chest pain, palpitations or peripheral edema      Objective    /70   Pulse 68   Temp 98  F (36.7  C) (Oral)   Resp  "12   Ht 1.543 m (5' 0.75\")   Wt 66.7 kg (147 lb)   SpO2 92%   Breastfeeding No   BMI 28.00 kg/m    Body mass index is 28 kg/m .  Physical Exam   GENERAL: healthy, alert and no distress  RESP: lungs clear to auscultation - no rales, rhonchi or wheezes  CV: regular rate and rhythm, normal S1 S2, no S3 or S4, no murmur, click or rub    Diagnostic Test Results:  Labs reviewed in Epic        Assessment & Plan       (J45.20) Mild intermittent asthma without complication  (primary encounter diagnosis)  Comment: stable  Plan: allergy medication    (G62.9) Neuropathy  Comment: start gabapentin  Plan: gabapentin (NEURONTIN) 100 MG capsule, Vitamin         B12, CANCELED: Vitamin B12            (I48.91) Atrial fibrillation, unspecified type (H)  Comment: stable; rate-controlled  Plan: aspirin    (E55.9) Vitamin D deficiency  Comment: assess  Plan: Vitamin D Deficiency, CANCELED: Vitamin D         Deficiency            (E78.5) Hyperlipidemia LDL goal <100  Comment: assess  Plan: Comprehensive metabolic panel (BMP + Alb, Alk         Phos, ALT, AST, Total. Bili, TP), Lipid panel         reflex to direct LDL Fasting, CANCELED: Lipid         panel reflex to direct LDL Fasting, CANCELED:         Comprehensive metabolic panel (BMP + Alb, Alk         Phos, ALT, AST, Total. Bili, TP)         HTN. At goal.  On lisinopril.      BMI:   Estimated body mass index is 28 kg/m  as calculated from the following:    Height as of this encounter: 1.543 m (5' 0.75\").    Weight as of this encounter: 66.7 kg (147 lb).           See Patient Instructions    No follow-ups on file.    Concepcion Mccall MD  [facility]    "

## 2020-07-14 NOTE — PATIENT INSTRUCTIONS
Gabapentin sent to pharmacy    Lab only appointment  
,[email or web address]

## 2020-07-14 NOTE — LETTER
[facility]  [address], [address]  [place], [place]  [address]                                            TEL:[phone]  FAX:[phone]        Veronica Bray  [address]   [place] [address]      July 20, 2020    Dear Veronica,  Enclosed are your recent lab results.  The cholesterol level LDL is at goal.   The kidney function (GFR) and liver function tests (AST and ALT) are within normal limits.     The fasting glucose is borderline elevated. Continue to eat healthy and exercise as able to prevent diabetes.   B12 is normal       Sincerely,      Concepcion Mccall M.D.

## 2020-07-14 NOTE — NURSING NOTE
"/70   Pulse 68   Temp 98  F (36.7  C) (Oral)   Resp 12   Ht 1.543 m (5' 0.75\")   Wt 66.7 kg (147 lb)   SpO2 92%   Breastfeeding No   BMI 28.00 kg/m      "

## 2020-07-16 ASSESSMENT — ASTHMA QUESTIONNAIRES: ACT_TOTALSCORE: 23

## 2020-07-17 DIAGNOSIS — E78.5 HYPERLIPIDEMIA LDL GOAL <100: ICD-10-CM

## 2020-07-17 DIAGNOSIS — G62.9 NEUROPATHY: ICD-10-CM

## 2020-07-17 DIAGNOSIS — E55.9 VITAMIN D DEFICIENCY: ICD-10-CM

## 2020-07-17 LAB
ALBUMIN SERPL-MCNC: 3.5 G/DL (ref 3.4–5)
ALP SERPL-CCNC: 73 U/L (ref 40–150)
ALT SERPL W P-5'-P-CCNC: 25 U/L (ref 0–50)
ANION GAP SERPL CALCULATED.3IONS-SCNC: 5 MMOL/L (ref 3–14)
AST SERPL W P-5'-P-CCNC: 22 U/L (ref 0–45)
BILIRUB SERPL-MCNC: 0.5 MG/DL (ref 0.2–1.3)
BUN SERPL-MCNC: 16 MG/DL (ref 7–30)
CALCIUM SERPL-MCNC: 8.2 MG/DL (ref 8.5–10.1)
CHLORIDE SERPL-SCNC: 108 MMOL/L (ref 94–109)
CHOLEST SERPL-MCNC: 159 MG/DL
CO2 SERPL-SCNC: 27 MMOL/L (ref 20–32)
CREAT SERPL-MCNC: 0.54 MG/DL (ref 0.52–1.04)
GFR SERPL CREATININE-BSD FRML MDRD: 83 ML/MIN/{1.73_M2}
GLUCOSE SERPL-MCNC: 102 MG/DL (ref 70–99)
HDLC SERPL-MCNC: 68 MG/DL
LDLC SERPL CALC-MCNC: 73 MG/DL
NONHDLC SERPL-MCNC: 91 MG/DL
POTASSIUM SERPL-SCNC: 4.1 MMOL/L (ref 3.4–5.3)
PROT SERPL-MCNC: 7.5 G/DL (ref 6.8–8.8)
SODIUM SERPL-SCNC: 140 MMOL/L (ref 133–144)
TRIGL SERPL-MCNC: 90 MG/DL
VIT B12 SERPL-MCNC: 313 PG/ML (ref 193–986)

## 2020-07-17 PROCEDURE — 80053 COMPREHEN METABOLIC PANEL: CPT | Performed by: INTERNAL MEDICINE

## 2020-07-17 PROCEDURE — 36415 COLL VENOUS BLD VENIPUNCTURE: CPT | Performed by: INTERNAL MEDICINE

## 2020-07-17 PROCEDURE — 80061 LIPID PANEL: CPT | Performed by: INTERNAL MEDICINE

## 2020-07-17 PROCEDURE — 82607 VITAMIN B-12: CPT | Performed by: INTERNAL MEDICINE

## 2020-07-17 PROCEDURE — 82306 VITAMIN D 25 HYDROXY: CPT | Performed by: INTERNAL MEDICINE

## 2020-07-19 LAB — DEPRECATED CALCIDIOL+CALCIFEROL SERPL-MC: 43 UG/L (ref 20–75)

## 2020-07-27 DIAGNOSIS — E78.49 OTHER HYPERLIPIDEMIA: ICD-10-CM

## 2020-07-27 DIAGNOSIS — G62.9 NEUROPATHY: ICD-10-CM

## 2020-07-29 RX ORDER — GABAPENTIN 100 MG/1
CAPSULE ORAL
Qty: 90 CAPSULE | Refills: 0 | Status: SHIPPED | OUTPATIENT
Start: 2020-07-29 | End: 2020-09-10

## 2020-07-29 RX ORDER — ATORVASTATIN CALCIUM 10 MG/1
TABLET, FILM COATED ORAL
Qty: 90 TABLET | Refills: 0 | Status: SHIPPED | OUTPATIENT
Start: 2020-07-29 | End: 2020-09-10

## 2020-08-03 ENCOUNTER — ANTICOAGULATION THERAPY VISIT (OUTPATIENT)
Dept: CARDIOLOGY | Facility: CLINIC | Age: 85
End: 2020-08-03

## 2020-08-03 DIAGNOSIS — Z79.01 LONG TERM CURRENT USE OF ANTICOAGULANT THERAPY: ICD-10-CM

## 2020-08-03 DIAGNOSIS — I48.0 PAROXYSMAL ATRIAL FIBRILLATION (H): ICD-10-CM

## 2020-08-03 LAB — INR PPP: 3.3 (ref 0.86–1.14)

## 2020-08-03 PROCEDURE — 85610 PROTHROMBIN TIME: CPT | Performed by: INTERNAL MEDICINE

## 2020-08-03 PROCEDURE — 99207 ZZC NO CHARGE NURSE ONLY: CPT

## 2020-08-03 PROCEDURE — 36416 COLLJ CAPILLARY BLOOD SPEC: CPT | Performed by: INTERNAL MEDICINE

## 2020-08-03 NOTE — PROGRESS NOTES
ANTICOAGULATION FOLLOW-UP CLINIC VISIT    Patient Name:  Veronica Bray  Date:  8/3/2020  Contact Type:  Telephone    SUBJECTIVE:  Patient Findings         Clinical Outcomes     Negatives:   Major bleeding event, Thromboembolic event, Anticoagulation-related hospital admission, Anticoagulation-related ED visit, Anticoagulation-related fatality           OBJECTIVE    Recent labs: (last 7 days)     08/03/20  0951   INR 3.30*       ASSESSMENT / PLAN  INR assessment SUPRA    Recheck INR In: 3 WEEKS    INR Location Outside lab      Anticoagulation Summary  As of 8/3/2020    INR goal:   2.0-3.0   TTR:   80.1 % (1 y)   INR used for dosing:   3.30! (8/3/2020)   Warfarin maintenance plan:   2.5 mg (5 mg x 0.5) every Mon, Thu; 5 mg (5 mg x 1) all other days   Full warfarin instructions:   2.5 mg every Mon, Thu; 5 mg all other days   Weekly warfarin total:   30 mg   No change documented:   Larisa Monae RN   Plan last modified:   Amanda Fuentes RN (1/23/2020)   Next INR check:   8/24/2020   Priority:   Maintenance   Target end date:   Indefinite    Indications    Atrial fibrillation (H) [I48.91] [I48.91]  Long-term (current) use of anticoagulants [Z79.01] [Z79.01]  Paroxysmal atrial fibrillation (H) [I48.0]             Anticoagulation Episode Summary     INR check location:       Preferred lab:       Send INR reminders to:   [facility] HEART INR NURSE    Comments:         Anticoagulation Care Providers     Provider Role Specialty Phone number    Ip, Tr Carrera MD Referring Cardiology [phone]            See the Encounter Report to view Anticoagulation Flowsheet and Dosing Calendar (Go to Encounters tab in chart review, and find the Anticoagulation Therapy Visit)    INR 3.30.  Left message on home number and cell phone.  INR has been slowly going up over the last few INR checks.  Asked patient to call back to discuss dose decrease or if she prefers to increase her greens.  Mike ROBERTS    1500:  Spoke to the patient.   She would prefer to increase her greens and said that would be easy to do for her.  Continue same schedule and increase the greens.  Recheck in 3 weeks but patient said she would only recheck every 4 weeks.  Mike Monae RN

## 2020-08-31 ENCOUNTER — ANTICOAGULATION THERAPY VISIT (OUTPATIENT)
Dept: CARDIOLOGY | Facility: CLINIC | Age: 85
End: 2020-08-31
Payer: MEDICARE

## 2020-08-31 DIAGNOSIS — Z79.01 LONG TERM CURRENT USE OF ANTICOAGULANT THERAPY: ICD-10-CM

## 2020-08-31 DIAGNOSIS — I48.0 PAROXYSMAL ATRIAL FIBRILLATION (H): ICD-10-CM

## 2020-08-31 LAB
CAPILLARY BLOOD COLLECTION: NORMAL
INR PPP: 2.5 (ref 0.86–1.14)

## 2020-08-31 PROCEDURE — 85610 PROTHROMBIN TIME: CPT | Performed by: INTERNAL MEDICINE

## 2020-08-31 PROCEDURE — 36416 COLLJ CAPILLARY BLOOD SPEC: CPT | Performed by: INTERNAL MEDICINE

## 2020-08-31 PROCEDURE — 99207 ZZC NO CHARGE NURSE ONLY: CPT

## 2020-08-31 NOTE — PROGRESS NOTES
ANTICOAGULATION FOLLOW-UP CLINIC VISIT    Patient Name:  Veronica Bray  Date:  2020  Contact Type:  Telephone    SUBJECTIVE:  Patient Findings     Positives:   Upcoming invasive procedure (Getting a cortisone shot in her shoulder on 9/3), Change in medications (Pt has been of tylenol for several weeks now), Change in diet/appetite (Eating greens ie broccoli/iceberg lettuce/spring mix 3-4x/wk), Bruising (Pt states she has more bruising than normal but attributes this to scraping arm/leg on bedframe)        Clinical Outcomes     Negatives:   Major bleeding event, Thromboembolic event, Anticoagulation-related hospital admission, Anticoagulation-related ED visit, Anticoagulation-related fatality           OBJECTIVE    Recent labs: (last 7 days)     20  1005   INR 2.50*       ASSESSMENT / PLAN  INR assessment THER    Recheck INR In: 4 WEEKS    INR Location Outside lab      Anticoagulation Summary  As of 2020    INR goal:   2.0-3.0   TTR:   80.6 % (1 y)   INR used for dosin.50 (2020)   Warfarin maintenance plan:   2.5 mg (5 mg x 0.5) every Mon, Thu; 5 mg (5 mg x 1) all other days   Full warfarin instructions:   2.5 mg every Mon, Thu; 5 mg all other days   Weekly warfarin total:   30 mg   No change documented:   Jenny Hoang RN   Plan last modified:   Amanda Fuentes, RN (2020)   Next INR check:   2020   Priority:   Maintenance   Target end date:   Indefinite    Indications    Atrial fibrillation (H) [I48.91] [I48.91]  Long-term (current) use of anticoagulants [Z79.01] [Z79.01]  Paroxysmal atrial fibrillation (H) [I48.0]             Anticoagulation Episode Summary     INR check location:       Preferred lab:       Send INR reminders to:   [facility] HEART INR NURSE    Comments:         Anticoagulation Care Providers     Provider Role Specialty Phone number    Ip, Tr Carrera MD Referring Cardiology [phone]            See the Encounter Report to view Anticoagulation Flowsheet  and Dosing Calendar (Go to Encounters tab in chart review, and find the Anticoagulation Therapy Visit)    INR 2.50 today, back in range today. Spoke to patient who states she does have some increased bruising on her arms/legs after reaching for something under her bed recently. No other signs of bleeding. Pt states she is off the tylenol now and has been for several weeks. Was previously taking for arthritis pains. She reports that she is scheduled for a cortisone shot in her right shoulder this Thursday (does not need to hold warfarin or have her INR result faxed per patient). No other med changes. Pt has been maintaining her green intake at 3-4x/wk, will eat broccoli/iceberg lettuce/spring mix. Discussed that now that she is off the tylenol, would want to be cautious about overdoing the greens as it could make her INR drop. Will continue on current maintenance dosing of 2.5mg Mon/Thurs and 5mg all other days. Pt will continue greens 3x/wk. Due to transportation issues, patient unable to come in any sooner than 4wks so recheck in 4wks.     Jenny Hoang RN

## 2020-09-01 ENCOUNTER — HOSPITAL ENCOUNTER (EMERGENCY)
Facility: CLINIC | Age: 85
Discharge: HOME | End: 2020-09-01
Attending: EMERGENCY MEDICINE | Admitting: EMERGENCY MEDICINE
Payer: MEDICARE

## 2020-09-01 VITALS
SYSTOLIC BLOOD PRESSURE: 134 MMHG | RESPIRATION RATE: 18 BRPM | HEART RATE: 65 BPM | DIASTOLIC BLOOD PRESSURE: 61 MMHG | OXYGEN SATURATION: 94 % | TEMPERATURE: 97.9 F

## 2020-09-01 DIAGNOSIS — I48.91 ATRIAL FIBRILLATION WITH RVR (H): ICD-10-CM

## 2020-09-01 DIAGNOSIS — Z79.01 ANTICOAGULATED ON COUMADIN: ICD-10-CM

## 2020-09-01 LAB
ANION GAP SERPL CALCULATED.3IONS-SCNC: 4 MMOL/L (ref 3–14)
BASOPHILS # BLD AUTO: 0 10E9/L (ref 0–0.2)
BASOPHILS NFR BLD AUTO: 1.1 %
BUN SERPL-MCNC: 20 MG/DL (ref 7–30)
CALCIUM SERPL-MCNC: 8.1 MG/DL (ref 8.5–10.1)
CHLORIDE SERPL-SCNC: 108 MMOL/L (ref 94–109)
CO2 SERPL-SCNC: 27 MMOL/L (ref 20–32)
CREAT SERPL-MCNC: 0.66 MG/DL (ref 0.52–1.04)
DIFFERENTIAL METHOD BLD: ABNORMAL
EOSINOPHIL # BLD AUTO: 0 10E9/L (ref 0–0.7)
EOSINOPHIL NFR BLD AUTO: 1.1 %
ERYTHROCYTE [DISTWIDTH] IN BLOOD BY AUTOMATED COUNT: 13.7 % (ref 10–15)
GFR SERPL CREATININE-BSD FRML MDRD: 78 ML/MIN/{1.73_M2}
GLUCOSE SERPL-MCNC: 101 MG/DL (ref 70–99)
HCT VFR BLD AUTO: 40.8 % (ref 35–47)
HGB BLD-MCNC: 12.7 G/DL (ref 11.7–15.7)
IMM GRANULOCYTES # BLD: 0 10E9/L (ref 0–0.4)
IMM GRANULOCYTES NFR BLD: 0.3 %
INR PPP: 3.01 (ref 0.86–1.14)
LYMPHOCYTES # BLD AUTO: 0.6 10E9/L (ref 0.8–5.3)
LYMPHOCYTES NFR BLD AUTO: 17.3 %
MCH RBC QN AUTO: 30 PG (ref 26.5–33)
MCHC RBC AUTO-ENTMCNC: 31.1 G/DL (ref 31.5–36.5)
MCV RBC AUTO: 97 FL (ref 78–100)
MONOCYTES # BLD AUTO: 0.3 10E9/L (ref 0–1.3)
MONOCYTES NFR BLD AUTO: 8.1 %
NEUTROPHILS # BLD AUTO: 2.7 10E9/L (ref 1.6–8.3)
NEUTROPHILS NFR BLD AUTO: 72.1 %
NRBC # BLD AUTO: 0 10*3/UL
NRBC BLD AUTO-RTO: 0 /100
PLATELET # BLD AUTO: 150 10E9/L (ref 150–450)
POTASSIUM SERPL-SCNC: 4 MMOL/L (ref 3.4–5.3)
RBC # BLD AUTO: 4.23 10E12/L (ref 3.8–5.2)
SODIUM SERPL-SCNC: 139 MMOL/L (ref 133–144)
TROPONIN I SERPL-MCNC: 0.03 UG/L (ref 0–0.04)
WBC # BLD AUTO: 3.7 10E9/L (ref 4–11)

## 2020-09-01 PROCEDURE — 85025 COMPLETE CBC W/AUTO DIFF WBC: CPT | Performed by: EMERGENCY MEDICINE

## 2020-09-01 PROCEDURE — 99284 EMERGENCY DEPT VISIT MOD MDM: CPT

## 2020-09-01 PROCEDURE — 85610 PROTHROMBIN TIME: CPT | Performed by: EMERGENCY MEDICINE

## 2020-09-01 PROCEDURE — 84484 ASSAY OF TROPONIN QUANT: CPT | Performed by: EMERGENCY MEDICINE

## 2020-09-01 PROCEDURE — 80048 BASIC METABOLIC PNL TOTAL CA: CPT | Performed by: EMERGENCY MEDICINE

## 2020-09-01 PROCEDURE — 93005 ELECTROCARDIOGRAM TRACING: CPT

## 2020-09-01 ASSESSMENT — ENCOUNTER SYMPTOMS
FEVER: 0
LIGHT-HEADEDNESS: 1
PALPITATIONS: 1
SHORTNESS OF BREATH: 1
WEAKNESS: 1

## 2020-09-01 NOTE — ED TRIAGE NOTES
Patient comes in via EMS for evaluation of a-fib. Patient woke up not feeling well this morning and felt worse after going to store, called EMS and found to be in a-fib with RVR rate 140-180, patient spontaneously converted en-route and is now NSR with rate in 60s. Patient has not complaints at this time.

## 2020-09-01 NOTE — ED PROVIDER NOTES
"  History     Chief Complaint:  Atrial Fib      HPI   Veronica Bray is a 89 year old female anticoagulated on Coumadin with a history of coronary artery disease, cardiomyopathy, hyperlipidemia, hypertension, mitral regurgitation, NSTEMI, paroxysmal atrial fibrillation, SVT s/p ablation, and chronic ischemic heart disease who presents to the emergency department for evaluation of atrial fibrillation. The patient reports that this morning while she was grocery shopping she had the sudden onset of weakness, lightheadedness, shortness of breath, palpitations, and a \"fullness\" in her throat. She then finished at the store, went home, and called EMS. On arrival, she was found to be in atrial fibrillation with RVR with rate in the 140s-180s. She then spontaneously converted en route and is now feeling asymptomatic and back to her baseline. She has not yet eaten today. No fevers or recent illnesses.  She notes that she has been feeling well and at baseline up till the onset of the atrial fibrillation today.    Allergies:  Aleve  Celebrex  Codeine  Demerol  Hydromorphine  Percocet  Tramadol  Vicodin     Medications:    Aspirin 81 mg  Lipitor  Coreg  Neurontin  Zestril   Coumadin      Past Medical History:    Atrial flutter  Coronary artery disease  Cardiomyopathy  Hyperlipidemia  Hypertension   Left bundle branch block  Mitral regurgitation  NSTEMI  Paroxysmal atrial fibrillation  SVT  Obstructive sleep apnea   Thrombocytopenia   Senile nuclear sclerosis  Macular degeneration   Osteoarthrosis   Chronic ischemic heart disease   Neuropathy   Asthma   Hemothorax  Coagulation disorder     Past Surgical History:    Cholecystectomy  Hysterectomy  Left heart cath  Hip, knee, and wrist surgeries  CABG x 2 with coronary artery stent     Family History:    MI: mother, brothers     Social History:  Tobacco Use: Former, quit: 1967  Alcohol Use: Rare  PCP: Concepcion Mccall  Marital Status:        Review of Systems "   Constitutional: Negative for fever.   Respiratory: Positive for shortness of breath (resolved).    Cardiovascular: Positive for palpitations (resolved).   Neurological: Positive for weakness (resolved) and light-headedness (resolved).   All other systems reviewed and are negative.      Physical Exam     Patient Vitals for the past 24 hrs:   BP Temp Temp src Pulse Resp SpO2   09/01/20 1025 134/61 97.9  F (36.6  C) Oral 65 18 94 %     Physical Exam  General: Elderly female sitting upright  Eyes: PERRL, Conjunctive within normal limits  ENT: Moist mucous membranes, oropharynx clear.   CV: Normal S1S2, no murmur, rub or gallop. Regular rate and rhythm  Resp: Clear to auscultation bilaterally, no wheezes, rales or rhonchi. Normal respiratory effort.  GI: Abdomen is soft, nontender and nondistended. No palpable masses. No rebound or guarding.  MSK: No edema. Nontender. Normal active range of motion.  Skin: Warm and dry. Scattered extremity ecchymoses. No rashes on visible skin.  Neuro: Alert and oriented. Responds appropriately to all questions and commands. No focal findings appreciated.   Psych: Normal mood and affect. Pleasant.    Emergency Department Course   ECG:  @ 1032  Indication: atrial fibrillation  Vent. Rate 65 bpm. RI interval 162 ms. QRS duration 134 ms. QT/QTc 466/484 ms. P-R-T axis 54 -1 118. No significant change when compared with EKG from 3/21/2018.  Normal sinus rhythm with sinus arrhythmia. Left bundle branch block. Abnormal ECG.    Read @ 1036 by Dr. Coker.    Laboratory:  CBC: WBC: 3.7 (L), HGB: 12.7, PLT: 150  BMP: Glucose 101 (H), Calcium: 8.1 (L), o/w WNL (Creatinine: 0.66)    INR: 3.01 (H)    1054 Troponin I: 0.033    Emergency Department Course:  1039 Nursing notes and vitals reviewed. I performed an exam of the patient as documented above.     EKG was done, interpretation as above.    Blood drawn. This was sent to the lab for further testing, results above.    1148 I rechecked the patient  and discussed the results of her workup thus far. She continues to deny any symptoms. She has eaten part of a box lunch.    Findings and plan explained to the Patient. Patient discharged home with instructions regarding supportive care, medications, and reasons to return. The importance of close follow-up was reviewed.     I personally reviewed the laboratory results with the Patient and answered all related questions prior to discharge.     Impression & Plan    Medical Decision Making:  Veronica Bray is a 89 year old female with a history of paroxysmal atrial fibrillation on coumadin who presents to the emergency department today for evaluation after she was found to be in atrial fibrillation with RVR by EMS this morning as above. Fortunately, she spontaneously cardioverted en route and is now asymptomatic and remains at baseline here. Electrolytes were normal. She is anticoagulated on Coumadin and INR is therapeutic. I doubt PE as the etiology. Troponin is WNL. There was no sign of HD instability/end-organ ischemia.  This is unlikely to represent new acute pathology.  Patient did not have return of atrial fibrillation throughout her stay in the department. She will be discharged home.  She will follow up with cardiology and/or her primary care clinic within the next week. She knows to return to the ED if she develops repeat symptoms, chest pain, shortness of breath, lightheadedness/syncope, palpitations, or any other new and concerning symptoms.     Diagnosis:    ICD-10-CM    1. Atrial fibrillation with RVR (H)  I48.91    2. Anticoagulated on Coumadin  Z79.01        Disposition:  Discharged to home    Scribe Disclosure:  Peter FORRESTER, am serving as a scribe on 9/1/2020 at 10:39 AM to personally document services performed by Dr. Coker, Shayy Mix MD based on my observations and the provider's statements to me.     Peter Contreras  9/1/2020   [facility] EMERGENCY DEPARTMENT        Shayy Coker MD  09/01/20 5208

## 2020-09-01 NOTE — ED AVS SNAPSHOT
[facility] Emergency Department  [address]  [facility] [address]  Phone:  [phone]  Fax:  [phone]                                    Veronica Bray   MRN: [id number]    Department:  [facility] Emergency Department   Date of Visit:  9/1/2020           After Visit Summary Signature Page    I have received my discharge instructions, and my questions have been answered. I have discussed any challenges I see with this plan with the nurse or doctor.    ..........................................................................................................................................  Patient/Patient Representative Signature      ..........................................................................................................................................  Patient Representative Print Name and Relationship to Patient    ..................................................               ................................................  Date                                   Time    ..........................................................................................................................................  Reviewed by Signature/Title    ...................................................              ..............................................  Date                                               Time          22EPIC Rev 08/18

## 2020-09-01 NOTE — ED NOTES
Bed: ED22  Expected date: 9/1/20  Expected time: 10:16 AM  Means of arrival: Ambulance  Comments:  Greer [address]  89F  A-fib RVR -160

## 2020-09-02 LAB — INTERPRETATION ECG - MUSE: NORMAL

## 2020-09-08 ENCOUNTER — TELEPHONE (OUTPATIENT)
Dept: INTERNAL MEDICINE | Facility: CLINIC | Age: 85
End: 2020-09-08

## 2020-09-08 ENCOUNTER — TRANSFERRED RECORDS (OUTPATIENT)
Dept: HEALTH INFORMATION MANAGEMENT | Facility: CLINIC | Age: 85
End: 2020-09-08

## 2020-09-08 NOTE — TELEPHONE ENCOUNTER
Patient calls requesting medication for anxiety.  Patient states she was in ED last week and then at [facility] on Saturday and connie Specialist today who said she had a mini stroke in her left eye.  Patient states she was told by the ED and both eye doctors that she needs to get her stress under control.  Patient states she has many medication allergies and wants to make sure it is something that will not make her eye worse.  Please address.

## 2020-09-08 NOTE — TELEPHONE ENCOUNTER
Patient is very stressed with her own health, her  requires a lot of care and has been falling and her daughter has had a stroke.  She states her plate is full and even the paramedics had mentioned that she may need to speak with her provider about getting something for anxiety.  ERENDIRA Johnston R.N.

## 2020-09-09 NOTE — TELEPHONE ENCOUNTER
Left a message on cell voicemail asking patient to return call to clinic.  Ask if she was able to get a ride for Vaibhav to come into clinic tomorrow.  See MD message below also.  ERENDIRA Johnston R.N.

## 2020-09-09 NOTE — TELEPHONE ENCOUNTER
Please have patient fill out LAYA and PhQ9 ahead of time.  I could also have a visit with her if she comes in with her  for his visit.    Would she also like to discuss with social work options for help with her 's care?

## 2020-09-09 NOTE — TELEPHONE ENCOUNTER
Rescheduled 1 p.m. patient for 3:20 so that Veronica and her  can both be seen at 1:00 p.m.  ERENDIRA Johnston R.N.

## 2020-09-09 NOTE — TELEPHONE ENCOUNTER
Veronica called back and yes they have a ride for tomorrow. She is interested in talking with Dr Mccall about social work options

## 2020-09-10 ENCOUNTER — OFFICE VISIT (OUTPATIENT)
Dept: INTERNAL MEDICINE | Facility: CLINIC | Age: 85
End: 2020-09-10
Payer: MEDICARE

## 2020-09-10 VITALS
TEMPERATURE: 97.8 F | OXYGEN SATURATION: 94 % | HEART RATE: 68 BPM | RESPIRATION RATE: 14 BRPM | SYSTOLIC BLOOD PRESSURE: 128 MMHG | WEIGHT: 141.5 LBS | HEIGHT: 61 IN | DIASTOLIC BLOOD PRESSURE: 60 MMHG | BODY MASS INDEX: 26.71 KG/M2

## 2020-09-10 DIAGNOSIS — H53.122 TRANSIENT VISUAL LOSS, LEFT EYE: ICD-10-CM

## 2020-09-10 DIAGNOSIS — I10 BENIGN ESSENTIAL HYPERTENSION: ICD-10-CM

## 2020-09-10 DIAGNOSIS — F41.1 GAD (GENERALIZED ANXIETY DISORDER): ICD-10-CM

## 2020-09-10 DIAGNOSIS — F32.0 MILD MAJOR DEPRESSION (H): ICD-10-CM

## 2020-09-10 DIAGNOSIS — G45.9 TIA (TRANSIENT ISCHEMIC ATTACK): ICD-10-CM

## 2020-09-10 DIAGNOSIS — H54.62 VISION LOSS OF LEFT EYE: Primary | ICD-10-CM

## 2020-09-10 DIAGNOSIS — Z79.01 LONG TERM CURRENT USE OF ANTICOAGULANT THERAPY: ICD-10-CM

## 2020-09-10 DIAGNOSIS — I48.0 PAF (PAROXYSMAL ATRIAL FIBRILLATION) (H): ICD-10-CM

## 2020-09-10 DIAGNOSIS — E78.49 OTHER HYPERLIPIDEMIA: ICD-10-CM

## 2020-09-10 PROCEDURE — 84443 ASSAY THYROID STIM HORMONE: CPT | Performed by: INTERNAL MEDICINE

## 2020-09-10 PROCEDURE — 36415 COLL VENOUS BLD VENIPUNCTURE: CPT | Performed by: INTERNAL MEDICINE

## 2020-09-10 PROCEDURE — 99214 OFFICE O/P EST MOD 30 MIN: CPT | Performed by: INTERNAL MEDICINE

## 2020-09-10 RX ORDER — WARFARIN SODIUM 5 MG/1
TABLET ORAL
Qty: 90 TABLET | Refills: 1 | Status: SHIPPED | OUTPATIENT
Start: 2020-09-10 | End: 2020-10-06 | Stop reason: ALTCHOICE

## 2020-09-10 RX ORDER — LISINOPRIL 5 MG/1
5 TABLET ORAL DAILY
Qty: 90 TABLET | Refills: 1 | Status: SHIPPED | OUTPATIENT
Start: 2020-09-10 | End: 2020-12-15

## 2020-09-10 RX ORDER — ATORVASTATIN CALCIUM 10 MG/1
10 TABLET, FILM COATED ORAL AT BEDTIME
Qty: 90 TABLET | Refills: 0 | Status: SHIPPED | OUTPATIENT
Start: 2020-09-10 | End: 2020-12-31

## 2020-09-10 ASSESSMENT — ANXIETY QUESTIONNAIRES
IF YOU CHECKED OFF ANY PROBLEMS ON THIS QUESTIONNAIRE, HOW DIFFICULT HAVE THESE PROBLEMS MADE IT FOR YOU TO DO YOUR WORK, TAKE CARE OF THINGS AT HOME, OR GET ALONG WITH OTHER PEOPLE: SOMEWHAT DIFFICULT
5. BEING SO RESTLESS THAT IT IS HARD TO SIT STILL: MORE THAN HALF THE DAYS
1. FEELING NERVOUS, ANXIOUS, OR ON EDGE: NEARLY EVERY DAY
2. NOT BEING ABLE TO STOP OR CONTROL WORRYING: NEARLY EVERY DAY
6. BECOMING EASILY ANNOYED OR IRRITABLE: MORE THAN HALF THE DAYS
3. WORRYING TOO MUCH ABOUT DIFFERENT THINGS: NEARLY EVERY DAY
GAD7 TOTAL SCORE: 19
7. FEELING AFRAID AS IF SOMETHING AWFUL MIGHT HAPPEN: NEARLY EVERY DAY

## 2020-09-10 ASSESSMENT — PATIENT HEALTH QUESTIONNAIRE - PHQ9
5. POOR APPETITE OR OVEREATING: NEARLY EVERY DAY
SUM OF ALL RESPONSES TO PHQ QUESTIONS 1-9: 13

## 2020-09-10 ASSESSMENT — MIFFLIN-ST. JEOR: SCORE: 1000.25

## 2020-09-10 NOTE — LETTER
September 15, 2020      Veronica Bray  [address] [address]   [facility] [address]        Dear ,    We are writing to inform you of your test results.    The thyroid test (TSH) is within normal limits.     Resulted Orders   TSH with free T4 reflex   Result Value Ref Range    TSH 0.97 0.40 - 4.00 mU/L       If you have any questions or concerns, please call the clinic at the number listed above.       Sincerely,        Concepcion Mccall MD

## 2020-09-10 NOTE — PROGRESS NOTES
"Subjective     Veronica Bray is a 89 year old female who presents to clinic today for the following health issues:  Patient is being seen in the clinic to discuss anxiety and depression  HPI     Atrial fibrillation.  On 9/1 she had heart rate of 140-160 and called ambulance. Then her heart spontaneously converted to HR of 70. She was brought to the ER.  Troponin was normal. BMP and CBC were unremarkable.   She has an appointment with cardiology.   She was on coumadin and INR therapeutic.  She then had vision loss of her left eye on Wednesday and again on Saturday. Her vision has slightly improved.   She saw the eye doctor that Tuesday morning and had a \"blood clot\" to her eye.  She was told she should see cardiology and needs a carotid ultrasound.    Depression and Anxiety Follow-Up  Has not been on anything for mood in the past.     How are you doing with your depression since your last visit? Worsened     How are you doing with your anxiety since your last visit?  Worsened     Are you having other symptoms that might be associated with depression or anxiety? Yes:  decreased appetite    Have you had a significant life event? OTHER: Family     Do you have any concerns with your use of alcohol or other drugs? No    Social History     Tobacco Use     Smoking status: Former Smoker     Smokeless tobacco: Never Used     Tobacco comment: quit approx 1967    Substance Use Topics     Alcohol use: Yes     Alcohol/week: 4.0 standard drinks     Types: 4 Glasses of wine per week     Comment: rarely     Drug use: No     PHQ 9/10/2020   PHQ-9 Total Score 13   Q9: Thoughts of better off dead/self-harm past 2 weeks Not at all     LAYA-7 SCORE 9/10/2020   Total Score 19         Suicide Assessment Five-step Evaluation and Treatment (SAFE-T)      How many servings of fruits and vegetables do you eat daily?  2-3    On average, how many sweetened beverages do you drink each day (Examples: soda, juice, sweet tea, etc.  Do NOT count diet " "or artificially sweetened beverages)?   2    How many days per week do you exercise enough to make your heart beat faster? 3 or less    How many minutes a day do you exercise enough to make your heart beat faster? 9 or less    How many days per week do you miss taking your medication? 0        Review of Systems   CONSTITUTIONAL: NEGATIVE for fever, chills, change in weight  Eyes: vision loss  RESP: NEGATIVE for significant cough or SOB  CV: NEGATIVE for chest pain, palpitations or peripheral edema      Objective    /60   Pulse 68   Temp 97.8  F (36.6  C) (Oral)   Resp 14   Ht 1.543 m (5' 0.75\")   Wt 64.2 kg (141 lb 8 oz)   SpO2 94%   BMI 26.96 kg/m    Body mass index is 26.96 kg/m .  Physical Exam   GENERAL: healthy, alert and no distress  NECK: no adenopathy, no asymmetry, masses, or scars and thyroid normal to palpation  RESP: lungs clear to auscultation - no rales, rhonchi or wheezes  CV: regular rate and rhythm, normal S1 S2, no S3 or S4, no murmur, click or rub    Assessment & Plan       (H54.62) Vision loss of left eye  (primary encounter diagnosis)  Comment: assess given vision loss  Plan: US Carotid Bilateral, Echocardiogram Complete            (F32.0) Mild major depression (H)  Comment: no thoughts of self harm  Plan: MENTAL HEALTH REFERRAL  - Adult; Outpatient         Treatment; Individual/Couples/Family/Group         Therapy/Health Psychology; [facility]: [facility] [phone]; We will         contact you to schedule the appointment or         please call with any questions  Pt wishes to have counseling first          (I48.0) PAF (paroxysmal atrial fibrillation) (H)  Comment:   Plan: TSH with free T4 reflex        -pt following up with cardiology    (I10) Benign essential hypertension  Comment: at goal  Plan: lisinopril (ZESTRIL) 5 MG tablet            (Z79.01) Long term current use of anticoagulant therapy  Comment:   Plan: warfarin ANTICOAGULANT (COUMADIN) 5 MG " tablet            (H53.122) Transient visual loss, left eye   Comment:   Plan: US Carotid Bilateral, Echocardiogram Complete            (G45.9) TIA (transient ischemic attack)  Comment:   Plan: Echocardiogram Complete            (E78.49) Other hyperlipidemia  Comment: assess  Plan: atorvastatin (LIPITOR) 10 MG tablet            (F41.1) LAYA (generalized anxiety disorder)  Comment: worsened  Plan: MENTAL HEALTH REFERRAL  - Adult; Outpatient         Treatment; Individual/Couples/Family/Group         Therapy/Health Psychology; [facility]: [facility] [phone]; We will         contact you to schedule the appointment or         please call with any questions         -pt wishes to see a counselor first and plans on considering medication later          Return in about 4 weeks (around 10/8/2020) for Routine Visit.    Concepcion Mccall MD  [facility]

## 2020-09-11 LAB — TSH SERPL DL<=0.005 MIU/L-ACNC: 0.97 MU/L (ref 0.4–4)

## 2020-09-11 ASSESSMENT — ANXIETY QUESTIONNAIRES: GAD7 TOTAL SCORE: 19

## 2020-09-14 ENCOUNTER — TRANSFERRED RECORDS (OUTPATIENT)
Dept: HEALTH INFORMATION MANAGEMENT | Facility: CLINIC | Age: 85
End: 2020-09-14

## 2020-09-23 ENCOUNTER — HOSPITAL ENCOUNTER (OUTPATIENT)
Dept: CARDIOLOGY | Facility: CLINIC | Age: 85
Discharge: HOME | End: 2020-09-23
Attending: INTERNAL MEDICINE | Admitting: INTERNAL MEDICINE
Payer: MEDICARE

## 2020-09-23 DIAGNOSIS — G45.9 TIA (TRANSIENT ISCHEMIC ATTACK): ICD-10-CM

## 2020-09-23 DIAGNOSIS — H53.122 TRANSIENT VISUAL LOSS, LEFT EYE: ICD-10-CM

## 2020-09-23 DIAGNOSIS — H54.62 VISION LOSS OF LEFT EYE: ICD-10-CM

## 2020-09-23 PROCEDURE — 40000264 ECHOCARDIOGRAM COMPLETE

## 2020-09-23 PROCEDURE — 93306 TTE W/DOPPLER COMPLETE: CPT | Mod: 26 | Performed by: INTERNAL MEDICINE

## 2020-09-23 PROCEDURE — 25800025 ZZH RX 258: Performed by: INTERNAL MEDICINE

## 2020-09-23 RX ORDER — ACYCLOVIR 200 MG/1
30 CAPSULE ORAL ONCE
Status: COMPLETED | OUTPATIENT
Start: 2020-09-23 | End: 2020-09-23

## 2020-09-23 RX ADMIN — SODIUM CHLORIDE 30 ML: 9 INJECTION, SOLUTION INTRAMUSCULAR; INTRAVENOUS; SUBCUTANEOUS at 11:38

## 2020-09-25 ENCOUNTER — HOSPITAL ENCOUNTER (OUTPATIENT)
Dept: CARDIOLOGY | Facility: CLINIC | Age: 85
Discharge: HOME | End: 2020-09-25
Attending: INTERNAL MEDICINE | Admitting: INTERNAL MEDICINE
Payer: MEDICARE

## 2020-09-25 DIAGNOSIS — H53.122 TRANSIENT VISUAL LOSS, LEFT EYE: ICD-10-CM

## 2020-09-25 DIAGNOSIS — H54.62 VISION LOSS OF LEFT EYE: ICD-10-CM

## 2020-09-25 PROCEDURE — 93880 EXTRACRANIAL BILAT STUDY: CPT

## 2020-09-28 ENCOUNTER — TELEPHONE (OUTPATIENT)
Dept: CARDIOLOGY | Facility: CLINIC | Age: 85
End: 2020-09-28

## 2020-09-28 ENCOUNTER — ANTICOAGULATION THERAPY VISIT (OUTPATIENT)
Dept: CARDIOLOGY | Facility: CLINIC | Age: 85
End: 2020-09-28
Payer: MEDICARE

## 2020-09-28 DIAGNOSIS — I48.0 PAROXYSMAL ATRIAL FIBRILLATION (H): ICD-10-CM

## 2020-09-28 DIAGNOSIS — Z79.01 LONG TERM CURRENT USE OF ANTICOAGULANT THERAPY: ICD-10-CM

## 2020-09-28 LAB
CAPILLARY BLOOD COLLECTION: NORMAL
INR PPP: 1.5 (ref 0.86–1.14)

## 2020-09-28 PROCEDURE — 36416 COLLJ CAPILLARY BLOOD SPEC: CPT | Performed by: INTERNAL MEDICINE

## 2020-09-28 PROCEDURE — 85610 PROTHROMBIN TIME: CPT | Performed by: INTERNAL MEDICINE

## 2020-09-28 PROCEDURE — 99207 ZZC NO CHARGE NURSE ONLY: CPT

## 2020-09-28 NOTE — PROGRESS NOTES
ANTICOAGULATION FOLLOW-UP CLINIC VISIT    Patient Name:  Veronica Bray  Date:  2020  Contact Type:  Telephone    SUBJECTIVE:  Patient Findings     Positives:   Change in health (Vision changes per patient, saw opthamology and diagnosed w/ retinal occlusion), Emergency department visit (ED 20 for afib w/ RVR; pt spontaneously converted back to SR)             OBJECTIVE    Recent labs: (last 7 days)     20  0956   INR 1.50*       ASSESSMENT / PLAN  INR assessment SUB    Recheck INR In: 1 WEEK    INR Location Outside lab      Anticoagulation Summary  As of 2020    INR goal:   2.0-3.0   TTR:   78.0 % (1 y)   INR used for dosin.50! (2020)   Warfarin maintenance plan:   2.5 mg (5 mg x 0.5) every Mon, Thu; 5 mg (5 mg x 1) all other days   Full warfarin instructions:   : 5 mg; Otherwise 2.5 mg every Mon, Thu; 5 mg all other days   Weekly warfarin total:   30 mg   Plan last modified:   Amanda Fuentes RN (2020)   Next INR check:   10/5/2020   Priority:   Maintenance   Target end date:   Indefinite    Indications    Atrial fibrillation (H) [I48.91] [I48.91]  Long-term (current) use of anticoagulants [Z79.01] [Z79.01]  Paroxysmal atrial fibrillation (H) [I48.0]             Anticoagulation Episode Summary     INR check location:       Preferred lab:       Send INR reminders to:   [facility] INR NURSE    Comments:         Anticoagulation Care Providers     Provider Role Specialty Phone number    Ip, Rt Morris Carrera MD Referring Cardiology [phone]            See the Encounter Report to view Anticoagulation Flowsheet and Dosing Calendar (Go to Encounters tab in chart review, and find the Anticoagulation Therapy Visit)    INR 1.50 today at outside clinic. Pt was in the ED 20 for afib w/ RVR, had spontaneous conversion back to NSR. INR was 3.01 at that time. PCP note from 9/10 indicates that patient had vision loss in her left eye twice following her ED visit, saw opthamology who  diagnosed with with a retinal occlusion. Subsequent carotid US and echo were stable.     Attempted to reach patient to discuss INR result and recent changes to health status, left message to call back. Would plan to double patient's dosing to 5mg today. Pt has f/up w/ Dr Benavides scheduled 9/30, message to him to verify if any other intervention needed ie bridging.     Addendum 1305: Spoke to patient. She states it has been a tough month, was dealing with the health issues mentioned previously but then also has been taking care of her  who has had multiple falls recently. She denies any vision changes since the previous episodes, was told by opthamology that it may take several months for her vision to go back to normal. No changes to diet, eats broccoli/spring mix/beans 2-3x/wk, has not increased recently. No boost/ensure/V8. No changes to meds and no new supplements. Pt denies missing any doses of warfarin. Will boost patient to 5mg today and then resume normal maintenance dosing of 2.5mg Mon/Thurs and 5mg all other days. Recheck in 1wk. Pt will avoid greens the next two days and then resume normal diet. Discussed that patient should go to ED for any recurrence of vision changes/signs of stroke. Pt agreeable to plan.     Jenny Hoang RN

## 2020-09-28 NOTE — TELEPHONE ENCOUNTER
Ip, MD Natalya Merida Audrey G, RN    Caller: Unspecified (Today, 10:51 AM)               Will discuss switching to DOAC at that time.  Thanks

## 2020-09-28 NOTE — TELEPHONE ENCOUNTER
Message to Dr Benavides for further direction regarding warfarin therapy. INR 1.50 today with recent vision loss/retinal occlusion. Has OV 9/30/20 w/ Dr Benavides.

## 2020-09-30 ENCOUNTER — OFFICE VISIT (OUTPATIENT)
Dept: CARDIOLOGY | Facility: CLINIC | Age: 85
End: 2020-09-30
Payer: MEDICARE

## 2020-09-30 VITALS
BODY MASS INDEX: 27.38 KG/M2 | DIASTOLIC BLOOD PRESSURE: 80 MMHG | OXYGEN SATURATION: 94 % | HEIGHT: 61 IN | HEART RATE: 60 BPM | SYSTOLIC BLOOD PRESSURE: 136 MMHG | WEIGHT: 145 LBS

## 2020-09-30 DIAGNOSIS — I48.0 PAROXYSMAL ATRIAL FIBRILLATION (H): Primary | ICD-10-CM

## 2020-09-30 PROCEDURE — 99215 OFFICE O/P EST HI 40 MIN: CPT | Performed by: INTERNAL MEDICINE

## 2020-09-30 RX ORDER — ACETAMINOPHEN 160 MG
2000 TABLET,DISINTEGRATING ORAL DAILY
COMMUNITY

## 2020-09-30 ASSESSMENT — MIFFLIN-ST. JEOR: SCORE: 1016.13
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
## 2020-10-01 ENCOUNTER — TELEPHONE (OUTPATIENT)
Dept: CARDIOLOGY | Facility: CLINIC | Age: 85
End: 2020-10-01

## 2020-10-01 DIAGNOSIS — I48.0 PAF (PAROXYSMAL ATRIAL FIBRILLATION) (H): Primary | ICD-10-CM

## 2020-10-01 NOTE — TELEPHONE ENCOUNTER
Can you pls send prescription of Xarelto 20 mg daily to pt's mail order pharmacy.  This was just added yesterday.  Many thanks.  Pls let pt know that it would be fine to start Xarelto if INR is less than 2.5.  Warfarin should be stopped once Xarelto is started.  Many thanks.     Contacted patient and the Mail order pharmacy is [facility] her next INR is Tuesday 10-6-2020. Patient verbalized understanding and agreed to plan of care.

## 2020-10-06 ENCOUNTER — ANTICOAGULATION THERAPY VISIT (OUTPATIENT)
Dept: CARDIOLOGY | Facility: CLINIC | Age: 85
End: 2020-10-06

## 2020-10-06 DIAGNOSIS — Z79.01 LONG TERM CURRENT USE OF ANTICOAGULANT THERAPY: ICD-10-CM

## 2020-10-06 DIAGNOSIS — I48.0 PAROXYSMAL ATRIAL FIBRILLATION (H): ICD-10-CM

## 2020-10-06 LAB
CAPILLARY BLOOD COLLECTION: NORMAL
INR PPP: 2.4 (ref 0.86–1.14)

## 2020-10-06 PROCEDURE — 36416 COLLJ CAPILLARY BLOOD SPEC: CPT | Performed by: INTERNAL MEDICINE

## 2020-10-06 PROCEDURE — 85610 PROTHROMBIN TIME: CPT | Performed by: INTERNAL MEDICINE

## 2020-10-06 PROCEDURE — 99207 PR NO CHARGE NURSE ONLY: CPT

## 2020-10-06 NOTE — PROGRESS NOTES
ANTICOAGULATION FOLLOW-UP CLINIC VISIT    Patient Name:  Veronica Bray  Date:  10/6/2020  Contact Type:  Telephone    SUBJECTIVE:  Patient Findings     Positives:  Other complaints    Comments:  Switch Warfarin to Xarelto when INR < 2.5 per Dr. Benavides         Clinical Outcomes     Negatives:  Major bleeding event, Thromboembolic event, Anticoagulation-related hospital admission, Anticoagulation-related ED visit, Anticoagulation-related fatality    Comments:  Switch Warfarin to Xarelto when INR < 2.5 per Dr. Benavides            OBJECTIVE    Recent labs: (last 7 days)     10/06/20  1108   INR 2.40*       ASSESSMENT / PLAN  INR assessment THER    Recheck INR In: 1 WEEK    INR Location Outside lab      Anticoagulation Summary  As of 10/6/2020    INR goal:  2.0-3.0   TTR:  76.8 % (1 y)   INR used for dosin.40 (10/6/2020)   Warfarin maintenance plan:  2.5 mg (5 mg x 0.5) every Mon, Thu; 5 mg (5 mg x 1) all other days   Full warfarin instructions:  2.5 mg every Mon, Thu; 5 mg all other days   Weekly warfarin total:  30 mg   No change documented:  Larisa Monae RN   Plan last modified:  Amanda Fuentes, RN (2020)   Next INR check:  10/13/2020   Priority:  Maintenance   Target end date:  Indefinite    Indications    Atrial fibrillation (H) [I48.91] [I48.91]  Long-term (current) use of anticoagulants [Z79.01] [Z79.01]  Paroxysmal atrial fibrillation (H) [I48.0]             Anticoagulation Episode Summary     INR check location:      Preferred lab:      Send INR reminders to:  [facility] HEART INR NURSE    Comments:        Anticoagulation Care Providers     Provider Role Specialty Phone number    Tr Benavides MD Referring Cardiology [phone]            See the Encounter Report to view Anticoagulation Flowsheet and Dosing Calendar (Go to Encounters tab in chart review, and find the Anticoagulation Therapy Visit)    INR 2.4.  Called and spoke to the patient.  She will switch from Warfarin to Xarelto when INR < 2.5  per Dr. Benavides.  Script was sent to her mail order pharmacy and she was contacted yesterday and told it was approved and will be coming in the mail soon.  She is already scheduled for an INR check next week as previously planned so we will keep that appointment and if INR <2.5 then she can start Xarelto that day.  She will drink some v8 juice before the next INR check.  Mike Monae RN

## 2020-10-08 ENCOUNTER — TELEPHONE (OUTPATIENT)
Dept: INTERNAL MEDICINE | Facility: CLINIC | Age: 85
End: 2020-10-08

## 2020-10-08 DIAGNOSIS — H54.7 VISION LOSS: Primary | ICD-10-CM

## 2020-10-08 NOTE — TELEPHONE ENCOUNTER
Before labor day she had some vision loss of left eye.   Reviewed echocardiogram and carotid ultrasound.  Will also have patient see neurology.     Patient reports that she does not want an MRI of brain ordered and would prefer to discuss with neurology.

## 2020-10-13 ENCOUNTER — ANTICOAGULATION THERAPY VISIT (OUTPATIENT)
Dept: CARDIOLOGY | Facility: CLINIC | Age: 85
End: 2020-10-13
Payer: MEDICARE

## 2020-10-13 DIAGNOSIS — Z79.01 LONG TERM CURRENT USE OF ANTICOAGULANT THERAPY: ICD-10-CM

## 2020-10-13 DIAGNOSIS — I48.0 PAROXYSMAL ATRIAL FIBRILLATION (H): ICD-10-CM

## 2020-10-13 LAB
CAPILLARY BLOOD COLLECTION: NORMAL
INR PPP: 2.4 (ref 0.86–1.14)

## 2020-10-13 PROCEDURE — 99207 PR NO CHARGE NURSE ONLY: CPT

## 2020-10-13 PROCEDURE — 36416 COLLJ CAPILLARY BLOOD SPEC: CPT | Performed by: INTERNAL MEDICINE

## 2020-10-13 PROCEDURE — 85610 PROTHROMBIN TIME: CPT | Performed by: INTERNAL MEDICINE

## 2020-10-13 NOTE — PROGRESS NOTES
ANTICOAGULATION FOLLOW-UP CLINIC VISIT    Patient Name:  Veronica Bray  Date:  10/13/2020  Contact Type:  Telephone    SUBJECTIVE:  Patient Findings     Positives:  Other complaints    Comments:  Switching from Warfarin to Xarelto         Clinical Outcomes     Negatives:  Major bleeding event, Thromboembolic event, Anticoagulation-related hospital admission, Anticoagulation-related ED visit, Anticoagulation-related fatality    Comments:  Switching from Warfarin to Xarelto            OBJECTIVE    Recent labs: (last 7 days)     10/13/20  1124   INR 2.40*       ASSESSMENT / PLAN  INR assessment THER    Recheck INR In: 1 DAY    INR Location Outside lab      Anticoagulation Summary  As of 10/13/2020    INR goal:  2.0-3.0   TTR:  76.8 % (1 y)   INR used for dosin.40 (10/13/2020)   Warfarin maintenance plan:  2.5 mg (5 mg x 0.5) every Mon, Thu; 5 mg (5 mg x 1) all other days   Full warfarin instructions:  2.5 mg every Mon, Thu; 5 mg all other days   Weekly warfarin total:  30 mg   No change documented:  Larisa Monae RN   Plan last modified:  Amanda Fuentes RN (2020)   Next INR check:     Target end date:  Indefinite    Indications    Atrial fibrillation (H) [I48.91] [I48.91]  Long-term (current) use of anticoagulants [Z79.01] [Z79.01]  Paroxysmal atrial fibrillation (H) [I48.0]             Anticoagulation Episode Summary     INR check location:      Preferred lab:      Resolved date:  10/13/2020    Resolved reason:  Changed Anticoagulant     Send INR reminders to:  [facility] HEART INR NURSE    Comments:        Anticoagulation Care Providers     Provider Role Specialty Phone number    Ip, Tr Morris Carrera MD Referring Cardiology [phone]            See the Encounter Report to view Anticoagulation Flowsheet and Dosing Calendar (Go to Encounters tab in chart review, and find the Anticoagulation Therapy Visit)    INR 2.40.  Called and spoke to the patient.  Since INR is < 2.5 she can switch from warfarin  to xarelto today per Dr. Benavides.  Stop warfarin and start xarelto tonight at dinner.  No further INR checks needed.  Confirmed with patient that she received the xarelto supply from mail order pharmacy.  Mike Monae RN

## 2020-10-19 ENCOUNTER — VIRTUAL VISIT (OUTPATIENT)
Dept: PSYCHOLOGY | Facility: CLINIC | Age: 85
End: 2020-10-19
Attending: INTERNAL MEDICINE
Payer: MEDICARE

## 2020-10-19 DIAGNOSIS — F43.23 ADJUSTMENT DISORDER WITH MIXED ANXIETY AND DEPRESSED MOOD: Primary | ICD-10-CM

## 2020-10-19 PROCEDURE — 90834 PSYTX W PT 45 MINUTES: CPT | Mod: 95

## 2020-10-19 ASSESSMENT — COLUMBIA-SUICIDE SEVERITY RATING SCALE - C-SSRS
2. HAVE YOU ACTUALLY HAD ANY THOUGHTS OF KILLING YOURSELF?: NO
ATTEMPT PAST THREE MONTHS: NO
TOTAL  NUMBER OF ABORTED OR SELF INTERRUPTED ATTEMPTS PAST 3 MONTHS: NO
1. IN THE PAST MONTH, HAVE YOU WISHED YOU WERE DEAD OR WISHED YOU COULD GO TO SLEEP AND NOT WAKE UP?: NO
1. IN THE PAST MONTH, HAVE YOU WISHED YOU WERE DEAD OR WISHED YOU COULD GO TO SLEEP AND NOT WAKE UP?: YES
5. HAVE YOU STARTED TO WORK OUT OR WORKED OUT THE DETAILS OF HOW TO KILL YOURSELF? DO YOU INTEND TO CARRY OUT THIS PLAN?: NO
6. HAVE YOU EVER DONE ANYTHING, STARTED TO DO ANYTHING, OR PREPARED TO DO ANYTHING TO END YOUR LIFE?: NO
TOTAL  NUMBER OF INTERRUPTED ATTEMPTS LIFETIME: NO
5. HAVE YOU STARTED TO WORK OUT OR WORKED OUT THE DETAILS OF HOW TO KILL YOURSELF? DO YOU INTEND TO CARRY OUT THIS PLAN?: NO
REASONS FOR IDEATION LIFETIME: COMPLETELY TO END OR STOP THE PAIN (YOU COULDN'T GO ON LIVING WITH THE PAIN OR HOW YOU WERE FEELING)
TOTAL  NUMBER OF ABORTED OR SELF INTERRUPTED ATTEMPTS PAST LIFETIME: NO
6. HAVE YOU EVER DONE ANYTHING, STARTED TO DO ANYTHING, OR PREPARED TO DO ANYTHING TO END YOUR LIFE?: NO
ATTEMPT LIFETIME: NO
3. HAVE YOU BEEN THINKING ABOUT HOW YOU MIGHT KILL YOURSELF?: NO
2. HAVE YOU ACTUALLY HAD ANY THOUGHTS OF KILLING YOURSELF LIFETIME?: NO
4. HAVE YOU HAD THESE THOUGHTS AND HAD SOME INTENTION OF ACTING ON THEM?: NO
TOTAL  NUMBER OF INTERRUPTED ATTEMPTS PAST 3 MONTHS: NO
4. HAVE YOU HAD THESE THOUGHTS AND HAD SOME INTENTION OF ACTING ON THEM?: NO

## 2020-10-19 ASSESSMENT — ANXIETY QUESTIONNAIRES
IF YOU CHECKED OFF ANY PROBLEMS ON THIS QUESTIONNAIRE, HOW DIFFICULT HAVE THESE PROBLEMS MADE IT FOR YOU TO DO YOUR WORK, TAKE CARE OF THINGS AT HOME, OR GET ALONG WITH OTHER PEOPLE: NOT DIFFICULT AT ALL
6. BECOMING EASILY ANNOYED OR IRRITABLE: MORE THAN HALF THE DAYS
1. FEELING NERVOUS, ANXIOUS, OR ON EDGE: SEVERAL DAYS
7. FEELING AFRAID AS IF SOMETHING AWFUL MIGHT HAPPEN: MORE THAN HALF THE DAYS
5. BEING SO RESTLESS THAT IT IS HARD TO SIT STILL: NOT AT ALL
3. WORRYING TOO MUCH ABOUT DIFFERENT THINGS: MORE THAN HALF THE DAYS
GAD7 TOTAL SCORE: 10
2. NOT BEING ABLE TO STOP OR CONTROL WORRYING: MORE THAN HALF THE DAYS

## 2020-10-19 ASSESSMENT — PATIENT HEALTH QUESTIONNAIRE - PHQ9
SUM OF ALL RESPONSES TO PHQ QUESTIONS 1-9: 10
5. POOR APPETITE OR OVEREATING: SEVERAL DAYS

## 2020-10-19 NOTE — PATIENT INSTRUCTIONS
PLAN: (Patient Tasks / Therapist Tasks / Other)  Upcoming appointment: 10/27 at 2:30pm  Homework:  Sleep strategy A-Z activity to tire out your brain and increase ability to return to sleep.  Next session:  Complete diagnostic assessment
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
## 2020-10-20 ASSESSMENT — ANXIETY QUESTIONNAIRES: GAD7 TOTAL SCORE: 10

## 2020-10-22 ENCOUNTER — TRANSFERRED RECORDS (OUTPATIENT)
Dept: HEALTH INFORMATION MANAGEMENT | Facility: CLINIC | Age: 85
End: 2020-10-22

## 2020-10-27 ENCOUNTER — VIRTUAL VISIT (OUTPATIENT)
Dept: PSYCHOLOGY | Facility: CLINIC | Age: 85
End: 2020-10-27
Payer: MEDICARE

## 2020-10-27 DIAGNOSIS — F43.23 ADJUSTMENT DISORDER WITH MIXED ANXIETY AND DEPRESSED MOOD: Primary | ICD-10-CM

## 2020-10-27 PROCEDURE — 90791 PSYCH DIAGNOSTIC EVALUATION: CPT | Mod: 95

## 2020-10-27 NOTE — PROGRESS NOTES
"[facility]   Provider Name:  Britney Terrazas     Credentials:  MICHAEL RODAS    PATIENT'S NAME: Veronica Bray  PREFERRED NAME: Veronica  PRONOUNS: she/her/hers     MRN: [id number]  : 1931   ACCT. NUMBER:  [id number]  DATE OF SERVICE: 10/27/20  START TIME: 2:30pm  END TIME: 3:25pm  PREFERRED PHONE: [phone]  May we leave a program related message: Yes  SERVICE MODALITY:  Phone Visit:      Provider verified identity through the following two step process.  Patient provided:  Patient     The patient has been notified of the following:      \"We have found that certain health care needs can be provided without the need for a face to face visit.  This service lets us provide the care you need with a phone conversation.       I will have full access to your [facility] medical record during this entire phone call.   I will be taking notes for your medical record.      Since this is like an office visit, we will bill your insurance company for this service.       There are potential benefits and risks of telephone visits (e.g. limits to patient confidentiality) that differ from in-person visits.?  Confidentiality still applies for telephone services, and nobody will record the visit.  It is important to be in a quiet, private space that is free of distractions (including cell phone or other devices) during the visit.??      If during the course of the call I believe a telephone visit is not appropriate, you will not be charged for this service\"     Consent has been obtained for this service by care team member: Yes     [facility] DIAGNOSTIC ASSESSMENT      Identifying Information:  Patient is a 89 year old, , , female.  The pronoun use throughout this assessment reflects the patient's chosen pronoun.  Patient was referred for an assessment by primary care provider.  Patient attended the session alone.     Chief Complaint:   The reason for seeking services at " "this time is: \" so much on my plate \", \"worry too much\", wants \"less worry\"   The problem(s) began in May 2020. Patient has not attempted to resolve these concerns in the past.    Social/Family History:  Patient reported they grew up in [place], [place].  They were raised by biological parents and her 4 younger brothers.  Parents stayed  until her father's death at age 44.  Her mother did not remarry, she \"had the best\". Patient reported that their childhood was good, \"life on the farm\" at [facility] [place], she did not know they were poor, sleigh rides, ice skating in the hollow, outdoor games with the neighbor kids.  Patient described their current relationships with family of origin as close, talks with her one remaining brother all the time.      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  Cultural influences and impact on patient's life structure, values, norms, and healthcare: Racial or Ethnic Self-Identification , dominant culture, Social Orientation: middle class, Spiritual Beliefs: Congregation and Health Beliefs and the endorsement of OR engagement in Culturally Specific Healing Practices: Western medicine.  Contextual influences on patient's health include: Individual Factors mother of 3, her 's health is deteriorating, her younger daughter has health and relationship difficulties for the past year, Societal Factors global pandemic, increased isolation, Economic Factors financially stable and Health- Seeking Factors seeks appropriate healthcare to support herself and her .    These factors will be addressed in the Preliminary Treatment plan.  Patient identified their preferred language to be English. Patient reported they does not need the assistance of an  or other support involved in therapy.     Patient reported had no significant delays in developmental tasks.   Patient's highest education level was high " school graduate and some college. Patient identified the following learning problems: none reported.  Modifications will not be used to assist communication in therapy.   Patient reports they are  able to understand written materials.    Patient reported the following relationship history one, she and her  are high school sweethearts.  Patient's current relationship status is  for 70 years.   Patient identified their sexual orientation as heterosexual.  Patient reported having three child(mak). Patient identified partner, adult child and friends as part of their support system.  Patient identified the quality of these relationships as stable and meaningful.      Patient's current living/housing situation involves staying in own home/apartment.  They live with their  and they report that housing is stable.     Patient is currently retired, she worked in the [employer], and fire inspection fields until senior care.  Patient reports their finances are obtained through senior care savings.  Patient does not identify finances as a current stressor.      Patient reported that they have not been involved with the legal system.   Patient denies being on probation / parole / under the jurisdiction of the court.    Patient's Strengths and Limitations:  Patient identified the following strengths or resources that will help them succeed in treatment: commitment to health and well being, mirta / spirituality, family support, insight, intelligence and motivation. Things that may interfere with the patient's success in treatment include: none identified.     Personal and Family Medical History:   Patient does not report a family history of mental health concerns.  Patient reports family history includes Heart Disease in her brother; Myocardial Infarction in her brother, brother, brother, and mother; Unknown/Adopted in her father and mother. Alcohol abuse in father, brother.    Patient does not report  Mental Health Diagnosis or Treatment.      Patient has had a physical exam to rule out medical causes for current symptoms.  Date of last physical exam was within the past year. Client was encouraged to follow up with PCP if symptoms were to develop. The patient has a [facility] Primary Care Provider, who is named Concepcion Mccall..  Patient reports the following current medical concerns: macular degeneration, foot problem, broken arm in 2019.  There are not significant appetite / nutritional concerns / weight changes.   Patient does not report a history of head injury / trauma / cognitive impairment.      Patient reports current meds as:   No outpatient medications have been marked as taking for the 10/27/20 encounter (Virtual Visit) with Britney Terrazas LGSW.       Medication Adherence:  Patient reports taking prescribed medications as prescribed.    Patient Allergies:    Allergies   Allergen Reactions     Aleve [Naproxen] Anaphylaxis     Celebrex [Celecoxib]      Codeine      Demerol [Meperidine]      Hydromorphone      Percocet [Oxycodone-Acetaminophen]      Tramadol      Vicodin [Hydrocodone-Acetaminophen]        Medical History:    Past Medical History:   Diagnosis Date     Atrial flutter (H)      CAD (coronary artery disease)     LIMA graft to the LAD and vein graft to the diagonal are patent.      Cardiomyopathy, unspecified (H)     tachy induced     Hyperlipidemia      Hypertension      LBBB (left bundle branch block)      Mitral regurgitation      NSTEMI (non-ST elevated myocardial infarction) (H)      PAF (paroxysmal atrial fibrillation) (H)     converted on Amiodarone     Sleep apnea     CPAP     SVT (supraventricular tachycardia) (H)      Thrombocytopenia, unspecified (H) 7/13/2017     Tobacco use          Current Mental Status Exam:   Appearance:  Telephone, unable to assess    Eye Contact:  Telephone, unable to assess   Psychomotor:  Telephone, unable to assess       Gait / station:   Telephone, unable to assess  Attitude / Demeanor: Cooperative  Interested Pleasant Attentive  Speech      Rate / Production: Normal/ Responsive      Volume:  Normal  volume      Language:  no problems  Mood:   Normal  Affect:   Appropriate    Thought Content: Clear   Thought Process: Coherent  Goal Directed  Logical       Associations: No loosening of associations  Insight:   Fair   Judgment:  Intact   Orientation:  All  Attention/concentration: Good    Rating Scales:    PHQ9:    PHQ-9 SCORE 9/10/2020 10/19/2020   PHQ-9 Total Score 13 10   ;    GAD7:    LAYA-7 SCORE 9/10/2020 10/19/2020   Total Score 19 10     CGI:     First:Considering your total clinical experience with this particular patient population, how severe are the patient's symptoms at this time?: 2 (11/29/2020  5:04 PM)  ;    Most recentNo data recorded    Substance Use:  Patient did report a family history of substance use concerns; see medical history section for details.  Patient has not received chemical dependency treatment in the past.  Patient has not ever been to detox.      Patient is not currently receiving any chemical dependency treatment. Patient reported the following problems as a result of their substance use: none reported by client.    Patient reports using alcohol 3 times per week and has 1 beers and glasses of wine at a time. Patient first started drinking at age did not assess.  Patient reported date of last use was did not assess.  Patient reports heaviest use was did not assess.  Patient denies using tobacco.  Patient denies using marijuana.  Patient reports using caffeine 1 times per day and drinks 1 at a time. Patient started using caffeine at age did not assess.  Patient reports using/abusing the following substance(s). Patient reported no other substance use.     CAGE- AID:    CAGE-AID Total Score 11/29/2020   Total Score 0       Substance Use: No symptoms    Based on the negative CAGE score and clinical interview there  are  not indications of drug or alcohol abuse.      Significant Losses / Trauma / Abuse / Neglect Issues:   Patient did not serve in the .  There are indications or report of significant loss, trauma, abuse or neglect issues related to: her son's tour in [place], daughter's medical conditions at age 3 which required exploratory surgery.  Concerns for possible neglect are not present.     Safety Assessment:   Current Safety Concerns:  [place] Suicide Severity Rating Scale (Lifetime/Recent)  [place] Suicide Severity Rating (Lifetime/Recent) 10/19/2020   1. Wish to be Dead (Lifetime) Yes   Wish to be Dead Description (Lifetime) Passive, but would not commit suicide   1. Wish to be Dead (Recent) No   2. Non-Specific Active Suicidal Thoughts (Lifetime) No   2. Non-Specific Active Suicidal Thoughts (Recent) No   3. Active Suicidal Ideation with any Methods (Not Plan) Without Intent to Act (Lifetime) No   3. Active Sucidal Ideation with any Methods (Not Plan) Without Intent to Act (Recent) No   4. Active Suicidal Ideation with Some Intent to Act, Without Specific Plan (Lifetime) No   4. Active Suicidal Ideation with Some Intent to Act, Without Specific Plan (Recent) No   5. Active Suicidal Ideation with Specific Plan and Intent (Lifetime) No   5. Active Suicidal Ideation with Specific Plan and Intent (Recent) No   Most Severe Ideation Rating (Lifetime) 1   Frequency (Lifetime) 1   Duration (Lifetime) 1   Controllability (Lifetime) 1   Protective Factors  (Lifetime) 2   Reasons for Ideation (Lifetime) 5   Most Severe Ideation Rating (Past Month) NA   Frequency (Past Month) NA   Duration (Past Month) NA   Controllability (Past Month) NA   Protective Factors (Past Month) NA   Reasons for Ideation (Past Month) NA   Actual Attempt (Lifetime) No   Actual Attempt (Past 3 Months) No   Has subject engaged in non-suicidal self-injurious behavior? (Lifetime) No   Has subject engaged in non-suicidal self-injurious behavior? (Past  3 Months) No   Interrupted Attempts (Lifetime) No   Interrupted Attempts (Past 3 Months) No   Aborted or Self-Interrupted Attempt (Lifetime) No   Aborted or Self-Interrupted Attempt (Past 3 Months) No   Preparatory Acts or Behavior (Lifetime) No   Preparatory Acts or Behavior (Past 3 Months) No   Most Recent Attempt Actual Lethality Code NA   Most Lethal Attempt Actual Lethality Code NA   Initial/First Attempt Actual Lethality Code NA     Patient denies current homicidal ideation and behaviors.  Patient denies current self-injurious ideation and behaviors.    Patient denied risk behaviors associated with substance use.  Patient denies any high risk behaviors associated with mental health symptoms.  Patient reports the following current concerns for their personal safety: None.  Patient reports there are not  firearms in the house. no firearms per client report.     History of Safety Concerns:  Patient denied a history of homicidal ideation.     Patient denied a history of personal safety concerns.    Patient denied a history of assaultive behaviors.    Patient denied a history of sexual assault behaviors.     Patient denied a history of risk behaviors associated with substance use.  Patient denies any history of high risk behaviors associated with mental health symptoms.  Patient reports the following protective factors: spirituality, positive relationships positive social network and positive family connections, forward/future oriented thinking, dedication to family/friends, safe and stable environment, regular physical activity, living with other people, daily obligations, financial stability and access to a variety of clinical interventions    Risk Plan:  See Recommendations for Safety and Risk Management Plan    Review of Symptoms per patient report:  Depression: Change in sleep, Lack of interest, Excessive or inappropriate guilt, Change in energy level, Change in appetite, Irritability and Feeling sad, down, or  depressed  Kavitha:  No Symptoms  Psychosis: No Symptoms  Anxiety: Excessive worry, Nervousness, Sleep disturbance and Irritability  Panic:  No symptoms  Post Traumatic Stress Disorder:  No Symptoms   Eating Disorder: No Symptoms  ADD / ADHD:  No symptoms  Conduct Disorder: No symptoms  Autism Spectrum Disorder: No symptoms  Obsessive Compulsive Disorder: No Symptoms    Patient reports the following compulsive behaviors and treatment history: none per client report.      Diagnostic Criteria:   A. The development of emotional or behavioral symptoms in response to an identifiable stressor(s) occurring within 3 months of the onset of the stressor(s)  B. These symptoms or behaviors are clinically significant, as evidenced by one or both of the following:       - Marked distress that is out of proportion to the severity/intensity of the stressor (with consideration for external context & culture)       - Significant impairment in social, occupational, or other important areas of functioning  C. The stress-related disturbance does not meet criteria for another disorder & is not not an exacerbation of another mental disorder  D. The symptoms do not represent normal bereavement  E. Once the stressor or its consequences have terminated, the symptoms do not persist for more than an additional 6 months       * Adjustment Disorder with Mixed Anxiety and Depressed Mood: The predominant manifestation is a combination of depression and anxiety    Functional Status:  Patient reports the following functional impairments: relationship(s).     WHODAS:   WHODAS 2.0 Total Score 10/19/2020   Total Score 20       Clinical Summary:  1. Reason for assessment: Veronica is experiencing anxiety and depression in response to many life stressors.  2. Psychosocial, Cultural and Contextual Factors: Veronica is an 89 year old, ,  female.  She lives in an assisted living facility with her  of 70 years.  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  Her  has experienced health deterioration starting in May 2020, he has increased in-home support.  Veronica's youngest daughter had health and relationship difficulties in May 2019 which has been a strain on Veronica.  She is interested in developing strategies to reduce her situational depression and anxiety symptoms.  3. 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  4. Other Diagnoses that is relevant to services:   None indicated.  5. 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  6. Prognosis: Expect Improvement and Relieve Acute Symptoms.  7. Likely consequences of symptoms if not treated: continued mood instability, increased difficulties in interpersonal relationships, decreased functioning.  8. Client strengths include:  caring, educated, empathetic, goal-focused, good listener, insightful, intelligent, motivated, responsible parent and support of family, friends and providers .     Recommendations:     1. Plan for Safety and Risk Management:   Recommended that patient call 911 or go to the local ED should there be a change in any of these risk factors..          Report to child / adult protection services was NA.     2. Patient's identified no concerns with a cultural influence to be addressed by provider.     3. Initial Treatment will focus on:    Adjustment Difficulties related to: family concerns.     4. Resources/Service Plan:    services are not indicated.   Modifications to assist communication are not indicated.   Additional disability accommodations are not indicated.      5.  Collaboration:   Collaboration / coordination of treatment will be initiated with the following  support professionals: primary care physician.      6.  Referrals:   The following referral(s) will be initiated: none indicated. Next Scheduled Appointment: n/a.     A Release of Information has been obtained for the following: primary care physician.    7. TALAT:    TALAT:  Discussed the general effects of drugs and alcohol on health and well-being. Provider gave patient printed information about the effects of chemical use on their health and well being. Recommendations:  Alcohol use is not a concern at this time.     8. Records:   These were reviewed at time of assessment.   Information in this assessment was obtained from the medical record and  provided by patient who is a good historian.    Patient will have open access to their mental health medical record.      Provider Name/ Credentials:  CLARK Hernandez, LGSW  November 29, 2020    Reviewed and supervision provided by NNAMDI Ha November 30, 2012

## 2020-11-10 ENCOUNTER — TELEPHONE (OUTPATIENT)
Dept: PSYCHOLOGY | Facility: CLINIC | Age: 85
End: 2020-11-10

## 2020-11-10 NOTE — TELEPHONE ENCOUNTER
Contacted Veronica for our 2:30pm session 3 times between 2:31- 2:42pm, home and mobile numbers were contacted.  Unable to leave a message on either phone number.  No answer and no return call at the time of this note.    I hope to talk with Veronica at our next session on 11/16.    Britney Terrazas, CLARK, LGSW

## 2020-11-12 DIAGNOSIS — R79.9 ABNORMAL FINDING OF BLOOD CHEMISTRY, UNSPECIFIED: ICD-10-CM

## 2020-11-12 DIAGNOSIS — E78.5 HYPERLIPIDEMIA LDL GOAL <100: ICD-10-CM

## 2020-11-12 DIAGNOSIS — R20.0 TACTILE ANESTHESIA: Primary | ICD-10-CM

## 2020-11-12 DIAGNOSIS — R79.89 OTHER SPECIFIED ABNORMAL FINDINGS OF BLOOD CHEMISTRY: ICD-10-CM

## 2020-11-12 LAB — HBA1C MFR BLD: 5.3 % (ref 0–5.6)

## 2020-11-12 PROCEDURE — 83036 HEMOGLOBIN GLYCOSYLATED A1C: CPT | Performed by: PSYCHIATRY & NEUROLOGY

## 2020-11-12 PROCEDURE — 36415 COLL VENOUS BLD VENIPUNCTURE: CPT | Performed by: PSYCHIATRY & NEUROLOGY

## 2020-12-01 ENCOUNTER — TRANSFERRED RECORDS (OUTPATIENT)
Dept: HEALTH INFORMATION MANAGEMENT | Facility: CLINIC | Age: 85
End: 2020-12-01

## 2020-12-10 ENCOUNTER — VIRTUAL VISIT (OUTPATIENT)
Dept: PSYCHOLOGY | Facility: CLINIC | Age: 85
End: 2020-12-10
Payer: MEDICARE

## 2020-12-10 DIAGNOSIS — F43.23 ADJUSTMENT DISORDER WITH MIXED ANXIETY AND DEPRESSED MOOD: Primary | ICD-10-CM

## 2020-12-10 PROCEDURE — 90834 PSYTX W PT 45 MINUTES: CPT | Mod: 95

## 2020-12-10 NOTE — PROGRESS NOTES
"                                             Progress Note    Patient Name: Veronica Bray  Date: 12/10/20         Service Type: Individual      Session Start Time: 9:00am  Session End Time: 9:45am     Session Length: 45 minutes    Session #: 3    Attendees: Client    Service Modality:  Phone Visit:      Provider verified identity through the following two step process.  Patient provided:  Patient     The patient has been notified of the following:      \"We have found that certain health care needs can be provided without the need for a face to face visit.  This service lets us provide the care you need with a phone conversation.       I will have full access to your [facility] medical record during this entire phone call.   I will be taking notes for your medical record.      Since this is like an office visit, we will bill your insurance company for this service.       There are potential benefits and risks of telephone visits (e.g. limits to patient confidentiality) that differ from in-person visits.?  Confidentiality still applies for telephone services, and nobody will record the visit.  It is important to be in a quiet, private space that is free of distractions (including cell phone or other devices) during the visit.??      If during the course of the call I believe a telephone visit is not appropriate, you will not be charged for this service\"     Consent has been obtained for this service by care team member: Yes      Treatment Plan Last Reviewed: 12/10/20   PHQ-9 / LAYA-7 : 10/19/20 due at next session    DATA  Interactive Complexity: No  Crisis: No       Progress Since Last Session (Related to Symptoms / Goals / Homework):   Symptoms: Improving Veronica reports reduced symptoms of anxiety since our last session related to herself and her .  She does identify concerns with negative ruminating thoughts which we addressed in the treatment plan today.    Homework: Achieved / completed to satisfaction. " Sleep strategies have improved quantity of sleep.      Episode of Care Goals: Satisfactory progress - PREPARATION (Decided to change - considering how); Intervened by negotiating a change plan and determining options / strategies for behavior change, identifying triggers, exploring social supports, and working towards setting a date to begin behavior change     Current / Ongoing Stressors and Concerns:   Veronica reports stress and a negative impact to her mood regarding her former son-in-law.       Treatment Objective(s) Addressed in This Session:   identify 1 stressors which contribute to feelings of depression   Behavior of former son-in-law contributes to frequent feelings of frustration/anger and increase stress level     Intervention:   Emotion Focused Therapy: Validated Veronica's frustration with her former son-in-law and his past behavior on which she ruminates regularly.  She expresses violated expectations and unexpected behavior.  She is seeking acceptance of what happened and an ability to put these thoughts out of her mind as they negatively impact her mood on a regular basis.  Treatment plan created today focusing on this concern.        ASSESSMENT: Current Emotional / Mental Status (status of significant symptoms):   Risk status (Self / Other harm or suicidal ideation)   Patient denies current fears or concerns for personal safety.   Patient denies current or recent suicidal ideation or behaviors.   Patient denies current or recent homicidal ideation or behaviors.   Patient denies current or recent self injurious behavior or ideation.   Patient denies other safety concerns.   Patient reports there has been no change in risk factors since their last session.     Patient reports there has been no change in protective factors since their last session.     Recommended that patient call 911 or go to the local ED should there be a change in any of these risk factors.     Appearance:   Telephone, unable to assess     Eye Contact:   Telephone, unable to assess    Psychomotor Behavior: Telephone, unable to assess    Attitude:   Cooperative  Interested Friendly Attentive   Orientation:   All   Speech    Rate / Production: Normal/ Responsive Talkative Normal     Volume:  Normal    Mood:    Normal   Affect:    Appropriate    Thought Content:  Clear  Rumination    Thought Form:  Coherent  Goal Directed  Logical    Insight:    Fair      Medication Review:   No current psychiatric medications prescribed     Medication Compliance:   Yes     Changes in Health Issues:   None reported     Chemical Use Review:   Substance Use: Chemical use reviewed, no active concerns identified      Tobacco Use: No current tobacco use.      Diagnosis:  1. Adjustment disorder with mixed anxiety and depressed mood        Collateral Reports Completed:   Not Applicable    PLAN: (Patient Tasks / Therapist Tasks / Other)  Upcoming appointment: 12/17 at 12:30pm  Treatment plan created today and sent to client  Continue using sleep strategies to improve the quantity and quality of your sleep.        Britney Terrazas, [facility] December 10, 2020  Reviewed and supervision provided by Med Marquez [facility] December 11, 2020                                                         ______________________________________________________________________    Treatment Plan    Patient's Name: Veronica Bray  YOB: 1931    Date: 12/10/20    DSM5 Diagnoses: Adjustment Disorders  309.28 (F43.23) With mixed anxiety and depressed mood  Psychosocial / Contextual Factors: Veronica is interested in reducing negative thoughts she has regarding her former son-in-law.  These thoughts cause stress and negatively impact her emotions on a regular basis.  WHODAS: 20    Referral / Collaboration:  Referral to another professional/service is not indicated at this time..    Anticipated number of session or this episode of care: 6    MeasurableTreatment Goal(s) related to  diagnosis / functional impairment(s)  Goal 1: Patient will reduce the stress and thoughts she has about her former son-in-law.    I will know I've met my goal when I can get rid of the negative thoughts about my former son-in-law.      Objective #A (Patient Action)    Patient will identify 2 strategies to more effectively address stressors.  Status: New - Date: 12/10/20     Intervention(s)  Therapist will assign homework to practice strategies of thought replacement, narrative change to accept past events to reduce current impact on emotions.  provide educational materials on thought replacement, acceptance, Cognitive Behavioral Therapy (CBT).      Patient has reviewed and agreed to the above plan.      Britney Terrazas, SW  December 10, 2020  Reviewed and signed [facility] 11[facility] 2020

## 2020-12-10 NOTE — PATIENT INSTRUCTIONS
PLAN: (Patient Tasks / Therapist Tasks / Other)  Upcoming appointment: 12/17 at 12:30pm  Treatment plan created today and sent to client  Continue using sleep strategies to improve the quantity and quality of your sleep.

## 2020-12-17 ENCOUNTER — VIRTUAL VISIT (OUTPATIENT)
Dept: PSYCHOLOGY | Facility: CLINIC | Age: 85
End: 2020-12-17
Payer: MEDICARE

## 2020-12-17 DIAGNOSIS — F43.23 ADJUSTMENT DISORDER WITH MIXED ANXIETY AND DEPRESSED MOOD: Primary | ICD-10-CM

## 2020-12-17 PROCEDURE — 90834 PSYTX W PT 45 MINUTES: CPT | Mod: 95

## 2020-12-17 NOTE — PROGRESS NOTES
"                                             Progress Note    Patient Name: Veronica Bray  Date: 12/10/20         Service Type: Individual      Session Start Time: 9:00am  Session End Time: 9:45am     Session Length: 45 minutes    Session #: 3    Attendees: Client    Service Modality:  Phone Visit:      Provider verified identity through the following two step process.  Patient provided:  Patient     The patient has been notified of the following:      \"We have found that certain health care needs can be provided without the need for a face to face visit.  This service lets us provide the care you need with a phone conversation.       I will have full access to your [facility] medical record during this entire phone call.   I will be taking notes for your medical record.      Since this is like an office visit, we will bill your insurance company for this service.       There are potential benefits and risks of telephone visits (e.g. limits to patient confidentiality) that differ from in-person visits.?  Confidentiality still applies for telephone services, and nobody will record the visit.  It is important to be in a quiet, private space that is free of distractions (including cell phone or other devices) during the visit.??      If during the course of the call I believe a telephone visit is not appropriate, you will not be charged for this service\"     Consent has been obtained for this service by care team member: Yes      Treatment Plan Last Reviewed: 12/10/20   PHQ-9 / LAYA-7 : 10/19/20 due at next session    DATA  Interactive Complexity: No  Crisis: No       Progress Since Last Session (Related to Symptoms / Goals / Homework):   Symptoms: Improving Veronica reports reduced symptoms of stress and negative thoughts since our last session.      Homework: Achieved / completed to satisfaction. Veronica had increased awareness of her ruminating thoughts and distracted herself or told herself not to think about " them.      Episode of Care Goals: Satisfactory progress - PREPARATION (Decided to change - considering how); Intervened by negotiating a change plan and determining options / strategies for behavior change, identifying triggers, exploring social supports, and working towards setting a date to begin behavior change     Current / Ongoing Stressors and Concerns:   Veronica reports stress and a negative impact to her mood regarding her former son-in-law.       Treatment Objective(s) Addressed in This Session:   identify 1 stressors which contribute to feelings of depression   Thoughts of former son-in-law contributes to frequent feelings of frustration/anger and increase stress level     Intervention:   Emotion Focused Therapy: Veronica successfully implemented a thought replacement strategy over the past week to stop focusing on her son-in-law when thoughts of him arise.  She reports talking about her feelings regarding her son-in-law, the disappointment and confusion regarding his divorce of her daughter has been helpful.   Her relationship with her son-in-law was closer than average and she acknowledges grief in losing more of an immediate family member versus a son-in-law.  This realization will likely help Veronica process her feelings and loss of this relationship.        ASSESSMENT: Current Emotional / Mental Status (status of significant symptoms):   Risk status (Self / Other harm or suicidal ideation)   Patient denies current fears or concerns for personal safety.   Patient denies current or recent suicidal ideation or behaviors.   Patient denies current or recent homicidal ideation or behaviors.   Patient denies current or recent self injurious behavior or ideation.   Patient denies other safety concerns.   Patient reports there has been no change in risk factors since their last session.     Patient reports there has been no change in protective factors since their last session.     Recommended that patient call 911  or go to the local ED should there be a change in any of these risk factors.     Appearance:   Telephone, unable to assess    Eye Contact:   Telephone, unable to assess    Psychomotor Behavior: Telephone, unable to assess    Attitude:   Cooperative  Interested Friendly Attentive   Orientation:   All   Speech    Rate / Production: Normal/ Responsive Talkative Normal     Volume:  Normal    Mood:    Normal   Affect:    Appropriate    Thought Content:  Clear  Rumination    Thought Form:  Coherent  Goal Directed  Logical    Insight:    Fair      Medication Review:   No current psychiatric medications prescribed     Medication Compliance:   Yes     Changes in Health Issues:   None reported     Chemical Use Review:   Substance Use: Chemical use reviewed, no active concerns identified      Tobacco Use: No current tobacco use.      Diagnosis:  1. Adjustment disorder with mixed anxiety and depressed mood        Collateral Reports Completed:   Not Applicable    PLAN: (Patient Tasks / Therapist Tasks / Other)  Upcoming appointment: 1/14 at 2:30pm  Veronica will use thought replacement to either replace a negative thought with a positive thought or let the negative thought go.        Britney Terrazas, [facility] December 7, 2020  Reviewed and signed by Med Marquez, [facility] 12/17/20                                                       ______________________________________________________________________    Treatment Plan    Patient's Name: Veronica Bray  YOB: 1931    Date: 12/10/20    DSM5 Diagnoses: Adjustment Disorders  309.28 (F43.23) With mixed anxiety and depressed mood  Psychosocial / Contextual Factors: Veronica is interested in reducing negative thoughts she has regarding her former son-in-law.  These thoughts cause stress and negatively impact her emotions on a regular basis.  WHODAS: 20    Referral / Collaboration:  Referral to another professional/service is not indicated at this time..    Anticipated  number of session or this episode of care: 6    MeasurableTreatment Goal(s) related to diagnosis / functional impairment(s)  Goal 1: Patient will reduce the stress and thoughts she has about her former son-in-law.    I will know I've met my goal when I can get rid of the negative thoughts about my former son-in-law.      Objective #A (Patient Action)    Patient will identify 2 strategies to more effectively address stressors.  Status: New - Date: 12/10/20     Intervention(s)  Therapist will assign homework to practice strategies of thought replacement, narrative change to accept past events to reduce current impact on emotions.  provide educational materials on thought replacement, acceptance, Cognitive Behavioral Therapy (CBT).      Patient has reviewed and agreed to the above plan.      MICHAEL Hernandez  December 10, 2020  Reviewed and signed by NNAMDI Ha December 11, 2020

## 2020-12-21 ENCOUNTER — VIRTUAL VISIT (OUTPATIENT)
Dept: INTERNAL MEDICINE | Facility: CLINIC | Age: 85
End: 2020-12-21
Payer: MEDICARE

## 2020-12-21 DIAGNOSIS — J45.41 MODERATE PERSISTENT ASTHMA WITH EXACERBATION: ICD-10-CM

## 2020-12-21 DIAGNOSIS — J20.9 ACUTE BRONCHITIS, UNSPECIFIED ORGANISM: Primary | ICD-10-CM

## 2020-12-21 PROBLEM — E78.49 OTHER HYPERLIPIDEMIA: Status: RESOLVED | Noted: 2018-07-11 | Resolved: 2020-12-21

## 2020-12-21 PROCEDURE — 99441 PR PHYSICIAN TELEPHONE EVALUATION 5-10 MIN: CPT | Mod: 95 | Performed by: INTERNAL MEDICINE

## 2020-12-21 RX ORDER — AZITHROMYCIN 250 MG/1
TABLET, FILM COATED ORAL
Qty: 6 TABLET | Refills: 0 | Status: SHIPPED | OUTPATIENT
Start: 2020-12-21 | End: 2020-12-26

## 2020-12-21 RX ORDER — PREDNISONE 10 MG/1
TABLET ORAL
Qty: 30 TABLET | Refills: 0 | Status: SHIPPED | OUTPATIENT
Start: 2020-12-21 | End: 2021-03-27

## 2020-12-21 ASSESSMENT — ENCOUNTER SYMPTOMS
WHEEZING: 1
CHILLS: 0
COUGH: 1
FEVER: 0
SHORTNESS OF BREATH: 1

## 2020-12-21 NOTE — PROGRESS NOTES
"Veronica Bray is a 89 year old female who is being evaluated via a billable telephone visit.      The patient has been notified of following:     \"This telephone visit will be conducted via a call between you and your physician/provider. We have found that certain health care needs can be provided without the need for a physical exam.  This service lets us provide the care you need with a short phone conversation.  If a prescription is necessary we can send it directly to your pharmacy.  If lab work is needed we can place an order for that and you can then stop by our lab to have the test done at a later time.    Telephone visits are billed at different rates depending on your insurance coverage. During this emergency period, for some insurers they may be billed the same as an in-person visit.  Please reach out to your insurance provider with any questions.    If during the course of the call the physician/provider feels a telephone visit is not appropriate, you will not be charged for this service.\"    Patient has given verbal consent for Telephone visit?  Yes    What phone number would you like to be contacted at? [phone].    How would you like to obtain your AVS? MyChart    Subjective     Veronica Bray is a 89 year old female who presents via phone visit today for the following health issues:Patient requests Prednisone RX. ACT completed today.    HPI  Her asthma acting up for 3 weeks. Symptoms worsened since 5 days ago.  She has cough with sputum production, feels SOB. Also had pleuritic chest pain and wheezing. Denies body aches, fever, chill. She uses inhaler as needed but nit regularly.  Denies covid 19 exposure.    Review of Systems   Constitutional: Negative for chills and fever.   Respiratory: Positive for cough, shortness of breath and wheezing.       Objective    Vitals:  No vitals were obtained today due to virtual visit.  PSYCH: Alert and oriented times 3; coherent speech, normal   rate and " volume, able to articulate logical thoughts, able   to abstract reason, no tangential thoughts, no hallucinations   or delusions  Her affect is normal  RESP: she sounds congested, off and on cough, no audible wheezing, able to talk in full sentences  Remainder of exam unable to be completed due to telephone visits     Assessment/Plan:    Assessment & Plan     Diagnoses and all orders for this visit:    Acute bronchitis, unspecified organism  -     predniSONE (DELTASONE) 10 MG tablet; Take 4 tabs daily for 3 days, 3 tabs daily for next 3 days, 2 tabs daily for next 3 days and 1 tab daily for 3 days  -     azithromycin (ZITHROMAX) 250 MG tablet; Take 2 tablets (500 mg) by mouth daily for 1 day, THEN 1 tablet (250 mg) daily for 4 days.    Moderate persistent asthma with exacerbation  -     predniSONE (DELTASONE) 10 MG tablet; Take 4 tabs daily for 3 days, 3 tabs daily for next 3 days, 2 tabs daily for next 3 days and 1 tab daily for 3 days  -     azithromycin (ZITHROMAX) 250 MG tablet; Take 2 tablets (500 mg) by mouth daily for 1 day, THEN 1 tablet (250 mg) daily for 4 days.      Return if symptoms worsen or fail to improve.    Jose Barr MD  [facility]    Phone call duration: 9 minutes

## 2020-12-21 NOTE — PATIENT INSTRUCTIONS
Patient Education     Acute Bronchitis  Your healthcare provider has told you that you have acute bronchitis. Bronchitis is infection or inflammation of the airways in the lungs (bronchial tubes). Normally, air moves easily in and out of the airways. Bronchitis narrows the airways. This makes it harder for air to flow in and out of the lungs. This causes symptoms such as shortness of breath, coughing up yellow or green mucus, and wheezing.  Bronchitis can be acute or chronic. Acute means it happens quickly and goes away in a short time. Chronic means a condition lasts a long time and often comes back. Most people with acute bronchitis get better in 1 to 2 weeks.     What causes acute bronchitis?  Acute bronchitis is often caused by a virus such as a cold or the flu. In some cases, it may be caused by bacteria. Certain factors make it more likely for a cold or flu to turn into bronchitis. These include being very young, being elderly, having a heart or lung problem, or having a weak immune system. Cigarette smoking also makes bronchitis more likely.  When bronchitis develops, the airways become swollen. The airways may also become infected with bacteria. This is known as a secondary infection.  Symptoms of acute bronchitis  Symptoms can include:    Coughing with mucus    Wheezing    Feeling short of breath    Chest pain    Fever  Diagnosing acute bronchitis  Your healthcare provider will ask about your symptoms and health history. He or she will give you a physical exam. This will include listening to your lungs while you breathe. You may have a chest X-ray to look for infection in the lungs (pneumonia) if you have had a fever. You may also have blood tests to check for infection.  Treating acute bronchitis  Bronchitis usually goes away in 1 to 2 weeks without treatment. You can help feel better by:    Taking medicine as directed. Talk to your healthcare provider before taking any over-the-counter medicines (OTC).  Some OTC medicines help relieve inflammation in your bronchial tubes. They can also thin mucus. This makes it easier to cough up. Your healthcare provider may prescribe an inhaler to help open up the bronchial tubes. Most of the time, acute bronchitis is caused by a viral infection. Antibiotics are usually not prescribed for viral infections.    Drinking plenty of fluids, such as water, juice, or warm soup. Fluids loosen mucus so that you can cough it up. This helps you breathe more easily. Fluids also prevent dehydration.    Using a humidifier. This can help reduce coughing.    Getting plenty of rest    Not smoking. Also, don't let anyone else smoke in your home. In public places, move away from secondhand smoke.  Recovery and follow-up  Follow up with your doctor. You will likely feel better in 1 to 2 weeks. But you may have a dry cough for a longer time. Let your doctor know if you still have symptoms other than a dry cough after 2 weeks. Tell him or her if you get bronchial infections often.  Self-care tips  To get relief from your symptoms and prevent bronchitis:    Stop smoking. Stopping smoking is the most important step you can take to treat bronchitis. If you need help stopping smoking, talk with your healthcare provider.    Stay away from secondhand smoke and other irritants. Try to stay away from smoke, chemicals, fumes, and dust. Don t let anyone smoke in your home. Stay indoors on smoggy days.    Prevent lung infections. Ask your healthcare provider about the flu and pneumonia vaccines. Take steps to prevent colds and other lung infections.    Wash your hands well. Wash your hands often with soap and water. Use hand  when you can t wash your hands. Stay away from crowds during cold and flu season.  When to call your healthcare provider  Call the healthcare provider if you have any of these:    Fever of 100.4 F ( 38.0 C) or higher, or as directed by your healthcare provider    Symptoms that get  worse, or new symptoms    Breathing not getting better with treatments    Symptoms that don t start to get better in 1 week  Zelda last reviewed this educational content on 8/1/2019 2000-2020 [employer]. [address] [place] [address], [place] [address]. All rights reserved. This information is not intended as a substitute for professional medical care. Always follow your healthcare professional's instructions.

## 2020-12-22 ASSESSMENT — ASTHMA QUESTIONNAIRES: ACT_TOTALSCORE: 7

## 2020-12-28 DIAGNOSIS — E78.49 OTHER HYPERLIPIDEMIA: ICD-10-CM

## 2020-12-28 DIAGNOSIS — G62.9 NEUROPATHY: ICD-10-CM

## 2020-12-31 RX ORDER — ATORVASTATIN CALCIUM 10 MG/1
10 TABLET, FILM COATED ORAL AT BEDTIME
Qty: 90 TABLET | Refills: 1 | Status: SHIPPED | OUTPATIENT
Start: 2020-12-31 | End: 2021-01-01

## 2020-12-31 RX ORDER — GABAPENTIN 100 MG/1
CAPSULE ORAL
Qty: 90 CAPSULE | Refills: 1 | Status: SHIPPED | OUTPATIENT
Start: 2020-12-31 | End: 2021-09-13

## 2020-12-31 NOTE — TELEPHONE ENCOUNTER
"Requested Prescriptions   Pending Prescriptions Disp Refills     atorvastatin (LIPITOR) 10 MG tablet 90 tablet 0     Sig: Take 1 tablet (10 mg) by mouth At Bedtime       [facility] Protocol Passed - 12/28/2020  2:38 PM        Passed - LDL on file in past 12 months     Recent Labs   Lab Test 07/17/20  0835   LDL 73             Passed - No abnormal creatine kinase in past 12 months     No lab results found.             Passed - Recent (12 mo) or future (30 days) visit within the authorizing provider's specialty     Patient has had an office visit with the authorizing provider or a provider within the authorizing providers department within the previous 12 mos or has a future within next 30 days. See \"Patient Info\" tab in inbasket, or \"Choose Columns\" in Meds & Orders section of the refill encounter.              Passed - Medication is active on med list        Passed - Patient is age 18 or older        Passed - No active pregnancy on record        Passed - No positive pregnancy test in past 12 months           gabapentin (NEURONTIN) 100 MG capsule 90 capsule 0       There is no refill protocol information for this order        Last office visit 12/21/20.      Atorvastatin.  Prescription approved per [facility] Refill Protocol.      Neurontin.  Routing refill request to provider for review/approval because:  Drug not on the [facility] refill protocol   Drug not active on patient's medication list        "

## 2021-01-01 ENCOUNTER — LAB REQUISITION (OUTPATIENT)
Dept: LAB | Facility: CLINIC | Age: 86
End: 2021-01-01
Payer: MEDICARE

## 2021-01-01 ENCOUNTER — ANCILLARY PROCEDURE (OUTPATIENT)
Dept: CARDIOLOGY | Facility: CLINIC | Age: 86
End: 2021-01-01
Attending: INTERNAL MEDICINE
Payer: MEDICARE

## 2021-01-01 ENCOUNTER — OFFICE VISIT (OUTPATIENT)
Dept: CARDIOLOGY | Facility: CLINIC | Age: 86
End: 2021-01-01
Payer: MEDICARE

## 2021-01-01 ENCOUNTER — TELEPHONE (OUTPATIENT)
Dept: CARDIOLOGY | Facility: CLINIC | Age: 86
End: 2021-01-01

## 2021-01-01 ENCOUNTER — DOCUMENTATION ONLY (OUTPATIENT)
Dept: CARDIOLOGY | Facility: CLINIC | Age: 86
End: 2021-01-01

## 2021-01-01 ENCOUNTER — TRANSCRIBE ORDERS (OUTPATIENT)
Dept: CARDIOLOGY | Facility: CLINIC | Age: 86
End: 2021-01-01

## 2021-01-01 VITALS
HEIGHT: 61 IN | RESPIRATION RATE: 16 BRPM | SYSTOLIC BLOOD PRESSURE: 146 MMHG | HEART RATE: 68 BPM | WEIGHT: 139.5 LBS | BODY MASS INDEX: 26.34 KG/M2 | DIASTOLIC BLOOD PRESSURE: 52 MMHG

## 2021-01-01 VITALS
SYSTOLIC BLOOD PRESSURE: 130 MMHG | HEART RATE: 64 BPM | DIASTOLIC BLOOD PRESSURE: 60 MMHG | RESPIRATION RATE: 16 BRPM | BODY MASS INDEX: 26.09 KG/M2 | WEIGHT: 138.1 LBS

## 2021-01-01 DIAGNOSIS — Z95.0 CARDIAC PACEMAKER IN SITU: ICD-10-CM

## 2021-01-01 DIAGNOSIS — E55.9 VITAMIN D DEFICIENCY, UNSPECIFIED: ICD-10-CM

## 2021-01-01 DIAGNOSIS — E78.49 OTHER HYPERLIPIDEMIA: ICD-10-CM

## 2021-01-01 DIAGNOSIS — Z95.1 S/P CABG X 2: ICD-10-CM

## 2021-01-01 DIAGNOSIS — I25.83 CORONARY ATHEROSCLEROSIS DUE TO LIPID RICH PLAQUE: ICD-10-CM

## 2021-01-01 DIAGNOSIS — I48.0 PAROXYSMAL ATRIAL FIBRILLATION (H): ICD-10-CM

## 2021-01-01 DIAGNOSIS — D64.9 ANEMIA, UNSPECIFIED: ICD-10-CM

## 2021-01-01 DIAGNOSIS — Z95.0 CARDIAC PACEMAKER: ICD-10-CM

## 2021-01-01 DIAGNOSIS — I10 ESSENTIAL HYPERTENSION: Primary | ICD-10-CM

## 2021-01-01 DIAGNOSIS — I25.83 CORONARY ATHEROSCLEROSIS DUE TO LIPID RICH PLAQUE: Primary | ICD-10-CM

## 2021-01-01 DIAGNOSIS — I48.0 PAF (PAROXYSMAL ATRIAL FIBRILLATION) (H): ICD-10-CM

## 2021-01-01 DIAGNOSIS — G47.33 OSA (OBSTRUCTIVE SLEEP APNEA): ICD-10-CM

## 2021-01-01 DIAGNOSIS — I10 ESSENTIAL (PRIMARY) HYPERTENSION: ICD-10-CM

## 2021-01-01 DIAGNOSIS — Z95.0 CARDIAC PACEMAKER IN SITU: Primary | ICD-10-CM

## 2021-01-01 DIAGNOSIS — I49.5 SSS (SICK SINUS SYNDROME) (H): Primary | ICD-10-CM

## 2021-01-01 LAB
ANION GAP SERPL CALCULATED.3IONS-SCNC: 6 MMOL/L (ref 5–18)
BUN SERPL-MCNC: 27 MG/DL (ref 8–28)
CALCIUM SERPL-MCNC: 9 MG/DL (ref 8.5–10.5)
CHLORIDE BLD-SCNC: 103 MMOL/L (ref 98–107)
CO2 SERPL-SCNC: 32 MMOL/L (ref 22–31)
CREAT SERPL-MCNC: 0.86 MG/DL (ref 0.6–1.1)
DEPRECATED CALCIDIOL+CALCIFEROL SERPL-MC: 73 UG/L (ref 30–80)
ERYTHROCYTE [DISTWIDTH] IN BLOOD BY AUTOMATED COUNT: 13.4 % (ref 10–15)
GFR SERPL CREATININE-BSD FRML MDRD: 60 ML/MIN/1.73M2
GLUCOSE BLD-MCNC: 138 MG/DL (ref 70–125)
HCT VFR BLD AUTO: 38.2 % (ref 35–47)
HGB BLD-MCNC: 11.7 G/DL (ref 11.7–15.7)
INR PPP: 1.09 (ref 0.85–1.15)
INR PPP: 1.89 (ref 0.85–1.15)
INR PPP: 1.98 (ref 0.85–1.15)
MCH RBC QN AUTO: 29.6 PG (ref 26.5–33)
MCHC RBC AUTO-ENTMCNC: 30.6 G/DL (ref 31.5–36.5)
MCV RBC AUTO: 97 FL (ref 78–100)
MDC_IDC_EPISODE_DTM: NORMAL
MDC_IDC_EPISODE_DURATION: NORMAL S
MDC_IDC_EPISODE_ID: 2
MDC_IDC_EPISODE_TYPE: NORMAL
MDC_IDC_LEAD_IMPLANT_DT: NORMAL
MDC_IDC_LEAD_LOCATION: NORMAL
MDC_IDC_LEAD_LOCATION_DETAIL_1: NORMAL
MDC_IDC_LEAD_MFG: NORMAL
MDC_IDC_LEAD_MODEL: NORMAL
MDC_IDC_LEAD_POLARITY_TYPE: NORMAL
MDC_IDC_LEAD_SERIAL: NORMAL
MDC_IDC_MSMT_BATTERY_DTM: NORMAL
MDC_IDC_MSMT_BATTERY_DTM: NORMAL
MDC_IDC_MSMT_BATTERY_REMAINING_LONGEVITY: 142 MO
MDC_IDC_MSMT_BATTERY_REMAINING_LONGEVITY: 150 MO
MDC_IDC_MSMT_BATTERY_RRT_TRIGGER: 2.62
MDC_IDC_MSMT_BATTERY_RRT_TRIGGER: 2.62
MDC_IDC_MSMT_BATTERY_STATUS: NORMAL
MDC_IDC_MSMT_BATTERY_STATUS: NORMAL
MDC_IDC_MSMT_BATTERY_VOLTAGE: 3.2 V
MDC_IDC_MSMT_BATTERY_VOLTAGE: 3.2 V
MDC_IDC_MSMT_LEADCHNL_RA_IMPEDANCE_VALUE: 304 OHM
MDC_IDC_MSMT_LEADCHNL_RA_IMPEDANCE_VALUE: 304 OHM
MDC_IDC_MSMT_LEADCHNL_RA_IMPEDANCE_VALUE: 399 OHM
MDC_IDC_MSMT_LEADCHNL_RA_IMPEDANCE_VALUE: 437 OHM
MDC_IDC_MSMT_LEADCHNL_RA_PACING_THRESHOLD_AMPLITUDE: 0.38 V
MDC_IDC_MSMT_LEADCHNL_RA_PACING_THRESHOLD_AMPLITUDE: 0.38 V
MDC_IDC_MSMT_LEADCHNL_RA_PACING_THRESHOLD_AMPLITUDE: 0.5 V
MDC_IDC_MSMT_LEADCHNL_RA_PACING_THRESHOLD_PULSEWIDTH: 0.4 MS
MDC_IDC_MSMT_LEADCHNL_RA_SENSING_INTR_AMPL: 1.38 MV
MDC_IDC_MSMT_LEADCHNL_RV_IMPEDANCE_VALUE: 456 OHM
MDC_IDC_MSMT_LEADCHNL_RV_IMPEDANCE_VALUE: 475 OHM
MDC_IDC_MSMT_LEADCHNL_RV_IMPEDANCE_VALUE: 608 OHM
MDC_IDC_MSMT_LEADCHNL_RV_IMPEDANCE_VALUE: 627 OHM
MDC_IDC_MSMT_LEADCHNL_RV_PACING_THRESHOLD_AMPLITUDE: 0.5 V
MDC_IDC_MSMT_LEADCHNL_RV_PACING_THRESHOLD_AMPLITUDE: 0.62 V
MDC_IDC_MSMT_LEADCHNL_RV_PACING_THRESHOLD_AMPLITUDE: 0.75 V
MDC_IDC_MSMT_LEADCHNL_RV_PACING_THRESHOLD_PULSEWIDTH: 0.4 MS
MDC_IDC_MSMT_LEADCHNL_RV_SENSING_INTR_AMPL: 31.62 MV
MDC_IDC_PG_IMPLANT_DTM: NORMAL
MDC_IDC_PG_IMPLANT_DTM: NORMAL
MDC_IDC_PG_MFG: NORMAL
MDC_IDC_PG_MFG: NORMAL
MDC_IDC_PG_MODEL: NORMAL
MDC_IDC_PG_MODEL: NORMAL
MDC_IDC_PG_SERIAL: NORMAL
MDC_IDC_PG_SERIAL: NORMAL
MDC_IDC_PG_TYPE: NORMAL
MDC_IDC_PG_TYPE: NORMAL
MDC_IDC_SESS_CLINIC_NAME: NORMAL
MDC_IDC_SESS_CLINIC_NAME: NORMAL
MDC_IDC_SESS_DTM: NORMAL
MDC_IDC_SESS_DTM: NORMAL
MDC_IDC_SESS_TYPE: NORMAL
MDC_IDC_SESS_TYPE: NORMAL
MDC_IDC_SET_BRADY_AT_MODE_SWITCH_RATE: 171 {BEATS}/MIN
MDC_IDC_SET_BRADY_AT_MODE_SWITCH_RATE: 171 {BEATS}/MIN
MDC_IDC_SET_BRADY_HYSTRATE: NORMAL
MDC_IDC_SET_BRADY_HYSTRATE: NORMAL
MDC_IDC_SET_BRADY_LOWRATE: 60 {BEATS}/MIN
MDC_IDC_SET_BRADY_LOWRATE: 60 {BEATS}/MIN
MDC_IDC_SET_BRADY_MAX_SENSOR_RATE: 120 {BEATS}/MIN
MDC_IDC_SET_BRADY_MAX_SENSOR_RATE: 120 {BEATS}/MIN
MDC_IDC_SET_BRADY_MAX_TRACKING_RATE: 120 {BEATS}/MIN
MDC_IDC_SET_BRADY_MAX_TRACKING_RATE: 120 {BEATS}/MIN
MDC_IDC_SET_BRADY_MODE: NORMAL
MDC_IDC_SET_BRADY_MODE: NORMAL
MDC_IDC_SET_BRADY_PAV_DELAY_LOW: 180 MS
MDC_IDC_SET_BRADY_PAV_DELAY_LOW: 180 MS
MDC_IDC_SET_BRADY_SAV_DELAY_LOW: 150 MS
MDC_IDC_SET_BRADY_SAV_DELAY_LOW: 150 MS
MDC_IDC_SET_LEADCHNL_RA_PACING_AMPLITUDE: 1.75 V
MDC_IDC_SET_LEADCHNL_RA_PACING_AMPLITUDE: 3.5 V
MDC_IDC_SET_LEADCHNL_RA_PACING_ANODE_ELECTRODE_1: NORMAL
MDC_IDC_SET_LEADCHNL_RA_PACING_ANODE_ELECTRODE_1: NORMAL
MDC_IDC_SET_LEADCHNL_RA_PACING_ANODE_LOCATION_1: NORMAL
MDC_IDC_SET_LEADCHNL_RA_PACING_ANODE_LOCATION_1: NORMAL
MDC_IDC_SET_LEADCHNL_RA_PACING_CAPTURE_MODE: NORMAL
MDC_IDC_SET_LEADCHNL_RA_PACING_CAPTURE_MODE: NORMAL
MDC_IDC_SET_LEADCHNL_RA_PACING_CATHODE_ELECTRODE_1: NORMAL
MDC_IDC_SET_LEADCHNL_RA_PACING_CATHODE_ELECTRODE_1: NORMAL
MDC_IDC_SET_LEADCHNL_RA_PACING_CATHODE_LOCATION_1: NORMAL
MDC_IDC_SET_LEADCHNL_RA_PACING_CATHODE_LOCATION_1: NORMAL
MDC_IDC_SET_LEADCHNL_RA_PACING_POLARITY: NORMAL
MDC_IDC_SET_LEADCHNL_RA_PACING_POLARITY: NORMAL
MDC_IDC_SET_LEADCHNL_RA_PACING_PULSEWIDTH: 0.4 MS
MDC_IDC_SET_LEADCHNL_RA_PACING_PULSEWIDTH: 0.4 MS
MDC_IDC_SET_LEADCHNL_RA_SENSING_ANODE_ELECTRODE_1: NORMAL
MDC_IDC_SET_LEADCHNL_RA_SENSING_ANODE_ELECTRODE_1: NORMAL
MDC_IDC_SET_LEADCHNL_RA_SENSING_ANODE_LOCATION_1: NORMAL
MDC_IDC_SET_LEADCHNL_RA_SENSING_ANODE_LOCATION_1: NORMAL
MDC_IDC_SET_LEADCHNL_RA_SENSING_CATHODE_ELECTRODE_1: NORMAL
MDC_IDC_SET_LEADCHNL_RA_SENSING_CATHODE_ELECTRODE_1: NORMAL
MDC_IDC_SET_LEADCHNL_RA_SENSING_CATHODE_LOCATION_1: NORMAL
MDC_IDC_SET_LEADCHNL_RA_SENSING_CATHODE_LOCATION_1: NORMAL
MDC_IDC_SET_LEADCHNL_RA_SENSING_POLARITY: NORMAL
MDC_IDC_SET_LEADCHNL_RA_SENSING_POLARITY: NORMAL
MDC_IDC_SET_LEADCHNL_RA_SENSING_SENSITIVITY: 0.15 MV
MDC_IDC_SET_LEADCHNL_RA_SENSING_SENSITIVITY: 0.15 MV
MDC_IDC_SET_LEADCHNL_RV_PACING_AMPLITUDE: 1.5 V
MDC_IDC_SET_LEADCHNL_RV_PACING_AMPLITUDE: 3.5 V
MDC_IDC_SET_LEADCHNL_RV_PACING_ANODE_ELECTRODE_1: NORMAL
MDC_IDC_SET_LEADCHNL_RV_PACING_ANODE_ELECTRODE_1: NORMAL
MDC_IDC_SET_LEADCHNL_RV_PACING_ANODE_LOCATION_1: NORMAL
MDC_IDC_SET_LEADCHNL_RV_PACING_ANODE_LOCATION_1: NORMAL
MDC_IDC_SET_LEADCHNL_RV_PACING_CAPTURE_MODE: NORMAL
MDC_IDC_SET_LEADCHNL_RV_PACING_CAPTURE_MODE: NORMAL
MDC_IDC_SET_LEADCHNL_RV_PACING_CATHODE_ELECTRODE_1: NORMAL
MDC_IDC_SET_LEADCHNL_RV_PACING_CATHODE_ELECTRODE_1: NORMAL
MDC_IDC_SET_LEADCHNL_RV_PACING_CATHODE_LOCATION_1: NORMAL
MDC_IDC_SET_LEADCHNL_RV_PACING_CATHODE_LOCATION_1: NORMAL
MDC_IDC_SET_LEADCHNL_RV_PACING_POLARITY: NORMAL
MDC_IDC_SET_LEADCHNL_RV_PACING_POLARITY: NORMAL
MDC_IDC_SET_LEADCHNL_RV_PACING_PULSEWIDTH: 0.4 MS
MDC_IDC_SET_LEADCHNL_RV_PACING_PULSEWIDTH: 0.4 MS
MDC_IDC_SET_LEADCHNL_RV_SENSING_ANODE_ELECTRODE_1: NORMAL
MDC_IDC_SET_LEADCHNL_RV_SENSING_ANODE_ELECTRODE_1: NORMAL
MDC_IDC_SET_LEADCHNL_RV_SENSING_ANODE_LOCATION_1: NORMAL
MDC_IDC_SET_LEADCHNL_RV_SENSING_ANODE_LOCATION_1: NORMAL
MDC_IDC_SET_LEADCHNL_RV_SENSING_CATHODE_ELECTRODE_1: NORMAL
MDC_IDC_SET_LEADCHNL_RV_SENSING_CATHODE_ELECTRODE_1: NORMAL
MDC_IDC_SET_LEADCHNL_RV_SENSING_CATHODE_LOCATION_1: NORMAL
MDC_IDC_SET_LEADCHNL_RV_SENSING_CATHODE_LOCATION_1: NORMAL
MDC_IDC_SET_LEADCHNL_RV_SENSING_POLARITY: NORMAL
MDC_IDC_SET_LEADCHNL_RV_SENSING_POLARITY: NORMAL
MDC_IDC_SET_LEADCHNL_RV_SENSING_SENSITIVITY: 0.9 MV
MDC_IDC_SET_LEADCHNL_RV_SENSING_SENSITIVITY: 0.9 MV
MDC_IDC_SET_ZONE_DETECTION_INTERVAL: 350 MS
MDC_IDC_SET_ZONE_DETECTION_INTERVAL: 350 MS
MDC_IDC_SET_ZONE_DETECTION_INTERVAL: 400 MS
MDC_IDC_SET_ZONE_DETECTION_INTERVAL: 400 MS
MDC_IDC_SET_ZONE_TYPE: NORMAL
MDC_IDC_STAT_AT_BURDEN_PERCENT: 0 %
MDC_IDC_STAT_AT_BURDEN_PERCENT: 69.6 %
MDC_IDC_STAT_AT_DTM_END: NORMAL
MDC_IDC_STAT_AT_DTM_END: NORMAL
MDC_IDC_STAT_AT_DTM_START: NORMAL
MDC_IDC_STAT_AT_DTM_START: NORMAL
MDC_IDC_STAT_BRADY_AP_VP_PERCENT: 84 %
MDC_IDC_STAT_BRADY_AP_VP_PERCENT: 93.61 %
MDC_IDC_STAT_BRADY_AP_VS_PERCENT: 0.02 %
MDC_IDC_STAT_BRADY_AP_VS_PERCENT: 0.02 %
MDC_IDC_STAT_BRADY_AS_VP_PERCENT: 15.53 %
MDC_IDC_STAT_BRADY_AS_VP_PERCENT: 6.13 %
MDC_IDC_STAT_BRADY_AS_VS_PERCENT: 0.24 %
MDC_IDC_STAT_BRADY_AS_VS_PERCENT: 0.43 %
MDC_IDC_STAT_BRADY_DTM_END: NORMAL
MDC_IDC_STAT_BRADY_DTM_END: NORMAL
MDC_IDC_STAT_BRADY_DTM_START: NORMAL
MDC_IDC_STAT_BRADY_DTM_START: NORMAL
MDC_IDC_STAT_BRADY_RA_PERCENT_PACED: 25.95 %
MDC_IDC_STAT_BRADY_RA_PERCENT_PACED: 93.83 %
MDC_IDC_STAT_BRADY_RV_PERCENT_PACED: 96.84 %
MDC_IDC_STAT_BRADY_RV_PERCENT_PACED: 99.74 %
MDC_IDC_STAT_EPISODE_RECENT_COUNT: 0
MDC_IDC_STAT_EPISODE_RECENT_COUNT_DTM_END: NORMAL
MDC_IDC_STAT_EPISODE_RECENT_COUNT_DTM_START: NORMAL
MDC_IDC_STAT_EPISODE_TOTAL_COUNT: 0
MDC_IDC_STAT_EPISODE_TOTAL_COUNT: 2
MDC_IDC_STAT_EPISODE_TOTAL_COUNT: 2
MDC_IDC_STAT_EPISODE_TOTAL_COUNT_DTM_END: NORMAL
MDC_IDC_STAT_EPISODE_TOTAL_COUNT_DTM_START: NORMAL
MDC_IDC_STAT_EPISODE_TYPE: NORMAL
PLATELET # BLD AUTO: 122 10E3/UL (ref 150–450)
POTASSIUM BLD-SCNC: 4.2 MMOL/L (ref 3.5–5)
RBC # BLD AUTO: 3.95 10E6/UL (ref 3.8–5.2)
SODIUM SERPL-SCNC: 141 MMOL/L (ref 136–145)
WBC # BLD AUTO: 3 10E3/UL (ref 4–11)

## 2021-01-01 PROCEDURE — 80048 BASIC METABOLIC PNL TOTAL CA: CPT | Mod: ORL | Performed by: FAMILY MEDICINE

## 2021-01-01 PROCEDURE — 36415 COLL VENOUS BLD VENIPUNCTURE: CPT | Mod: ORL | Performed by: FAMILY MEDICINE

## 2021-01-01 PROCEDURE — 93296 REM INTERROG EVL PM/IDS: CPT | Performed by: INTERNAL MEDICINE

## 2021-01-01 PROCEDURE — P9604 ONE-WAY ALLOW PRORATED TRIP: HCPCS | Mod: ORL

## 2021-01-01 PROCEDURE — P9603 ONE-WAY ALLOW PRORATED MILES: HCPCS | Mod: ORL | Performed by: FAMILY MEDICINE

## 2021-01-01 PROCEDURE — 82306 VITAMIN D 25 HYDROXY: CPT | Mod: ORL | Performed by: FAMILY MEDICINE

## 2021-01-01 PROCEDURE — P9604 ONE-WAY ALLOW PRORATED TRIP: HCPCS | Mod: ORL | Performed by: FAMILY MEDICINE

## 2021-01-01 PROCEDURE — 99214 OFFICE O/P EST MOD 30 MIN: CPT | Performed by: NURSE PRACTITIONER

## 2021-01-01 PROCEDURE — 93280 PM DEVICE PROGR EVAL DUAL: CPT | Performed by: INTERNAL MEDICINE

## 2021-01-01 PROCEDURE — 36415 COLL VENOUS BLD VENIPUNCTURE: CPT | Mod: ORL

## 2021-01-01 PROCEDURE — 99214 OFFICE O/P EST MOD 30 MIN: CPT | Performed by: INTERNAL MEDICINE

## 2021-01-01 PROCEDURE — 85610 PROTHROMBIN TIME: CPT | Mod: ORL | Performed by: FAMILY MEDICINE

## 2021-01-01 PROCEDURE — 85610 PROTHROMBIN TIME: CPT | Mod: ORL

## 2021-01-01 PROCEDURE — 93294 REM INTERROG EVL PM/LDLS PM: CPT | Performed by: INTERNAL MEDICINE

## 2021-01-01 PROCEDURE — P9603 ONE-WAY ALLOW PRORATED MILES: HCPCS | Mod: ORL

## 2021-01-01 PROCEDURE — 85027 COMPLETE CBC AUTOMATED: CPT | Mod: ORL | Performed by: FAMILY MEDICINE

## 2021-01-01 RX ORDER — METOPROLOL TARTRATE 25 MG/1
25 TABLET, FILM COATED ORAL 2 TIMES DAILY
Qty: 180 TABLET | Refills: 0 | Status: SHIPPED | OUTPATIENT
Start: 2021-01-01 | End: 2022-01-01

## 2021-01-01 RX ORDER — METOPROLOL TARTRATE 25 MG/1
25 TABLET, FILM COATED ORAL 2 TIMES DAILY
Qty: 180 TABLET | Refills: 4 | Status: SHIPPED | OUTPATIENT
Start: 2021-01-01 | End: 2021-01-01

## 2021-01-01 RX ORDER — ATORVASTATIN CALCIUM 10 MG/1
10 TABLET, FILM COATED ORAL AT BEDTIME
Qty: 90 TABLET | Refills: 4 | Status: SHIPPED | OUTPATIENT
Start: 2021-01-01 | End: 2021-01-01

## 2021-01-01 RX ORDER — BUMETANIDE 1 MG/1
1 TABLET ORAL 2 TIMES DAILY
Qty: 180 TABLET | Refills: 0 | Status: SHIPPED | OUTPATIENT
Start: 2021-01-01 | End: 2022-01-01

## 2021-01-01 RX ORDER — ATORVASTATIN CALCIUM 10 MG/1
10 TABLET, FILM COATED ORAL AT BEDTIME
Qty: 90 TABLET | Refills: 4 | Status: SHIPPED | OUTPATIENT
Start: 2021-01-01 | End: 2022-01-01

## 2021-01-01 RX ORDER — BUMETANIDE 1 MG/1
1 TABLET ORAL 2 TIMES DAILY
Qty: 180 TABLET | Refills: 4 | Status: SHIPPED | OUTPATIENT
Start: 2021-01-01 | End: 2021-01-01

## 2021-01-01 ASSESSMENT — MIFFLIN-ST. JEOR: SCORE: 990.15

## 2021-01-14 ENCOUNTER — VIRTUAL VISIT (OUTPATIENT)
Dept: PSYCHOLOGY | Facility: CLINIC | Age: 86
End: 2021-01-14
Payer: COMMERCIAL

## 2021-01-14 DIAGNOSIS — F43.23 ADJUSTMENT DISORDER WITH MIXED ANXIETY AND DEPRESSED MOOD: Primary | ICD-10-CM

## 2021-01-14 PROCEDURE — 99207 PR NO CHARGE LOS: CPT | Mod: TEL

## 2021-01-14 NOTE — Clinical Note
Dr. Mccall - I enjoyed talking with Veronica, thank you for the referral.  I let her know she can call at any time to return to counseling, she did not need a formal referral.  Take care, Britney

## 2021-01-14 NOTE — PROGRESS NOTES
"                     Discharge Summary  Multiple Sessions    Client Name: Veronica Bray MRN#: [id number] YOB: 1931    Discharge Date:   2021    Service Modality: Phone Visit:      Provider verified identity through the following two step process.  Patient provided:  Patient     The patient has been notified of the following:      \"We have found that certain health care needs can be provided without the need for a face to face visit.  This service lets us provide the care you need with a phone conversation.       I will have full access to your [facility] medical record during this entire phone call.   I will be taking notes for your medical record.      Since this is like an office visit, we will bill your insurance company for this service.       There are potential benefits and risks of telephone visits (e.g. limits to patient confidentiality) that differ from in-person visits.?  Confidentiality still applies for telephone services, and nobody will record the visit.  It is important to be in a quiet, private space that is free of distractions (including cell phone or other devices) during the visit.??      If during the course of the call I believe a telephone visit is not appropriate, you will not be charged for this service\"     Consent has been obtained for this service by care team member: Yes     Service Type: Individual      Session Start Time: 2:31pm  Session End Time: 2:40pm      Session Length: 9 minutes     Session #: 4     Attendees: Client      Focus of Treatment Objective(s):  Client's presenting concerns included: Adjustment Difficulties related to: family concerns  Stage of Change at time of Discharge: PREPARATION (Decided to change - considering how)    Medication Adherence:  Yes    Chemical Use:  No    Assessment: Current Emotional / Mental Status (status of significant symptoms):    Risk status (Self / Other harm or suicidal ideation)  Client denies current fears or " concerns for personal safety.  Client denies current or recent suicidal ideation or behaviors.  Client denies current or recent homicidal ideation or behaviors.  Client denies current or recent self injurious behavior or ideation.  Client denies other safety concerns.  A safety and risk management plan has not been developed at this time, however client was given the after-hours number should there be a change in any of these risk factors.    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  She lives with her  in an assisted living facility.  She and her  have experienced significant medical concerns in 2020.  The medical conditions coupled with past family stress, COVID-19, and isolation, have increased Veronica's level of stress and impacted her mental health.    WHODAS 2.0 (12 item) Score: 20    Reason for Discharge:  She reports feeling too overwhelmed to continue therapy at this time.  She reports some relief from past family concerns and may return in the future.      Aftercare Plan:  Client may resume counseling services at any time in the future by calling the [facility] Intake Office, [phone].      Britney Terrazas, [facility]  January 14, 2021  Note reviewed and signed by Med Marquez [facility] January 14, 2021

## 2021-03-14 ENCOUNTER — HEALTH MAINTENANCE LETTER (OUTPATIENT)
Age: 86
End: 2021-03-14

## 2021-03-27 ENCOUNTER — APPOINTMENT (OUTPATIENT)
Dept: CT IMAGING | Facility: CLINIC | Age: 86
DRG: 149 | End: 2021-03-27
Attending: EMERGENCY MEDICINE
Payer: MEDICARE

## 2021-03-27 ENCOUNTER — APPOINTMENT (OUTPATIENT)
Dept: MRI IMAGING | Facility: CLINIC | Age: 86
DRG: 149 | End: 2021-03-27
Attending: EMERGENCY MEDICINE
Payer: MEDICARE

## 2021-03-27 ENCOUNTER — HOSPITAL ENCOUNTER (INPATIENT)
Facility: CLINIC | Age: 86
LOS: 1 days | Discharge: HOME | DRG: 149 | End: 2021-03-29
Attending: EMERGENCY MEDICINE | Admitting: INTERNAL MEDICINE
Payer: MEDICARE

## 2021-03-27 DIAGNOSIS — H10.45 CHRONIC ALLERGIC CONJUNCTIVITIS: ICD-10-CM

## 2021-03-27 DIAGNOSIS — R42 DIZZINESS: ICD-10-CM

## 2021-03-27 DIAGNOSIS — H81.20 VESTIBULAR NEURONITIS, UNSPECIFIED LATERALITY: Primary | ICD-10-CM

## 2021-03-27 DIAGNOSIS — R09.81 SINUS CONGESTION: ICD-10-CM

## 2021-03-27 LAB
ALBUMIN UR-MCNC: NEGATIVE MG/DL
ANION GAP SERPL CALCULATED.3IONS-SCNC: 4 MMOL/L (ref 3–14)
APPEARANCE UR: CLEAR
BASOPHILS # BLD AUTO: 0 10E9/L (ref 0–0.2)
BASOPHILS NFR BLD AUTO: 1 %
BILIRUB UR QL STRIP: NEGATIVE
BUN SERPL-MCNC: 18 MG/DL (ref 7–30)
CALCIUM SERPL-MCNC: 8.2 MG/DL (ref 8.5–10.1)
CHLORIDE SERPL-SCNC: 107 MMOL/L (ref 94–109)
CO2 SERPL-SCNC: 30 MMOL/L (ref 20–32)
COLOR UR AUTO: NORMAL
CREAT BLD-MCNC: 0.7 MG/DL (ref 0.52–1.04)
CREAT SERPL-MCNC: 0.67 MG/DL (ref 0.52–1.04)
DIFFERENTIAL METHOD BLD: ABNORMAL
EOSINOPHIL # BLD AUTO: 0.2 10E9/L (ref 0–0.7)
EOSINOPHIL NFR BLD AUTO: 5.8 %
ERYTHROCYTE [DISTWIDTH] IN BLOOD BY AUTOMATED COUNT: 13.6 % (ref 10–15)
GFR SERPL CREATININE-BSD FRML MDRD: 77 ML/MIN/{1.73_M2}
GFR SERPL CREATININE-BSD FRML MDRD: 79 ML/MIN/{1.73_M2}
GLUCOSE SERPL-MCNC: 84 MG/DL (ref 70–99)
GLUCOSE UR STRIP-MCNC: NEGATIVE MG/DL
HCT VFR BLD AUTO: 41.7 % (ref 35–47)
HGB BLD-MCNC: 12.8 G/DL (ref 11.7–15.7)
HGB UR QL STRIP: NEGATIVE
IMM GRANULOCYTES # BLD: 0 10E9/L (ref 0–0.4)
IMM GRANULOCYTES NFR BLD: 0.3 %
INTERPRETATION ECG - MUSE: NORMAL
KETONES UR STRIP-MCNC: NEGATIVE MG/DL
LABORATORY COMMENT REPORT: NORMAL
LEUKOCYTE ESTERASE UR QL STRIP: NEGATIVE
LYMPHOCYTES # BLD AUTO: 0.9 10E9/L (ref 0.8–5.3)
LYMPHOCYTES NFR BLD AUTO: 27.5 %
MCH RBC QN AUTO: 29.7 PG (ref 26.5–33)
MCHC RBC AUTO-ENTMCNC: 30.7 G/DL (ref 31.5–36.5)
MCV RBC AUTO: 97 FL (ref 78–100)
MONOCYTES # BLD AUTO: 0.4 10E9/L (ref 0–1.3)
MONOCYTES NFR BLD AUTO: 11.5 %
NEUTROPHILS # BLD AUTO: 1.7 10E9/L (ref 1.6–8.3)
NEUTROPHILS NFR BLD AUTO: 53.9 %
NITRATE UR QL: NEGATIVE
NRBC # BLD AUTO: 0 10*3/UL
NRBC BLD AUTO-RTO: 0 /100
PH UR STRIP: 6 PH (ref 5–7)
PLATELET # BLD AUTO: 148 10E9/L (ref 150–450)
POTASSIUM SERPL-SCNC: 4.1 MMOL/L (ref 3.4–5.3)
RBC # BLD AUTO: 4.31 10E12/L (ref 3.8–5.2)
RBC #/AREA URNS AUTO: 1 /HPF (ref 0–2)
SARS-COV-2 RNA RESP QL NAA+PROBE: NEGATIVE
SODIUM SERPL-SCNC: 141 MMOL/L (ref 133–144)
SOURCE: NORMAL
SP GR UR STRIP: 1.02 (ref 1–1.03)
SPECIMEN SOURCE: NORMAL
TROPONIN I SERPL-MCNC: 0.02 UG/L (ref 0–0.04)
TROPONIN I SERPL-MCNC: 0.02 UG/L (ref 0–0.04)
UROBILINOGEN UR STRIP-MCNC: NORMAL MG/DL (ref 0–2)
WBC # BLD AUTO: 3.1 10E9/L (ref 4–11)
WBC #/AREA URNS AUTO: <1 /HPF (ref 0–5)

## 2021-03-27 PROCEDURE — 93005 ELECTROCARDIOGRAM TRACING: CPT

## 2021-03-27 PROCEDURE — 250N000011 HC RX IP 250 OP 636: Performed by: EMERGENCY MEDICINE

## 2021-03-27 PROCEDURE — 85025 COMPLETE CBC W/AUTO DIFF WBC: CPT | Performed by: EMERGENCY MEDICINE

## 2021-03-27 PROCEDURE — 255N000002 HC RX 255 OP 636: Performed by: EMERGENCY MEDICINE

## 2021-03-27 PROCEDURE — 258N000003 HC RX IP 258 OP 636: Performed by: EMERGENCY MEDICINE

## 2021-03-27 PROCEDURE — 99285 EMERGENCY DEPT VISIT HI MDM: CPT | Mod: 25

## 2021-03-27 PROCEDURE — 81001 URINALYSIS AUTO W/SCOPE: CPT | Performed by: EMERGENCY MEDICINE

## 2021-03-27 PROCEDURE — 96361 HYDRATE IV INFUSION ADD-ON: CPT

## 2021-03-27 PROCEDURE — 84484 ASSAY OF TROPONIN QUANT: CPT | Performed by: EMERGENCY MEDICINE

## 2021-03-27 PROCEDURE — 250N000012 HC RX MED GY IP 250 OP 636 PS 637: Performed by: PHYSICIAN ASSISTANT

## 2021-03-27 PROCEDURE — 96375 TX/PRO/DX INJ NEW DRUG ADDON: CPT

## 2021-03-27 PROCEDURE — 250N000013 HC RX MED GY IP 250 OP 250 PS 637: Performed by: PHYSICIAN ASSISTANT

## 2021-03-27 PROCEDURE — 250N000009 HC RX 250: Performed by: PHYSICIAN ASSISTANT

## 2021-03-27 PROCEDURE — 70450 CT HEAD/BRAIN W/O DYE: CPT | Mod: MG

## 2021-03-27 PROCEDURE — 250N000009 HC RX 250: Performed by: EMERGENCY MEDICINE

## 2021-03-27 PROCEDURE — 96374 THER/PROPH/DIAG INJ IV PUSH: CPT

## 2021-03-27 PROCEDURE — G0378 HOSPITAL OBSERVATION PER HR: HCPCS

## 2021-03-27 PROCEDURE — 87635 SARS-COV-2 COVID-19 AMP PRB: CPT | Performed by: EMERGENCY MEDICINE

## 2021-03-27 PROCEDURE — 80048 BASIC METABOLIC PNL TOTAL CA: CPT | Performed by: EMERGENCY MEDICINE

## 2021-03-27 PROCEDURE — A9585 GADOBUTROL INJECTION: HCPCS | Performed by: EMERGENCY MEDICINE

## 2021-03-27 PROCEDURE — 258N000003 HC RX IP 258 OP 636: Performed by: PHYSICIAN ASSISTANT

## 2021-03-27 PROCEDURE — 70496 CT ANGIOGRAPHY HEAD: CPT | Mod: MG

## 2021-03-27 PROCEDURE — 250N000013 HC RX MED GY IP 250 OP 250 PS 637: Performed by: EMERGENCY MEDICINE

## 2021-03-27 PROCEDURE — 82565 ASSAY OF CREATININE: CPT

## 2021-03-27 PROCEDURE — 99220 PR INITIAL OBSERVATION CARE,LEVEL III: CPT | Performed by: PHYSICIAN ASSISTANT

## 2021-03-27 PROCEDURE — C9803 HOPD COVID-19 SPEC COLLECT: HCPCS

## 2021-03-27 PROCEDURE — 70553 MRI BRAIN STEM W/O & W/DYE: CPT | Mod: MG

## 2021-03-27 RX ORDER — ACETAMINOPHEN 650 MG/1
650 SUPPOSITORY RECTAL EVERY 4 HOURS PRN
Status: DISCONTINUED | OUTPATIENT
Start: 2021-03-27 | End: 2021-03-29 | Stop reason: HOSPADM

## 2021-03-27 RX ORDER — DIAZEPAM 10 MG/2ML
2.5 INJECTION, SOLUTION INTRAMUSCULAR; INTRAVENOUS ONCE
Status: COMPLETED | OUTPATIENT
Start: 2021-03-27 | End: 2021-03-27

## 2021-03-27 RX ORDER — GADOBUTROL 604.72 MG/ML
7.5 INJECTION INTRAVENOUS ONCE
Status: COMPLETED | OUTPATIENT
Start: 2021-03-27 | End: 2021-03-27

## 2021-03-27 RX ORDER — CETIRIZINE HYDROCHLORIDE 10 MG/1
10 TABLET ORAL EVERY EVENING
Status: DISCONTINUED | OUTPATIENT
Start: 2021-03-27 | End: 2021-03-27

## 2021-03-27 RX ORDER — PROCHLORPERAZINE MALEATE 5 MG
5 TABLET ORAL EVERY 6 HOURS PRN
Status: DISCONTINUED | OUTPATIENT
Start: 2021-03-27 | End: 2021-03-29 | Stop reason: HOSPADM

## 2021-03-27 RX ORDER — AMOXICILLIN 250 MG
2 CAPSULE ORAL 2 TIMES DAILY PRN
Status: DISCONTINUED | OUTPATIENT
Start: 2021-03-27 | End: 2021-03-29 | Stop reason: HOSPADM

## 2021-03-27 RX ORDER — BISACODYL 10 MG
10 SUPPOSITORY, RECTAL RECTAL DAILY PRN
Status: DISCONTINUED | OUTPATIENT
Start: 2021-03-27 | End: 2021-03-29 | Stop reason: HOSPADM

## 2021-03-27 RX ORDER — IOPAMIDOL 755 MG/ML
500 INJECTION, SOLUTION INTRAVASCULAR ONCE
Status: COMPLETED | OUTPATIENT
Start: 2021-03-27 | End: 2021-03-27

## 2021-03-27 RX ORDER — ASPIRIN 81 MG/1
81 TABLET ORAL EVERY OTHER DAY
Status: DISCONTINUED | OUTPATIENT
Start: 2021-03-28 | End: 2021-03-29 | Stop reason: HOSPADM

## 2021-03-27 RX ORDER — SODIUM CHLORIDE 9 MG/ML
1000 INJECTION, SOLUTION INTRAVENOUS CONTINUOUS
Status: DISCONTINUED | OUTPATIENT
Start: 2021-03-27 | End: 2021-03-27

## 2021-03-27 RX ORDER — CETIRIZINE HYDROCHLORIDE 10 MG/1
10 TABLET ORAL DAILY
COMMUNITY

## 2021-03-27 RX ORDER — PSEUDOEPHEDRINE HCL 120 MG/1
120 TABLET, FILM COATED, EXTENDED RELEASE ORAL 2 TIMES DAILY
Status: DISCONTINUED | OUTPATIENT
Start: 2021-03-27 | End: 2021-03-29 | Stop reason: HOSPADM

## 2021-03-27 RX ORDER — ONDANSETRON 4 MG/1
4 TABLET, ORALLY DISINTEGRATING ORAL EVERY 6 HOURS PRN
Status: DISCONTINUED | OUTPATIENT
Start: 2021-03-27 | End: 2021-03-29 | Stop reason: HOSPADM

## 2021-03-27 RX ORDER — LANOLIN ALCOHOL/MO/W.PET/CERES
3 CREAM (GRAM) TOPICAL
Status: DISCONTINUED | OUTPATIENT
Start: 2021-03-27 | End: 2021-03-29 | Stop reason: HOSPADM

## 2021-03-27 RX ORDER — PREDNISONE 20 MG/1
60 TABLET ORAL DAILY
Status: DISCONTINUED | OUTPATIENT
Start: 2021-03-27 | End: 2021-03-29 | Stop reason: HOSPADM

## 2021-03-27 RX ORDER — ACETAMINOPHEN 500 MG
500-1000 TABLET ORAL EVERY 6 HOURS PRN
COMMUNITY

## 2021-03-27 RX ORDER — CARVEDILOL 6.25 MG/1
6.25 TABLET ORAL 2 TIMES DAILY WITH MEALS
Status: DISCONTINUED | OUTPATIENT
Start: 2021-03-27 | End: 2021-03-29 | Stop reason: HOSPADM

## 2021-03-27 RX ORDER — GUAIFENESIN 600 MG/1
1200 TABLET, EXTENDED RELEASE ORAL 2 TIMES DAILY
Status: DISCONTINUED | OUTPATIENT
Start: 2021-03-27 | End: 2021-03-29 | Stop reason: HOSPADM

## 2021-03-27 RX ORDER — LORATADINE 10 MG/1
10 TABLET ORAL DAILY
Status: DISCONTINUED | OUTPATIENT
Start: 2021-03-27 | End: 2021-03-29 | Stop reason: HOSPADM

## 2021-03-27 RX ORDER — LISINOPRIL 5 MG/1
5 TABLET ORAL DAILY
Status: DISCONTINUED | OUTPATIENT
Start: 2021-03-27 | End: 2021-03-29 | Stop reason: HOSPADM

## 2021-03-27 RX ORDER — MECLIZINE HYDROCHLORIDE 25 MG/1
25 TABLET ORAL ONCE
Status: COMPLETED | OUTPATIENT
Start: 2021-03-27 | End: 2021-03-27

## 2021-03-27 RX ORDER — ACETAMINOPHEN 325 MG/1
650 TABLET ORAL EVERY 4 HOURS PRN
Status: DISCONTINUED | OUTPATIENT
Start: 2021-03-27 | End: 2021-03-29 | Stop reason: HOSPADM

## 2021-03-27 RX ORDER — SODIUM CHLORIDE 9 MG/ML
1000 INJECTION, SOLUTION INTRAVENOUS CONTINUOUS
Status: DISCONTINUED | OUTPATIENT
Start: 2021-03-27 | End: 2021-03-29

## 2021-03-27 RX ORDER — AMOXICILLIN 250 MG
1 CAPSULE ORAL 2 TIMES DAILY PRN
Status: DISCONTINUED | OUTPATIENT
Start: 2021-03-27 | End: 2021-03-29 | Stop reason: HOSPADM

## 2021-03-27 RX ORDER — LORAZEPAM 2 MG/ML
1 INJECTION INTRAMUSCULAR ONCE
Status: COMPLETED | OUTPATIENT
Start: 2021-03-27 | End: 2021-03-27

## 2021-03-27 RX ORDER — ATORVASTATIN CALCIUM 10 MG/1
10 TABLET, FILM COATED ORAL AT BEDTIME
Status: DISCONTINUED | OUTPATIENT
Start: 2021-03-27 | End: 2021-03-29 | Stop reason: HOSPADM

## 2021-03-27 RX ORDER — PROCHLORPERAZINE 25 MG
12.5 SUPPOSITORY, RECTAL RECTAL EVERY 12 HOURS PRN
Status: DISCONTINUED | OUTPATIENT
Start: 2021-03-27 | End: 2021-03-29 | Stop reason: HOSPADM

## 2021-03-27 RX ORDER — MECLIZINE HYDROCHLORIDE 25 MG/1
25 TABLET ORAL EVERY 6 HOURS SCHEDULED
Status: DISCONTINUED | OUTPATIENT
Start: 2021-03-27 | End: 2021-03-29 | Stop reason: HOSPADM

## 2021-03-27 RX ORDER — ONDANSETRON 2 MG/ML
4 INJECTION INTRAMUSCULAR; INTRAVENOUS EVERY 6 HOURS PRN
Status: DISCONTINUED | OUTPATIENT
Start: 2021-03-27 | End: 2021-03-29 | Stop reason: HOSPADM

## 2021-03-27 RX ORDER — OXYMETAZOLINE HYDROCHLORIDE 0.05 G/100ML
2 SPRAY NASAL 2 TIMES DAILY
Status: DISPENSED | OUTPATIENT
Start: 2021-03-27 | End: 2021-03-29

## 2021-03-27 RX ADMIN — MECLIZINE HYDROCHLORIDE 25 MG: 25 TABLET ORAL at 07:48

## 2021-03-27 RX ADMIN — CARVEDILOL 6.25 MG: 6.25 TABLET, FILM COATED ORAL at 17:10

## 2021-03-27 RX ADMIN — PREDNISONE 60 MG: 20 TABLET ORAL at 17:10

## 2021-03-27 RX ADMIN — OXYMETAZOLINE HYDROCHLORIDE 2 SPRAY: 0.5 SPRAY NASAL at 22:29

## 2021-03-27 RX ADMIN — ATORVASTATIN CALCIUM 10 MG: 10 TABLET, FILM COATED ORAL at 22:29

## 2021-03-27 RX ADMIN — LISINOPRIL 5 MG: 5 TABLET ORAL at 17:10

## 2021-03-27 RX ADMIN — DIAZEPAM 2.5 MG: 10 INJECTION, SOLUTION INTRAMUSCULAR; INTRAVENOUS at 14:09

## 2021-03-27 RX ADMIN — MECLIZINE HYDROCHLORIDE 25 MG: 25 TABLET ORAL at 23:37

## 2021-03-27 RX ADMIN — SODIUM CHLORIDE 80 ML: 9 INJECTION, SOLUTION INTRAVENOUS at 08:38

## 2021-03-27 RX ADMIN — SODIUM CHLORIDE 500 ML: 9 INJECTION, SOLUTION INTRAVENOUS at 07:47

## 2021-03-27 RX ADMIN — MECLIZINE HYDROCHLORIDE 25 MG: 25 TABLET ORAL at 17:10

## 2021-03-27 RX ADMIN — LORATADINE 10 MG: 10 TABLET ORAL at 17:10

## 2021-03-27 RX ADMIN — LORAZEPAM 1 MG: 2 INJECTION INTRAMUSCULAR; INTRAVENOUS at 11:33

## 2021-03-27 RX ADMIN — SODIUM CHLORIDE 1000 ML: 9 INJECTION, SOLUTION INTRAVENOUS at 17:10

## 2021-03-27 RX ADMIN — PSEUDOEPHEDRINE HCL 120 MG: 120 TABLET, FILM COATED, EXTENDED RELEASE ORAL at 20:57

## 2021-03-27 RX ADMIN — GADOBUTROL 6 ML: 604.72 INJECTION INTRAVENOUS at 12:18

## 2021-03-27 RX ADMIN — RIVAROXABAN 20 MG: 10 TABLET, FILM COATED ORAL at 17:10

## 2021-03-27 RX ADMIN — GUAIFENESIN 1200 MG: 600 TABLET, EXTENDED RELEASE ORAL at 20:57

## 2021-03-27 RX ADMIN — IOPAMIDOL 70 ML: 755 INJECTION, SOLUTION INTRAVENOUS at 08:38

## 2021-03-27 RX ADMIN — SODIUM CHLORIDE 1000 ML: 0.9 INJECTION, SOLUTION INTRAVENOUS at 14:09

## 2021-03-27 ASSESSMENT — ENCOUNTER SYMPTOMS
WEAKNESS: 0
SHORTNESS OF BREATH: 0
HEADACHES: 0
NUMBNESS: 0
ABDOMINAL PAIN: 0
FREQUENCY: 0
DIZZINESS: 1
SPEECH DIFFICULTY: 0
NAUSEA: 1
DYSURIA: 0

## 2021-03-27 ASSESSMENT — MIFFLIN-ST. JEOR: SCORE: 1033.24

## 2021-03-27 NOTE — PLAN OF CARE
ROOM # 208-2    Living Situation (if not independent, order SW consult): Home with   Facility name: [facility]  : [facility]- [phone]    Activity level at baseline: Independent with walker   Activity level on admit: Assist of two      Patient registered to observation; given Patient Bill of Rights; given the opportunity to ask questions about observation status and their plan of care.  Patient has been oriented to the observation room, bathroom and call light is in place.    Discussed discharge goals and expectations with patient/family.

## 2021-03-27 NOTE — ED PROVIDER NOTES
"  History   Chief Complaint:  Dizziness       HPI   Veronica Bray is a 89 year old female with history of hypertension, CAD s/p CABG, neuropathy and macular degeneration who presents with dizziness.  The patient states that today, after waking up at 0600 and ambulating to the kitchen with her walker, she began experiencing dizziness, which she states felt like, \"I'm going to tip over,\" that also worsens when she turns her head to the left.  She fell asleep at 2200 feeling normal last night.  Secondary to the dizziness, she endorses some nausea and intermittent shooting pains in her left ear which had been going on for several days.  She also feels congested.  She denies any headache, new numbness and tingling in her arms/legs, speech issues, new vision changes or loss of vision, and ringing in ears. She also denies any chest pain, shortness of breath, abdominal pain and urinary symptoms.  She has no new medicine changes.    Per nurses, she had no trauma prior to onset, and upon arrival had a blood sugar of 90 and was in sinus rhythm.    Review of Systems   HENT: Positive for congestion and ear pain (left).         No ringing in ears   Eyes: Negative for visual disturbance.   Respiratory: Negative for shortness of breath.    Cardiovascular: Negative for chest pain.   Gastrointestinal: Positive for nausea. Negative for abdominal pain.   Genitourinary: Negative for dysuria, frequency and urgency.   Neurological: Positive for dizziness. Negative for speech difficulty, weakness, numbness and headaches.   All other systems reviewed and are negative.      Allergies:  Aleve [Naproxen]  Celebrex [Celecoxib]  Codeine  Demerol [Meperidine]  Hydromorphone  Percocet [Oxycodone-Acetaminophen]  Tramadol  Vicodin [Hydrocodone-Acetaminophen]    Medications:  Aspirin  Lipitor  Coreg  Neurontin  Lisinopril  Prednisone  Xarelto    Past Medical History:    A fib  CAD  Cardiomyopathy  Hyperlipidemia  Hypertension  Left bundle branch " block  Mitral regurgitation  NSTEMI  Sleep apnea  SVT  Thrombocytopenia  Neuropathy  Hemothorax, left  Coronary arthrosclerosis  Macular degeneration retina  Chronic ischemic heart disease  CABG x2    Past Surgical History:    Cholecystectomy  Hysterectomy  Left heart cath  Hip surgery  Knee surgery  Wrist surgery    Family History:    Mother: MI    Social History:  Patient presents to ED via EMS.   She lives in independent living.    Physical Exam     Patient Vitals for the past 24 hrs:   BP Temp Temp src Pulse Resp SpO2 Weight   21 1130 99/63 -- -- 68 25 97 % --   21 1115 -- -- -- 61 20 97 % --   21 1100 131/45 -- -- 61 20 98 % --   21 1045 -- -- -- 60 20 99 % --   21 1030 125/46 -- -- 59 26 100 % --   21 1015 -- -- -- 64 21 99 % --   21 1000 122/56 -- -- 56 14 99 % --   21 0945 -- -- -- 53 19 98 % --   21 0935 -- -- -- 66 14 98 % --   21 0930 129/53 -- -- 68 23 (!) 84 % --   21 0915 137/57 -- -- 73 21 (!) 87 % --   21 0815 122/48 -- -- 61 23 95 % --   21 0800 (!) 148/45 -- -- 60 22 -- --   21 0745 (!) 153/59 -- -- 62 -- 95 % --   21 0730 (!) 159/57 -- -- 70 -- 94 % --   21 0717 (!) 166/68 97.6  F (36.4  C) Oral 63 16 93 % 63.5 kg (140 lb)       Physical Exam  General: Alert, no acute distress  Neuro:    Alert and oriented x 3.    Speech is normal and fluent.   Face is symmetric without droop. CN's II-XII grossly intact. Negative pronator drift. Finger to nose symmetric and grossly normal bilaterally.    Right Arm: Good  strength. 5/5 elbow flexion. 5/5 elbow extension. Sensation intact to light touch.   Left Arm: Good  strength. 5/5 elbow flexion. 5/5 elbow extension. Sensation intact to light touch.   Right Le/5 straight leg raise, 5/5 ankle dorsiflexion, 5/5 ankle plantar flexion. Sensation intact to light touch.   Left Le/5 straight leg raise, 5/5 ankle dorsiflexion, 5/5 ankle plantar flexion.  Sensation intact to light touch.            Romberg and gait: Deferred  HEENT:  Moist mucous membranes. Conjunctiva normal. TMs clear bilaterally  CV:  RRR, 1/6 SYS murmur, skin warm and well perfused  Pulm:  CTAB, no wheezes/ronchi/rales.  No acute distress, breathing comfortably  GI:  Soft, nontender, nondistended.  No rebound or guarding.  Normal bowel sounds  MSK:  Moving all extremities.  No focal areas of edema, erythema, or tenderness  Skin:  WWP, no rashes, no lower extremity edema, skin color normal, no diaphoresis  Psych:  Well-appearing, normal affect, regular speech    Emergency Department Course   ECG  ECG taken at 0721, ECG read at 0730  Normal sinus rhythm  Left bundle branch block  Abnormal ECG   Rate 60 bpm. GA interval 164 ms. QRS duration 136 ms. QT/QTc 474/474 ms. P-R-T axes 67 -3 108.     Imaging:  MR Brain w/o & w Contrast  IMPRESSION:  1. No evidence for acute intracranial abnormality. Specifically, no  acute infarct.  2. Mild generalized brain parenchymal volume loss and presumed chronic  small vessel ischemic disease.    As read by radiology.    CTA Head Neck with Contrast  IMPRESSION:  1. Atherosclerotic disease at the carotid bifurcations with less than  50% stenosis by NASCET criteria bilaterally.  2. No significant stenosis of the cervical vertebral arteries.  3. No high-grade stenosis or large vessel occlusion involving the  major proximal intracranial arteries of the [place].  4. 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    As read by radiology.    Head CT w/o contrast  IMPRESSION:     1. Small hypodense focus in the medial aspect of the right thalamus,  which is concerning for a lacunar infarct. This is age indeterminate,  but favored to be chronic. If further characterization is required  clinically, MRI may be helpful to rule out a more recent infarct.  2. Mild generalized brain  parenchymal volume loss.  3. No acute intracranial hemorrhage or mass effect.    As read by radiology.    Laboratory:  CBC: WBC 3.1(L), HGB 12.8, (L)   BMP: Calcium: 8.2(L) o/w WNL (Creatinine 0.67)   Troponin (Collected 0751): <0.015  Troponin (Collected 1041): 0.021    Creatinine POCT: Creatinine 0.7, GFR estimate 79    UA with microscopic: all WNL     Asymptomatic COVID19 Virus PCR by nasopharyngeal swab NEGATIVE    Emergency Department Course:    Reviewed:  I reviewed nursing notes, vitals, past medical history and care everywhere    Assessments:  0745 I obtained history and examined the patient as noted above.   1200 I rechecked the patient and explained findings.   1332 Patient rechecked and updated.      Consults:   1324 I spoke with Ketty Traylor for [facility]'s presentation, findings, and plan of care.      Interventions:  0747  mL IV Bolus  0748 Meclizine 25 mg PO  1133 Ativan 1 mg IV    Disposition:  The patient was admitted to the hospital under the care of Dr. Xavier Aguilar.       Impression & Plan       Medical Decision Making:  Veronica Bray is a 89 year old female wit history of CAD s/p CABG, peripheral neuropathy, macular degeneration who presents with sudden onset dizziness which seems to be positional (worse with standing or turning head to the left).  She is slightly hypertensive to SBP 160s on arrival, otherwise vitally stable.  Neuro exam is nonfocal - doubt stroke or central etiology for her dizziness initially.  I did consider other causes including peripheral vertigo, severe electrolyte disturbances, UTI, orthostatic hypotension, cardiac dysrhythmia, atypical ACS amongst others.  CTA head/neck without significant stenosis; CT head initially concerning for possible lacunar infarct but follow up MRI was unremarakble.  EKG shows known LBBB without acute ischemic appearance based on S[place] criteria.  Troponin x 2 is WNL.  The rest of  the patient's labs are likewise unremarkable and UA is normal.  Patient continues to have dizziness despite the above interventions and therefore is a poor candidate for discharge.  She will be admitted for hospital observation for continued symptomatic treatment for likely peripheral dizziness (given sudden onset and positional).  Discussed care with medicine team who accepted the patient.      Covid-19  Veronica Bray was evaluated during a global COVID-19 pandemic, which necessitated consideration that the patient might be at risk for infection with the SARS-CoV-2 virus that causes COVID-19.   Applicable protocols for evaluation were followed during the patient's care.   COVID-19 was considered as part of the patient's evaluation. The plan for testing is:  a test was obtained during this visit.    Diagnosis:    ICD-10-CM    1. Dizziness  R42        Scribe Disclosure:  Usha FORRESTER, am serving as a scribe at 7:13 AM on 3/27/2021 to document services personally performed by Harsh King MD based on my observations and the provider's statements to me.     [facility]       Harsh King MD  03/27/21 2952

## 2021-03-27 NOTE — H&P
[facility]  History and Physical  Hospitalist       Date of Admission:  3/27/2021    Assessment & Plan   Veronica Bray is a 89 year old female with PAF on Xarelto, VALENTIN on CPAP, tachy-induced CM, ASCVD s/p MI and CABG 2006, LBBB, HTN, DLD, vague paraesthesias followed by Neurology and PAD/carotid stenosis who presented to [facility] ED from independent living on 3/27/2021 with sudden onset dizziness.  BMP within normal limits.  CBC notable for WBC 3.1, Hgb 23.8, and Plt 148.  UA bland.  CT Head and CTA Head/Neck negative for acute process.  MRI Brain without acute infarct.  Meclizine and valium trailed in ED but patient had ongoing dizziness.  Admission requested.     #Dizziness  Significant allergies / sinus congestion.  Suspect vestibular neuritis.  No e/o CVA based on imaging.  No focal motor deficits.  Bubble study negative in Sept 2020.   - schedule meclizine + low-dose valium  - Add prednisone taper  - fluids  - antiemetics  - PT eval in AM    #PAF on Xarelto  #ASCVD s/p MI  #H/o NICM  #HTN / DLD  Continue PTA carvedilol, lisinopril, ASA, atorvastatin, and Xarelto    COVID status:  Negative  DVT Prophylaxis: Xarelto  Code Status: Full Code     Disposition: Expected discharge in 1-2 days pending improvement in symptoms      Ketty Traylor, PAC  Hospitalist Service  [facility]  Text Page (7AM - 5PM, M-F)      PRIMARY CARE PROVIDER: Concpecion Mccall    CHIEF COMPLAINT  Dizziness    History is obtained from the patient    HISTORY OF PRESENT ILLNESS  Veronica Bray is a 89 year old female with PAF on Xarelto, VALENTIN on CPAP, tachy-induced CM, ASCVD s/p MI and CABG 2006, LBBB, HTN, DLD, vague paraesthesias followed by Neurology and PAD/carotid stenosis who presented to [facility] ED from independent living on 3/27/2021 with sudden onset dizziness.  Patient was at home today when she developed sudden onset of dizziness.  She has been struggling with some allergies and sinus  congestion, although this is not necessarily new.  She notes today is much worse than normal.  She has also been noticing some clicking and piercing pain in her left ear over the last week or so.  She denies significant hearing loss.  She is currently home alone as her  is at [facility] after tearing his rotator cuff.  She denies fever, chills, sweats, diarrhea, abdominal pain.  She came in for further evaluation.    In the ED, /68, HR 63, RR 16, SPO2 93% at rest on room air, temperature 97.6 .  BMP within normal limits.  CBC notable for WBC 3.1, Hgb 23.8, and Plt 148.  UA bland.  CT Head and CTA Head/Neck negative for acute process.  MRI Brain without acute infarct.  Meclizine and valium trailed in ED with resulting hypoxia but unfortunately did not provide improvement in symptoms.  Admission requested.     Patient was able to get up from the bed and transition over to the chair with a standby assist.  She does have some quite significant dizziness with motion of the body, not necessarily just the head.  She has a good appetite.  Overall, her current complaints are related to her sinus congestion rather than dizziness.    PAST MEDICAL HISTORY    1. PAF on Xarelto  2. Tachy-induced CM  3. Pulmonary hypertension.    4. HFpEF.  Echo 9/2020 showed EF 55-6% with moderate-to-severe LVH, increased LV filling pressures, G1 LVDD, moderate pulmonary hypertension, normal RVSF, 1-2+ MR, 1-2+ TR, 1-2+ pulm regurg, negative bubble.   5. ASCVD s/p MI and CABG 2006 and PCI  6. LBBB  7. Hypertension  8. Dyslipidemia  9. VALENTIN on CPAP  10. History of tobacco use  11. Macular degeneration  12. Peripheral neuropathy versus vague paresthesias, followed by Neurology      PAST SURGICAL HISTORY  I have reviewed this patient's surgical history and updated it with pertinent information if needed.  Past Surgical History:   Procedure Laterality Date     CHOLECYSTECTOMY       CORONARY ANGIOGRAPHY ADULT ORDER  1/2005    Stent: Distal  cfx, Mid LAD, Stent: D -1     GYN SURGERY      hysterectomy     HC LEFT HEART CATHETERIZATION  11/2006    mid distal-anterior/ distal inferior/ Apical Yossi, 85     HC LEFT HEART CATHETERIZATION  5/2015    100% Proximal LAD, patent LIMA-LAD, SVG-D1, LVEDP 13, 10-18 mmHg AV gradient, 3+ MR after PVC     ORTHOPEDIC SURGERY      hip, knee, wrist       PRIOR TO ADMISSION MEDICATIONS  Prior to Admission Medications   Prescriptions Last Dose Informant Patient Reported? Taking?   Cholecalciferol (VITAMIN D3) 50 MCG (2000 UT) CAPS 3/26/2021  Yes Yes   Sig: Take 2 capsules by mouth once daily   Multiple Vitamins-Minerals (PRESERVISION AREDS 2) CAPS 3/26/2021 at pm  Yes Yes   Sig: Take 1 capsule by mouth 2 times daily    Omega-3 Fatty Acids (FISH OIL PO) 3/25/2021  Yes Yes   Sig: Take by mouth daily   Pseudoephedrine-guaiFENesin (MUCINEX D PO) 3/26/2021 at pm  Yes Yes   Sig: Take 2 tablets by mouth once daily   acetaminophen (TYLENOL) 500 MG tablet 3/26/2021  Yes Yes   Sig: Take 500-1,000 mg by mouth every 6 hours as needed for mild pain   aspirin 81 MG EC tablet 3/26/2021  Yes Yes   Sig: Take 81 mg by mouth every other day   atorvastatin (LIPITOR) 10 MG tablet 3/26/2021 at pm  No Yes   Sig: Take 1 tablet (10 mg) by mouth At Bedtime   carvedilol (COREG) 6.25 MG tablet 3/26/2021 at pm  No Yes   Sig: TAKE 1 TABLET TWICE DAILY  WITH MEALS   cetirizine (ZYRTEC) 10 MG tablet 3/26/2021 at pm  Yes Yes   Sig: Take 10 mg by mouth daily otc   gabapentin (NEURONTIN) 100 MG capsule Past Month  No Yes   Sig: TAKE 1 TO 3 CAPSULES       (100-300 MG) DAILY AS      NEEDED FOR NEUROPATHY.   lisinopril (ZESTRIL) 5 MG tablet 3/26/2021 at am  No Yes   Sig: TAKE 1 TABLET DAILY   rivaroxaban ANTICOAGULANT (XARELTO ANTICOAGULANT) 20 MG TABS tablet 3/26/2021 at pm  No Yes   Sig: Take 1 tablet (20 mg) by mouth daily (with dinner)      Facility-Administered Medications: None       ALLERGIES  Allergies   Allergen Reactions     Aleve [Naproxen]  Anaphylaxis     Celebrex [Celecoxib]      Codeine      Demerol [Meperidine]      Hydromorphone      Percocet [Oxycodone-Acetaminophen]      Tramadol      Vicodin [Hydrocodone-Acetaminophen]        SOCIAL HISTORY  Lives independently in a home with her .  Nonsmoker.  No alcohol use.  Son lives nearby.  Typically IADL however these are limited by her baseline macular degeneration.    FAMILY HISTORY  I have reviewed this patient's family history and updated it with pertinent information if needed.   Family History   Problem Relation Age of Onset     Unknown/Adopted Mother      Myocardial Infarction Mother      Unknown/Adopted Father      Myocardial Infarction Brother         Rheumatic fever     Myocardial Infarction Brother      Myocardial Infarction Brother      Heart Disease Brother        REVIEW OF SYSTEMS:  14 point comprehensive ROS undertaken with pertinent positives and negatives as above and otherwise unremarkable.     PHYSICAL EXAM  Temp: 97.4  F (36.3  C) Temp src: Oral BP: (!) 166/48 Pulse: 75   Resp: 20 SpO2: 93 % O2 Device: None (Room air) Oxygen Delivery: 3 LPM  Vital Signs with Ranges  Temp:  [97.2  F (36.2  C)-97.6  F (36.4  C)] 97.4  F (36.3  C)  Pulse:  [53-75] 75  Resp:  [13-29] 20  BP: ()/(42-68) 166/48  SpO2:  [84 %-100 %] 93 %  144 lbs 6.4 oz    GENERAL:  Pleasant, cooperative, alert. Well developed, well nourished.   HEENT: Normocephalic, atraumatic.  Extra occular mm intact.  Sclera injected on right. PERRL. Slight nystagmus beating right.    PULMONARY: Clear to auscultation bilaterally   CARDIAC: Regular rate and rhythm.    ABDOMEN: Soft, nontender   MUSCULOSKELETAL:  Moving x 4 spontaneously with CMS intact x4.  Normal bulk and tone.    NEURO: Alert and oriented x3.  CN II-XII grossly intact and symmetric.  No ataxia or tremor.  Vision impairment noted but no obvious focal neuro deficits.   SKIN:  Warm, pink, dry.    DATA  Data reviewed today:  I personally reviewed no images or  EKG's today.    Recent Labs   Lab 03/27/21  1041 03/27/21  0751   WBC  --  3.1*   HGB  --  12.8   MCV  --  97   PLT  --  148*   NA  --  141   POTASSIUM  --  4.1   CHLORIDE  --  107   CO2  --  30   BUN  --  18   CR  --  0.67   ANIONGAP  --  4   BARRETT  --  8.2*   GLC  --  84   TROPI 0.021 0.017       Recent Results (from the past 24 hour(s))   Head CT w/o contrast    Narrative    CT SCAN OF THE HEAD WITHOUT CONTRAST   3/27/2021 8:53 AM     HISTORY: Dizziness, nonspecific.    TECHNIQUE: Axial images of the head and coronal reformations without  IV contrast material. Radiation dose for this scan was reduced using  automated exposure control, adjustment of the mA and/or kV according  to patient size, or iterative reconstruction technique.    COMPARISON: None.    FINDINGS: The ventricles are normal in size and configuration. Mild  generalized brain parenchymal volume loss. Punctate hypodense focus in  the medial aspect of the right thalamus (series 3 image 16) likely  representing an age-indeterminate lacunar infarct, favored to be  chronic. Otherwise, the brain parenchyma and extra-axial spaces appear  within normal limits for age on unenhanced CT. Scattered intracranial  atherosclerotic calcifications are seen involving the carotid siphons  and bilateral vertebral arteries. No intracranial hemorrhage or mass  effect.    Bilateral lens replacements. The visualized orbits otherwise appear  normal. The visualized aspects of the paranasal sinuses and mastoid  and middle ear cavities are clear. Degenerative changes of the  bilateral temporomandibular joints are seen. Partially visualized  degenerative changes of the upper cervical spine. The bony calvarium  and bones of the skull base appear intact.       Impression    IMPRESSION:     1. Small hypodense focus in the medial aspect of the right thalamus,  which is concerning for a lacunar infarct. This is age indeterminate,  but favored to be chronic. If further characterization  is required  clinically, MRI may be helpful to rule out a more recent infarct.  2. Mild generalized brain parenchymal volume loss.  3. No acute intracranial hemorrhage or mass effect.    HERMINIO CANTRELL MD   CTA Head Neck with Contrast    Narrative    CT ANGIOGRAM OF THE HEAD AND NECK WITH CONTRAST  3/27/2021 9:17 AM     HISTORY: Dizziness, nonspecific.    TECHNIQUE: CT angiography with an injection of 70mL Isovue-370 IV with  scans through the head and neck. Images were transferred to a separate  3-D workstation where multiplanar reformations and 3-D images were  created. Estimates of carotid stenoses are made relative to the distal  internal carotid artery diameters except as noted. Radiation dose for  this scan was reduced using automated exposure control, adjustment of  the mA and/or kV according to patient size, or iterative  reconstruction technique.    COMPARISON: CT head of same day.     CT HEAD FINDINGS: No contrast enhancing lesions. Cerebral blood flow  is grossly normal.     CT ANGIOGRAM HEAD FINDINGS: There is bilateral carotid siphon  atherosclerotic disease with mild stenosis of the cavernous segment of  the right internal carotid artery and no significant stenosis in the  left intracranial internal carotid artery. There is a small  laterally-directed focal outpouching of the proximal to mid right  cavernous segment of the right internal carotid artery measuring up to  4 mm from base to dome, concerning for a small aneurysm (series 6  image 333).    CT ANGIOGRAM NECK FINDINGS: Scattered atherosclerosis of the aortic  arch without significant stenosis at the origins of the great vessels.      Right carotid artery: The right common and internal carotid arteries  are patent. Atherosclerotic disease at the carotid bifurcation and  proximal internal carotid artery with less than 50% stenosis by NASCET  criteria.     Left carotid artery: The left common and internal carotid arteries are  patent.  Atherosclerotic disease at the carotid bifurcation and  proximal internal carotid artery with less than 50% stenosis by NASCET  criteria.     Vertebral arteries: Vertebral arteries are patent without evidence of  dissection. Mild atherosclerosis of the left vertebral artery origin  without significant stenosis. The left vertebral artery is dominant.     Other findings: There is a subcentimeter left thyroid nodule without  aggressive features, not requiring follow-up by imaging criteria.  Multilevel degenerative changes are seen in the cervical spine.      Impression    IMPRESSION:  1. Atherosclerotic disease at the carotid bifurcations with less than  50% stenosis by NASCET criteria bilaterally.  2. No significant stenosis of the cervical vertebral arteries.  3. No high-grade stenosis or large vessel occlusion involving the  major proximal intracranial arteries of the [place].  4. 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    HERMINIO CANTRELL MD   MR Brain w/o & w Contrast    Narrative    MRI BRAIN WITHOUT AND WITH CONTRAST  3/27/2021 12:29 PM     HISTORY: Dizziness, nonspecific. Dizziness, follow-up CT finding for  possible thalamus infarct.    TECHNIQUE: Multiplanar, multisequence MRI of the brain without and  with 6 mL Gadavist.     COMPARISON: CT head 3/27/2021.     FINDINGS: No definite correlate seen for the tiny hypodense focus  along the medial aspect of the right thalamus on recent head CT. No  evidence for acute infarct. Mild patchy T2/FLAIR hyperintense signal  changes in the cerebral white matter, nonspecific, but likely due to  chronic small vessel ischemic disease. There is mild generalized brain  parenchymal volume loss. The ventricles are normal in size and  configuration. No acute intracranial hemorrhage, extra axial fluid  collection, mass lesion or herniation. No abnormal intracranial  postcontrast  enhancement is identified. Bilateral lens replacements.  The visualized orbits otherwise appear normal. Trace scattered  paranasal sinus mucosal thickening. The calvarium, skull base and mid  face are otherwise unremarkable.      Impression    IMPRESSION:  1. No evidence for acute intracranial abnormality. Specifically, no  acute infarct.  2. Mild generalized brain parenchymal volume loss and presumed chronic  small vessel ischemic disease.    HERMINIO CANTRELL MD

## 2021-03-27 NOTE — ED TRIAGE NOTES
Pt arrives via EMS from independent living d/t dizziness and nausea around 0600 today. No CP, SOB, HA, visual changes, gait issues, speech changes, facial droop, or falls. Hx afib, on Xerelto. BG 90. Initial , repeat 160. 4 mg Zofran given. ABC intact. A&O x4.

## 2021-03-27 NOTE — ED NOTES
Bed: ED15  Expected date: 3/27/21  Expected time: 7:04 AM  Means of arrival: Ambulance  Comments:  Greer CaputoyoPARAMJIT HTN

## 2021-03-27 NOTE — ED NOTES
"Orthostatic Blood Pressures completed, patient was able to get herself from laying to sitting position without assistance.  States \"a little dizzy\" with position change from laying to sitting.  Required standby assistance when standing, patient states \" More dizzy with standing\".  Patient appeared unsteady and wobbly. Noted SP02 reading 87% room air while doing orthostatic BPS.  Assisted patient back into bed. RN/MD notified  "

## 2021-03-27 NOTE — ED NOTES
[facility]  ED Nurse Handoff Report    Veronica Bray is a 89 year old female   ED Chief complaint: Dizziness  . ED Diagnosis:   Final diagnoses:   Dizziness     Allergies:   Allergies   Allergen Reactions     Aleve [Naproxen] Anaphylaxis     Celebrex [Celecoxib]      Codeine      Demerol [Meperidine]      Hydromorphone      Percocet [Oxycodone-Acetaminophen]      Tramadol      Vicodin [Hydrocodone-Acetaminophen]        Code Status: Full Code  Activity level - Baseline/Home:  Assist X 1. Activity Level - Current:   Assist X 2. Lift room needed: No. Bariatric: No   Needed: No   Isolation: No. Infection: Not Applicable.     Vital Signs:   Vitals:    03/27/21 1300 03/27/21 1330 03/27/21 1340 03/27/21 1350   BP: 134/50 (!) 140/42     Pulse: 66 64 60 67   Resp: 15 20 20 25   Temp:       TempSrc:       SpO2: 98%  99% 96%   Weight:           Cardiac Rhythm:  ,      Pain level:    Patient confused: No. Patient Falls Risk: Yes.   Elimination Status: Has voided   Patient Report - Initial Complaint: Pt arrives via EMS from independent living d/t dizziness and nausea around 0600 today. No CP, SOB, HA, visual changes, gait issues, speech changes, facial droop, or falls. Hx afib, on Xerelto. BG 90. Initial , repeat 160. 4 mg Zofran given. ABC intact. A&O x4. Focused Assessment: Dizziness, like room is spinning. No CP or SOB   Tests Performed:  Labs, MRI/CT. Abnormal Results:   Labs Ordered and Resulted from Time of ED Arrival Up to the Time of Departure from the ED   CBC WITH PLATELETS DIFFERENTIAL - Abnormal; Notable for the following components:       Result Value    WBC 3.1 (*)     MCHC 30.7 (*)     Platelet Count 148 (*)     All other components within normal limits   BASIC METABOLIC PANEL - Abnormal; Notable for the following components:    Calcium 8.2 (*)     All other components within normal limits   TROPONIN I   ROUTINE UA WITH MICROSCOPIC   CREATININE POCT   TROPONIN I   SARS-COV-2  (COVID-19) VIRUS RT-PCR   ISTAT CREATININE NURSING POCT   ORTHOSTATIC BLOOD PRESSURE AND PULSE     MR Brain w/o & w Contrast   Preliminary Result   IMPRESSION:   1. No evidence for acute intracranial abnormality. Specifically, no   acute infarct.   2. Mild generalized brain parenchymal volume loss and presumed chronic   small vessel ischemic disease.      CTA Head Neck with Contrast   Preliminary Result   IMPRESSION:   1. Atherosclerotic disease at the carotid bifurcations with less than   50% stenosis by NASCET criteria bilaterally.   2. No significant stenosis of the cervical vertebral arteries.   3. No high-grade stenosis or large vessel occlusion involving the   major proximal intracranial arteries of the [place] of [place].   4. Small laterally-directed suspected aneurysm measuring 4 mm arising   from the cavernous segment of the right internal carotid artery.      Head CT w/o contrast   Preliminary Result   IMPRESSION:      1. Small hypodense focus in the medial aspect of the right thalamus,   which is concerning for a lacunar infarct. This is age indeterminate,   but favored to be chronic. If further characterization is required   clinically, MRI may be helpful to rule out a more recent infarct.   2. Mild generalized brain parenchymal volume loss.   3. No acute intracranial hemorrhage or mass effect.         Treatments provided: see MAR  Family Comments: pt [facility] at [facility] living.  [facility]  OBS brochure/video discussed/provided to patient:  Yes  ED Medications:   Medications   sodium chloride 0.9% infusion (has no administration in time range)   diazepam (VALIUM) injection 2.5 mg (has no administration in time range)   meclizine (ANTIVERT) tablet 25 mg (25 mg Oral Given 3/27/21 1007)   0.9% sodium chloride BOLUS (0 mLs Intravenous Stopped 3/27/21 6933)   CT Scan Flush (80 mLs Intravenous Given 3/27/21 0838)   iopamidol (ISOVUE-370) solution 500 mL (70 mLs Intravenous Given 3/27/21 0838)   LORazepam  (ATIVAN) injection 1 mg (1 mg Intravenous Given 3/27/21 1133)   gadobutrol (GADAVIST) injection 7.5 mL (6 mLs Intravenous Given 3/27/21 1218)     Drips infusing:  No  For the majority of the shift, the patient's behavior Green. Interventions performed were n/a.    Sepsis treatment initiated: No     Patient tested for COVID 19 prior to admission: YES    ED Nurse Name/Phone Number: Shannen Anderson RN,   1:59 PM    RECEIVING UNIT ED HANDOFF REVIEW    Above ED Nurse Handoff Report was reviewed: Yes  Reviewed by: Cora Davis RN on March 27, 2021 at 2:49 PM

## 2021-03-27 NOTE — PHARMACY-ADMISSION MEDICATION HISTORY
Admission medication history interview status for this patient is complete. See [facility] admission navigator for allergy information, prior to admission medications and immunization status.     Medication history interview done, indicate source(s): Patient  Medication history resources (including written lists, pill bottles, clinic record):[facility] list  Pharmacy: [facility] mail service    Changes made to PTA medication list:  Added: zyrtec  Deleted: allegra and prednisone  Changed: vitamin D3    Actions taken by pharmacist (provider contacted, etc):None     Additional medication history information:None    Medication reconciliation/reorder completed by provider prior to medication history?  N    Prior to Admission medications    Medication Sig Last Dose Taking? Auth Provider   acetaminophen (TYLENOL) 500 MG tablet Take 2 tablets (1,000 mg) by mouth 3 times daily  Patient taking differently: Take 500 mg by mouth every 6 hours as needed  3/26/2021 at am Yes Jarek Stringer MD   aspirin 81 MG EC tablet Take 81 mg by mouth every other day 3/26/2021 Yes Unknown, Entered By History   atorvastatin (LIPITOR) 10 MG tablet Take 1 tablet (10 mg) by mouth At Bedtime 3/26/2021 at pm Yes Concepcion Mccall MD   carvedilol (COREG) 6.25 MG tablet TAKE 1 TABLET TWICE DAILY  WITH MEALS 3/26/2021 at pm Yes Concepcion Mccall MD   cetirizine (ZYRTEC) 10 MG tablet Take 10 mg by mouth daily otc 3/26/2021 at pm Yes Unknown, Entered By History   Cholecalciferol (VITAMIN D3) 50 MCG (2000 UT) CAPS Take 2 capsules by mouth once daily 3/26/2021 Yes Reported, Patient   gabapentin (NEURONTIN) 100 MG capsule TAKE 1 TO 3 CAPSULES       (100-300 MG) DAILY AS      NEEDED FOR NEUROPATHY. Past Month Yes Concepcion Mccall MD   lisinopril (ZESTRIL) 5 MG tablet TAKE 1 TABLET DAILY 3/26/2021 at am Yes Concepcion Mccall MD   Multiple Vitamins-Minerals (PRESERVISION AREDS 2) CAPS Take 1 capsule by mouth 2 times daily  3/26/2021 at pm Yes Reported,  Patient   Omega-3 Fatty Acids (FISH OIL PO) Take by mouth daily 3/25/2021 Yes Reported, Patient   Pseudoephedrine-guaiFENesin (MUCINEX D PO) Take 2 tablets by mouth once daily 3/26/2021 at pm Yes Reported, Patient   rivaroxaban ANTICOAGULANT (XARELTO ANTICOAGULANT) 20 MG TABS tablet Take 1 tablet (20 mg) by mouth daily (with dinner) 3/26/2021 at pm Yes Ip, Tr Carrera MD

## 2021-03-28 ENCOUNTER — APPOINTMENT (OUTPATIENT)
Dept: PHYSICAL THERAPY | Facility: CLINIC | Age: 86
DRG: 149 | End: 2021-03-28
Attending: PHYSICIAN ASSISTANT
Payer: MEDICARE

## 2021-03-28 PROBLEM — R09.81 SINUS CONGESTION: Status: ACTIVE | Noted: 2021-03-28

## 2021-03-28 PROBLEM — H81.20 VESTIBULAR NEURITIS, UNSPECIFIED LATERALITY: Status: ACTIVE | Noted: 2021-03-28

## 2021-03-28 PROBLEM — H10.45 CHRONIC ALLERGIC CONJUNCTIVITIS: Status: ACTIVE | Noted: 2021-03-28

## 2021-03-28 PROBLEM — H81.20 VESTIBULAR NEURONITIS, UNSPECIFIED LATERALITY: Status: ACTIVE | Noted: 2021-03-28

## 2021-03-28 LAB
ANION GAP SERPL CALCULATED.3IONS-SCNC: 7 MMOL/L (ref 3–14)
BUN SERPL-MCNC: 19 MG/DL (ref 7–30)
CALCIUM SERPL-MCNC: 8.3 MG/DL (ref 8.5–10.1)
CHLORIDE SERPL-SCNC: 105 MMOL/L (ref 94–109)
CO2 SERPL-SCNC: 27 MMOL/L (ref 20–32)
CREAT SERPL-MCNC: 0.61 MG/DL (ref 0.52–1.04)
GFR SERPL CREATININE-BSD FRML MDRD: 80 ML/MIN/{1.73_M2}
GLUCOSE SERPL-MCNC: 184 MG/DL (ref 70–99)
POTASSIUM SERPL-SCNC: 4.6 MMOL/L (ref 3.4–5.3)
SODIUM SERPL-SCNC: 139 MMOL/L (ref 133–144)

## 2021-03-28 PROCEDURE — 250N000013 HC RX MED GY IP 250 OP 250 PS 637: Performed by: PHYSICIAN ASSISTANT

## 2021-03-28 PROCEDURE — 97161 PT EVAL LOW COMPLEX 20 MIN: CPT | Mod: GP | Performed by: PHYSICAL THERAPIST

## 2021-03-28 PROCEDURE — G0378 HOSPITAL OBSERVATION PER HR: HCPCS

## 2021-03-28 PROCEDURE — 250N000012 HC RX MED GY IP 250 OP 636 PS 637: Performed by: PHYSICIAN ASSISTANT

## 2021-03-28 PROCEDURE — 80048 BASIC METABOLIC PNL TOTAL CA: CPT | Performed by: PHYSICIAN ASSISTANT

## 2021-03-28 PROCEDURE — 99232 SBSQ HOSP IP/OBS MODERATE 35: CPT | Performed by: PHYSICIAN ASSISTANT

## 2021-03-28 PROCEDURE — 36415 COLL VENOUS BLD VENIPUNCTURE: CPT | Performed by: PHYSICIAN ASSISTANT

## 2021-03-28 PROCEDURE — 258N000003 HC RX IP 258 OP 636: Performed by: PHYSICIAN ASSISTANT

## 2021-03-28 PROCEDURE — 97530 THERAPEUTIC ACTIVITIES: CPT | Mod: GP | Performed by: PHYSICAL THERAPIST

## 2021-03-28 PROCEDURE — 120N000004 HC R&B MS OVERFLOW

## 2021-03-28 RX ORDER — PSEUDOEPHEDRINE HCL 120 MG/1
120 TABLET, FILM COATED, EXTENDED RELEASE ORAL 2 TIMES DAILY
Qty: 14 TABLET | Refills: 1 | Status: SHIPPED | OUTPATIENT
Start: 2021-03-28 | End: 2021-04-04

## 2021-03-28 RX ORDER — MECLIZINE HYDROCHLORIDE 25 MG/1
25 TABLET ORAL 3 TIMES DAILY PRN
Qty: 30 TABLET | Refills: 0 | Status: SHIPPED | OUTPATIENT
Start: 2021-03-28 | End: 2021-03-28

## 2021-03-28 RX ORDER — MECLIZINE HYDROCHLORIDE 25 MG/1
25 TABLET ORAL 3 TIMES DAILY PRN
Qty: 30 TABLET | Refills: 0 | Status: SHIPPED | OUTPATIENT
Start: 2021-03-28 | End: 2021-01-01

## 2021-03-28 RX ORDER — PREDNISONE 10 MG/1
TABLET ORAL
Qty: 42 TABLET | Refills: 0 | Status: SHIPPED | OUTPATIENT
Start: 2021-03-29 | End: 2021-03-28

## 2021-03-28 RX ORDER — PREDNISONE 10 MG/1
TABLET ORAL
Qty: 34 TABLET | Refills: 0 | Status: SHIPPED | OUTPATIENT
Start: 2021-03-29 | End: 2021-04-07

## 2021-03-28 RX ORDER — GUAIFENESIN 600 MG/1
1200 TABLET, EXTENDED RELEASE ORAL 2 TIMES DAILY
Qty: 84 TABLET | Refills: 0 | Status: SHIPPED | OUTPATIENT
Start: 2021-03-28 | End: 2021-04-18

## 2021-03-28 RX ORDER — FLUTICASONE PROPIONATE 50 MCG
2 SPRAY, SUSPENSION (ML) NASAL DAILY
Qty: 16 G | Refills: 1 | Status: SHIPPED | OUTPATIENT
Start: 2021-03-28 | End: 2022-01-01

## 2021-03-28 RX ORDER — PREDNISONE 10 MG/1
TABLET ORAL
Qty: 36 TABLET | Refills: 0 | Status: SHIPPED | OUTPATIENT
Start: 2021-03-29 | End: 2021-03-28

## 2021-03-28 RX ADMIN — LORATADINE 10 MG: 10 TABLET ORAL at 08:45

## 2021-03-28 RX ADMIN — MECLIZINE HYDROCHLORIDE 25 MG: 25 TABLET ORAL at 23:41

## 2021-03-28 RX ADMIN — LISINOPRIL 5 MG: 5 TABLET ORAL at 08:45

## 2021-03-28 RX ADMIN — PSEUDOEPHEDRINE HCL 120 MG: 120 TABLET, FILM COATED, EXTENDED RELEASE ORAL at 19:42

## 2021-03-28 RX ADMIN — SODIUM CHLORIDE 1000 ML: 9 INJECTION, SOLUTION INTRAVENOUS at 05:32

## 2021-03-28 RX ADMIN — GUAIFENESIN 1200 MG: 600 TABLET, EXTENDED RELEASE ORAL at 19:41

## 2021-03-28 RX ADMIN — MECLIZINE HYDROCHLORIDE 25 MG: 25 TABLET ORAL at 18:18

## 2021-03-28 RX ADMIN — PREDNISONE 60 MG: 20 TABLET ORAL at 08:45

## 2021-03-28 RX ADMIN — CARVEDILOL 6.25 MG: 6.25 TABLET, FILM COATED ORAL at 18:18

## 2021-03-28 RX ADMIN — ATORVASTATIN CALCIUM 10 MG: 10 TABLET, FILM COATED ORAL at 23:41

## 2021-03-28 RX ADMIN — RIVAROXABAN 20 MG: 10 TABLET, FILM COATED ORAL at 18:18

## 2021-03-28 RX ADMIN — OXYMETAZOLINE HYDROCHLORIDE 2 SPRAY: 0.5 SPRAY NASAL at 19:42

## 2021-03-28 RX ADMIN — MECLIZINE HYDROCHLORIDE 25 MG: 25 TABLET ORAL at 13:59

## 2021-03-28 RX ADMIN — CARVEDILOL 6.25 MG: 6.25 TABLET, FILM COATED ORAL at 08:44

## 2021-03-28 RX ADMIN — PSEUDOEPHEDRINE HCL 120 MG: 120 TABLET, FILM COATED, EXTENDED RELEASE ORAL at 08:45

## 2021-03-28 RX ADMIN — ASPIRIN 81 MG: 81 TABLET ORAL at 08:45

## 2021-03-28 RX ADMIN — GUAIFENESIN 1200 MG: 600 TABLET, EXTENDED RELEASE ORAL at 08:45

## 2021-03-28 RX ADMIN — MECLIZINE HYDROCHLORIDE 25 MG: 25 TABLET ORAL at 05:32

## 2021-03-28 ASSESSMENT — MIFFLIN-ST. JEOR: SCORE: 1045.94

## 2021-03-28 NOTE — PLAN OF CARE
"BP (!) 162/56 (BP Location: Left arm)   Pulse 66   Temp 96.6  F (35.9  C) (Oral)   Resp 17   Ht 1.575 m (5' 2\")   Wt 66.8 kg (147 lb 3.2 oz)   SpO2 92%   BMI 26.92 kg/m      Neuro: WDL  Cardiac: WDL  Lungs: WDL  GI: WDL  : WDL  Pain: Denies   IV: Saline locked   Meds: Meclizine for dizziness   Labs/tests:   Diet: Regular   Activity: SBA with walker   Misc: Patient was unsteady when getting up with Provider, kept for another night.  Plan: Patient is stable and on room air. Will continue to monitor and provide cares.   +  "

## 2021-03-28 NOTE — CONSULTS
Care Management Initial Consult    General Information  Assessment completed with: Patient,    Type of CM/SW Visit: Initial Assessment    Primary Care Provider verified and updated as needed: Yes   Readmission within the last 30 days:        Reason for Consult: discharge planning  Advance Care Planning:            Communication Assessment  Patient's communication style: spoken language (English or Bilingual)    Hearing Difficulty or Deaf: no   Wear Glasses or Blind: (Macular degeneration)    Cognitive  Cognitive/Neuro/Behavioral: WDL                      Living Environment:   People in home: spouse(he is currently at a [facility] recovering from shoulder surg)     Current living Arrangements: independent living facility      Able to return to prior arrangements: yes       Family/Social Support:  Care provided by: self  Provides care for: no one  Marital Status:   , Children, Neighbor          Description of Support System: Supportive, Involved. States she has good support from her children, grandchildren and neighbors.        Current Resources:   Patient receiving home care services: No     Community Resources:    Equipment currently used at home: walker, rolling  Supplies currently used at home:      Employment/Financial:  Employment Status: retired        Financial Concerns: No concerns identified       Lifestyle & Psychosocial Needs:        Socioeconomic History     Marital status:      Spouse name: Not on file     Number of children: Not on file     Years of education: Not on file     Highest education level: Not on file     Tobacco Use     Smoking status: Former Smoker     Smokeless tobacco: Never Used     Tobacco comment: quit approx 1967    Substance and Sexual Activity     Alcohol use: Yes     Alcohol/week: 4.0 standard drinks     Types: 4 Glasses of wine per week     Comment: rarely     Drug use: No     Sexual activity: Yes     Partners: Male       Functional Status:  Prior to  admission patient needed assistance:    No       Mental Health Status:      Alert, pleasant, oriented, able to maintain conversation and make needs known    Chemical Dependency Status:      No concerns    Values/Beliefs:  Spiritual, Cultural Beliefs, Congregation Practices, Values that affect care: no               Additional Information:  Met with patient at bedside for discharge planning. Patient lives at [facility] with her . States  at TCU recovering from surgery. Patient states she prepares her own meals, manages medications, bathing and grooming. Patient not currently open with any services. Patient plans to discharge home. She is homebound as she does not drive due to macular degeneration.     Patient would like home care PT. States she is unable to get to outpatient PT. Patient has used [facility] in the past. She would like [facility] for PT HC support.     Will continue to follow for discharge plan of care.     Luis Carlos Staley RN Case Manager  Inpatient Care Coordination   [facility]   [phone]    Luis Carlos Staley RN

## 2021-03-28 NOTE — PROGRESS NOTES
Pt ambulated in the hallway Ax1 with gait belt and walker. C/o of lightheadness , denies dizziness.

## 2021-03-28 NOTE — PLAN OF CARE
o   PRIMARY DIAGNOSIS: VERTIGO/Vestibular Neuritis    OUTPATIENT/OBSERVATION GOALS TO BE MET BEFORE DISCHARGE  1. Orthostatic performed: N/A    2. Completion of appropriate imaging: N/A    3. Tolerating PO medications: Yes    4. Return to near baseline physical activity: Yes    5. Cleared for discharge by consultants (if involved): No    Vital signs remain stable.  LS clear, some diminishment in bases.    Adequate sats on room air.  Patient currently denies any pain.  Patient states that her dizziness has improved.  Patient is an assist of one with gai-tbelt and walker, ambulating to the bathroom prn without difficulty.    Maintenance IV continues of 0.9 NS at 75 ccs/hour.  Plan:  Continue to provide supportive cares.  Planned CT consult.      Discharge Planner Nurse   Safe discharge environment identified: Yes  Barriers to discharge: Yes, unless medically cleared.         Entered by: Karen M. Lesch 03/27/2021 9:26 PM     Please review provider order for any additional goals.   Nurse to notify provider when observation goals have been met and patient is ready for discharge.

## 2021-03-28 NOTE — PLAN OF CARE
Physical Therapy Discharge Summary    Reason for therapy discharge:    All goals and outcomes met, no further needs identified.    Progress towards therapy goal(s). See goals on Care Plan in [facility] electronic health record for goal details.  Goals met    Therapy recommendation(s):    Continued therapy is recommended.  Rationale/Recommendations:  OP vestibular rehab for any residual dizziness.

## 2021-03-28 NOTE — PROGRESS NOTES
[facility]  Medicine Progress Note - Hospitalist Service       Date of Admission:  3/27/2021  Assessment & Plan     Veronica Bray is a 89 year old female with PAF on Xarelto, VALENTIN on CPAP, tachy-induced CM, ASCVD s/p MI and CABG 2006, LBBB, HTN, DLD, vague paraesthesias followed by Neurology and PAD/carotid stenosis who presented to [facility] ED from independent living on 3/27/2021 with sudden onset dizziness.  BMP within normal limits.  CBC notable for WBC 3.1, Hgb 23.8, and Plt 148.  UA bland.  CT Head and CTA Head/Neck negative for acute process.  MRI Brain without acute infarct.  Meclizine and valium trailed in ED but patient had ongoing dizziness.  Admission requested.     #Dizziness  Significant allergies / sinus congestion.  Suspect vestibular neuritis.  No e/o CVA based on imaging.  No focal motor deficits.  Bubble study negative in Sept 2020.   - Improving but still too off-balance to send home alone today (and too good for TCU)  - Continue prednisone down-taper  - Meclizine + low-dose valium  - antiemetics  - PT to re-evaluate in AM     #PAF on Xarelto  #ASCVD s/p MI  #H/o NICM  #HTN / DLD  Continue PTA carvedilol, lisinopril, ASA, atorvastatin, and Xarelto    #Sinus congestion  Chronic but worsening lately.  Needs ENT eval.  Liked Dr Hogue in the past.   - Continue BID Mucinex, sudafed, Afrin, and nasal saline  - Start Flonase    COVID status:  Negative  Diet: Regular Diet Adult    DVT Prophylaxis: xarelto  Code Status: Full Code      DISPO:  Home hopefully tomorrow with outpatient Vestibular therapy and family/friend support.  Prescriptions sent to her pharmacy at [facility].     PAMELA Sprague  Hospitalist Service  [facility]     Text Page (7AM - 5PM, M-F)  ______________________________________________________________________    Interval History   Initially had hoped to send home today because did so well with therapy but remains quite wobbly when up to the  bathroom.  Worried abotu falls.  Still off balance.     Data reviewed today: I reviewed all medications, new labs and imaging results over the last 24 hours. I personally reviewed no images or EKG's today.    Physical Exam   Vital Signs: Temp: 97  F (36.1  C) Temp src: Oral BP: 138/40 Pulse: 71   Resp: 18 SpO2: 92 % O2 Device: None (Room air) Oxygen Delivery: 3 LPM  Weight: 147 lbs 3.2 oz    GENERAL:  Pleasant, cooperative, alert. Well developed, well nourished.   HEENT: Normocephalic, atraumatic.  Extra occular mm intact.  Left sclera slightly injected/scarred but no icterus. PER.  Mucous membranes moist.  Remains congested sounding.    PULMONOLOGY: Clear   CARDIAC: Regular   ABDOMEN: Soft, nontender non distended. No hepatosplenomegaly.  MUSCULOSKELETAL:  Moving x 4 spontaneously with CMS intact x4.  Normal bulk and tone.  No LE edema.  Bilateral midline knee incisions well healed  NEURO: Alert and oriented x3.  CN II-XII grossly intact and symmetric.  Somewhat ataxic with ambulation.   SKIN: warm, pink, dry       Data   Recent Labs   Lab 03/28/21  0554 03/27/21  1041 03/27/21  0751   WBC  --   --  3.1*   HGB  --   --  12.8   MCV  --   --  97   PLT  --   --  148*     --  141   POTASSIUM 4.6  --  4.1   CHLORIDE 105  --  107   CO2 27  --  30   BUN 19  --  18   CR 0.61  --  0.67   ANIONGAP 7  --  4   BARRETT 8.3*  --  8.2*   *  --  84   TROPI  --  0.021 0.017     Recent Results (from the past 24 hour(s))   MR Brain w/o & w Contrast    Narrative    MRI BRAIN WITHOUT AND WITH CONTRAST  3/27/2021 12:29 PM     HISTORY: Dizziness, nonspecific. Dizziness, follow-up CT finding for  possible thalamus infarct.    TECHNIQUE: Multiplanar, multisequence MRI of the brain without and  with 6 mL Gadavist.     COMPARISON: CT head 3/27/2021.     FINDINGS: No definite correlate seen for the tiny hypodense focus  along the medial aspect of the right thalamus on recent head CT. No  evidence for acute infarct. Mild patchy  T2/FLAIR hyperintense signal  changes in the cerebral white matter, nonspecific, but likely due to  chronic small vessel ischemic disease. There is mild generalized brain  parenchymal volume loss. The ventricles are normal in size and  configuration. No acute intracranial hemorrhage, extra axial fluid  collection, mass lesion or herniation. No abnormal intracranial  postcontrast enhancement is identified. Bilateral lens replacements.  The visualized orbits otherwise appear normal. Trace scattered  paranasal sinus mucosal thickening. The calvarium, skull base and mid  face are otherwise unremarkable.      Impression    IMPRESSION:  1. No evidence for acute intracranial abnormality. Specifically, no  acute infarct.  2. Mild generalized brain parenchymal volume loss and presumed chronic  small vessel ischemic disease.    HERMINIO CANTRELL MD     Medications     sodium chloride 1,000 mL (03/28/21 0532)       aspirin  81 mg Oral Every Other Day     atorvastatin  10 mg Oral At Bedtime     carvedilol  6.25 mg Oral BID w/meals     guaiFENesin  1,200 mg Oral BID     lisinopril  5 mg Oral Daily     loratadine  10 mg Oral Daily     meclizine  25 mg Oral Q6H EUNICE     oxymetazoline  2 spray Both Nostrils BID     predniSONE  60 mg Oral Daily     pseudoePHEDrine  120 mg Oral BID     rivaroxaban ANTICOAGULANT  20 mg Oral Daily with supper

## 2021-03-28 NOTE — PLAN OF CARE
PRIMARY DIAGNOSIS: VERTIGO    OUTPATIENT/OBSERVATION GOALS TO BE MET BEFORE DISCHARGE  1. Orthostatic performed: N/A    2. Completion of appropriate imaging: N/A    3. Tolerating PO medications: Yes    4. Return to near baseline physical activity: No    5. Cleared for discharge by consultants (if involved): No    Pt is A&O x4. VSS. Denies pain. Reports dizziness only with movement. NS 75 mL/hr. Assist x1 with gait belt and walker. Tolerating regular diet. Will provide supportive care.      Discharge Planner Nurse   Safe discharge environment identified: Yes  Barriers to discharge: Yes       Entered by: Usha Burgess 03/28/2021 1:06 AM     Please review provider order for any additional goals.   Nurse to notify provider when observation goals have been met and patient is ready for discharge.

## 2021-03-28 NOTE — PROGRESS NOTES
[facility]      OUTPATIENT PHYSICAL THERAPY EVALUATION  PLAN OF TREATMENT FOR OUTPATIENT REHABILITATION  (COMPLETE FOR INITIAL CLAIMS ONLY)  Patient's Last Name, First Name, M.I.  YOB: 1931  Veronica Bray                        Provider's Name  [facility] Medical Record No.  [id number]                               Onset Date:  03/27/21   Start of Care Date:  03/28/21      Type:     _X_PT   ___OT   ___SLP Medical Diagnosis:  Dizziness/vertigo                        PT Diagnosis:  Impaired functional mobility   Visits from SOC:  1   _________________________________________________________________________________  Plan of Treatment/Functional Goals    Planned Interventions: balance training, bed mobility training, gait training, patient/family education, transfer training     Goals: See Physical Therapy Goals on Care Plan in [employer] electronic health record.    Therapy Frequency: One time eval and treatment only  Predicted Duration of Therapy Intervention: 2 day  _________________________________________________________________________________    I CERTIFY THE NEED FOR THESE SERVICES FURNISHED UNDER        THIS PLAN OF TREATMENT AND WHILE UNDER MY CARE     (Physician co-signature of this document indicates review and certification of the therapy plan).                Certification date from: 03/28/21, Certification date to: 03/29/21    Referring Physician: Ketty Traylor PA            Initial Assessment        See Physical Therapy evaluation dated 03/28/21 in Epic electronic health record.

## 2021-03-28 NOTE — PLAN OF CARE
"PRIMARY DIAGNOSIS: VERTIGO    OUTPATIENT/OBSERVATION GOALS TO BE MET BEFORE DISCHARGE  1. Orthostatic performed: N/A    2. Completion of appropriate imaging: N/A    3. Tolerating PO medications: Yes    4. Return to near baseline physical activity: No    5. Cleared for discharge by consultants (if involved): No    Pt is A&O x4. VSS. Denies pain. Reports dizziness only with movement. NS 75 mL/hr. Assist x1 with gait belt and walker. Tolerating regular diet. Will provide supportive care.    Discharge Planner Nurse   Safe discharge environment identified: Yes  Barriers to discharge: Yes       Entered by: Cora Davis 03/28/2021     /40 (BP Location: Left arm)   Pulse 71   Temp 97  F (36.1  C) (Oral)   Resp 18   Ht 1.575 m (5' 2\")   Wt 66.8 kg (147 lb 3.2 oz)   SpO2 92%   BMI 26.92 kg/m    VSS and on room air. Patient felt much better today than yesterday. IV  Running at 75/hr. PT consulted, saw patient when nurse was in the room for morning assessment. PT agreed with neuritis diagnosis. Will continue to monitor and provide cares.      Please review provider order for any additional goals. Nurse to notify provider when observation goals have been met and patient is ready for discharge.  "

## 2021-03-28 NOTE — PLAN OF CARE
PRIMARY DIAGNOSIS: VERTIGO    OUTPATIENT/OBSERVATION GOALS TO BE MET BEFORE DISCHARGE  1. Orthostatic performed: N/A    2. Completion of appropriate imaging: N/A    3. Tolerating PO medications: Yes    4. Return to near baseline physical activity: No    5. Cleared for discharge by consultants (if involved): No    Pt is A&O x4. VSS. Denies pain. Reports dizziness only with movement. New IV placed after pt accidentally pulled old one. NS 75 mL/hr. Assist x1 with gait belt and walker. Tolerating regular diet. Will provide supportive care.    Discharge Planner Nurse   Safe discharge environment identified: Yes  Barriers to discharge: Yes       Entered by: Usha Burgess 03/28/2021 5:05 AM     Please review provider order for any additional goals.   Nurse to notify provider when observation goals have been met and patient is ready for discharge.

## 2021-03-28 NOTE — DISCHARGE INSTRUCTIONS
Here is the information you requested:     Dr. Tr Hogue, Otolaryngologist   [facility]   [address] , [place] [address] [phone]

## 2021-03-28 NOTE — PROGRESS NOTES
03/28/21 0906   Quick Adds   Quick Adds Certification;Vestibular Eval   Type of Visit Initial PT Evaluation   Living Environment   People in home spouse  (spouse is currently at TCU)   Home Accessibility no concerns   Transportation Anticipated family or friend will provide   Living Environment Comments lives in ILF   Self-Care   Usual Activity Tolerance moderate   Current Activity Tolerance moderate   Regular Exercise No   Equipment Currently Used at Home walker, rolling   Disability/Function   Wear Glasses or Blind   (Macular degeneration)   Fall history within last six months no   Change in Functional Status Since Onset of Current Illness/Injury yes   General Information   Onset of Illness/Injury or Date of Surgery 03/27/21   Referring Physician Ketty Traylor PA   Patient/Family Therapy Goals Statement (PT) Discharge to home   Pertinent History of Current Problem (include personal factors and/or comorbidities that impact the POC) Veronica Bray is a 89 year old female with PAF on Xarelto, VALENTIN on CPAP, tachy-induced CM, ASCVD s/p MI and CABG 2006, LBBB, HTN, DLD, vague paraesthesias followed by Neurology and PAD/carotid stenosis who presented to [facility] ED from independent living on 3/27/2021 with sudden onset dizziness.  BMP within normal limits.  CBC notable for WBC 3.1, Hgb 23.8, and Plt 148.  UA bland.  CT Head and CTA Head/Neck negative for acute process.  MRI Brain without acute infarct.  Meclizine and valium trailed in ED but patient had ongoing dizziness.  Admission requested.    Existing Precautions/Restrictions fall   Weight-Bearing Status - LLE full weight-bearing   Weight-Bearing Status - RLE full weight-bearing   Cognition   Orientation Status (Cognition) oriented x 4   Affect/Mental Status (Cognition) WNL   Follows Commands (Cognition) WNL   Pain Assessment   Patient Currently in Pain No   Integumentary/Edema   Integumentary/Edema no deficits were identifed   Posture    Posture Forward head  position;Protracted shoulders   Range of Motion (ROM)   ROM Comment ROM WFL   Strength   Manual Muscle Testing Quick Adds Deficits observed during functional mobility   Bed Mobility   Comment (Bed Mobility) sit<>supine with SBA   Transfers   Transfer Safety Comments SBA sit<>stand   Gait/Stairs (Locomotion)   Comment (Gait/Stairs) CGA with FWW   Balance   Balance Comments Requires B UE support for safe dynamic mobility   Sensory Examination   Sensory Perception patient reports no sensory changes   Coordination   Coordination no deficits were identified   Muscle Tone   Muscle Tone no deficits were identified   Cervicogenic Screen   Neck ROM WFL for positional testing   Vertebral Artery Test Normal   Alar Ligament Test Normal   Oculomotor Exam   Smooth Pursuit Normal   Saccades Normal   Saccades Comments difficult to accurately assess due to poor vision   Rapid Head Thrust Normal   Infrared Goggle Exam or Frenzel Lense Exam   Exam completed with Room Light   Spontaneous Nystagmus Negative   Gaze Evoked Nystagmus Negative   Positional testing Negative   Positional Testing Comments No symptoms reported in positional testing   Clinical Impression   Criteria for Skilled Therapeutic Intervention yes, treatment indicated   PT Diagnosis (PT) Impaired functional mobility   Influenced by the following impairments Dizziness/lightheadedness, impaired strength   Functional limitations due to impairments Difficulty with transfers, ambulation,   Clinical Presentation Stable/Uncomplicated   Clinical Presentation Rationale medically stable, clear POC   Clinical Decision Making (Complexity) low complexity   Therapy Frequency (PT) One time eval and treatment only   Predicted Duration of Therapy Intervention (days/wks) 1 day   Planned Therapy Interventions (PT) balance training;bed mobility training;gait training;patient/family education;transfer training   Risk & Benefits of therapy have been explained evaluation/treatment results  reviewed;care plan/treatment goals reviewed;risks/benefits reviewed;current/potential barriers reviewed;participants voiced agreement with care plan;participants included;patient   PT Discharge Planning    PT Discharge Recommendation (DC Rec) home with outpatient physical therapy   PT Rationale for DC Rec Pt is able to mobilize adequately enough for safe discharge home. Rec continued OP vestibular rehab to address any residual dizziness after discharge   PT Brief overview of current status  SBA WW   Therapy Certification   Start of care date 03/28/21   Certification date from 03/28/21   Certification date to 03/28/21   Medical Diagnosis Dizziness/vertigo   Total Evaluation Time   Total Evaluation Time (Minutes) 15

## 2021-03-29 ENCOUNTER — APPOINTMENT (OUTPATIENT)
Dept: PHYSICAL THERAPY | Facility: CLINIC | Age: 86
DRG: 149 | End: 2021-03-29
Payer: MEDICARE

## 2021-03-29 VITALS
HEART RATE: 64 BPM | RESPIRATION RATE: 18 BRPM | OXYGEN SATURATION: 92 % | DIASTOLIC BLOOD PRESSURE: 45 MMHG | HEIGHT: 62 IN | BODY MASS INDEX: 26.35 KG/M2 | SYSTOLIC BLOOD PRESSURE: 160 MMHG | TEMPERATURE: 95.6 F | WEIGHT: 143.2 LBS

## 2021-03-29 PROCEDURE — 97530 THERAPEUTIC ACTIVITIES: CPT | Mod: GP | Performed by: PHYSICAL THERAPIST

## 2021-03-29 PROCEDURE — 250N000012 HC RX MED GY IP 250 OP 636 PS 637: Performed by: PHYSICIAN ASSISTANT

## 2021-03-29 PROCEDURE — 250N000013 HC RX MED GY IP 250 OP 250 PS 637: Performed by: PHYSICIAN ASSISTANT

## 2021-03-29 PROCEDURE — 99239 HOSP IP/OBS DSCHRG MGMT >30: CPT | Performed by: INTERNAL MEDICINE

## 2021-03-29 PROCEDURE — 258N000003 HC RX IP 258 OP 636: Performed by: PHYSICIAN ASSISTANT

## 2021-03-29 RX ADMIN — CARVEDILOL 6.25 MG: 6.25 TABLET, FILM COATED ORAL at 07:42

## 2021-03-29 RX ADMIN — PSEUDOEPHEDRINE HCL 120 MG: 120 TABLET, FILM COATED, EXTENDED RELEASE ORAL at 07:42

## 2021-03-29 RX ADMIN — LORATADINE 10 MG: 10 TABLET ORAL at 07:42

## 2021-03-29 RX ADMIN — PREDNISONE 60 MG: 20 TABLET ORAL at 07:42

## 2021-03-29 RX ADMIN — MAGNESIUM HYDROXIDE 30 ML: 400 SUSPENSION ORAL at 07:46

## 2021-03-29 RX ADMIN — DOCUSATE SODIUM 50 MG AND SENNOSIDES 8.6 MG 2 TABLET: 8.6; 5 TABLET, FILM COATED ORAL at 07:46

## 2021-03-29 RX ADMIN — LISINOPRIL 5 MG: 5 TABLET ORAL at 07:42

## 2021-03-29 RX ADMIN — MECLIZINE HYDROCHLORIDE 25 MG: 25 TABLET ORAL at 06:56

## 2021-03-29 RX ADMIN — SODIUM CHLORIDE 1000 ML: 9 INJECTION, SOLUTION INTRAVENOUS at 04:33

## 2021-03-29 RX ADMIN — GUAIFENESIN 1200 MG: 600 TABLET, EXTENDED RELEASE ORAL at 07:42

## 2021-03-29 ASSESSMENT — MIFFLIN-ST. JEOR: SCORE: 1027.8

## 2021-03-29 NOTE — PLAN OF CARE
"Patient's After Visit Summary was reviewed with patient.   Patient verbalized understanding of After Visit Summary, recommended follow up and was given an opportunity to ask questions.   Discharge medications sent home with patient/family: Patient will  at [facility] pharmacy in [place]. Flonase, Mucinex, Meclizine, Prednisone, Sudafed, and Sodium Chloride.    Discharged with nurse and family friend. Patient medically cleared to discharge. All belongings sent with patient. Denied pain and dizziness. Family friend will transport patient home and transport her to get her medications.    BP (!) 160/45 (BP Location: Right arm)   Pulse 64   Temp 95.6  F (35.3  C) (Oral)   Resp 18   Ht 1.575 m (5' 2\")   Wt 65 kg (143 lb 3.2 oz)   SpO2 92%   BMI 26.19 kg/m    OBSERVATION patient END time: 1200         "

## 2021-03-29 NOTE — PLAN OF CARE
"Vitals: BP (!) 144/53 (BP Location: Left arm)   Pulse 67   Temp 96.4  F (35.8  C) (Oral)   Resp 16   Ht 1.575 m (5' 2\")   Wt 65 kg (143 lb 3.2 oz)   SpO2 94%   BMI 26.19 kg/m      Neuro: WNL  Cardiac: WNL  Lungs: WNL  GI: WNL  : WNL  Skin: WNL  Activity: SBA with walker  Diet: Regular  Pain: Denies  IV: NS 75 mL/hr   Meds: Meclizine, mucinex, sudafed, Afrin spray  Misc: Denied dizziness.  Plan: Monitor dizziness. Will provide supportive care.      "

## 2021-03-29 NOTE — PROGRESS NOTES
Care Management Discharge Note    Discharge Date: 03/29/21       Discharge Disposition: Home, self care    Discharge Services: None    Discharge DME: None    Discharge Transportation: family or friend will provide    Private pay costs discussed: Not applicable    PAS Confirmation Code:  NA  Patient/family educated on Medicare website which has current facility and service quality ratings: yes    Education Provided on the Discharge Plan:  Yes    Persons Notified of Discharge Plans: patient  Patient/Family in Agreement with the Plan: yes    Additional Information:  Discussed on rounds, patient doing well with mobility and not needing home PT support. Spoke with patient, she is agreeable to discharge with no home care service. States she will have support from her neighbor friends and children.     Patient discharging to [facility]. Her friend, Tomasa, will bring her clothes and provide transportation.     Luis Carlos Staley RN Case Manager  Inpatient Care Coordination   [facility]   [phone]      Luis Carlos Staley RN

## 2021-03-29 NOTE — PROGRESS NOTES
"Patient is alert and oriented x4. VS WNL and documented on the FS. Lung sounds clear and patient is on RA. Patient states she has chronic SOB with exertion (asthma). Denies SOB.  Active bowel sounds with LBM Friday (stool softeners given). Patient denies any pain, urgency, and frequency when voiding. Denies pain. Patient states she has chronic numbness and tingling in her hands and feet. SBA when up. Patient denies dizziness this morning when moving. Patient continues on prednisone and meclizine (PRN valium in needed).  Patient also getting Mucinex, Sudafed, Afrin, and Flonase for sinus congestion. Plan: PT re-evaluate this morning and hopefully discharge back to home with vestibular therapy and support from family and friends.       BP (!) 144/53 (BP Location: Left arm)   Pulse 63   Temp 95.6  F (35.3  C) (Oral)   Resp 18   Ht 1.575 m (5' 2\")   Wt 65 kg (143 lb 3.2 oz)   SpO2 95%   BMI 26.19 kg/m      "

## 2021-03-29 NOTE — PLAN OF CARE
Physical Therapy Discharge Summary    Reason for therapy discharge:    All goals and outcomes met, no further needs identified.    Progress towards therapy goal(s). See goals on Care Plan in Epic electronic health record for goal details.  Goals met    Therapy recommendation(s):    OP vestibular rehab if pt experiences any residual dizziness symptoms.

## 2021-03-29 NOTE — DISCHARGE SUMMARY
[facility]    Discharge Summary  Hospitalist    Date of Admission:  3/27/2021  Date of Discharge:  3/29/2021 12:21 PM  Discharging Provider: Xavier Aguilar MD  Date of Service (when I saw the patient): 03/29/21    Discharge Diagnoses   Dizziness.  Suspected vestibular neuritis.  Significant allergies with sinus congestion.  Paroxysmal A. fib, on Xarelto.  Coronary artery disease.  CABG in 2006.  Hypertension.  Dyslipidemia.  Sleep apnea on CPAP.  History of tachycardia induced cardiomyopathy.      History of Present Illness   Veronica Bray is a 89 year old female with PAF on Xarelto, VALENTIN on CPAP, tachy-induced CM, ASCVD s/p MI and CABG 2006, LBBB, HTN, DLD, vague paraesthesias followed by Neurology and PAD/carotid stenosis who presented to [facility] ED from independent living on 3/27/2021 with sudden onset dizziness.  BMP within normal limits.  CBC notable for WBC 3.1, Hgb 23.8, and Plt 148.  UA bland.  CT Head and CTA Head/Neck negative for acute process.  MRI Brain without acute infarct.  Meclizine and valium trailed in ED but patient had ongoing dizziness.  Admission requested.     Hospital Course     Dizziness/vertigo.    Patient has significant allergy symptoms as well as sinus congestion.  She also gets fullness of her ears.  These issues are going on for months.  She has tried multiple different over-the-counter medications with little response.    Initially BP pH was suspected but given her symptoms other likely possibility would be vestibular neuritis.  For that reason she is being started on prednisone tapering course.    She was also recommended to follow-up with ENT regarding her issues.    She has been having slow improvement in her symptoms.  Yesterday, she was still having a lot of dizziness and was not able to walk safely by herself.  For that reason she was continued on the current medical treatment and physical therapy was also continued.  But today she feels much better.  She  managed to walk with physical therapist with no symptoms now.  She is eager to go home.    She is being discharged on prednisone as well as as needed meclizine.  She was also prescribed medications for her sinus congestion.    Recommended to follow-up with her primary care physician and also see an ENT specialist for her symptoms.    Her other home medications were continued as before without any change.      I personally evaluated and examined the patient on the day of discharge.    Xavier Aguilar MD      Pending Results   These results will be followed up by PCP  Unresulted Labs Ordered in the Past 30 Days of this Admission     No orders found from 2/25/2021 to 3/28/2021.               Primary Care Physician   Concepcion Mccall        Discharge Disposition   Discharged to home  Condition at discharge: Stable    Consultations This Hospital Stay   CARE MANAGEMENT / SOCIAL WORK IP CONSULT  PHYSICAL THERAPY ADULT IP CONSULT    Time Spent on this Encounter   Discharge time: greater than 30 minutes.    Discharge Orders      Otolaryngology Referral      Physical Therapy Referral      Reason for your hospital stay    You were brought in for Observation of dizziness and sinus congestion.  Your dizziness was new and felt to be related to a condition called vestibular neuritis, or an inflammation of the balance center in the ear.  We treat this condition with a 9-10 day course of steroids.  Vestibular neuritis can be triggered by an upper respiratory infection or sinus congestion.  It appears that you have been struggling with sinus congestion for quite some time and that it is getting worse lately.  You will be going home with some medications to help with this such as Mucinex (guaifenesin), sudafed, nasal saline, and Flonase (a nasal steroid).  You have also been given a referral to see Dr Hogue of ENT to see what exactly is going on and how best to treat it.  Please have a family member or friend stay with you until you are  more steady on your feet.  Please follow-up with outpatient vestibular therapy.     Follow-up and recommended labs and tests     Follow-up with Dr Hogue of ENT     Activity    Your activity upon discharge:  Resume normal activity as tolerated.  Use a walker and be very cautious and slow when changing positions or ambulating.  NO DRIVING until your dizziness has completely resolved, which may take some time.     When to contact your care team    Call your primary doctor if you have any of the following: temperature greater than 101, worsening shortness of breath, increased swelling, worsening pain, new or unrelenting diarrhea, dizziness/passing out, falls, bleeding that doesn't stop, uncontrolled pain, loss of taste, loss of smell, or any other new or concerning symptoms.  Call 911 or go to the Emergency Room if you need immediate assistance.     Diet    Follow this diet upon discharge: Resume your normal diet as tolerated.     Discharge Medications   Discharge Medication List as of 3/29/2021 11:19 AM      START taking these medications    Details   fluticasone (FLONASE) 50 MCG/ACT nasal spray Spray 2 sprays into both nostrils daily (may take up to 2 weeks to take effect), Disp-16 g, R-1, E-Prescribe      guaiFENesin (MUCINEX) 600 MG 12 hr tablet Take 2 tablets (1,200 mg) by mouth 2 times daily for 21 days, Disp-84 tablet, R-0, E-Prescribe      pseudoePHEDrine (SUDAFED) 120 MG 12 hr tablet Take 1 tablet (120 mg) by mouth 2 times daily for 7 days, Disp-14 tablet, R-1, E-Prescribe      sodium chloride (OCEAN) 0.65 % nasal spray Spray 1 spray into both nostrils every hour as needed for congestion, Disp-104 mL, R-1, E-Prescribe         CONTINUE these medications which have CHANGED    Details   meclizine (ANTIVERT) 25 MG tablet Take 1 tablet (25 mg) by mouth 3 times daily as needed for dizziness, Disp-30 tablet, R-0, E-Prescribe      predniSONE (DELTASONE) 10 MG tablet Take 6 tablets (60 mg) by mouth daily for 3 days, THEN  5 tablets (50 mg) daily for 1 day, THEN 4 tablets (40 mg) daily for 1 day, THEN 3 tablets (30 mg) daily for 1 day, THEN 2 tablets (20 mg) daily for 1 day, THEN 1 tablet (10 mg) daily for 1 day, THEN  0.5 tablets (5 mg) daily for 1 day. Then STOP, Disp-34 tablet, R-0, E-Prescribe         CONTINUE these medications which have NOT CHANGED    Details   acetaminophen (TYLENOL) 500 MG tablet Take 500-1,000 mg by mouth every 6 hours as needed for mild pain, Historical      aspirin 81 MG EC tablet Take 81 mg by mouth every other day, Historical      atorvastatin (LIPITOR) 10 MG tablet Take 1 tablet (10 mg) by mouth At Bedtime, Disp-90 tablet, R-1, E-Prescribe      carvedilol (COREG) 6.25 MG tablet TAKE 1 TABLET TWICE DAILY  WITH MEALS, Disp-180 tablet, R-1, E-Prescribe      cetirizine (ZYRTEC) 10 MG tablet Take 10 mg by mouth daily otc, Historical      Cholecalciferol (VITAMIN D3) 50 MCG (2000 UT) CAPS Take 2 capsules by mouth once daily, Historical      gabapentin (NEURONTIN) 100 MG capsule TAKE 1 TO 3 CAPSULES       (100-300 MG) DAILY AS      NEEDED FOR NEUROPATHY., Disp-90 capsule, R-1, E-Prescribe      lisinopril (ZESTRIL) 5 MG tablet TAKE 1 TABLET DAILY, Disp-90 tablet, R-1, E-Prescribe      Multiple Vitamins-Minerals (PRESERVISION AREDS 2) CAPS Take 1 capsule by mouth 2 times daily , Historical      Omega-3 Fatty Acids (FISH OIL PO) Take by mouth daily, Historical      rivaroxaban ANTICOAGULANT (XARELTO ANTICOAGULANT) 20 MG TABS tablet Take 1 tablet (20 mg) by mouth daily (with dinner), Disp-90 tablet, R-3, E-Prescribe         STOP taking these medications       Pseudoephedrine-guaiFENesin (MUCINEX D PO) Comments:   Reason for Stopping:             Allergies   Allergies   Allergen Reactions     Aleve [Naproxen] Anaphylaxis     Celebrex [Celecoxib]      Codeine      Demerol [Meperidine]      Hydromorphone      Percocet [Oxycodone-Acetaminophen]      Tramadol      Vicodin [Hydrocodone-Acetaminophen]      Data   Most  Recent 3 CBC's:  Recent Labs   Lab Test 03/27/21  0751 09/01/20  1054 12/14/19  0558   WBC 3.1* 3.7* 6.4   HGB 12.8 12.7 12.3   MCV 97 97 98   * 150 170      Most Recent 3 BMP's:  Recent Labs   Lab Test 03/28/21  0554 03/27/21  0751 09/01/20  1054    141 139   POTASSIUM 4.6 4.1 4.0   CHLORIDE 105 107 108   CO2 27 30 27   BUN 19 18 20   CR 0.61 0.67 0.66   ANIONGAP 7 4 4   BARRETT 8.3* 8.2* 8.1*   * 84 101*     Most Recent 2 LFT's:  Recent Labs   Lab Test 07/17/20  0835 06/07/19  1004   AST 22 27   ALT 25 22   ALKPHOS 73 87   BILITOTAL 0.5 0.5     Most Recent INR's and Anticoagulation Dosing History:  Anticoagulation Dose History     Recent Dosing and Labs Latest Ref Rng & Units 7/6/2020 8/3/2020 8/31/2020 9/1/2020 9/28/2020 10/6/2020 10/13/2020    INR 0.86 - 1.14 3.00(H) 3.30(H) 2.50(H) 3.01(H) 1.50(H) 2.40(H) 2.40(H)    INR 0.86 - 1.14 - - - - - - -        Most Recent 3 Troponin's:  Recent Labs   Lab Test 03/27/21  1041 03/27/21  0751 09/01/20  1054   TROPI 0.021 0.017 0.033     Most Recent Cholesterol Panel:  Recent Labs   Lab Test 07/17/20  0835   CHOL 159   LDL 73   HDL 68   TRIG 90     Most Recent 6 Bacteria Isolates From Any Culture (See EPIC Reports for Culture Details):  Recent Labs   Lab Test 10/10/17  1310   CULT 50,000 to 100,000 colonies/mL  Escherichia coli  *     Most Recent TSH, T4 and A1c Labs:  Recent Labs   Lab Test 11/12/20  1108 09/10/20  1433   TSH  --  0.97   A1C 5.3  --

## 2021-03-29 NOTE — PROGRESS NOTES
"BP (!) 160/45 (BP Location: Right arm)   Pulse 64   Temp 95.6  F (35.3  C) (Oral)   Resp 18   Ht 1.575 m (5' 2\")   Wt 65 kg (143 lb 3.2 oz)   SpO2 92%   BMI 26.19 kg/m      "

## 2021-03-29 NOTE — UTILIZATION REVIEW
Admission Status; Secondary Review Determination    Under the authority of the Utilization Management Committee, the utilization review process indicated a secondary review on the above patient. The review outcome is based on review of the medical records, discussions with staff, and applying clinical experience noted on the date of the review.    (x) Inpatient Status Appropriate - This patient's medical care is consistent with medical management for inpatient care and reasonable inpatient medical practice.    RATIONALE FOR DETERMINATION:89-year-old female with history of paroxysmal atrial fibrillation on anticoagulation, tachycardia induced cardiomyopathy, coronary artery disease status post MI with bypass surgery 2006, hypertension, paresthesias of unclear etiology, history of carotid stenosis who was admitted to the hospital due to sudden onset of disabling dizziness with vertigo.  Patient also has significant sinus/rhinitis with congestion concerning for potential vestibular neuritis.  No clear findings of acute stroke as patient is at high risk for this.  The severity of patient's comorbidities and dizziness with such that patient required 2+ nights in the hospital with ongoing active management including IV fluids and high-dose steroids appropriate for inpatient care.    At the time of admission with the information available to the attending physician more than 2 nights Hospital complex care was anticipated, based on patient risk of adverse outcome if treated as outpatient and complex care required. Inpatient admission is appropriate based on the Medicare guidelines.    This document was produced using voice recognition software    The information on this document is developed by the utilization review team in order for the business office to ensure compliance. This only denotes the appropriateness of proper admission status and does not reflect the quality of care rendered.    The definitions of  Inpatient Status and Observation Status used in making the determination above are those provided in the CMS Coverage Manual, Chapter 1 and Chapter 6, section 70.4.    Sincerely,    Juan Escobar MD  Utilization Review  Physician Advisor  [facility].

## 2021-03-30 ENCOUNTER — TELEPHONE (OUTPATIENT)
Dept: INTERNAL MEDICINE | Facility: CLINIC | Age: 86
End: 2021-03-30

## 2021-03-30 NOTE — TELEPHONE ENCOUNTER
IP F/U    Date: 3/29/21  Diagnosis: Vestibular Neuronitis, Unspecified Laterality, Dizziness  Is patient active in care coordination? No  Was patient in TCU? No    ED / Discharge Outreach Protocol    Patient Contact    Attempt # 1    Was call answered?  No.  Left message on voicemail with information to call me back.

## 2021-03-31 NOTE — TELEPHONE ENCOUNTER
"Patient wanted writer to inform primary care provider that her , Vaibhav is not doing well and will have to come home with 24/hr care. Patient crying on telephone, would like Dr. Mccall to reach out to her and call her when she has a chance. No available appointments to schedule patient for telephone visit. Pleas call patient if able.       Hospital/TCU/ED for chronic condition Discharge Protocol    \"Hi, my name is Vanessa Jo RN, a registered nurse, and I am calling from [facility].  I am calling to follow up and see how things are going for you after your recent emergency visit/hospital/TCU stay.\"    Tell me how you are doing now that you are home?\" doing okay but has upsetting news about her .       Discharge Instructions    \"Let's review your discharge instructions.  What is/are the follow-up recommendations?  Pt. Response:   Follow-up and recommended labs and tests      Follow-up with Dr Hogue of ENT       \"Has an appointment with your primary care provider been scheduled?\"   No was not recommended in f/u    \"When you see the provider, I would recommend that you bring your medications with you.\"    Medications    \"Tell me what changed about your medicines when you discharged?\"    Changes to chronic meds?    0-1    \"What questions do you have about your medications?\"    None     New diagnoses of heart failure, COPD, diabetes, or MI?    No              Post Discharge Medication Reconciliation Status: discharge medications reconciled and changed, per note/orders.    Was MTM referral placed (*Make sure to put transitions as reason for referral)?   No    Call Summary    \"What questions or concerns do you have about your recent visit and your follow-up care?\"     none    \"If you have questions or things don't continue to improve, we encourage you contact us through the main clinic number (give number).  Even if the clinic is not open, triage nurses are available 24/7 to help you. " "    We would like you to know that our clinic has extended hours (provide information).  We also have urgent care (provide details on closest location and hours/contact info)\"      \"Thank you for your time and take care!\"             "

## 2021-04-01 NOTE — TELEPHONE ENCOUNTER
covering provider for PCP  I would recommend scheduling a VV to screen for Depression/anxiety.  Dr Mccall can always try to call when she is available.    Care coordination referral for her  may need to come from his PCP.    Jose Barr MD

## 2021-04-01 NOTE — TELEPHONE ENCOUNTER
Called patient and left a message that I have received her message and am sorry for everything that is happening.  As noted, [facility] that could help [facility] what is needed.

## 2021-04-02 ENCOUNTER — TRANSFERRED RECORDS (OUTPATIENT)
Dept: HEALTH INFORMATION MANAGEMENT | Facility: CLINIC | Age: 86
End: 2021-04-02

## 2021-04-06 ENCOUNTER — TELEPHONE (OUTPATIENT)
Dept: INTERNAL MEDICINE | Facility: CLINIC | Age: 86
End: 2021-04-06

## 2021-04-06 NOTE — TELEPHONE ENCOUNTER
Reason for Call: Request for an order or referral: IN HOME PT    Order or referral being requested: IN HOME PHYSICAL THERAPY FOR NECK AND HELP WITH HER BALANCE    Date needed: as soon as possible    Has the patient been seen by the PCP for this problem? YES    Additional comments: Please call patient to discuss    Phone number Patient can be reached at:  Home number on file:    Telephone Information:   [phone]      Best Time:  any    Can we leave a detailed message on this number?  YES    Call taken on 4/6/2021 at 3:07 PM by Donna Maher

## 2021-04-07 NOTE — TELEPHONE ENCOUNTER
There was no mention of balance issues except dizziness on hospital notes, tan need for physical therapy- why neck and balance?   Is she home bound?    Is she making a hospital follow up with Concepcion?

## 2021-04-08 ENCOUNTER — VIRTUAL VISIT (OUTPATIENT)
Dept: INTERNAL MEDICINE | Facility: CLINIC | Age: 86
End: 2021-04-08
Payer: MEDICARE

## 2021-04-08 DIAGNOSIS — I10 ESSENTIAL HYPERTENSION: ICD-10-CM

## 2021-04-08 DIAGNOSIS — I67.1 ANEURYSM, CAROTID ARTERY, INTERNAL: ICD-10-CM

## 2021-04-08 DIAGNOSIS — R42 DIZZINESS: ICD-10-CM

## 2021-04-08 DIAGNOSIS — H35.30 MACULAR DEGENERATION (SENILE) OF RETINA: ICD-10-CM

## 2021-04-08 DIAGNOSIS — R26.89 BALANCE PROBLEMS: ICD-10-CM

## 2021-04-08 DIAGNOSIS — Z09 HOSPITAL DISCHARGE FOLLOW-UP: Primary | ICD-10-CM

## 2021-04-08 DIAGNOSIS — H81.20 VESTIBULAR NEURONITIS, UNSPECIFIED LATERALITY: ICD-10-CM

## 2021-04-08 PROCEDURE — 99495 TRANSJ CARE MGMT MOD F2F 14D: CPT | Mod: 95 | Performed by: INTERNAL MEDICINE

## 2021-04-08 ASSESSMENT — ENCOUNTER SYMPTOMS
DIZZINESS: 1
LIGHT-HEADEDNESS: 1
PALPITATIONS: 0
WEAKNESS: 0
FATIGUE: 0
SHORTNESS OF BREATH: 0

## 2021-04-08 NOTE — PROGRESS NOTES
Ricky is a 89 year old who is being evaluated via a billable telephone visit.      What phone number would you like to be contacted at? [phone]  How would you like to obtain your AVS? Lee    Assessment & Plan   Problem List Items Addressed This Visit        Nervous and Auditory    Vestibular neuronitis, unspecified laterality    Relevant Orders    HOME CARE NURSING REFERRAL       Circulatory    Essential hypertension    Relevant Orders    HOME CARE NURSING REFERRAL       Other    Macular degeneration (senile) of retina    Relevant Orders    HOME CARE NURSING REFERRAL    Dizziness    Relevant Orders    HOME CARE NURSING REFERRAL      Other Visit Diagnoses     Hospital discharge follow-up    -  Primary    Relevant Orders    HOME CARE NURSING REFERRAL    Balance problems        Relevant Orders    HOME CARE NURSING REFERRAL         Home bound with dizziness and MD. In home PT and vestibular therapy ordered.  Advised to check BP daily to decide if lBP meds have to be adjusted.  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  Vascular referral placed.    Addendum: Spoke to the patient on 4/9/2021 about vascular referral placed.  Patient was overwhelmed and felt emotional and crying.  Emotional support given to the patient.  Give vascular doctor information.  Offered some medicine to help with her mood.  Patient declined.  Patient grandson will be visiting her soon to help her.  Advised to call if she needs any other help from us.    Return if symptoms worsen or fail to improve.    Jose Barr MD  [facility] ANDRES Martin is a 89 year old who presents for the following health issues     HPI     Hospital Follow-up Visit:    Hospital/Nursing Home/IP Rehab Facility: [facility]  Date of Admission: 3/27/2021  Date of Discharge: 3/29/2021  Reason(s) for Admission: dizziness    Was your hospitalization related to COVID-19? No   Problems taking medications  regularly:  None  Medication changes since discharge: None  Problems adhering to non-medication therapy:  None    Summary of hospitalization:  [facility] discharge summary reviewed  Diagnostic Tests/Treatments reviewed.  Follow up needed: none  Other Healthcare Providers Involved in Patient s Care:         Homecare  Update since discharge: fluctuating course.   Post Discharge Medication Reconciliation: discharge medications reconciled and changed, per note/orders.  Plan of care communicated with patient          She was in the hospital from 3/27-3/29 for dizziness. She also had sinus congestion and ear fullness.  CT head, CTA head, MRI brain were done. 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    She was told she has suspected vestibular neuritis.   She saw ENT and she had hearing test and was good. She was told sinus is clearing. She still has hoarse voice.   She will finish prednisone by tomorrow. Also takes meclizine.   She has dizziness/light headedness for months. She thinks it due to lisinopril and coreg.  BP was around 110's.  Still has dizziness and balance problem. Uses walker to ambulate more alona to dizziness.   Interested in home PT due dizziness, balance problem. She cant drive due to MD.    Review of Systems   Constitutional: Negative for fatigue.   Respiratory: Negative for shortness of breath.    Cardiovascular: Negative for chest pain and palpitations.   Neurological: Positive for dizziness and light-headedness. Negative for weakness.          Objective     Vitals:  No vitals were obtained today due to virtual visit.    Physical Exam   PSYCH: Alert and oriented times 3; coherent speech, normal   rate and volume, able to articulate logical thoughts, able   to abstract reason, no tangential thoughts, no hallucinations   or delusions  Her affect is normal  RESP: No cough, no audible wheezing, able  to talk in full sentences  Remainder of exam unable to be completed due to telephone visits      Phone call duration: 16  minutes

## 2021-04-08 NOTE — TELEPHONE ENCOUNTER
Called patient and informed her of message from Staci.     Patient states that she has been having balance problems for a few months and this is worse than the dizziness. Neck pain is gone now, she thinks the prednisone may have helped with this. Patient has macular degeneration and cannot drive, her  used to drive her, but he is in rehab facility and they are going to be Assisted Living soon-likely moving to [place]. She is unable to get to the clinic for follow-up appointment, but she was able to see ENT, they did not find any problems with her inner ear.     She had a friend with a BP monitor who checked her BP yesterday for her, she checked 3 times and varied from 104-112/70s, pulse rate was around 107.    Recommended patient schedule Virtual Visit for hospital follow-up, can discuss home care physical therapy referral at that time. Patient agrees. Telephone visit scheduled with Dr. Barr today at 11:20.

## 2021-04-08 NOTE — PATIENT INSTRUCTIONS
Patient Education     Understanding Dizziness, Balance Problems, and Fainting     The eyes, inner ear, joints, and muscles send signals to the brain to achieve balance.     Balance is a group effort of the eyes, inner ear, joints, and muscles. They each send signals to the brain about body position and head movement. Then the brain uses this information to achieve balance. When the brain receives conflicting signals, or when there is a problem with blood flow, dizziness or fainting can happen.   Vertigo  Vertigo is the feeling of spinning. It may happen if the brain receives conflicting balance signals. Vertigo is often caused by a problem in the inner ear. Problems include changes in inner ear structures, infection, swelling, or excess fluid. Sometimes vertigo is due to a brain problem, such as migraine, stroke, or tumor.   Dysequilibrium  Dysequilibrium is the feeling of imbalance without a sense of spinning. It may happen if the signal path between the body and brain is disrupted. There are many causes of dysequilibrium, including diabetes, anemia, head injury, and aging.   Syncope  Syncope is losing consciousness or fainting. The brain needs oxygen-rich blood to function. The heart pumps that blood to the brain. If there is a problem with the heart, blood flow (such as low blood pressure), or blood vessels, you may faint.   [employer] last reviewed this educational content on 5/1/2018 2000-2020 [employer]. All rights reserved. This information is not intended as a substitute for professional medical care. Always follow your healthcare professional's instructions.

## 2021-04-09 PROBLEM — I67.1 ANEURYSM, CAROTID ARTERY, INTERNAL: Status: ACTIVE | Noted: 2021-04-09

## 2021-04-10 ENCOUNTER — MEDICAL CORRESPONDENCE (OUTPATIENT)
Dept: HEALTH INFORMATION MANAGEMENT | Facility: CLINIC | Age: 86
End: 2021-04-10

## 2021-04-12 ENCOUNTER — TELEPHONE (OUTPATIENT)
Dept: INTERNAL MEDICINE | Facility: CLINIC | Age: 86
End: 2021-04-12

## 2021-04-12 ENCOUNTER — TELEPHONE (OUTPATIENT)
Dept: OTHER | Facility: CLINIC | Age: 86
End: 2021-04-12

## 2021-04-12 NOTE — TELEPHONE ENCOUNTER
RN, can you please help me with the referral.  I am not able to find a specific referral for vascular neurosurgery.  Can you pend the referral for me to sign and make sure patient is aware of this new referral and the updated phone number for referral.    Jose Barr MD

## 2021-04-12 NOTE — TELEPHONE ENCOUNTER
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  The referral should be placed to:    Vascular Neurosurgery:  [facility] Dr. Nichole)   [phone]     (Contact - Diamond ROBERTS) - [phone]    Garth,  MILAGROS Grimes, RN  [facility] [place]

## 2021-04-12 NOTE — TELEPHONE ENCOUNTER
Sun at [facility] ([phone]) calls for PT orders 2xwk for 6 wks and Home Health Aid 2xwk for 3wks.  OK to leave a message    PHQ9 score is a 4.  Patient is feeling a bit overwhelmed and stressed shilpa Garsia

## 2021-04-16 ENCOUNTER — TELEPHONE (OUTPATIENT)
Dept: INTERNAL MEDICINE | Facility: CLINIC | Age: 86
End: 2021-04-16

## 2021-04-28 ENCOUNTER — TELEPHONE (OUTPATIENT)
Dept: INTERNAL MEDICINE | Facility: CLINIC | Age: 86
End: 2021-04-28

## 2021-04-28 DIAGNOSIS — Z53.9 DIAGNOSIS NOT YET DEFINED: Primary | ICD-10-CM

## 2021-04-28 PROCEDURE — G0180 MD CERTIFICATION HHA PATIENT: HCPCS | Performed by: INTERNAL MEDICINE

## 2021-05-02 ENCOUNTER — TRANSFERRED RECORDS (OUTPATIENT)
Dept: HEALTH INFORMATION MANAGEMENT | Facility: CLINIC | Age: 86
End: 2021-05-02

## 2021-05-03 ENCOUNTER — TELEPHONE (OUTPATIENT)
Dept: INTERNAL MEDICINE | Facility: CLINIC | Age: 86
End: 2021-05-03

## 2021-05-03 ENCOUNTER — COMMUNICATION - HEALTHEAST (OUTPATIENT)
Dept: SCHEDULING | Facility: CLINIC | Age: 86
End: 2021-05-03

## 2021-05-03 NOTE — TELEPHONE ENCOUNTER
Pt calling requesting call back from Dr. Mccall to discuss recent suicide attempt to give updates on several other things going on. She is currently hospitalized at [facility]. Pt would like a call back ASAP at [phone]. Please advise. Thanks.

## 2021-05-04 NOTE — TELEPHONE ENCOUNTER
She is still in [facility], doesn;t know when she will be getting discharged.  ERENDIRA Johnston R.N.

## 2021-05-04 NOTE — TELEPHONE ENCOUNTER
Patient tried to commit suicide Sun.5/2 by taking an overdose of Naproxen.  States she may have done some permanent damage to her kidneys as they are not functioning well at this time.  She had been dealing with her 's health issues trying to find a place for them to live that could serve his needs as well as her own. Son found [facility] in [place] where spouse would be in memory care and patient would have an efficiency or 1 BR apartment.    needs full assistance and they spent a lot of money to move him and they were told at first he could live in [place], then said no and it went back and forth like this several times causing patient a lot of upset and worry.  She is upset that she won't be able to see Dr. Mccall anymore and is in tears.  She would like a call from Dr. Mccall.  ERENDIRA Johnston R.N.

## 2021-05-06 NOTE — TELEPHONE ENCOUNTER
Called patient and spoke with her.  She plans on having therapy and also reports she would not harm herself again.

## 2021-05-07 ENCOUNTER — TELEPHONE (OUTPATIENT)
Dept: INTERNAL MEDICINE | Facility: CLINIC | Age: 86
End: 2021-05-07

## 2021-05-08 ENCOUNTER — TRANSFERRED RECORDS (OUTPATIENT)
Dept: HEALTH INFORMATION MANAGEMENT | Facility: CLINIC | Age: 86
End: 2021-05-08

## 2021-05-09 ENCOUNTER — COMMUNICATION - HEALTHEAST (OUTPATIENT)
Dept: SCHEDULING | Facility: CLINIC | Age: 86
End: 2021-05-09

## 2021-05-09 LAB
ALT SERPL-CCNC: 30 U/L (ref 0–45)
AST SERPL-CCNC: 22 U/L (ref 0–40)

## 2021-05-11 ENCOUNTER — TELEPHONE (OUTPATIENT)
Dept: INTERNAL MEDICINE | Facility: CLINIC | Age: 86
End: 2021-05-11

## 2021-05-11 NOTE — TELEPHONE ENCOUNTER
Nancy from [facility] calling with questions about dosing for Vitamin C and omega 3 as well as concerns about the patients allergies to tylenol naproxen and demerol, which are listed as prescribed medications. Call her at [phone]

## 2021-05-12 LAB
CREAT SERPL-MCNC: 0.73 MG/DL (ref 0.6–1.1)
GFR SERPL CREATININE-BSD FRML MDRD: >60 ML/MIN/1.73M2
GLUCOSE SERPL-MCNC: 103 MG/DL (ref 70–125)
POTASSIUM SERPL-SCNC: 3.8 MMOL/L (ref 3.5–5)

## 2021-05-12 NOTE — TELEPHONE ENCOUNTER
This is a pharmacy that works with many assisted living facilities per call to them.  We have nothing in her chart about [facility] and patient is in [facility] currently so meds may change.     Called [facility] ([phone]), left message for nursing to call the clinic about [facility] and this patient.  Is this an organization that they partner with?  Patient is either moving in to [facility] or may have moved in just before being hospitalized.  She is still hospitalized.  ERENDIRA Johnston R.N.

## 2021-05-13 ENCOUNTER — PATIENT OUTREACH (OUTPATIENT)
Dept: CARE COORDINATION | Facility: CLINIC | Age: 86
End: 2021-05-13

## 2021-05-13 DIAGNOSIS — Z71.89 OTHER SPECIFIED COUNSELING: Primary | Chronic | ICD-10-CM

## 2021-05-13 NOTE — PROGRESS NOTES
Clinic Care Coordination Contact  [facility]/Voicemail       Clinical Data: Care Coordinator Outreach  Outreach attempted x 1.  Left message on patient's voicemail with call back information and requested return call.  Plan: Care Coordinator will try to reach patient again in 1-2 business days.

## 2021-05-13 NOTE — TELEPHONE ENCOUNTER
Spoke with [facility], patient was admitted to [facility] in [place] on 5/10 and they wanted clarification on some of her meds.  All are OTC and entered historicially, they will contact patient for clarification. ERENDIRA Johnston R.N.

## 2021-05-14 NOTE — PROGRESS NOTES
Clinic Care Coordination Contact  Community Health Worker Initial Outreach    CHW Initial Information Gathering:  Referral Source: ED Follow-Up  Preferred Hospital: Other  Preferred Urgent Care: Other       Patient accepts CC: No, due to the patient stating that she was not needing any assistance at this time. The patient stated that she did not want to receive assistance from CCC at this time. The CHW will remove the order at this time.

## 2021-05-18 ENCOUNTER — TELEPHONE (OUTPATIENT)
Dept: INTERNAL MEDICINE | Facility: CLINIC | Age: 86
End: 2021-05-18

## 2021-05-20 ENCOUNTER — RECORDS - HEALTHEAST (OUTPATIENT)
Dept: ADMINISTRATIVE | Facility: OTHER | Age: 86
End: 2021-05-20

## 2021-05-20 ENCOUNTER — AMBULATORY - HEALTHEAST (OUTPATIENT)
Dept: CARDIOLOGY | Facility: CLINIC | Age: 86
End: 2021-05-20

## 2021-05-27 ENCOUNTER — RECORDS - HEALTHEAST (OUTPATIENT)
Dept: LAB | Facility: CLINIC | Age: 86
End: 2021-05-27

## 2021-05-27 LAB — INR PPP: 2.26 (ref 0.9–1.1)

## 2021-06-02 ENCOUNTER — RECORDS - HEALTHEAST (OUTPATIENT)
Dept: LAB | Facility: CLINIC | Age: 86
End: 2021-06-02

## 2021-06-04 ENCOUNTER — AMBULATORY - HEALTHEAST (OUTPATIENT)
Dept: CARDIOLOGY | Facility: CLINIC | Age: 86
End: 2021-06-04

## 2021-06-04 ENCOUNTER — RECORDS - HEALTHEAST (OUTPATIENT)
Dept: ADMINISTRATIVE | Facility: OTHER | Age: 86
End: 2021-06-04

## 2021-06-09 DIAGNOSIS — I48.92 ATRIAL FLUTTER, PAROXYSMAL (H): Primary | ICD-10-CM

## 2021-06-10 DIAGNOSIS — Z95.1 S/P CABG (CORONARY ARTERY BYPASS GRAFT): ICD-10-CM

## 2021-06-10 DIAGNOSIS — I10 BENIGN ESSENTIAL HYPERTENSION: ICD-10-CM

## 2021-06-10 DIAGNOSIS — I48.0 PAROXYSMAL ATRIAL FIBRILLATION (H): ICD-10-CM

## 2021-06-10 DIAGNOSIS — I48.92 ATRIAL FLUTTER, PAROXYSMAL (H): ICD-10-CM

## 2021-06-10 LAB — INR PPP: 1.69 (ref 0.9–1.1)

## 2021-06-11 NOTE — TELEPHONE ENCOUNTER
Pending Prescriptions:                       Disp   Refills    torsemide (DEMADEX) 5 MG tablet [Pharmacy *14 tab*10       Sig: TAKE 1 TABLET BY MOUTH ONCE DAILY    Routing refill request to provider for review/approval because:  BP Readings from Last 3 Encounters:   03/29/21 (!) 160/45   09/30/20 136/80   09/10/20 128/60

## 2021-06-11 NOTE — TELEPHONE ENCOUNTER
Pending Prescriptions:                       Disp   Refills    lisinopril (ZESTRIL) 5 MG tablet [Pharmacy*90 tab*1        Sig: TAKE 1 TABLET DAILY    carvedilol (COREG) 6.25 MG tablet [Pharmac*180 ta*1        Sig: TAKE 1 TABLET TWICE DAILY  WITH MEALS    Routing refill request to provider for review/approval because:  BP Readings from Last 3 Encounters:   03/29/21 (!) 160/45   09/30/20 136/80   09/10/20 128/60

## 2021-06-14 RX ORDER — TORSEMIDE 5 MG/1
TABLET ORAL
Qty: 7 TABLET | Refills: 1 | Status: SHIPPED | OUTPATIENT
Start: 2021-06-14 | End: 2021-09-13

## 2021-06-14 RX ORDER — CARVEDILOL 6.25 MG/1
TABLET ORAL
Qty: 14 TABLET | Refills: 1 | Status: SHIPPED | OUTPATIENT
Start: 2021-06-14 | End: 2021-09-13

## 2021-06-14 RX ORDER — LISINOPRIL 5 MG/1
TABLET ORAL
Qty: 90 TABLET | Refills: 0 | OUTPATIENT
Start: 2021-06-14

## 2021-06-14 RX ORDER — LISINOPRIL 5 MG/1
5 TABLET ORAL DAILY
Qty: 7 TABLET | Refills: 1 | Status: SHIPPED | OUTPATIENT
Start: 2021-06-14 | End: 2021-09-13

## 2021-06-14 RX ORDER — CARVEDILOL 6.25 MG/1
TABLET ORAL
Qty: 90 TABLET | Refills: 0 | OUTPATIENT
Start: 2021-06-14

## 2021-06-15 NOTE — TELEPHONE ENCOUNTER
Spoke with pt and notified. Pt moved to [facility] and is establishing with a new provider.   MIGUEL A Rutherford

## 2021-06-16 DIAGNOSIS — I48.92 ATRIAL FLUTTER, PAROXYSMAL (H): ICD-10-CM

## 2021-06-17 RX ORDER — TORSEMIDE 5 MG/1
TABLET ORAL
Qty: 11 TABLET | OUTPATIENT
Start: 2021-06-17

## 2021-06-23 ENCOUNTER — RECORDS - HEALTHEAST (OUTPATIENT)
Dept: LAB | Facility: CLINIC | Age: 86
End: 2021-06-23

## 2021-06-24 ENCOUNTER — RECORDS - HEALTHEAST (OUTPATIENT)
Dept: ADMINISTRATIVE | Facility: OTHER | Age: 86
End: 2021-06-24

## 2021-06-24 LAB
ANION GAP SERPL CALCULATED.3IONS-SCNC: 10 MMOL/L (ref 5–18)
BNP SERPL-MCNC: 319 PG/ML (ref 0–167)
BUN SERPL-MCNC: 17 MG/DL (ref 8–28)
CALCIUM SERPL-MCNC: 8.2 MG/DL (ref 8.5–10.5)
CHLORIDE BLD-SCNC: 102 MMOL/L (ref 98–107)
CO2 SERPL-SCNC: 29 MMOL/L (ref 22–31)
CREAT SERPL-MCNC: 0.84 MG/DL (ref 0.6–1.1)
ERYTHROCYTE [DISTWIDTH] IN BLOOD BY AUTOMATED COUNT: 17.3 % (ref 11–14.5)
GFR SERPL CREATININE-BSD FRML MDRD: >60 ML/MIN/1.73M2
GLUCOSE BLD-MCNC: 109 MG/DL (ref 70–125)
HCT VFR BLD AUTO: 21.8 % (ref 35–47)
HGB BLD-MCNC: 6.3 G/DL (ref 12–16)
INR PPP: 2.58 (ref 0.9–1.1)
MCH RBC QN AUTO: 25.5 PG (ref 27–34)
MCHC RBC AUTO-ENTMCNC: 28.9 G/DL (ref 32–36)
MCV RBC AUTO: 88 FL (ref 80–100)
PLATELET # BLD AUTO: 189 THOU/UL (ref 140–440)
PMV BLD AUTO: 9.1 FL (ref 8.5–12.5)
POTASSIUM BLD-SCNC: 4 MMOL/L (ref 3.5–5)
RBC # BLD AUTO: 2.47 MILL/UL (ref 3.8–5.4)
SODIUM SERPL-SCNC: 141 MMOL/L (ref 136–145)
WBC: 4.1 THOU/UL (ref 4–11)

## 2021-06-24 ASSESSMENT — MIFFLIN-ST. JEOR: SCORE: 1058.19

## 2021-06-25 ENCOUNTER — RECORDS - HEALTHEAST (OUTPATIENT)
Dept: ADMINISTRATIVE | Facility: OTHER | Age: 86
End: 2021-06-25

## 2021-06-25 ENCOUNTER — COMMUNICATION - HEALTHEAST (OUTPATIENT)
Dept: SCHEDULING | Facility: CLINIC | Age: 86
End: 2021-06-25

## 2021-06-25 ENCOUNTER — ANESTHESIA - HEALTHEAST (OUTPATIENT)
Dept: SURGERY | Facility: CLINIC | Age: 86
End: 2021-06-25

## 2021-06-25 ASSESSMENT — MIFFLIN-ST. JEOR: SCORE: 1064.99

## 2021-06-26 ENCOUNTER — RECORDS - HEALTHEAST (OUTPATIENT)
Dept: ADMINISTRATIVE | Facility: OTHER | Age: 86
End: 2021-06-26

## 2021-06-26 ENCOUNTER — SURGERY - HEALTHEAST (OUTPATIENT)
Dept: SURGERY | Facility: CLINIC | Age: 86
End: 2021-06-26
Payer: MEDICARE

## 2021-06-26 ASSESSMENT — MIFFLIN-ST. JEOR: SCORE: 1052.74

## 2021-06-27 ENCOUNTER — RECORDS - HEALTHEAST (OUTPATIENT)
Dept: ADMINISTRATIVE | Facility: OTHER | Age: 86
End: 2021-06-27

## 2021-06-27 ASSESSMENT — MIFFLIN-ST. JEOR: SCORE: 1062.72

## 2021-06-28 ENCOUNTER — RECORDS - HEALTHEAST (OUTPATIENT)
Dept: ADMINISTRATIVE | Facility: OTHER | Age: 86
End: 2021-06-28

## 2021-06-28 ASSESSMENT — MIFFLIN-ST. JEOR: SCORE: 1061.82

## 2021-06-29 ENCOUNTER — RECORDS - HEALTHEAST (OUTPATIENT)
Dept: ADMINISTRATIVE | Facility: OTHER | Age: 86
End: 2021-06-29

## 2021-06-29 ASSESSMENT — MIFFLIN-ST. JEOR: SCORE: 1064.08

## 2021-06-30 ENCOUNTER — RECORDS - HEALTHEAST (OUTPATIENT)
Dept: ADMINISTRATIVE | Facility: OTHER | Age: 86
End: 2021-06-30

## 2021-06-30 ASSESSMENT — MIFFLIN-ST. JEOR: SCORE: 1061.36

## 2021-07-01 ENCOUNTER — RECORDS - HEALTHEAST (OUTPATIENT)
Dept: ADMINISTRATIVE | Facility: OTHER | Age: 86
End: 2021-07-01

## 2021-07-01 ASSESSMENT — MIFFLIN-ST. JEOR: SCORE: 1042.31

## 2021-07-02 ENCOUNTER — RECORDS - HEALTHEAST (OUTPATIENT)
Dept: ADMINISTRATIVE | Facility: OTHER | Age: 86
End: 2021-07-02

## 2021-07-02 ENCOUNTER — TELEPHONE (OUTPATIENT)
Dept: INTERNAL MEDICINE | Facility: CLINIC | Age: 86
End: 2021-07-02

## 2021-07-02 ASSESSMENT — MIFFLIN-ST. JEOR: SCORE: 1029.16

## 2021-07-02 NOTE — TELEPHONE ENCOUNTER
[facility] phone call message- medication clarification/question:    Full Medication Name:carvedilol (COREG) 6.25 MG tablet        Dose:TAKE 1 TABLET TWICE DAILY  WITH MEALS     Bumex     Question: The is an allergy to Carvedilol and also for Bumex. They are wondering if the patient was on this only during the hospital stay or if you want her to continue on these medications.  Also, they have that she is taking Metoprolol and she should not be on this and Carvediolol at the same time.    Pt saw Dr Ramsey    Pharmacy confirmed as    [facility] [address]: Yes    OK to leave a message on voice mail? Yes    Primary language: English      needed? No    Call taken on July 2, 2021 at 2:20 PM by Cullen Hernández

## 2021-07-02 NOTE — TELEPHONE ENCOUNTER
Reason for call:  Other   Patient called regarding (reason for call): appointment  Additional comments: [facility] Discharge called to request a  hospital follow up appointment in 3-5 days. No appointments available with Dr. Mccall or a colleage in that time frame. Please call patient directly to schedule an appointment in 3-5 days from 7/2/21 discharge date.    Phone number to reach patient:  Home number on file [phone] (home)    Best Time:  Asap    Can we leave a detailed message on this number?  YES    Travel screening: Not Applicable

## 2021-07-02 NOTE — TELEPHONE ENCOUNTER
Call to patient. States they are living in an assisted living now and there is a doctor that comes into the building so this appointment is not needed.

## 2021-07-02 NOTE — TELEPHONE ENCOUNTER
She should only be on metoprolol - NOT carvedilol.  She tolerated Bumex in the hospital, so continue that.    Dr. Chandler helped care for Ms. Bray in the hospital, so she returned call returned to [facility] [phone] with this information.    Nubia Aleman MD   Precepting provider  [facility]

## 2021-07-05 ENCOUNTER — TELEPHONE (OUTPATIENT)
Dept: INTERNAL MEDICINE | Facility: CLINIC | Age: 86
End: 2021-07-05

## 2021-07-05 NOTE — TELEPHONE ENCOUNTER
MTM referral from: Transitions of Care (recent hospital discharge or ED visit)    Discharge from [facility]    MTM referral outreach attempt #1 on July 5, 2021 at 1:18 PM      Outcome: Patient declined. [facility] manages pt's medications , will route to MTM Pharmacist/Provider as an FYI. Thank you for the referral.     Chilango Lofton, MTM coordinator

## 2021-07-06 VITALS
BODY MASS INDEX: 29.19 KG/M2 | WEIGHT: 155.5 LBS | HEIGHT: 61 IN | BODY MASS INDEX: 28.91 KG/M2 | WEIGHT: 155.3 LBS | BODY MASS INDEX: 28.53 KG/M2 | BODY MASS INDEX: 29.32 KG/M2 | BODY MASS INDEX: 29.38 KG/M2 | WEIGHT: 155.2 LBS | WEIGHT: 148.1 LBS | WEIGHT: 155.8 LBS | BODY MASS INDEX: 27.98 KG/M2 | WEIGHT: 153.3 LBS | WEIGHT: 154.5 LBS | WEIGHT: 153 LBS | BODY MASS INDEX: 29.48 KG/M2 | BODY MASS INDEX: 29.34 KG/M2 | BODY MASS INDEX: 29.44 KG/M2 | WEIGHT: 151 LBS | WEIGHT: 156 LBS | BODY MASS INDEX: 29.19 KG/M2 | BODY MASS INDEX: 28.97 KG/M2

## 2021-07-07 ENCOUNTER — RECORDS - HEALTHEAST (OUTPATIENT)
Dept: LAB | Facility: CLINIC | Age: 86
End: 2021-07-07

## 2021-07-07 NOTE — ANESTHESIA PREPROCEDURE EVALUATION
Anesthesia Evaluation      Patient summary reviewed   No history of anesthetic complications     Airway   Mallampati: II  Neck ROM: full   Pulmonary - negative ROS and normal exam    breath sounds clear to auscultation  (+) asthma  sleep apnea,                          Cardiovascular - negative ROS and normal exam  (+) hypertension, CAD, dysrhythmias, CHF, , hypercholesterolemia,     Rhythm: regular  Rate: abnormal,         Neuro/Psych - negative ROS   (+) neuromuscular disease,  CVA ,     Endo/Other - negative ROS      GI/Hepatic/Renal - negative ROS           Dental                         Anesthesia Plan  Planned anesthetic: MAC    ASA 4 - emergent     Anesthetic plan and risks discussed with: patient      DNR/DNI status   .  Plan is for suspension of DNR (Discussed this with patient, and she would like to suspend DNR status for periop period.)

## 2021-07-07 NOTE — ANESTHESIA CARE TRANSFER NOTE
Last vitals:   Vitals:    06/26/21 0845   BP: 153/79   Pulse: (!) 134   Resp: 22   Temp:    SpO2: 95%     Patient's level of consciousness is awake  Spontaneous respirations: yes  Maintains airway independently: yes  Dentition unchanged: yes  Oropharynx: oropharynx clear of all foreign objects    QCDR Measures:  ASA# 20 - Surgical Safety Checklist: WHO surgical safety checklist completed prior to induction    PQRS# 430 - Adult PONV Prevention: 4558F - Pt received => 2 anti-emetic agents (different classes) preop & intraop  ASA# 8 - Peds PONV Prevention: NA - Not pediatric patient, not GA or 2 or more risk factors NOT present  PQRS# 424 - Yani-op Temp Management: 4559F - At least one body temp DOCUMENTED => 35.5C or 95.9F within required timeframe  PQRS# 426 - PACU Transfer Protocol: - Transfer of care checklist used  ASA# 14 - Acute Post-op Pain: ASA14B - Patient did NOT experience pain >= 7 out of 10

## 2021-07-07 NOTE — ANESTHESIA POSTPROCEDURE EVALUATION
Patient: Veronica Bray  Procedure(s):  ESOPHAGOGASTRODUODENOSCOPY (EGD)  Anesthesia type: MAC    Patient location: [facility] Surgical Floor  Last vitals:   Vitals Value Taken Time   /58 06/26/21 0930   Temp 36.7  C (98.1  F) 06/26/21 0845   Pulse 109 06/26/21 0938   Resp 22 06/26/21 0845   SpO2 97 % 06/26/21 0938   Vitals shown include unvalidated device data.  Post vital signs: stable  Level of consciousness: awake and responds to simple questions  Post-anesthesia pain: pain controlled  Post-anesthesia nausea and vomiting: no  Pulmonary: unassisted, return to baseline  Cardiovascular: stable and blood pressure at baseline  Hydration: adequate  Anesthetic events: no

## 2021-07-08 LAB
ANION GAP SERPL CALCULATED.3IONS-SCNC: 12 MMOL/L (ref 5–18)
BUN SERPL-MCNC: 24 MG/DL (ref 8–28)
CALCIUM SERPL-MCNC: 9.8 MG/DL (ref 8.5–10.5)
CHLORIDE BLD-SCNC: 96 MMOL/L (ref 98–107)
CO2 SERPL-SCNC: 32 MMOL/L (ref 22–31)
CREAT SERPL-MCNC: 1.21 MG/DL (ref 0.6–1.1)
ERYTHROCYTE [DISTWIDTH] IN BLOOD BY AUTOMATED COUNT: 19.9 % (ref 11–14.5)
GFR SERPL CREATININE-BSD FRML MDRD: 42 ML/MIN/1.73M2
GLUCOSE BLD-MCNC: 78 MG/DL (ref 70–125)
HCT VFR BLD AUTO: 37.4 % (ref 35–47)
HGB BLD-MCNC: 10.9 G/DL (ref 12–16)
INR PPP: 1.13 (ref 0.9–1.1)
MCH RBC QN AUTO: 26 PG (ref 27–34)
MCHC RBC AUTO-ENTMCNC: 29.1 G/DL (ref 32–36)
MCV RBC AUTO: 89 FL (ref 80–100)
PLATELET # BLD AUTO: 227 THOU/UL (ref 140–440)
PMV BLD AUTO: 9 FL (ref 8.5–12.5)
POTASSIUM BLD-SCNC: 4.2 MMOL/L (ref 3.5–5)
RBC # BLD AUTO: 4.2 MILL/UL (ref 3.8–5.4)
SODIUM SERPL-SCNC: 140 MMOL/L (ref 136–145)
WBC: 4.8 THOU/UL (ref 4–11)

## 2021-07-15 ENCOUNTER — LAB REQUISITION (OUTPATIENT)
Dept: LAB | Facility: CLINIC | Age: 86
End: 2021-07-15
Payer: MEDICARE

## 2021-07-15 DIAGNOSIS — I48.0 PAROXYSMAL ATRIAL FIBRILLATION (H): ICD-10-CM

## 2021-07-16 LAB — INR PPP: 1.23 (ref 0.85–1.15)

## 2021-07-16 PROCEDURE — 85610 PROTHROMBIN TIME: CPT | Mod: ORL | Performed by: FAMILY MEDICINE

## 2021-07-16 PROCEDURE — 36415 COLL VENOUS BLD VENIPUNCTURE: CPT | Mod: ORL | Performed by: FAMILY MEDICINE

## 2021-07-16 PROCEDURE — P9603 ONE-WAY ALLOW PRORATED MILES: HCPCS | Mod: ORL | Performed by: FAMILY MEDICINE

## 2021-07-28 ENCOUNTER — LAB REQUISITION (OUTPATIENT)
Dept: LAB | Facility: CLINIC | Age: 86
End: 2021-07-28
Payer: MEDICARE

## 2021-07-28 DIAGNOSIS — I48.0 PAROXYSMAL ATRIAL FIBRILLATION (H): ICD-10-CM

## 2021-07-29 LAB — INR PPP: 1.49 (ref 0.85–1.15)

## 2021-07-29 PROCEDURE — 85610 PROTHROMBIN TIME: CPT | Mod: ORL | Performed by: FAMILY MEDICINE

## 2021-07-29 PROCEDURE — P9604 ONE-WAY ALLOW PRORATED TRIP: HCPCS | Mod: ORL | Performed by: FAMILY MEDICINE

## 2021-07-29 PROCEDURE — 36415 COLL VENOUS BLD VENIPUNCTURE: CPT | Mod: ORL | Performed by: FAMILY MEDICINE

## 2021-08-13 ENCOUNTER — LAB REQUISITION (OUTPATIENT)
Dept: LAB | Facility: CLINIC | Age: 86
End: 2021-08-13
Payer: MEDICARE

## 2021-08-13 DIAGNOSIS — I48.0 PAROXYSMAL ATRIAL FIBRILLATION (H): ICD-10-CM

## 2021-08-13 LAB — INR PPP: 1.66 (ref 0.85–1.15)

## 2021-08-13 PROCEDURE — 85610 PROTHROMBIN TIME: CPT | Mod: ORL

## 2021-08-13 PROCEDURE — 36415 COLL VENOUS BLD VENIPUNCTURE: CPT | Mod: ORL

## 2021-08-13 PROCEDURE — P9603 ONE-WAY ALLOW PRORATED MILES: HCPCS | Mod: ORL

## 2021-08-18 ENCOUNTER — LAB REQUISITION (OUTPATIENT)
Dept: LAB | Facility: CLINIC | Age: 86
End: 2021-08-18
Payer: MEDICARE

## 2021-08-18 DIAGNOSIS — D50.9 IRON DEFICIENCY ANEMIA, UNSPECIFIED: ICD-10-CM

## 2021-08-18 DIAGNOSIS — I10 ESSENTIAL (PRIMARY) HYPERTENSION: ICD-10-CM

## 2021-08-19 LAB
ANION GAP SERPL CALCULATED.3IONS-SCNC: 12 MMOL/L (ref 5–18)
BUN SERPL-MCNC: 52 MG/DL (ref 8–28)
CALCIUM SERPL-MCNC: 9.4 MG/DL (ref 8.5–10.5)
CHLORIDE BLD-SCNC: 102 MMOL/L (ref 98–107)
CO2 SERPL-SCNC: 28 MMOL/L (ref 22–31)
CREAT SERPL-MCNC: 1.09 MG/DL (ref 0.6–1.1)
DIGOXIN SERPL-MCNC: 0.7 UG/L
ERYTHROCYTE [DISTWIDTH] IN BLOOD BY AUTOMATED COUNT: 20 % (ref 10–15)
GFR SERPL CREATININE-BSD FRML MDRD: 45 ML/MIN/1.73M2
GLUCOSE BLD-MCNC: 107 MG/DL (ref 70–125)
HCT VFR BLD AUTO: 38.4 % (ref 35–47)
HGB BLD-MCNC: 11.7 G/DL (ref 11.7–15.7)
MCH RBC QN AUTO: 26.8 PG (ref 26.5–33)
MCHC RBC AUTO-ENTMCNC: 30.5 G/DL (ref 31.5–36.5)
MCV RBC AUTO: 88 FL (ref 78–100)
PLATELET # BLD AUTO: 151 10E3/UL (ref 150–450)
POTASSIUM BLD-SCNC: 3.9 MMOL/L (ref 3.5–5)
RBC # BLD AUTO: 4.36 10E6/UL (ref 3.8–5.2)
SODIUM SERPL-SCNC: 142 MMOL/L (ref 136–145)
WBC # BLD AUTO: 3.4 10E3/UL (ref 4–11)

## 2021-08-19 PROCEDURE — 36415 COLL VENOUS BLD VENIPUNCTURE: CPT | Mod: ORL | Performed by: PHYSICIAN ASSISTANT

## 2021-08-19 PROCEDURE — 80048 BASIC METABOLIC PNL TOTAL CA: CPT | Mod: ORL | Performed by: PHYSICIAN ASSISTANT

## 2021-08-19 PROCEDURE — 85027 COMPLETE CBC AUTOMATED: CPT | Mod: ORL | Performed by: PHYSICIAN ASSISTANT

## 2021-08-19 PROCEDURE — P9604 ONE-WAY ALLOW PRORATED TRIP: HCPCS | Mod: ORL | Performed by: PHYSICIAN ASSISTANT

## 2021-08-19 PROCEDURE — 80162 ASSAY OF DIGOXIN TOTAL: CPT | Mod: ORL | Performed by: PHYSICIAN ASSISTANT

## 2021-08-25 ENCOUNTER — LAB REQUISITION (OUTPATIENT)
Dept: LAB | Facility: CLINIC | Age: 86
End: 2021-08-25
Payer: MEDICARE

## 2021-08-25 ENCOUNTER — LAB REQUISITION (OUTPATIENT)
Dept: LAB | Facility: CLINIC | Age: 86
End: 2021-08-25
Payer: COMMERCIAL

## 2021-08-25 DIAGNOSIS — I48.0 PAROXYSMAL ATRIAL FIBRILLATION (H): ICD-10-CM

## 2021-08-25 DIAGNOSIS — N18.30 CHRONIC KIDNEY DISEASE, STAGE 3 UNSPECIFIED (H): ICD-10-CM

## 2021-08-26 LAB
ANION GAP SERPL CALCULATED.3IONS-SCNC: 11 MMOL/L (ref 5–18)
BUN SERPL-MCNC: 36 MG/DL (ref 8–28)
CALCIUM SERPL-MCNC: 8.9 MG/DL (ref 8.5–10.5)
CHLORIDE BLD-SCNC: 101 MMOL/L (ref 98–107)
CO2 SERPL-SCNC: 31 MMOL/L (ref 22–31)
CREAT SERPL-MCNC: 0.95 MG/DL (ref 0.6–1.1)
GFR SERPL CREATININE-BSD FRML MDRD: 53 ML/MIN/1.73M2
GLUCOSE BLD-MCNC: 117 MG/DL (ref 70–125)
INR PPP: 2.16 (ref 0.85–1.15)
POTASSIUM BLD-SCNC: 4.1 MMOL/L (ref 3.5–5)
SODIUM SERPL-SCNC: 143 MMOL/L (ref 136–145)

## 2021-08-26 PROCEDURE — 85610 PROTHROMBIN TIME: CPT | Mod: ORL | Performed by: FAMILY MEDICINE

## 2021-08-26 PROCEDURE — P9604 ONE-WAY ALLOW PRORATED TRIP: HCPCS | Mod: ORL | Performed by: PHYSICIAN ASSISTANT

## 2021-08-26 PROCEDURE — P9604 ONE-WAY ALLOW PRORATED TRIP: HCPCS | Mod: ORL | Performed by: FAMILY MEDICINE

## 2021-08-26 PROCEDURE — 36415 COLL VENOUS BLD VENIPUNCTURE: CPT | Mod: ORL | Performed by: PHYSICIAN ASSISTANT

## 2021-08-26 PROCEDURE — 80048 BASIC METABOLIC PNL TOTAL CA: CPT | Mod: ORL | Performed by: PHYSICIAN ASSISTANT

## 2021-09-13 ENCOUNTER — HOSPITAL ENCOUNTER (EMERGENCY)
Facility: CLINIC | Age: 86
Discharge: ANOTHER HOSPITAL | End: 2021-09-13
Attending: STUDENT IN AN ORGANIZED HEALTH CARE EDUCATION/TRAINING PROGRAM | Admitting: STUDENT IN AN ORGANIZED HEALTH CARE EDUCATION/TRAINING PROGRAM
Payer: MEDICARE

## 2021-09-13 ENCOUNTER — APPOINTMENT (OUTPATIENT)
Dept: RADIOLOGY | Facility: CLINIC | Age: 86
End: 2021-09-13
Attending: STUDENT IN AN ORGANIZED HEALTH CARE EDUCATION/TRAINING PROGRAM
Payer: MEDICARE

## 2021-09-13 VITALS
HEART RATE: 31 BPM | TEMPERATURE: 97.6 F | DIASTOLIC BLOOD PRESSURE: 65 MMHG | OXYGEN SATURATION: 98 % | BODY MASS INDEX: 26.26 KG/M2 | WEIGHT: 139 LBS | SYSTOLIC BLOOD PRESSURE: 150 MMHG | RESPIRATION RATE: 27 BRPM

## 2021-09-13 DIAGNOSIS — R00.1 BRADYCARDIA: ICD-10-CM

## 2021-09-13 LAB
ANION GAP SERPL CALCULATED.3IONS-SCNC: 11 MMOL/L (ref 5–18)
BASOPHILS # BLD AUTO: 0 10E3/UL (ref 0–0.2)
BASOPHILS NFR BLD AUTO: 1 %
BNP SERPL-MCNC: 1100 PG/ML (ref 0–167)
BUN SERPL-MCNC: 35 MG/DL (ref 8–28)
CALCIUM SERPL-MCNC: 7.8 MG/DL (ref 8.5–10.5)
CHLORIDE BLD-SCNC: 108 MMOL/L (ref 98–107)
CO2 SERPL-SCNC: 24 MMOL/L (ref 22–31)
CREAT SERPL-MCNC: 0.92 MG/DL (ref 0.6–1.1)
EOSINOPHIL # BLD AUTO: 0.2 10E3/UL (ref 0–0.7)
EOSINOPHIL NFR BLD AUTO: 4 %
ERYTHROCYTE [DISTWIDTH] IN BLOOD BY AUTOMATED COUNT: 19.7 % (ref 10–15)
GFR SERPL CREATININE-BSD FRML MDRD: 55 ML/MIN/1.73M2
GLUCOSE BLD-MCNC: 96 MG/DL (ref 70–125)
HCT VFR BLD AUTO: 36.7 % (ref 35–47)
HGB BLD-MCNC: 11.2 G/DL (ref 11.7–15.7)
IMM GRANULOCYTES # BLD: 0 10E3/UL
IMM GRANULOCYTES NFR BLD: 0 %
LYMPHOCYTES # BLD AUTO: 0.9 10E3/UL (ref 0.8–5.3)
LYMPHOCYTES NFR BLD AUTO: 23 %
MAGNESIUM SERPL-MCNC: 2.1 MG/DL (ref 1.8–2.6)
MCH RBC QN AUTO: 27.2 PG (ref 26.5–33)
MCHC RBC AUTO-ENTMCNC: 30.5 G/DL (ref 31.5–36.5)
MCV RBC AUTO: 89 FL (ref 78–100)
MONOCYTES # BLD AUTO: 0.4 10E3/UL (ref 0–1.3)
MONOCYTES NFR BLD AUTO: 12 %
NEUTROPHILS # BLD AUTO: 2.3 10E3/UL (ref 1.6–8.3)
NEUTROPHILS NFR BLD AUTO: 60 %
NRBC # BLD AUTO: 0 10E3/UL
NRBC BLD AUTO-RTO: 0 /100
PLATELET # BLD AUTO: 114 10E3/UL (ref 150–450)
POTASSIUM BLD-SCNC: 3.8 MMOL/L (ref 3.5–5)
RBC # BLD AUTO: 4.12 10E6/UL (ref 3.8–5.2)
SARS-COV-2 RNA RESP QL NAA+PROBE: NEGATIVE
SODIUM SERPL-SCNC: 143 MMOL/L (ref 136–145)
TROPONIN I SERPL-MCNC: 0.08 NG/ML (ref 0–0.29)
WBC # BLD AUTO: 3.8 10E3/UL (ref 4–11)

## 2021-09-13 PROCEDURE — 99284 EMERGENCY DEPT VISIT MOD MDM: CPT | Mod: 25

## 2021-09-13 PROCEDURE — 82947 ASSAY GLUCOSE BLOOD QUANT: CPT | Performed by: STUDENT IN AN ORGANIZED HEALTH CARE EDUCATION/TRAINING PROGRAM

## 2021-09-13 PROCEDURE — 84484 ASSAY OF TROPONIN QUANT: CPT | Performed by: STUDENT IN AN ORGANIZED HEALTH CARE EDUCATION/TRAINING PROGRAM

## 2021-09-13 PROCEDURE — 83880 ASSAY OF NATRIURETIC PEPTIDE: CPT | Performed by: STUDENT IN AN ORGANIZED HEALTH CARE EDUCATION/TRAINING PROGRAM

## 2021-09-13 PROCEDURE — 93005 ELECTROCARDIOGRAM TRACING: CPT

## 2021-09-13 PROCEDURE — 93005 ELECTROCARDIOGRAM TRACING: CPT | Performed by: EMERGENCY MEDICINE

## 2021-09-13 PROCEDURE — 71045 X-RAY EXAM CHEST 1 VIEW: CPT

## 2021-09-13 PROCEDURE — C9803 HOPD COVID-19 SPEC COLLECT: HCPCS

## 2021-09-13 PROCEDURE — 82374 ASSAY BLOOD CARBON DIOXIDE: CPT | Performed by: STUDENT IN AN ORGANIZED HEALTH CARE EDUCATION/TRAINING PROGRAM

## 2021-09-13 PROCEDURE — 36415 COLL VENOUS BLD VENIPUNCTURE: CPT | Performed by: STUDENT IN AN ORGANIZED HEALTH CARE EDUCATION/TRAINING PROGRAM

## 2021-09-13 PROCEDURE — 85025 COMPLETE CBC W/AUTO DIFF WBC: CPT | Performed by: STUDENT IN AN ORGANIZED HEALTH CARE EDUCATION/TRAINING PROGRAM

## 2021-09-13 PROCEDURE — 83735 ASSAY OF MAGNESIUM: CPT | Performed by: STUDENT IN AN ORGANIZED HEALTH CARE EDUCATION/TRAINING PROGRAM

## 2021-09-13 PROCEDURE — 87635 SARS-COV-2 COVID-19 AMP PRB: CPT | Performed by: STUDENT IN AN ORGANIZED HEALTH CARE EDUCATION/TRAINING PROGRAM

## 2021-09-13 RX ORDER — MIRTAZAPINE 7.5 MG/1
7.5 TABLET, FILM COATED ORAL DAILY PRN
COMMUNITY
Start: 2021-09-02 | End: 2022-01-01

## 2021-09-13 RX ORDER — METOPROLOL TARTRATE 25 MG/1
25 TABLET, FILM COATED ORAL 2 TIMES DAILY
COMMUNITY
Start: 2021-09-03 | End: 2021-01-01

## 2021-09-13 RX ORDER — BUMETANIDE 1 MG/1
1 TABLET ORAL 2 TIMES DAILY
COMMUNITY
Start: 2021-09-02 | End: 2021-01-01

## 2021-09-13 RX ORDER — GUAIFENESIN 600 MG/1
1200 TABLET, EXTENDED RELEASE ORAL DAILY PRN
COMMUNITY
Start: 2021-07-02 | End: 2022-01-01

## 2021-09-13 RX ORDER — FERROUS SULFATE 325(65) MG
325 TABLET ORAL
COMMUNITY
Start: 2021-07-02 | End: 2022-01-01

## 2021-09-13 RX ORDER — POLYETHYLENE GLYCOL 3350 17 G/17G
17 POWDER, FOR SOLUTION ORAL DAILY PRN
COMMUNITY
Start: 2021-07-02 | End: 2021-01-01

## 2021-09-13 RX ORDER — WARFARIN SODIUM 2.5 MG/1
2.5-5 TABLET ORAL DAILY
COMMUNITY
Start: 2021-08-26 | End: 2021-01-01

## 2021-09-13 RX ORDER — DIGOXIN 125 MCG
125 TABLET ORAL
COMMUNITY
Start: 2021-09-02 | End: 2021-01-01

## 2021-09-13 NOTE — ED TRIAGE NOTES
Patient has known bradycardia and was hospitalized in May and was suppose to follow up with cardiology but had a sick  so just made the appointment. She is ok when lying down and when up she feels lightheaded and shortness of breath,

## 2021-09-13 NOTE — ED PROVIDER NOTES
"  Emergency Department Encounter         FINAL IMPRESSION:  Bradycardia      ED COURSE AND MEDICAL DECISION MAKING       ED Course as of Sep 13 1551   Mon Sep 13, 2021   1024 EKG appears to be second-degree heart block type II with the P waves marching out and having a dropped QRS beat.  No STEMI,  no old EKGs for comparison.      1056 89-year-old female with history of A. fib on Coumadin as well as heart failure with preserved ejection fraction, here from her nursing home with increasing shortness of breath and lightheadedness with exertion.  Is found to be bradycardic.  Has been bradycardic in the past and currently has an appointment with cardiology coming up.  Patient states overall she feels well.  Polyuria for the past month.  No active chest pain on arrival.  No shortness of breath.  Arrival she looks well.  Asymptomatic.  Heart and lungs normal.  Abdomen benign.  No significant leg swelling.  Plan for cardiopulmonary evaluation and transfer for higher level of cardiac care.      1350 Long bedside discussion with patient as well as patient's son on the phone Nabeel regarding CODE STATUS.  Patient is a DNR/DNI and states \"I do not want to be shocked.\"  Discussed okay for medications to the IV regarding chronotropic and inotropic medications however she does not want to be transcutaneously paced.  Does not want to be intubated or have any aggressive procedures such as floating a pacemaker.  Okay with formal placement of a pacemaker            -Labs overall reassuring.  She looks well clinically.  Patient transferred to [facility].                10:24 AM I reviewed the patient's EKG.  10:35 AM Medical student met with the patient and preformed her initial interview and exam.  10:56 AM I met the patient and performed my initial interview and exam.   12:38 PM I spoke with Dr. Menezes, cardiology, regarding patient.   3:51 PM Patient transferred to another facility.                                   EKG  EKG " appears to be second-degree heart block type II with the P waves marching out and having a dropped QRS beat.  No STEMI,  no old EKGs for comparison.    At the conclusion of the encounter I discussed the results of all the tests and the disposition. The questions were answered. The patient or family acknowledged understanding and was agreeable with the care plan.                  MEDICATIONS GIVEN IN THE EMERGENCY DEPARTMENT:  Medications - No data to display    NEW PRESCRIPTIONS STARTED AT TODAY'S ED VISIT:  Discharge Medication List as of 9/13/2021  3:50 PM          HPI     Patient information obtained from: Patient    Use of Interpretor: N/A     Veronica Bray is a 89 year old female with a pertinent history of CHF, CAD, NSTEMI, SVT, paroxysmal atrial fibrillation, s/p CABG x 2, stroke, anemia, asthma, hypertension, hyperlipidemia, and tobacco use who presents to this ED via ambulance for evaluation of bradycardia.    Per chart review, patient was seen at [facility] on 06/24/2021 until 07/02/2021 for atrial fibrillation. Patient reported progressively worsening shortness of breath over the past two weeks. Hemoglobin 8.5. Normal platelets. INR 2.55. Urine with bacteria white blood cell counts positive nitrites present leukocyte esterase. BNP slightly elevated at 459. Troponin 0.03. Creatinine 1.09 with slightly low GFR of 47. Patient was started on Rocephin and given 5 mg metoprolol. Patient admitted for shortness of breath and hemoglobin of 6.3, which was obtained at her assisted living facility. Patient started on Bumex 1 mg TID, metoprolol tartrate 50 mg BID, digoxin 125 mcg TID, iron 325 daily and course of TMP-SMX. Patient discharged.     Patient reports progressively worsening bradycardia since 09/10 (3 days ago). She notes she has had intermittent bradycardia for the past month. Patient also reports increasing shortness of breath and lightheadedness with exertion. Of note, patient has a  cardiologist appointment scheduled for 09/22. She had mild nausea this morning. Patient is on coumadin.     Of note, patient reports polyuria and dysuria. She is concerned she has a UTI. Denies headache, chest pain, weight change, leg swelling, or any other complaints or concerns at this time.    REVIEW OF SYSTEMS:  Review of Systems   Constitutional: Negative for fever, malaise, weight change  HEENT: Negative runny nose, sore throat, ear pain, neck pain  Respiratory: Negative for cough, congestion. Positive for shortness of breath.  Cardiovascular: Negative for chest pain, leg edema  Gastrointestinal: Negative for abdominal distention, abdominal pain, constipation, vomiting, diarrhea. Positive for nausea (mild).  Genitourinary: Negative for and hematuria. Positive for dysuria and polyuria.   Integument: Negative for rash, skin breakdown  Neurological: Negative for paresthesias, weakness, headache. Positive for lightheadedness (with exertion).  Musculoskeletal: Negative for joint pain, joint swelling      All other systems reviewed and are negative.          MEDICAL HISTORY     Past Medical History:   Diagnosis Date     Arthritis      Asthma      Atrial flutter (H)      CAD (coronary artery disease)      Cardiomyopathy, unspecified (H)      CHF (congestive heart failure) (H)      Hyperlipidemia      Hypertension      Hypertension      LBBB (left bundle branch block)      Mitral regurgitation      NSTEMI (non-ST elevated myocardial infarction) (H)      PAF (paroxysmal atrial fibrillation) (H)      Sleep apnea      Stroke (H)      SVT (supraventricular tachycardia) (H)      Thrombocytopenia, unspecified (H) 7/13/2017     Tobacco use        Past Surgical History:   Procedure Laterality Date     ABDOMEN SURGERY      gallbladder     CARDIAC SURGERY       CHOLECYSTECTOMY       CORONARY ANGIOGRAPHY ADULT ORDER  1/2005    Stent: Distal cfx, Mid LAD, Stent: D -1     GYN SURGERY      hysterectomy     HC LEFT HEART  CATHETERIZATION  11/2006    mid distal-anterior/ distal inferior/ Apical Yossi, 85     HC LEFT HEART CATHETERIZATION  5/2015    100% Proximal LAD, patent LIMA-LAD, SVG-D1, LVEDP 13, 10-18 mmHg AV gradient, 3+ MR after PVC     HERNIA REPAIR       HYSTERECTOMY       JOINT REPLACEMENT      Knee hip     ORTHOPEDIC SURGERY      hip, knee, wrist     PICC MIDLINE INSERTION  5/6/2021          [place] ESOPHAGOGASTRODUODENOSCOPY TRANSORAL DIAGNOSTIC N/A 6/26/2021    Procedure: ESOPHAGOGASTRODUODENOSCOPY (EGD);  Surgeon: Marc Wolff MD;  Location: [facility] Main OR;  Service: Gastroenterology     TONSILLECTOMY       VASCULAR SURGERY         Social History     Tobacco Use     Smoking status: Never Smoker     Smokeless tobacco: Never Used     Tobacco comment: quit approx 1967    Substance Use Topics     Alcohol use: Never     Alcohol/week: 4.0 standard drinks     Types: 4 Glasses of wine per week     Comment: rarely     Drug use: Never       acetaminophen (TYLENOL) 500 MG tablet  aspirin 81 MG EC tablet  atorvastatin (LIPITOR) 10 MG tablet  bumetanide (BUMEX) 1 MG tablet  cetirizine (ZYRTEC) 10 MG tablet  Cholecalciferol (VITAMIN D3) 50 MCG (2000 UT) CAPS  digoxin (LANOXIN) 125 MCG tablet  FEROSUL 325 (65 Fe) MG tablet  fluticasone (FLONASE) 50 MCG/ACT nasal spray  guaiFENesin (MUCINEX) 600 MG 12 hr tablet  meclizine (ANTIVERT) 25 MG tablet  metoprolol tartrate (LOPRESSOR) 25 MG tablet  mirtazapine (REMERON) 7.5 MG tablet  Multiple Vitamins-Minerals (PRESERVISION AREDS 2) CAPS  polyethylene glycol (MIRALAX) 17 GM/Dose powder  sertraline (ZOLOFT) 50 MG tablet  sodium chloride (OCEAN) 0.65 % nasal spray  warfarin ANTICOAGULANT (COUMADIN) 2.5 MG tablet            PHYSICAL EXAM     BP (!) 150/65   Pulse (!) 31   Temp 97.6  F (36.4  C) (Temporal)   Resp 27   Wt 63 kg (139 lb)   SpO2 98%   BMI 26.26 kg/m        PHYSICAL EXAM:     General: Patient appears well, nontoxic, comfortable  HEENT: Moist mucous membranes, no tongue  swelling.  No head trauma.  No midline neck pain.  Cardiovascular: Normal rate, normal rhythm, no significant extremity edema. No appreciable murmur.  Respiratory: No signs of respiratory distress, lungs are clear to auscultation bilaterally with no wheezes rhonchi or rales.  Abdominal: Soft, nontender, nondistended, no palpable masses, no guarding, no rebound  Musculoskeletal: Full range of motion of joints, no deformities appreciated.  Neurological: Alert and oriented, grossly neurologically intact.  Psychological: Normal affect and mood.  Integument: No rashes appreciated        RESULTS       Labs Ordered and Resulted from Time of ED Arrival Up to the Time of Departure from the ED   BASIC METABOLIC PANEL - Abnormal; Notable for the following components:       Result Value    Chloride 108 (*)     Urea Nitrogen 35 (*)     Calcium 7.8 (*)     GFR Estimate 55 (*)     All other components within normal limits   B-TYPE NATRIURETIC PEPTIDE ([facility] ONLY) - Abnormal; Notable for the following components:    BNP 1,100 (*)     All other components within normal limits   CBC WITH PLATELETS AND DIFFERENTIAL - Abnormal; Notable for the following components:    WBC Count 3.8 (*)     Hemoglobin 11.2 (*)     MCHC 30.5 (*)     RDW 19.7 (*)     Platelet Count 114 (*)     All other components within normal limits   TROPONIN I - Normal   MAGNESIUM - Normal   COVID-19 VIRUS (CORONAVIRUS) BY PCR - Normal    Narrative:     Testing was performed using the zaida  SARS-CoV-2 & Influenza A/B Assay on the zaida  Essie  System.  This test should be ordered for the detection of SARS-COV-2 in individuals who meet SARS-CoV-2 clinical and/or epidemiological criteria. Test performance is unknown in asymptomatic patients.  This test is for in vitro diagnostic use under the FDA EUA for laboratories certified under CLIA to perform moderate and/or high complexity testing. This test has not been FDA cleared or approved.  A negative test does not rule  out the presence of PCR inhibitors in the specimen or target RNA in concentration below the limit of detection for the assay. The possibility of a false negative should be considered if the patient's recent exposure or clinical presentation suggests COVID-19.  [facility] are certified under the Clinical Laboratory Improvement Amendments of 1988 (CLIA-88) as qualified to perform moderate and/or high complexity laboratory testing.   CBC WITH PLATELETS & DIFFERENTIAL    Narrative:     The following orders were created for panel order CBC with Platelets & Differential.  Procedure                               Abnormality         Status                     ---------                               -----------         ------                     CBC with platelets and d...[[id number]]  Abnormal            Final result                 Please view results for these tests on the individual orders.   PERIPHERAL IV CATHETER       XR Chest Port 1 View   Final Result   IMPRESSION: Sternotomy. Coronary artery bypass grafting. Moderate cardiomegaly and mild pulmonary venous congestion similar to prior. Strands of fibrosis and/or linear atelectasis in both lower lungs unchanged. Lungs otherwise clear. Mild chronic pleural    thickening inferior left hemithorax unchanged. Calcified tortuous thoracic aorta. Bones are demineralized.                        PROCEDURES:  Procedures:  Procedures       I, Kristi Robb am serving as a scribe to document services personally performed by Rahul Forbes DO, based on my observations and the provider's statements to me.  I, Rahul Forbes DO, attest that Kirsti Robb is acting in a scribe capacity, has observed my performance of the services and has documented them in accordance with my direction.    Rahul Forbes DO  Emergency Medicine  [facility] EMERGENCY ROOM     Rahul Forbes DO  09/13/21 2675

## 2021-09-13 NOTE — PHARMACY-ADMISSION MEDICATION HISTORY
Pharmacy Note - Admission Medication History    Pertinent Provider Information: None     ______________________________________________________________________    Prior To Admission (PTA) med list completed and updated in EMR.       PTA Med List   Medication Sig Last Dose     acetaminophen (TYLENOL) 500 MG tablet Take 500-1,000 mg by mouth every 6 hours as needed for mild pain 9/13/2021 at Unknown time     aspirin 81 MG EC tablet Take 81 mg by mouth every other day 9/13/2021 at Unknown time     atorvastatin (LIPITOR) 10 MG tablet Take 1 tablet (10 mg) by mouth At Bedtime 9/12/2021 at Unknown time     bumetanide (BUMEX) 1 MG tablet Take 1 mg by mouth 2 times daily 9/13/2021 at Unknown time     cetirizine (ZYRTEC) 10 MG tablet Take 10 mg by mouth daily  9/13/2021 at Unknown time     Cholecalciferol (VITAMIN D3) 50 MCG (2000 UT) CAPS Take 8,000 Units by mouth daily Take 4 capsules by mouth once daily 9/13/2021 at Unknown time     digoxin (LANOXIN) 125 MCG tablet Take 125 mcg by mouth daily (with dinner) 9/12/2021 at Unknown time     FEROSUL 325 (65 Fe) MG tablet Take 325 mg by mouth daily (with breakfast) 9/13/2021 at Unknown time     fluticasone (FLONASE) 50 MCG/ACT nasal spray Spray 2 sprays into both nostrils daily (may take up to 2 weeks to take effect) 9/13/2021 at Unknown time     guaiFENesin (MUCINEX) 600 MG 12 hr tablet Take 1,200 mg by mouth 2 times daily 9/13/2021 at Unknown time     meclizine (ANTIVERT) 25 MG tablet Take 1 tablet (25 mg) by mouth 3 times daily as needed for dizziness Unknown at Unknown time     metoprolol tartrate (LOPRESSOR) 25 MG tablet Take 25 mg by mouth 2 times daily 9/13/2021 at Unknown time     mirtazapine (REMERON) 7.5 MG tablet Take 7.5 mg by mouth At Bedtime 9/12/2021 at Unknown time     Multiple Vitamins-Minerals (PRESERVISION AREDS 2) CAPS Take 1 capsule by mouth 2 times daily  9/13/2021 at Unknown time     polyethylene glycol (MIRALAX) 17 GM/Dose powder Take 17 g by mouth daily  as needed for constipation Unknown at Unknown time     sertraline (ZOLOFT) 50 MG tablet Take 50 mg by mouth daily 9/13/2021 at Unknown time     sodium chloride (OCEAN) 0.65 % nasal spray Spray 1 spray into both nostrils every hour as needed for congestion Unknown at Unknown time     warfarin ANTICOAGULANT (COUMADIN) 2.5 MG tablet Take 2.5-5 mg by mouth daily 2.5mg Mon and Thurs and 5mg ROW 9/12/2021 at Unknown time       Information source(s): Facility ([facility]/) medication list/MAR  Method of interview communication: in-person    Summary of Changes to PTA Med List  New: None  Discontinued: Coreg, Lisinopril  Changed: None    Patient was asked about OTC/herbal products specifically.  PTA med list reflects this.    In the past week, patient estimated taking medication this percent of the time:  greater than 90%.    Allergies were reviewed, assessed, and updated with the patient.      Patient does not use any multi-dose medications prior to admission.    The information provided in this note is only as accurate as the sources available at the time of the update(s).    Thank you for the opportunity to participate in the care of this patient.    Quincy Marie RP  9/13/2021 12:14 PM

## 2021-09-15 ENCOUNTER — LAB REQUISITION (OUTPATIENT)
Dept: LAB | Facility: CLINIC | Age: 86
End: 2021-09-15
Payer: MEDICARE

## 2021-09-15 DIAGNOSIS — I48.0 PAROXYSMAL ATRIAL FIBRILLATION (H): ICD-10-CM

## 2021-09-16 LAB
ATRIAL RATE - MUSE: 34 BPM
DIASTOLIC BLOOD PRESSURE - MUSE: NORMAL MMHG
INR PPP: 1.91 (ref 0.85–1.15)
INTERPRETATION ECG - MUSE: NORMAL
P AXIS - MUSE: 9 DEGREES
PR INTERVAL - MUSE: 142 MS
QRS DURATION - MUSE: 130 MS
QT - MUSE: 550 MS
QTC - MUSE: 413 MS
R AXIS - MUSE: -6 DEGREES
SYSTOLIC BLOOD PRESSURE - MUSE: NORMAL MMHG
T AXIS - MUSE: 94 DEGREES
VENTRICULAR RATE- MUSE: 34 BPM

## 2021-09-16 PROCEDURE — 36415 COLL VENOUS BLD VENIPUNCTURE: CPT | Mod: ORL | Performed by: FAMILY MEDICINE

## 2021-09-16 PROCEDURE — P9603 ONE-WAY ALLOW PRORATED MILES: HCPCS | Mod: ORL | Performed by: FAMILY MEDICINE

## 2021-09-16 PROCEDURE — 85610 PROTHROMBIN TIME: CPT | Mod: ORL | Performed by: FAMILY MEDICINE

## 2021-09-19 ENCOUNTER — HOSPITAL ENCOUNTER (EMERGENCY)
Facility: CLINIC | Age: 86
Discharge: HOME | End: 2021-09-19
Attending: STUDENT IN AN ORGANIZED HEALTH CARE EDUCATION/TRAINING PROGRAM | Admitting: STUDENT IN AN ORGANIZED HEALTH CARE EDUCATION/TRAINING PROGRAM
Payer: MEDICARE

## 2021-09-19 ENCOUNTER — APPOINTMENT (OUTPATIENT)
Dept: RADIOLOGY | Facility: CLINIC | Age: 86
End: 2021-09-19
Attending: STUDENT IN AN ORGANIZED HEALTH CARE EDUCATION/TRAINING PROGRAM
Payer: MEDICARE

## 2021-09-19 VITALS
OXYGEN SATURATION: 93 % | TEMPERATURE: 98.3 F | HEART RATE: 62 BPM | DIASTOLIC BLOOD PRESSURE: 97 MMHG | RESPIRATION RATE: 18 BRPM | SYSTOLIC BLOOD PRESSURE: 133 MMHG

## 2021-09-19 DIAGNOSIS — T14.8XXA BRUISING: ICD-10-CM

## 2021-09-19 LAB
BASOPHILS # BLD AUTO: 0.1 10E3/UL (ref 0–0.2)
BASOPHILS NFR BLD AUTO: 1 %
EOSINOPHIL # BLD AUTO: 0.1 10E3/UL (ref 0–0.7)
EOSINOPHIL NFR BLD AUTO: 3 %
ERYTHROCYTE [DISTWIDTH] IN BLOOD BY AUTOMATED COUNT: 18.9 % (ref 10–15)
HCT VFR BLD AUTO: 32.6 % (ref 35–47)
HGB BLD-MCNC: 10.1 G/DL (ref 11.7–15.7)
IMM GRANULOCYTES # BLD: 0 10E3/UL
IMM GRANULOCYTES NFR BLD: 0 %
INR PPP: 2.12 (ref 0.85–1.15)
LYMPHOCYTES # BLD AUTO: 1 10E3/UL (ref 0.8–5.3)
LYMPHOCYTES NFR BLD AUTO: 26 %
MCH RBC QN AUTO: 27.3 PG (ref 26.5–33)
MCHC RBC AUTO-ENTMCNC: 31 G/DL (ref 31.5–36.5)
MCV RBC AUTO: 88 FL (ref 78–100)
MONOCYTES # BLD AUTO: 0.4 10E3/UL (ref 0–1.3)
MONOCYTES NFR BLD AUTO: 10 %
NEUTROPHILS # BLD AUTO: 2.3 10E3/UL (ref 1.6–8.3)
NEUTROPHILS NFR BLD AUTO: 60 %
NRBC # BLD AUTO: 0 10E3/UL
NRBC BLD AUTO-RTO: 0 /100
PLATELET # BLD AUTO: 158 10E3/UL (ref 150–450)
RBC # BLD AUTO: 3.7 10E6/UL (ref 3.8–5.2)
WBC # BLD AUTO: 3.8 10E3/UL (ref 4–11)

## 2021-09-19 PROCEDURE — 85610 PROTHROMBIN TIME: CPT | Performed by: STUDENT IN AN ORGANIZED HEALTH CARE EDUCATION/TRAINING PROGRAM

## 2021-09-19 PROCEDURE — 85025 COMPLETE CBC W/AUTO DIFF WBC: CPT | Performed by: STUDENT IN AN ORGANIZED HEALTH CARE EDUCATION/TRAINING PROGRAM

## 2021-09-19 PROCEDURE — 71046 X-RAY EXAM CHEST 2 VIEWS: CPT

## 2021-09-19 PROCEDURE — 99284 EMERGENCY DEPT VISIT MOD MDM: CPT | Mod: 25

## 2021-09-19 PROCEDURE — 36415 COLL VENOUS BLD VENIPUNCTURE: CPT | Performed by: STUDENT IN AN ORGANIZED HEALTH CARE EDUCATION/TRAINING PROGRAM

## 2021-09-19 ASSESSMENT — ENCOUNTER SYMPTOMS: COLOR CHANGE: 1

## 2021-09-19 NOTE — ED TRIAGE NOTES
Pt here following pacemaker insertion on Tuesday with blood under skin in her left breast and arm. Denies pain except under the tape that is still over the pacer site. Nurse at [facility] called cardiologist office and they wanted an INR checked and to assess for bleeding. Pt on warfarin. Last had it last evening.

## 2021-09-19 NOTE — ED PROVIDER NOTES
EMERGENCY DEPARTMENT ENCOUNTER      NAME: Veronica Bray  AGE: 89 year old female  YOB: 1931  MRN: [id number]  EVALUATION DATE & TIME: 9/19/2021 12:19 PM    PCP: Concepcion Mccall    ED PROVIDER: Derrick De Souza M.D.      Chief Complaint   Patient presents with     Post-op Problem         FINAL IMPRESSION:  1. Bruising          ED COURSE & MEDICAL DECISION MAKING:    Pertinent Labs & Imaging studies reviewed. (See chart for details)  89 year old female presents to the Emergency Department for evaluation of bruising.  Patient had bruising along her left arm and shoulder from pacemaker.  There is no signs of blood clot.  Her INR is appropriate.  No signs of infection.  We will discharge her home and have her continue with conservative treatment and call her cardiologist in the morning.    At the conclusion of the encounter I discussed the results of all of the tests and the disposition. The questions were answered. The patient or family acknowledged understanding and was agreeable with the care plan.           12:48 PM I met with the patient, obtained history, performed an initial exam, and discussed options and plan for diagnostics and treatment here in the ED.  1:41 PM I updated and checked up on patient. We discussed the plan for discharge and the patient is agreeable. Reviewed supportive cares, symptomatic treatment, outpatient follow up, and reasons to return to the Emergency Department. Patient to be discharged by ED RN.       MEDICATIONS GIVEN IN THE EMERGENCY:  Medications - No data to display    NEW PRESCRIPTIONS STARTED AT TODAY'S ER VISIT  Discharge Medication List as of 9/19/2021  1:40 PM             =================================================================    HPI    Patient information was obtained from: Patient    Use of : N/A       Veronica Bray is a 89 year old female with a pertinent history of acute CHF, CAD, hyperlipidemia, HTN, A-fib, chronic ischemic heart  "disease, carotid artery aneurysm, and s/p CABG x2, who presents to this ED via walk-in for evaluation of post-op problem.     Patient reports pacemaker insertion on 9/14/21. She reports she was started on Warfarin about 2-3 days ago and started noticing bruising on her left upper arm and left breast a couple hours after taking medication. She states that bruising has not gotten bigger but reports that coloration is darker and \"more black.\" Patient denies any associating pain but notes some pain under the tape that is present over the pacemaker site. She reports some left breast swelling but states that it has been present since procedure and denies any pain. Patient states that nursing staff at [facility], where she is currently residing, noticed bruising and called cardiologist, who told her to come into ER for further evaluation and an INR check. Patient reports upcoming appointment with cardiology on 9/24/21.     No other complaints at this time.     Patient was seen at [facility] on 9/13/21 for 2nd/3rd degree block. Patient had a PPM placed on 9/14/21. Echo was performed on 9/14 with EF 70-75%, mod TR an MR, and pulmonary hypertension. Patient was discharged home and was recommended up follow up with cardiology.       REVIEW OF SYSTEMS   Review of Systems   Musculoskeletal:        Positive for left breast swelling. Negative for left breast and left arm pain.   Skin: Positive for color change (darker coloration to bruising on left arm and left breast).        Positive for bruising on left upper arm and left breast.   All other systems reviewed and are negative.       PAST MEDICAL HISTORY:  Past Medical History:   Diagnosis Date     Arthritis      Asthma      Atrial flutter (H)      CAD (coronary artery disease)     LIMA graft to the LAD and vein graft to the diagonal are patent.      Cardiomyopathy, unspecified (H)     tachy induced     CHF (congestive heart failure) (H)      Hyperlipidemia "      Hypertension      Hypertension      LBBB (left bundle branch block)      Mitral regurgitation      NSTEMI (non-ST elevated myocardial infarction) (H)      PAF (paroxysmal atrial fibrillation) (H)     converted on Amiodarone     Sleep apnea     CPAP     Stroke (H)     TIA     SVT (supraventricular tachycardia) (H)      Thrombocytopenia, unspecified (H) 7/13/2017     Tobacco use        PAST SURGICAL HISTORY:  Past Surgical History:   Procedure Laterality Date     ABDOMEN SURGERY      gallbladder     CARDIAC SURGERY       CHOLECYSTECTOMY       CORONARY ANGIOGRAPHY ADULT ORDER  1/2005    Stent: Distal cfx, Mid LAD, Stent: D -1     GYN SURGERY      hysterectomy     HC LEFT HEART CATHETERIZATION  11/2006    mid distal-anterior/ distal inferior/ Apical Yossi, 85     HC LEFT HEART CATHETERIZATION  5/2015    100% Proximal LAD, patent LIMA-LAD, SVG-D1, LVEDP 13, 10-18 mmHg AV gradient, 3+ MR after PVC     HERNIA REPAIR       HYSTERECTOMY       JOINT REPLACEMENT      Knee hip     ORTHOPEDIC SURGERY      hip, knee, wrist     PICC MIDLINE INSERTION  5/6/2021          TN ESOPHAGOGASTRODUODENOSCOPY TRANSORAL DIAGNOSTIC N/A 6/26/2021    Procedure: ESOPHAGOGASTRODUODENOSCOPY (EGD);  Surgeon: Marc Wolff MD;  Location: [facility];  Service: Gastroenterology     TONSILLECTOMY       VASCULAR SURGERY             CURRENT MEDICATIONS:    acetaminophen (TYLENOL) 500 MG tablet  aspirin 81 MG EC tablet  atorvastatin (LIPITOR) 10 MG tablet  bumetanide (BUMEX) 1 MG tablet  cetirizine (ZYRTEC) 10 MG tablet  Cholecalciferol (VITAMIN D3) 50 MCG (2000 UT) CAPS  digoxin (LANOXIN) 125 MCG tablet  FEROSUL 325 (65 Fe) MG tablet  fluticasone (FLONASE) 50 MCG/ACT nasal spray  guaiFENesin (MUCINEX) 600 MG 12 hr tablet  meclizine (ANTIVERT) 25 MG tablet  metoprolol tartrate (LOPRESSOR) 25 MG tablet  mirtazapine (REMERON) 7.5 MG tablet  Multiple Vitamins-Minerals (PRESERVISION AREDS 2) CAPS  polyethylene glycol (MIRALAX) 17 GM/Dose  powder  rivaroxaban ANTICOAGULANT (XARELTO ANTICOAGULANT) 15 MG TABS tablet  sertraline (ZOLOFT) 50 MG tablet  sodium chloride (OCEAN) 0.65 % nasal spray        ALLERGIES:  Allergies   Allergen Reactions     Aleve [Naproxen] Anaphylaxis     Celecoxib Unknown     Codeine Unknown     Hydrocodone-Acetaminophen Unknown     Hydromorphone Unknown     Meperidine Unknown     Oxycodone-Acetaminophen Unknown     Tramadol Unknown       FAMILY HISTORY:  Family History   Problem Relation Age of Onset     Unknown/Adopted Mother      Myocardial Infarction Mother      Unknown/Adopted Father      Myocardial Infarction Brother         Rheumatic fever     Myocardial Infarction Brother      Myocardial Infarction Brother      Heart Disease Brother        SOCIAL HISTORY:   Social History     Socioeconomic History     Marital status:      Spouse name: Not on file     Number of children: Not on file     Years of education: Not on file     Highest education level: Not on file   Occupational History     Not on file   Tobacco Use     Smoking status: Never Smoker     Smokeless tobacco: Never Used     Tobacco comment: quit approx 1967    Substance and Sexual Activity     Alcohol use: Never     Alcohol/week: 4.0 standard drinks     Types: 4 Glasses of wine per week     Comment: rarely     Drug use: Never     Sexual activity: Not Currently     Partners: Male   Other Topics Concern     Parent/sibling w/ CABG, MI or angioplasty before 65F 55M? Not Asked      Service Not Asked     Blood Transfusions Not Asked     Caffeine Concern No     Occupational Exposure Not Asked     Hobby Hazards Not Asked     Sleep Concern Not Asked     Stress Concern Not Asked     Weight Concern Not Asked     Special Diet No     Back Care Not Asked     Exercise No     Bike Helmet Not Asked     Seat Belt Not Asked     Self-Exams Not Asked   Social History Narrative     Not on file     Social Determinants of Health     Financial Resource Strain:      Difficulty  of Paying Living Expenses:    Food Insecurity:      Worried About Running Out of Food in the Last Year:      Ran Out of Food in the Last Year:    Transportation Needs:      Lack of Transportation (Medical):      Lack of Transportation (Non-Medical):    Physical Activity:      Days of Exercise per Week:      Minutes of Exercise per Session:    Stress:      Feeling of Stress :    Social Connections:      Frequency of Communication with Friends and Family:      Frequency of Social Gatherings with Friends and Family:      Attends Congregation Services:      Active Member of Clubs or Organizations:      Attends Club or Organization Meetings:      Marital Status:    Intimate Partner Violence:      Fear of Current or Ex-Partner:      Emotionally Abused:      Physically Abused:      Sexually Abused:        VITALS:  BP (!) 133/97   Pulse 62   Temp 98.3  F (36.8  C)   Resp 18   SpO2 93%       PHYSICAL EXAM    Constitutional: Well developed, Well nourished, NAD, GCS 15  HENT: Normocephalic, Atraumatic, Bilateral external ears normal, Oropharynx normal, mucous membranes moist, Nose normal. Neck-  Normal range of motion, No tenderness, Supple, No stridor.  Eyes: PERRL, EOMI, Conjunctiva normal, No discharge.   Respiratory: Normal breath sounds, No respiratory distress, No wheezing, Speaks full sentences easily. No cough.  Cardiovascular: Normal heart rate, Regular rhythm, No murmurs, No rubs, No gallops. Chest wall nontender.   Musculoskeletal: 2+ DP pulses. No edema.No cyanosis, No clubbing. Good range of motion in all major joints. No tenderness to palpation or major deformities noted.   Integument: Warm, Dry, No erythema, No rash. No petechiae. Significant bruising along inner portion of upper arm and into shoulder around pacemaker site. No tenderness, warmth, or signs of infection.   Neurologic: Alert & oriented x 3,  CN 3-12 intact Normal motor function, Normal sensory function, No focal deficits noted. Normal gait. Normal  finger to nose bilaterally  Psychiatric: Affect normal, Judgment normal, Mood normal. Cooperative.          LAB:  All pertinent labs reviewed and interpreted.  Labs Ordered and Resulted from Time of ED Arrival Up to the Time of Departure from the ED   INR - Abnormal; Notable for the following components:       Result Value    INR 2.12 (*)     All other components within normal limits   CBC WITH PLATELETS AND DIFFERENTIAL - Abnormal; Notable for the following components:    WBC Count 3.8 (*)     RBC Count 3.70 (*)     Hemoglobin 10.1 (*)     Hematocrit 32.6 (*)     MCHC 31.0 (*)     RDW 18.9 (*)     All other components within normal limits   CBC WITH PLATELETS & DIFFERENTIAL    Narrative:     The following orders were created for panel order CBC with Platelets & Differential.  Procedure                               Abnormality         Status                     ---------                               -----------         ------                     CBC with platelets and d...[[id number]]  Abnormal            Final result                 Please view results for these tests on the individual orders.       RADIOLOGY:  Reviewed all pertinent imaging. Please see official radiology report.  Chest XR,  PA & LAT   Final Result   IMPRESSION: There is a new dual-lead cardiac pacemaker with leads projected over the right atrium and right ventricle. No pneumothorax. Sternotomy wires and cardiomegaly are stable. There are strands of linear fibrosis in each lung, also stable. No acute    infiltrate.            I, Rosina Pleitez, am serving as a scribe to document services personally performed by Dr. Derrick De Souza based on my observation and the provider's statements to me. IDerrick MD attest that Rosina Pleitez is acting in a scribe capacity, has observed my performance of the services and has documented them in accordance with my direction.    Derrick De Souza M.D.  Emergency Medicine  [facility]  [facility] EMERGENCY ROOM  [address] [address]  [phone]  Dept: [phone]     Derrick De Souza MD  09/30/21 1868

## 2021-09-22 PROBLEM — I48.0 PAROXYSMAL ATRIAL FIBRILLATION (H): Status: RESOLVED | Noted: 2020-04-13 | Resolved: 2021-01-01

## 2021-09-22 PROBLEM — I48.91 ATRIAL FIBRILLATION (H): Status: RESOLVED | Noted: 2017-03-03 | Resolved: 2021-01-01

## 2021-09-22 PROBLEM — Z95.0 CARDIAC PACEMAKER IN SITU: Status: ACTIVE | Noted: 2021-01-01

## 2021-09-22 NOTE — LETTER
9/22/2021    Concepcion Mccall MD  [address] [facility] [address]    RE: Veronica Bray       Dear Colleague,    I had the pleasure of seeing Veronica Bray in the [facility].        [facility]  Heart Care Clinic Follow-up Note    Assessment & Plan        (I25.10,  I25.83) Coronary atherosclerosis due to lipid rich plaque  (primary encounter diagnosis)  Comment: Unknown anatomy with total occlusion of the LAD noted, this prompted coronary artery bypass grafting.  No current symptoms.    (Z95.1) S/P CABG x 2  Comment: From 2004 she had a LIMA to the LAD and a vein graft to first diagonal.  No symptoms.  No recent ischemic evaluation.  Might arrange for stress nuclear.    (I48.0) PAF (paroxysmal atrial fibrillation) (H)  Comment: Given this on Coumadin with her advanced [id number] score.  She does not want to get frequent INR is and we will give her prescription for Xarelto 15 mg.    (E78.49) Other hyperlipidemia  Comment: Cholesterol 159 with LDL of 73.    (G47.33) VALENTIN (obstructive sleep apnea)  Comment: Not utilizing CPAP and not a current issue.    (Z95.0) Cardiac pacemaker in situ  Comment: September 14 at [facility] had pacemaker placed secondary to second-degree 2-1 heart block.  Still on metoprolol and will hold digoxin.  Medtronic model 0 her XT DR MRI W1 DR 01 serial number [id number] and [id number].  The Medtronic atrial lead is 45 cm 5076 serial number [id number] and [id number] and the ventricular lead is model Secura select 3830-69 cm serial number [id number].    Plan  1.  Follow-up with device nurses in 1 week to evaluate significant large hematoma and device check.  2.  Discontinue digoxin.  3.  Renewed metoprolol, Bumex, Xarelto, as well as atorvastatin.  4.  Change Coumadin over to Xarelto.  5.  Follow-up with me in 6 months given all these issues.    Subjective  CC: 89-year-old white female being seen by me.  Since she saw me she went to  a nursing home in July, her   with Alzheimer's, she developed second-degree heart block and was brought into the [facility] emergency department.  Bed was not available and she was transferred urgently to [facility] where she received permanent programmable pacer following temporary wire.  She is doing some exercises at the nursing home, will be in assisted living permanently.  Denies any significant chest discomfort, palpitations, shortness of breath, PND, orthopnea, syncope, dizziness or peripheral edema.    Medications  Current Outpatient Medications   Medication Sig Dispense Refill     acetaminophen (TYLENOL) 500 MG tablet Take 500-1,000 mg by mouth every 6 hours as needed for mild pain       aspirin 81 MG EC tablet Take 81 mg by mouth every other day       cetirizine (ZYRTEC) 10 MG tablet Take 10 mg by mouth daily        Cholecalciferol (VITAMIN D3) 50 MCG (2000 UT) CAPS Take 8,000 Units by mouth daily Take 4 capsules by mouth once daily       digoxin (LANOXIN) 125 MCG tablet Take 125 mcg by mouth daily (with dinner)       FEROSUL 325 (65 Fe) MG tablet Take 325 mg by mouth daily (with breakfast)       fluticasone (FLONASE) 50 MCG/ACT nasal spray Spray 2 sprays into both nostrils daily (may take up to 2 weeks to take effect) 16 g 1     guaiFENesin (MUCINEX) 600 MG 12 hr tablet Take 1,200 mg by mouth 2 times daily       meclizine (ANTIVERT) 25 MG tablet Take 1 tablet (25 mg) by mouth 3 times daily as needed for dizziness 30 tablet 0     mirtazapine (REMERON) 7.5 MG tablet Take 7.5 mg by mouth At Bedtime       Multiple Vitamins-Minerals (PRESERVISION AREDS 2) CAPS Take 1 capsule by mouth 2 times daily        polyethylene glycol (MIRALAX) 17 GM/Dose powder Take 17 g by mouth daily as needed for constipation       sertraline (ZOLOFT) 50 MG tablet Take 50 mg by mouth daily       sodium chloride (OCEAN) 0.65 % nasal spray Spray 1 spray into both nostrils every hour as needed for congestion 104 mL 1      atorvastatin (LIPITOR) 10 MG tablet Take 1 tablet (10 mg) by mouth At Bedtime 90 tablet 4     bumetanide (BUMEX) 1 MG tablet Take 1 tablet (1 mg) by mouth 2 times daily 180 tablet 4     metoprolol tartrate (LOPRESSOR) 25 MG tablet Take 1 tablet (25 mg) by mouth 2 times daily 180 tablet 4     rivaroxaban ANTICOAGULANT (XARELTO ANTICOAGULANT) 15 MG TABS tablet Take 1 tablet (15 mg) by mouth daily (with dinner) 90 tablet 4       Objective  /60 (BP Location: Left arm, Patient Position: Sitting, Cuff Size: Adult Large)   Pulse 64   Resp 16   Wt 62.6 kg (138 lb 1.6 oz)   BMI 26.09 kg/m      General Appearance:    Alert, cooperative, no distress, appears stated age   Head:    Normocephalic, without obvious abnormality, atraumatic   Throat:   Lips, mucosa, and tongue normal; teeth and gums normal   Neck:   Supple, symmetrical, trachea midline, no adenopathy;        thyroid:  No enlargement/tenderness/nodules; no carotid    bruit or JVD   Back:     Symmetric, no curvature, ROM normal, no CVA tenderness   Lungs:     Clear to auscultation bilaterally, respirations unlabored   Chest wall:    No tenderness, midline sternotomy scar, large tennis ball sized hematoma over left pacer site, left breast with significant ecchymoses   Heart:    Regular rate and rhythm, S1 and S2 normal, no murmur, rub   or gallop   Abdomen:     Soft, non-tender, bowel sounds active all four quadrants,     no masses, no organomegaly   Extremities:   Normal, atraumatic, no cyanosis or edema   Pulses:   2+ and symmetric all extremities   Skin:   Skin color, texture, turgor normal, no rashes or lesions     Results    Lab Results personally reviewed   Lab Results   Component Value Date    CHOL 159 07/17/2020    CHOL 163 06/07/2019     Lab Results   Component Value Date    HDL 68 07/17/2020    HDL 56 06/07/2019     No components found for: LDLCALC  Lab Results   Component Value Date    TRIG 90 07/17/2020    TRIG 115 06/07/2019     Lab Results    Component Value Date    WBC 3.8 (L) 09/19/2021    HGB 10.1 (L) 09/19/2021    HCT 32.6 (L) 09/19/2021     09/19/2021     Lab Results   Component Value Date    BUN 35 (H) 09/13/2021     09/13/2021    CO2 24 09/13/2021     Review of Systems:   Enc Vitals  BP: 130/60  Pulse: 64  Resp: 16  Weight: 62.6 kg (138 lb 1.6 oz)                                              Thank you for allowing me to participate in the care of your patient.      Sincerely,     JASON GARNER MD     [facility]  cc:   No referring provider defined for this encounter.

## 2021-09-22 NOTE — PROGRESS NOTES
[facility]  Heart Care Clinic Follow-up Note    Assessment & Plan        (I25.10,  I25.83) Coronary atherosclerosis due to lipid rich plaque  (primary encounter diagnosis)  Comment: Unknown anatomy with total occlusion of the LAD noted, this prompted coronary artery bypass grafting.  No current symptoms.    (Z95.1) S/P CABG x 2  Comment: From  she had a LIMA to the LAD and a vein graft to first diagonal.  No symptoms.  No recent ischemic evaluation.  Might arrange for stress nuclear.    (I48.0) PAF (paroxysmal atrial fibrillation) (H)  Comment: Given this on Coumadin with her advanced [id number] score.  She does not want to get frequent INR is and we will give her prescription for Xarelto 15 mg.    (E78.49) Other hyperlipidemia  Comment: Cholesterol 159 with LDL of 73.    (G47.33) VALENTIN (obstructive sleep apnea)  Comment: Not utilizing CPAP and not a current issue.    (Z95.0) Cardiac pacemaker in situ  Comment:  at [facility] had pacemaker placed secondary to second-degree 2-1 heart block.  Still on metoprolol and will hold digoxin.  Medtronic model 0 her XT DR MRI W1 DR 01 serial number [id number] and [id number].  The Medtronic atrial lead is 45 cm 5076 serial number [id number] and [id number] and the ventricular lead is model Secura select 3830-69 cm serial number [id number].    Plan  1.  Follow-up with device nurses in 1 week to evaluate significant large hematoma and device check.  2.  Discontinue digoxin.  3.  Renewed metoprolol, Bumex, Xarelto, as well as atorvastatin.  4.  Change Coumadin over to Xarelto.  5.  Follow-up with me in 6 months given all these issues.    Subjective  CC: 89-year-old white female being seen by me.  Since she saw me she went to a nursing home in July, her   with Alzheimer's, she developed second-degree heart block and was brought into the [facility] emergency department.  Bed was not available and she was transferred urgently to [facility] where she  received permanent programmable pacer following temporary wire.  She is doing some exercises at the nursing home, will be in assisted living permanently.  Denies any significant chest discomfort, palpitations, shortness of breath, PND, orthopnea, syncope, dizziness or peripheral edema.    Medications  Current Outpatient Medications   Medication Sig Dispense Refill     acetaminophen (TYLENOL) 500 MG tablet Take 500-1,000 mg by mouth every 6 hours as needed for mild pain       aspirin 81 MG EC tablet Take 81 mg by mouth every other day       cetirizine (ZYRTEC) 10 MG tablet Take 10 mg by mouth daily        Cholecalciferol (VITAMIN D3) 50 MCG (2000 UT) CAPS Take 8,000 Units by mouth daily Take 4 capsules by mouth once daily       digoxin (LANOXIN) 125 MCG tablet Take 125 mcg by mouth daily (with dinner)       FEROSUL 325 (65 Fe) MG tablet Take 325 mg by mouth daily (with breakfast)       fluticasone (FLONASE) 50 MCG/ACT nasal spray Spray 2 sprays into both nostrils daily (may take up to 2 weeks to take effect) 16 g 1     guaiFENesin (MUCINEX) 600 MG 12 hr tablet Take 1,200 mg by mouth 2 times daily       meclizine (ANTIVERT) 25 MG tablet Take 1 tablet (25 mg) by mouth 3 times daily as needed for dizziness 30 tablet 0     mirtazapine (REMERON) 7.5 MG tablet Take 7.5 mg by mouth At Bedtime       Multiple Vitamins-Minerals (PRESERVISION AREDS 2) CAPS Take 1 capsule by mouth 2 times daily        polyethylene glycol (MIRALAX) 17 GM/Dose powder Take 17 g by mouth daily as needed for constipation       sertraline (ZOLOFT) 50 MG tablet Take 50 mg by mouth daily       sodium chloride (OCEAN) 0.65 % nasal spray Spray 1 spray into both nostrils every hour as needed for congestion 104 mL 1     atorvastatin (LIPITOR) 10 MG tablet Take 1 tablet (10 mg) by mouth At Bedtime 90 tablet 4     bumetanide (BUMEX) 1 MG tablet Take 1 tablet (1 mg) by mouth 2 times daily 180 tablet 4     metoprolol tartrate (LOPRESSOR) 25 MG tablet Take 1  tablet (25 mg) by mouth 2 times daily 180 tablet 4     rivaroxaban ANTICOAGULANT (XARELTO ANTICOAGULANT) 15 MG TABS tablet Take 1 tablet (15 mg) by mouth daily (with dinner) 90 tablet 4       Objective  /60 (BP Location: Left arm, Patient Position: Sitting, Cuff Size: Adult Large)   Pulse 64   Resp 16   Wt 62.6 kg (138 lb 1.6 oz)   BMI 26.09 kg/m      General Appearance:    Alert, cooperative, no distress, appears stated age   Head:    Normocephalic, without obvious abnormality, atraumatic   Throat:   Lips, mucosa, and tongue normal; teeth and gums normal   Neck:   Supple, symmetrical, trachea midline, no adenopathy;        thyroid:  No enlargement/tenderness/nodules; no carotid    bruit or JVD   Back:     Symmetric, no curvature, ROM normal, no CVA tenderness   Lungs:     Clear to auscultation bilaterally, respirations unlabored   Chest wall:    No tenderness, midline sternotomy scar, large tennis ball sized hematoma over left pacer site, left breast with significant ecchymoses   Heart:    Regular rate and rhythm, S1 and S2 normal, no murmur, rub   or gallop   Abdomen:     Soft, non-tender, bowel sounds active all four quadrants,     no masses, no organomegaly   Extremities:   Normal, atraumatic, no cyanosis or edema   Pulses:   2+ and symmetric all extremities   Skin:   Skin color, texture, turgor normal, no rashes or lesions     Results    Lab Results personally reviewed   Lab Results   Component Value Date    CHOL 159 07/17/2020    CHOL 163 06/07/2019     Lab Results   Component Value Date    HDL 68 07/17/2020    HDL 56 06/07/2019     No components found for: LDLCALC  Lab Results   Component Value Date    TRIG 90 07/17/2020    TRIG 115 06/07/2019     Lab Results   Component Value Date    WBC 3.8 (L) 09/19/2021    HGB 10.1 (L) 09/19/2021    HCT 32.6 (L) 09/19/2021     09/19/2021     Lab Results   Component Value Date    BUN 35 (H) 09/13/2021     09/13/2021    CO2 24 09/13/2021     Review of  Systems:   Enc Vitals  BP: 130/60  Pulse: 64  Resp: 16  Weight: 62.6 kg (138 lb 1.6 oz)

## 2021-09-22 NOTE — PATIENT INSTRUCTIONS
Ms Veronica Bray,  I enjoyed visiting with you again today.  I am glad to hear you are doing well.  Per our conversation we will arrange a visit with our pacer nurses in a week.  I will plan on seeing you 6 months.  Faisal Cardoza

## 2021-09-22 NOTE — PROGRESS NOTES
Rx's printed as ordered by LBF. Pt requesting paper copies- signed by [facility] in clinic and handed to patient. [place] [place]

## 2021-09-22 NOTE — TELEPHONE ENCOUNTER
----- Message from Ketty RADHA Mcgrwa sent at 9/22/2021  1:29 PM CDT -----  Regarding: GRAY pt  General phone call:    Caller: Violet with [facility]  Primary cardiologist: GRAY  Detailed reason for call: Violet is calling and states that the patient was put on Xeralto at her visit today, but the patient is still taking Warfarin. Violet states that she needs order to change the patient's medication  New or active symptoms? N/A  Best phone number: [phone]  Best time to contact: anytime  Ok to leave a detailedmessage? yes  Device? no    Additional Info:       LM with Violet from [facility] to address what needs to be sent over. -[facility]

## 2021-09-22 NOTE — PROGRESS NOTES
Patient was seen in clinic by Dr. Cardoza. Patient recently had a pacemaker implanted at [facility]. There is a significant amount of ecchymosis noted on patients mid chest from the most medial edge of right breast towards the most outer edge of patient's left breast. There is a mild amount of swelling at site of incision. Incision is intact with edges well approximated without drainage present. Patient reports that she is not due to see the Device clinic at [facility] until December. She would like to follow-up here instead. Arrangements for follow-up here will be made by Dr. Cardoza. Tentative plan: Patient to be seen in device clinic next week for full device check and wound assessment.     Jeremi Porter RN   Device Nurse

## 2021-09-23 NOTE — TELEPHONE ENCOUNTER
----- Message from Ketty Mcgraw sent at 9/23/2021 12:17 PM CDT -----  Regarding: [facility]BF pt  General phone call:    Caller: Violet with [facility]  Primary cardiologist: GRAY  Detailed reason for call: Violet is returning a call in regards to getting orders  New or active symptoms? N/A  Best phone number: [phone]  Best time to contact: anytime  Ok to leave a detailedmessage? yes  Device? no    Additional Info:       Called Violet back with [facility]. They are requesting orders to transition from warfarin to Xarelto since [facility] changed her warfarin to Xarelto. Writer explained that typically this is managed by the team that manages her INR's, which is [facility] physician group. We do not have access to her INR's and typically there is a process when switching to warfarin to NOAC that we no longer manage. She will go back to [facility] group. [place] [place]

## 2021-10-26 PROBLEM — R42 DIZZINESS: Status: RESOLVED | Noted: 2021-03-27 | Resolved: 2021-01-01

## 2021-10-26 NOTE — PATIENT INSTRUCTIONS
Veronica Bray,    It was a pleasure to see you today at the [facility].     My recommendations after this visit include:  - No changes to your medications.    - See Dr. Cardoza in December.   - If you have any questions or concerns, please call [phone] to talk with my nurse.    Yue Powers, CNP

## 2021-10-26 NOTE — PROGRESS NOTES
"HEART CARE PROGRESS NOTE      [facility]  [phone]      Assessment/Recommendations   Assessment:    1.  Permanent pacemaker: Implanted at [facility] in September 2021 due to second-degree heart block.  She will follow-up at [facility] for device management.  Symptoms have all resolved since receiving pacemaker.    2.  Paroxysmal atrial fibrillation: She recently changed to Xarelto but is thinking of switching back to warfarin due to cost.  She is requesting a clinic visit with Dr. Cardoza to discuss this in December.    3.  Coronary artery disease: History of coronary bypass surgery in 2004.  She denies any chest pain.    Plan:  1.  Continue current medications  2.  Continue low-sodium diet    Veronica Bray will follow up with Dr. Cardoza in December along with device check.       History of Present Illness/Subjective    Ms. Veronica Bray is a 89 year old female seen at [facility] today for follow-up.  She has a history of coronary artery disease with coronary artery bypass surgery in 2004, paroxysmal atrial fibrillation, dyslipidemia, and untreated sleep apnea.  She received permanent pacemaker at [facility] in September 2021 due to second-degree heart block.  Echocardiogram on September 14, 2021 showed ejection fraction of 70 to 75%, moderate mitral regurgitation, and moderate tricuspid regurgitation.    Since receiving pacemaker, she no longer has shortness of breath, fatigue, lightheadedness, or edema.  She is living at an assisted living with minimal assistance.  She exercises twice weekly and is asymptomatic.  She denies chest pain.      Her weight has been stable between 138 to 140 pounds.  She is following a low-sodium diet.        Physical Examination Review of Systems   Vitals: BP (!) 146/52 (BP Location: Left arm, Patient Position: Sitting, Cuff Size: Adult Regular)   Pulse 68   Resp 16   Ht 1.549 m (5' 1\")   Wt 63.3 " kg (139 lb 8 oz)   BMI 26.36 kg/m    BMI= Body mass index is 26.36 kg/m .  Wt Readings from Last 3 Encounters:   10/26/21 63.3 kg (139 lb 8 oz)   09/22/21 62.6 kg (138 lb 1.6 oz)   09/13/21 63 kg (139 lb)       General Appearance:     Alert, cooperative and in no acute distress.   ENT/Mouth: membranes moist, no oral lesions or bleeding gums.      EYES:  no scleral icterus, normal conjunctivae   Chest/Lungs:   lungs are clear to auscultation, no rales or wheezing, respirations unlabored   Cardiovascular:   Regular. Normal first and second heart sounds, no edema bilateral lower extremities    Abdomen:  Soft, nontender, nondistended, bowel sounds present   Extremities: no cyanosis or clubbing   Skin: warm, dry.    Neurologic: mood and affect are appropriate, alert and oriented x3    Left subclavian incision site clean, dry, intact with no swelling or bruising       Please refer above for cardiac ROS details.      Medical History  Surgical History Family History Social History   Past Medical History:   Diagnosis Date     Arthritis      Asthma      Atrial flutter (H)      CAD (coronary artery disease)     LIMA graft to the LAD and vein graft to the diagonal are patent.      Cardiomyopathy, unspecified (H)     tachy induced     CHF (congestive heart failure) (H)      Hyperlipidemia      Hypertension      Hypertension      LBBB (left bundle branch block)      Mitral regurgitation      NSTEMI (non-ST elevated myocardial infarction) (H)      PAF (paroxysmal atrial fibrillation) (H)     converted on Amiodarone     Sleep apnea     CPAP     Stroke (H)     TIA     SVT (supraventricular tachycardia) (H)      Thrombocytopenia, unspecified (H) 7/13/2017     Tobacco use      Past Surgical History:   Procedure Laterality Date     ABDOMEN SURGERY      gallbladder     CARDIAC SURGERY       CHOLECYSTECTOMY       CORONARY ANGIOGRAPHY ADULT ORDER  1/2005    Stent: Distal cfx, Mid LAD, Stent: D -1     GYN SURGERY      hysterectomy     HC  LEFT HEART CATHETERIZATION  11/2006    mid distal-anterior/ distal inferior/ Apical Yossi, 85     HC LEFT HEART CATHETERIZATION  5/2015    100% Proximal LAD, patent LIMA-LAD, SVG-D1, LVEDP 13, 10-18 mmHg AV gradient, 3+ MR after PVC     HERNIA REPAIR       HYSTERECTOMY       JOINT REPLACEMENT      Knee hip     ORTHOPEDIC SURGERY      hip, knee, wrist     PICC MIDLINE INSERTION  5/6/2021          [place] ESOPHAGOGASTRODUODENOSCOPY TRANSORAL DIAGNOSTIC N/A 6/26/2021    Procedure: ESOPHAGOGASTRODUODENOSCOPY (EGD);  Surgeon: Marc Wolff MD;  Location: [facility] OR;  Service: Gastroenterology     TONSILLECTOMY       VASCULAR SURGERY       Family History   Problem Relation Age of Onset     Unknown/Adopted Mother      Myocardial Infarction Mother      Unknown/Adopted Father      Myocardial Infarction Brother         Rheumatic fever     Myocardial Infarction Brother      Myocardial Infarction Brother      Heart Disease Brother     Social History     Socioeconomic History     Marital status:      Spouse name: Not on file     Number of children: Not on file     Years of education: Not on file     Highest education level: Not on file   Occupational History     Not on file   Tobacco Use     Smoking status: Never Smoker     Smokeless tobacco: Never Used     Tobacco comment: quit approx 1967    Substance and Sexual Activity     Alcohol use: Never     Alcohol/week: 4.0 standard drinks     Types: 4 Glasses of wine per week     Comment: rarely     Drug use: Never     Sexual activity: Not Currently     Partners: Male   Other Topics Concern     Parent/sibling w/ CABG, MI or angioplasty before 65F 55M? Not Asked      Service Not Asked     Blood Transfusions Not Asked     Caffeine Concern No     Occupational Exposure Not Asked     Hobby Hazards Not Asked     Sleep Concern Not Asked     Stress Concern Not Asked     Weight Concern Not Asked     Special Diet No     Back Care Not Asked     Exercise No     Bike Helmet Not  Asked     Seat Belt Not Asked     Self-Exams Not Asked   Social History Narrative     Not on file     Social Determinants of Health     Financial Resource Strain:      Difficulty of Paying Living Expenses:    Food Insecurity:      Worried About Running Out of Food in the Last Year:      Ran Out of Food in the Last Year:    Transportation Needs:      Lack of Transportation (Medical):      Lack of Transportation (Non-Medical):    Physical Activity:      Days of Exercise per Week:      Minutes of Exercise per Session:    Stress:      Feeling of Stress :    Social Connections:      Frequency of Communication with Friends and Family:      Frequency of Social Gatherings with Friends and Family:      Attends Buddhism Services:      Active Member of Clubs or Organizations:      Attends Club or Organization Meetings:      Marital Status:    Intimate Partner Violence:      Fear of Current or Ex-Partner:      Emotionally Abused:      Physically Abused:      Sexually Abused:           Medications  Allergies   Current Outpatient Medications   Medication Sig Dispense Refill     acetaminophen (TYLENOL) 500 MG tablet Take 500-1,000 mg by mouth every 6 hours as needed for mild pain       aspirin 81 MG EC tablet Take 81 mg by mouth every other day       atorvastatin (LIPITOR) 10 MG tablet Take 1 tablet (10 mg) by mouth At Bedtime 90 tablet 4     bumetanide (BUMEX) 1 MG tablet Take 1 tablet (1 mg) by mouth 2 times daily 180 tablet 4     cetirizine (ZYRTEC) 10 MG tablet Take 10 mg by mouth daily        Cholecalciferol (VITAMIN D3) 50 MCG (2000 UT) CAPS Take 8,000 Units by mouth daily Take 4 capsules by mouth once daily       FEROSUL 325 (65 Fe) MG tablet Take 325 mg by mouth daily (with breakfast)       fluticasone (FLONASE) 50 MCG/ACT nasal spray Spray 2 sprays into both nostrils daily (may take up to 2 weeks to take effect) 16 g 1     guaiFENesin (MUCINEX) 600 MG 12 hr tablet Take 1,200 mg by mouth 2 times daily       metoprolol  tartrate (LOPRESSOR) 25 MG tablet Take 1 tablet (25 mg) by mouth 2 times daily 180 tablet 4     mirtazapine (REMERON) 7.5 MG tablet Take 7.5 mg by mouth At Bedtime       Multiple Vitamins-Minerals (PRESERVISION AREDS 2) CAPS Take 1 capsule by mouth 2 times daily        rivaroxaban ANTICOAGULANT (XARELTO ANTICOAGULANT) 15 MG TABS tablet Take 1 tablet (15 mg) by mouth daily (with dinner) 90 tablet 4     sertraline (ZOLOFT) 50 MG tablet Take 50 mg by mouth daily      Allergies   Allergen Reactions     Aleve [Naproxen] Anaphylaxis     Celecoxib Unknown     Codeine Unknown     Hydrocodone-Acetaminophen Unknown     Hydromorphone Unknown     Meperidine Unknown     Oxycodone-Acetaminophen Unknown     Tramadol Unknown         Lab Results    Chemistry/lipid CBC Cardiac Enzymes/BNP/TSH/INR   Recent Labs   Lab Test 07/17/20  0835   CHOL 159   HDL 68   LDL 73   TRIG 90     Recent Labs   Lab Test 07/17/20  0835 06/07/19  1004 07/11/18  1010   LDL 73 84 86     Recent Labs   Lab Test 09/13/21  1020      POTASSIUM 3.8   CHLORIDE 108*   CO2 24   GLC 96   BUN 35*   CR 0.92   GFRESTIMATED 55*   BARRETT 7.8*     Recent Labs   Lab Test 09/13/21  1020 08/26/21  1026 08/19/21  1043   CR 0.92 0.95 1.09     Recent Labs   Lab Test 11/12/20  1108   A1C 5.3    Recent Labs   Lab Test 09/19/21  1243   WBC 3.8*   HGB 10.1*   HCT 32.6*   MCV 88        Recent Labs   Lab Test 09/19/21  1243 09/13/21  1020 08/19/21  1043   HGB 10.1* 11.2* 11.7    Recent Labs   Lab Test 09/13/21  1020 06/24/21  1722 05/08/21  2203   TROPONINI 0.08 0.03 0.06     Recent Labs   Lab Test 09/13/21  1020 06/24/21  1722 06/24/21  1030   BNP 1,100* 459* 319*     Recent Labs   Lab Test 05/02/21  1752   TSH 1.79     Recent Labs   Lab Test 10/07/21  1216 09/30/21  0910 09/19/21  1243   INR 1.89* 1.98* 2.12*

## 2021-10-26 NOTE — LETTER
10/26/2021    Dm Oreilly MD  [address] [place] [id number]    RE: Veronica Bray       Dear Colleague,    I had the pleasure of seeing Veronica Bray in the [facility].    HEART CARE PROGRESS NOTE      [facility]  [phone]      Assessment/Recommendations   Assessment:    1.  Permanent pacemaker: Implanted at [facility] in September 2021 due to second-degree heart block.  She will follow-up at [facility] for device management.  Symptoms have all resolved since receiving pacemaker.    2.  Paroxysmal atrial fibrillation: She recently changed to Xarelto but is thinking of switching back to warfarin due to cost.  She is requesting a clinic visit with Dr. Cardoza to discuss this in December.    3.  Coronary artery disease: History of coronary bypass surgery in 2004.  She denies any chest pain.    Plan:  1.  Continue current medications  2.  Continue low-sodium diet    Veronica Bray will follow up with Dr. Cardoza in December along with device check.       History of Present Illness/Subjective    Ms. Veronica Bray is a 89 year old female seen at [facility] today for follow-up.  She has a history of coronary artery disease with coronary artery bypass surgery in 2004, paroxysmal atrial fibrillation, dyslipidemia, and untreated sleep apnea.  She received permanent pacemaker at [facility] in September 2021 due to second-degree heart block.  Echocardiogram on September 14, 2021 showed ejection fraction of 70 to 75%, moderate mitral regurgitation, and moderate tricuspid regurgitation.    Since receiving pacemaker, she no longer has shortness of breath, fatigue, lightheadedness, or edema.  She is living at an assisted living with minimal assistance.  She exercises twice weekly and is asymptomatic.  She denies chest pain.      Her weight has been stable between 138  "to 140 pounds.  She is following a low-sodium diet.        Physical Examination Review of Systems   Vitals: BP (!) 146/52 (BP Location: Left arm, Patient Position: Sitting, Cuff Size: Adult Regular)   Pulse 68   Resp 16   Ht 1.549 m (5' 1\")   Wt 63.3 kg (139 lb 8 oz)   BMI 26.36 kg/m    BMI= Body mass index is 26.36 kg/m .  Wt Readings from Last 3 Encounters:   10/26/21 63.3 kg (139 lb 8 oz)   09/22/21 62.6 kg (138 lb 1.6 oz)   09/13/21 63 kg (139 lb)       General Appearance:     Alert, cooperative and in no acute distress.   ENT/Mouth: membranes moist, no oral lesions or bleeding gums.      EYES:  no scleral icterus, normal conjunctivae   Chest/Lungs:   lungs are clear to auscultation, no rales or wheezing, respirations unlabored   Cardiovascular:   Regular. Normal first and second heart sounds, no edema bilateral lower extremities    Abdomen:  Soft, nontender, nondistended, bowel sounds present   Extremities: no cyanosis or clubbing   Skin: warm, dry.    Neurologic: mood and affect are appropriate, alert and oriented x3    Left subclavian incision site clean, dry, intact with no swelling or bruising       Please refer above for cardiac ROS details.      Medical History  Surgical History Family History Social History   Past Medical History:   Diagnosis Date     Arthritis      Asthma      Atrial flutter (H)      CAD (coronary artery disease)     LIMA graft to the LAD and vein graft to the diagonal are patent.      Cardiomyopathy, unspecified (H)     tachy induced     CHF (congestive heart failure) (H)      Hyperlipidemia      Hypertension      Hypertension      LBBB (left bundle branch block)      Mitral regurgitation      NSTEMI (non-ST elevated myocardial infarction) (H)      PAF (paroxysmal atrial fibrillation) (H)     converted on Amiodarone     Sleep apnea     CPAP     Stroke (H)     TIA     SVT (supraventricular tachycardia) (H)      Thrombocytopenia, unspecified (H) 7/13/2017     Tobacco use      Past " Surgical History:   Procedure Laterality Date     ABDOMEN SURGERY      gallbladder     CARDIAC SURGERY       CHOLECYSTECTOMY       CORONARY ANGIOGRAPHY ADULT ORDER  1/2005    Stent: Distal cfx, Mid LAD, Stent: D -1     GYN SURGERY      hysterectomy     HC LEFT HEART CATHETERIZATION  11/2006    mid distal-anterior/ distal inferior/ Apical Yossi, 85     HC LEFT HEART CATHETERIZATION  5/2015    100% Proximal LAD, patent LIMA-LAD, SVG-D1, LVEDP 13, 10-18 mmHg AV gradient, 3+ MR after PVC     HERNIA REPAIR       HYSTERECTOMY       JOINT REPLACEMENT      Knee hip     ORTHOPEDIC SURGERY      hip, knee, wrist     PICC MIDLINE INSERTION  5/6/2021          IA ESOPHAGOGASTRODUODENOSCOPY TRANSORAL DIAGNOSTIC N/A 6/26/2021    Procedure: ESOPHAGOGASTRODUODENOSCOPY (EGD);  Surgeon: Marc Wolff MD;  Location: [facility] OR;  Service: Gastroenterology     TONSILLECTOMY       VASCULAR SURGERY       Family History   Problem Relation Age of Onset     Unknown/Adopted Mother      Myocardial Infarction Mother      Unknown/Adopted Father      Myocardial Infarction Brother         Rheumatic fever     Myocardial Infarction Brother      Myocardial Infarction Brother      Heart Disease Brother     Social History     Socioeconomic History     Marital status:      Spouse name: Not on file     Number of children: Not on file     Years of education: Not on file     Highest education level: Not on file   Occupational History     Not on file   Tobacco Use     Smoking status: Never Smoker     Smokeless tobacco: Never Used     Tobacco comment: quit approx 1967    Substance and Sexual Activity     Alcohol use: Never     Alcohol/week: 4.0 standard drinks     Types: 4 Glasses of wine per week     Comment: rarely     Drug use: Never     Sexual activity: Not Currently     Partners: Male   Other Topics Concern     Parent/sibling w/ CABG, MI or angioplasty before 65F 55M? Not Asked      Service Not Asked     Blood Transfusions Not  Asked     Caffeine Concern No     Occupational Exposure Not Asked     Hobby Hazards Not Asked     Sleep Concern Not Asked     Stress Concern Not Asked     Weight Concern Not Asked     Special Diet No     Back Care Not Asked     Exercise No     Bike Helmet Not Asked     Seat Belt Not Asked     Self-Exams Not Asked   Social History Narrative     Not on file     Social Determinants of Health     Financial Resource Strain:      Difficulty of Paying Living Expenses:    Food Insecurity:      Worried About Running Out of Food in the Last Year:      Ran Out of Food in the Last Year:    Transportation Needs:      Lack of Transportation (Medical):      Lack of Transportation (Non-Medical):    Physical Activity:      Days of Exercise per Week:      Minutes of Exercise per Session:    Stress:      Feeling of Stress :    Social Connections:      Frequency of Communication with Friends and Family:      Frequency of Social Gatherings with Friends and Family:      Attends [employer] Services:      Active Member of Clubs or Organizations:      Attends Club or Organization Meetings:      Marital Status:    Intimate Partner Violence:      Fear of Current or Ex-Partner:      Emotionally Abused:      Physically Abused:      Sexually Abused:           Medications  Allergies   Current Outpatient Medications   Medication Sig Dispense Refill     acetaminophen (TYLENOL) 500 MG tablet Take 500-1,000 mg by mouth every 6 hours as needed for mild pain       aspirin 81 MG EC tablet Take 81 mg by mouth every other day       atorvastatin (LIPITOR) 10 MG tablet Take 1 tablet (10 mg) by mouth At Bedtime 90 tablet 4     bumetanide (BUMEX) 1 MG tablet Take 1 tablet (1 mg) by mouth 2 times daily 180 tablet 4     cetirizine (ZYRTEC) 10 MG tablet Take 10 mg by mouth daily        Cholecalciferol (VITAMIN D3) 50 MCG (2000 UT) CAPS Take 8,000 Units by mouth daily Take 4 capsules by mouth once daily       FEROSUL 325 (65 Fe) MG tablet Take 325 mg by mouth  daily (with breakfast)       fluticasone (FLONASE) 50 MCG/ACT nasal spray Spray 2 sprays into both nostrils daily (may take up to 2 weeks to take effect) 16 g 1     guaiFENesin (MUCINEX) 600 MG 12 hr tablet Take 1,200 mg by mouth 2 times daily       metoprolol tartrate (LOPRESSOR) 25 MG tablet Take 1 tablet (25 mg) by mouth 2 times daily 180 tablet 4     mirtazapine (REMERON) 7.5 MG tablet Take 7.5 mg by mouth At Bedtime       Multiple Vitamins-Minerals (PRESERVISION AREDS 2) CAPS Take 1 capsule by mouth 2 times daily        rivaroxaban ANTICOAGULANT (XARELTO ANTICOAGULANT) 15 MG TABS tablet Take 1 tablet (15 mg) by mouth daily (with dinner) 90 tablet 4     sertraline (ZOLOFT) 50 MG tablet Take 50 mg by mouth daily      Allergies   Allergen Reactions     Aleve [Naproxen] Anaphylaxis     Celecoxib Unknown     Codeine Unknown     Hydrocodone-Acetaminophen Unknown     Hydromorphone Unknown     Meperidine Unknown     Oxycodone-Acetaminophen Unknown     Tramadol Unknown         Lab Results    Chemistry/lipid CBC Cardiac Enzymes/BNP/TSH/INR   Recent Labs   Lab Test 07/17/20  0835   CHOL 159   HDL 68   LDL 73   TRIG 90     Recent Labs   Lab Test 07/17/20  0835 06/07/19  1004 07/11/18  1010   LDL 73 84 86     Recent Labs   Lab Test 09/13/21  1020      POTASSIUM 3.8   CHLORIDE 108*   CO2 24   GLC 96   BUN 35*   CR 0.92   GFRESTIMATED 55*   BARRETT 7.8*     Recent Labs   Lab Test 09/13/21  1020 08/26/21  1026 08/19/21  1043   CR 0.92 0.95 1.09     Recent Labs   Lab Test 11/12/20  1108   A1C 5.3    Recent Labs   Lab Test 09/19/21  1243   WBC 3.8*   HGB 10.1*   HCT 32.6*   MCV 88        Recent Labs   Lab Test 09/19/21  1243 09/13/21  1020 08/19/21  1043   HGB 10.1* 11.2* 11.7    Recent Labs   Lab Test 09/13/21  1020 06/24/21  1722 05/08/21  2203   TROPONINI 0.08 0.03 0.06     Recent Labs   Lab Test 09/13/21  1020 06/24/21  1722 06/24/21  1030   BNP 1,100* 459* 319*     Recent Labs   Lab Test 05/02/21  1752   TSH 1.79      Recent Labs   Lab Test 10/07/21  1216 09/30/21  0910 09/19/21  1243   INR 1.89* 1.98* 2.12*                                                 Thank you for allowing me to participate in the care of your patient.      Sincerely,     Yue Alejandro NP     [facility]  cc:   No referring provider defined for this encounter.

## 2022-01-01 ENCOUNTER — HOSPITAL ENCOUNTER (EMERGENCY)
Facility: CLINIC | Age: 87
Discharge: HOME | End: 2022-08-09
Admitting: PHYSICIAN ASSISTANT
Payer: MEDICARE

## 2022-01-01 ENCOUNTER — APPOINTMENT (OUTPATIENT)
Dept: OCCUPATIONAL THERAPY | Facility: CLINIC | Age: 87
DRG: 291 | End: 2022-01-01
Payer: MEDICARE

## 2022-01-01 ENCOUNTER — ANCILLARY PROCEDURE (OUTPATIENT)
Dept: CARDIOLOGY | Facility: CLINIC | Age: 87
End: 2022-01-01
Attending: INTERNAL MEDICINE
Payer: MEDICARE

## 2022-01-01 ENCOUNTER — APPOINTMENT (OUTPATIENT)
Dept: PHYSICAL THERAPY | Facility: CLINIC | Age: 87
DRG: 291 | End: 2022-01-01
Payer: MEDICARE

## 2022-01-01 ENCOUNTER — TELEPHONE (OUTPATIENT)
Dept: CARDIOLOGY | Facility: CLINIC | Age: 87
End: 2022-01-01

## 2022-01-01 ENCOUNTER — TELEPHONE (OUTPATIENT)
Dept: FAMILY MEDICINE | Facility: CLINIC | Age: 87
End: 2022-01-01

## 2022-01-01 ENCOUNTER — DOCUMENTATION ONLY (OUTPATIENT)
Dept: HOME HEALTH SERVICES | Facility: CLINIC | Age: 87
End: 2022-01-01

## 2022-01-01 ENCOUNTER — TELEPHONE (OUTPATIENT)
Dept: CARDIOLOGY | Facility: CLINIC | Age: 87
End: 2022-01-01
Payer: MEDICARE

## 2022-01-01 ENCOUNTER — OFFICE VISIT (OUTPATIENT)
Dept: FAMILY MEDICINE | Facility: CLINIC | Age: 87
End: 2022-01-01
Payer: MEDICARE

## 2022-01-01 ENCOUNTER — LAB REQUISITION (OUTPATIENT)
Dept: LAB | Facility: CLINIC | Age: 87
End: 2022-01-01
Payer: MEDICARE

## 2022-01-01 ENCOUNTER — HOSPITAL ENCOUNTER (INPATIENT)
Facility: CLINIC | Age: 87
LOS: 10 days | Discharge: HOME | DRG: 291 | End: 2022-09-04
Attending: FAMILY MEDICINE | Admitting: HOSPITALIST
Payer: MEDICARE

## 2022-01-01 ENCOUNTER — PATIENT OUTREACH (OUTPATIENT)
Dept: CARE COORDINATION | Facility: CLINIC | Age: 87
End: 2022-01-01

## 2022-01-01 ENCOUNTER — APPOINTMENT (OUTPATIENT)
Dept: RADIOLOGY | Facility: CLINIC | Age: 87
DRG: 291 | End: 2022-01-01
Attending: FAMILY MEDICINE
Payer: MEDICARE

## 2022-01-01 ENCOUNTER — DOCUMENTATION ONLY (OUTPATIENT)
Dept: ORTHOPEDICS | Facility: CLINIC | Age: 87
End: 2022-01-01

## 2022-01-01 ENCOUNTER — APPOINTMENT (OUTPATIENT)
Dept: CT IMAGING | Facility: CLINIC | Age: 87
DRG: 291 | End: 2022-01-01
Attending: HOSPITALIST
Payer: MEDICARE

## 2022-01-01 ENCOUNTER — TRANSFERRED RECORDS (OUTPATIENT)
Dept: HEALTH INFORMATION MANAGEMENT | Facility: CLINIC | Age: 87
End: 2022-01-01

## 2022-01-01 ENCOUNTER — APPOINTMENT (OUTPATIENT)
Dept: CT IMAGING | Facility: CLINIC | Age: 87
DRG: 291 | End: 2022-01-01
Attending: NURSE PRACTITIONER
Payer: MEDICARE

## 2022-01-01 ENCOUNTER — APPOINTMENT (OUTPATIENT)
Dept: RADIOLOGY | Facility: CLINIC | Age: 87
DRG: 291 | End: 2022-01-01
Attending: HOSPITALIST
Payer: MEDICARE

## 2022-01-01 ENCOUNTER — OFFICE VISIT (OUTPATIENT)
Dept: CARDIOLOGY | Facility: CLINIC | Age: 87
End: 2022-01-01
Attending: NURSE PRACTITIONER
Payer: MEDICARE

## 2022-01-01 ENCOUNTER — APPOINTMENT (OUTPATIENT)
Dept: OCCUPATIONAL THERAPY | Facility: CLINIC | Age: 87
DRG: 291 | End: 2022-01-01
Attending: INTERNAL MEDICINE
Payer: MEDICARE

## 2022-01-01 ENCOUNTER — APPOINTMENT (OUTPATIENT)
Dept: RADIOLOGY | Facility: CLINIC | Age: 87
DRG: 291 | End: 2022-01-01
Attending: INTERNAL MEDICINE
Payer: MEDICARE

## 2022-01-01 ENCOUNTER — HEALTH MAINTENANCE LETTER (OUTPATIENT)
Age: 87
End: 2022-01-01

## 2022-01-01 ENCOUNTER — APPOINTMENT (OUTPATIENT)
Dept: PHYSICAL THERAPY | Facility: CLINIC | Age: 87
DRG: 291 | End: 2022-01-01
Attending: INTERNAL MEDICINE
Payer: MEDICARE

## 2022-01-01 ENCOUNTER — APPOINTMENT (OUTPATIENT)
Dept: CARDIOLOGY | Facility: CLINIC | Age: 87
DRG: 291 | End: 2022-01-01
Attending: INTERNAL MEDICINE
Payer: MEDICARE

## 2022-01-01 ENCOUNTER — APPOINTMENT (OUTPATIENT)
Dept: RADIOLOGY | Facility: CLINIC | Age: 87
End: 2022-01-01
Payer: MEDICARE

## 2022-01-01 VITALS
DIASTOLIC BLOOD PRESSURE: 62 MMHG | SYSTOLIC BLOOD PRESSURE: 124 MMHG | HEART RATE: 78 BPM | WEIGHT: 144 LBS | BODY MASS INDEX: 27.21 KG/M2

## 2022-01-01 VITALS
HEART RATE: 63 BPM | BODY MASS INDEX: 26.43 KG/M2 | HEIGHT: 61 IN | WEIGHT: 140 LBS | DIASTOLIC BLOOD PRESSURE: 60 MMHG | RESPIRATION RATE: 20 BRPM | SYSTOLIC BLOOD PRESSURE: 140 MMHG

## 2022-01-01 VITALS
WEIGHT: 145 LBS | DIASTOLIC BLOOD PRESSURE: 60 MMHG | HEIGHT: 61 IN | OXYGEN SATURATION: 97 % | BODY MASS INDEX: 27.38 KG/M2 | HEART RATE: 60 BPM | RESPIRATION RATE: 25 BRPM | SYSTOLIC BLOOD PRESSURE: 139 MMHG | TEMPERATURE: 98.3 F

## 2022-01-01 VITALS — BODY MASS INDEX: 27.96 KG/M2 | HEIGHT: 61 IN | WEIGHT: 148.1 LBS

## 2022-01-01 VITALS
RESPIRATION RATE: 16 BRPM | SYSTOLIC BLOOD PRESSURE: 113 MMHG | TEMPERATURE: 97.9 F | BODY MASS INDEX: 28.04 KG/M2 | OXYGEN SATURATION: 100 % | WEIGHT: 148.4 LBS | DIASTOLIC BLOOD PRESSURE: 55 MMHG | HEART RATE: 72 BPM

## 2022-01-01 DIAGNOSIS — I50.9 ACUTE ON CHRONIC CONGESTIVE HEART FAILURE, UNSPECIFIED HEART FAILURE TYPE (H): Primary | ICD-10-CM

## 2022-01-01 DIAGNOSIS — Z95.0 CARDIAC PACEMAKER IN SITU: ICD-10-CM

## 2022-01-01 DIAGNOSIS — K21.00 GASTROESOPHAGEAL REFLUX DISEASE WITH ESOPHAGITIS WITHOUT HEMORRHAGE: ICD-10-CM

## 2022-01-01 DIAGNOSIS — M48.54XA: ICD-10-CM

## 2022-01-01 DIAGNOSIS — M54.50 BACK PAIN OF THORACOLUMBAR REGION: ICD-10-CM

## 2022-01-01 DIAGNOSIS — I50.9 ACUTE ON CHRONIC CONGESTIVE HEART FAILURE, UNSPECIFIED HEART FAILURE TYPE (H): ICD-10-CM

## 2022-01-01 DIAGNOSIS — I48.0 PAF (PAROXYSMAL ATRIAL FIBRILLATION) (H): ICD-10-CM

## 2022-01-01 DIAGNOSIS — I25.83 CORONARY ATHEROSCLEROSIS DUE TO LIPID RICH PLAQUE: Primary | ICD-10-CM

## 2022-01-01 DIAGNOSIS — I44.2 COMPLETE HEART BLOCK (H): Primary | ICD-10-CM

## 2022-01-01 DIAGNOSIS — I10 ESSENTIAL (PRIMARY) HYPERTENSION: ICD-10-CM

## 2022-01-01 DIAGNOSIS — I48.91 ATRIAL FIBRILLATION (H): ICD-10-CM

## 2022-01-01 DIAGNOSIS — I48.91 ATRIAL FIBRILLATION WITH RVR (H): ICD-10-CM

## 2022-01-01 DIAGNOSIS — M54.6 ACUTE RIGHT-SIDED THORACIC BACK PAIN: Primary | ICD-10-CM

## 2022-01-01 DIAGNOSIS — I44.2 COMPLETE HEART BLOCK (H): ICD-10-CM

## 2022-01-01 DIAGNOSIS — E78.00 PURE HYPERCHOLESTEROLEMIA: ICD-10-CM

## 2022-01-01 DIAGNOSIS — D61.810 ANTINEOPLASTIC CHEMOTHERAPY INDUCED PANCYTOPENIA (CODE) (H): ICD-10-CM

## 2022-01-01 DIAGNOSIS — I34.0 NONRHEUMATIC MITRAL VALVE REGURGITATION: ICD-10-CM

## 2022-01-01 DIAGNOSIS — I50.9 ACUTE CONGESTIVE HEART FAILURE, UNSPECIFIED HEART FAILURE TYPE (H): Primary | ICD-10-CM

## 2022-01-01 DIAGNOSIS — M54.6 BACK PAIN OF THORACOLUMBAR REGION: ICD-10-CM

## 2022-01-01 DIAGNOSIS — Z95.0 CARDIAC PACEMAKER: ICD-10-CM

## 2022-01-01 DIAGNOSIS — I10 ESSENTIAL HYPERTENSION: ICD-10-CM

## 2022-01-01 DIAGNOSIS — Z95.1 S/P CABG X 2: ICD-10-CM

## 2022-01-01 DIAGNOSIS — I49.5 SSS (SICK SINUS SYNDROME) (H): ICD-10-CM

## 2022-01-01 DIAGNOSIS — M54.6 ACUTE RIGHT-SIDED THORACIC BACK PAIN: ICD-10-CM

## 2022-01-01 DIAGNOSIS — I50.33 ACUTE ON CHRONIC DIASTOLIC (CONGESTIVE) HEART FAILURE (H): ICD-10-CM

## 2022-01-01 DIAGNOSIS — K59.01 SLOW TRANSIT CONSTIPATION: ICD-10-CM

## 2022-01-01 LAB
ANION GAP SERPL CALCULATED.3IONS-SCNC: 11 MMOL/L (ref 5–18)
ANION GAP SERPL CALCULATED.3IONS-SCNC: 12 MMOL/L (ref 5–18)
ANION GAP SERPL CALCULATED.3IONS-SCNC: 12 MMOL/L (ref 5–18)
ANION GAP SERPL CALCULATED.3IONS-SCNC: 13 MMOL/L (ref 5–18)
ANION GAP SERPL CALCULATED.3IONS-SCNC: 13 MMOL/L (ref 7–15)
ANION GAP SERPL CALCULATED.3IONS-SCNC: 14 MMOL/L (ref 5–18)
ANION GAP SERPL CALCULATED.3IONS-SCNC: 6 MMOL/L (ref 5–18)
ANION GAP SERPL CALCULATED.3IONS-SCNC: 7 MMOL/L (ref 5–18)
ANION GAP SERPL CALCULATED.3IONS-SCNC: 8 MMOL/L (ref 5–18)
ANION GAP SERPL CALCULATED.3IONS-SCNC: 9 MMOL/L (ref 5–18)
ATRIAL RATE - MUSE: 110 BPM
ATRIAL RATE - MUSE: 122 BPM
ATRIAL RATE - MUSE: 63 BPM
BASOPHILS # BLD AUTO: 0 10E3/UL (ref 0–0.2)
BASOPHILS # BLD AUTO: 0 10E3/UL (ref 0–0.2)
BASOPHILS NFR BLD AUTO: 1 %
BASOPHILS NFR BLD AUTO: 1 %
BNP SERPL-MCNC: 1947 PG/ML (ref 0–167)
BUN SERPL-MCNC: 27 MG/DL (ref 8–28)
BUN SERPL-MCNC: 28.3 MG/DL (ref 8–23)
BUN SERPL-MCNC: 29 MG/DL (ref 8–28)
BUN SERPL-MCNC: 34 MG/DL (ref 8–28)
BUN SERPL-MCNC: 34 MG/DL (ref 8–28)
BUN SERPL-MCNC: 40 MG/DL (ref 8–28)
BUN SERPL-MCNC: 40 MG/DL (ref 8–28)
BUN SERPL-MCNC: 42 MG/DL (ref 8–28)
BUN SERPL-MCNC: 43 MG/DL (ref 8–28)
BUN SERPL-MCNC: 43 MG/DL (ref 8–28)
BUN SERPL-MCNC: 44 MG/DL (ref 8–28)
BUN SERPL-MCNC: 45 MG/DL (ref 8–28)
BUN SERPL-MCNC: 56 MG/DL (ref 8–28)
BUN SERPL-MCNC: 65 MG/DL (ref 8–28)
CALCIUM SERPL-MCNC: 8.3 MG/DL (ref 8.5–10.5)
CALCIUM SERPL-MCNC: 8.4 MG/DL (ref 8.5–10.5)
CALCIUM SERPL-MCNC: 8.6 MG/DL (ref 8.5–10.5)
CALCIUM SERPL-MCNC: 8.7 MG/DL (ref 8.5–10.5)
CALCIUM SERPL-MCNC: 8.9 MG/DL (ref 8.5–10.5)
CALCIUM SERPL-MCNC: 9 MG/DL (ref 8.2–9.6)
CALCIUM SERPL-MCNC: 9 MG/DL (ref 8.5–10.5)
CALCIUM SERPL-MCNC: 9 MG/DL (ref 8.5–10.5)
CALCIUM SERPL-MCNC: 9.1 MG/DL (ref 8.5–10.5)
CALCIUM SERPL-MCNC: 9.1 MG/DL (ref 8.5–10.5)
CALCIUM SERPL-MCNC: 9.2 MG/DL (ref 8.5–10.5)
CALCIUM SERPL-MCNC: 9.3 MG/DL (ref 8.5–10.5)
CHLORIDE BLD-SCNC: 100 MMOL/L (ref 98–107)
CHLORIDE BLD-SCNC: 101 MMOL/L (ref 98–107)
CHLORIDE BLD-SCNC: 103 MMOL/L (ref 98–107)
CHLORIDE BLD-SCNC: 88 MMOL/L (ref 98–107)
CHLORIDE BLD-SCNC: 91 MMOL/L (ref 98–107)
CHLORIDE BLD-SCNC: 94 MMOL/L (ref 98–107)
CHLORIDE SERPL-SCNC: 102 MMOL/L (ref 98–107)
CK SERPL-CCNC: 96 U/L (ref 30–190)
CO2 SERPL-SCNC: 27 MMOL/L (ref 22–31)
CO2 SERPL-SCNC: 27 MMOL/L (ref 22–31)
CO2 SERPL-SCNC: 28 MMOL/L (ref 22–31)
CO2 SERPL-SCNC: 33 MMOL/L (ref 22–31)
CO2 SERPL-SCNC: 34 MMOL/L (ref 22–31)
CO2 SERPL-SCNC: 34 MMOL/L (ref 22–31)
CO2 SERPL-SCNC: 36 MMOL/L (ref 22–31)
CO2 SERPL-SCNC: 38 MMOL/L (ref 22–31)
CO2 SERPL-SCNC: 39 MMOL/L (ref 22–31)
CO2 SERPL-SCNC: 40 MMOL/L (ref 22–31)
CO2 SERPL-SCNC: 41 MMOL/L (ref 22–31)
CO2 SERPL-SCNC: 42 MMOL/L (ref 22–31)
CO2 SERPL-SCNC: 43 MMOL/L (ref 22–31)
CREAT SERPL-MCNC: 0.79 MG/DL (ref 0.51–0.95)
CREAT SERPL-MCNC: 0.83 MG/DL (ref 0.6–1.1)
CREAT SERPL-MCNC: 0.92 MG/DL (ref 0.6–1.1)
CREAT SERPL-MCNC: 0.99 MG/DL (ref 0.6–1.1)
CREAT SERPL-MCNC: 1 MG/DL (ref 0.6–1.1)
CREAT SERPL-MCNC: 1.18 MG/DL (ref 0.6–1.1)
CREAT SERPL-MCNC: 1.2 MG/DL (ref 0.6–1.1)
CREAT SERPL-MCNC: 1.23 MG/DL (ref 0.6–1.1)
CREAT SERPL-MCNC: 1.24 MG/DL (ref 0.6–1.1)
CREAT SERPL-MCNC: 1.36 MG/DL (ref 0.6–1.1)
CREAT SERPL-MCNC: 1.39 MG/DL (ref 0.6–1.1)
CREAT SERPL-MCNC: 1.57 MG/DL (ref 0.6–1.1)
CREAT SERPL-MCNC: 1.58 MG/DL (ref 0.6–1.1)
CREAT SERPL-MCNC: 1.61 MG/DL (ref 0.6–1.1)
DEPRECATED HCO3 PLAS-SCNC: 28 MMOL/L (ref 22–29)
DIASTOLIC BLOOD PRESSURE - MUSE: NORMAL MMHG
EOSINOPHIL # BLD AUTO: 0.2 10E3/UL (ref 0–0.7)
EOSINOPHIL # BLD AUTO: 0.2 10E3/UL (ref 0–0.7)
EOSINOPHIL NFR BLD AUTO: 4 %
EOSINOPHIL NFR BLD AUTO: 4 %
ERYTHROCYTE [DISTWIDTH] IN BLOOD BY AUTOMATED COUNT: 13 % (ref 10–15)
ERYTHROCYTE [DISTWIDTH] IN BLOOD BY AUTOMATED COUNT: 13.2 % (ref 10–15)
ERYTHROCYTE [DISTWIDTH] IN BLOOD BY AUTOMATED COUNT: 13.2 % (ref 10–15)
ERYTHROCYTE [DISTWIDTH] IN BLOOD BY AUTOMATED COUNT: 13.7 % (ref 10–15)
ERYTHROCYTE [DISTWIDTH] IN BLOOD BY AUTOMATED COUNT: 14.2 % (ref 10–15)
ERYTHROCYTE [DISTWIDTH] IN BLOOD BY AUTOMATED COUNT: 14.3 % (ref 10–15)
GFR SERPL CREATININE-BSD FRML MDRD: 30 ML/MIN/1.73M2
GFR SERPL CREATININE-BSD FRML MDRD: 31 ML/MIN/1.73M2
GFR SERPL CREATININE-BSD FRML MDRD: 31 ML/MIN/1.73M2
GFR SERPL CREATININE-BSD FRML MDRD: 36 ML/MIN/1.73M2
GFR SERPL CREATININE-BSD FRML MDRD: 37 ML/MIN/1.73M2
GFR SERPL CREATININE-BSD FRML MDRD: 41 ML/MIN/1.73M2
GFR SERPL CREATININE-BSD FRML MDRD: 41 ML/MIN/1.73M2
GFR SERPL CREATININE-BSD FRML MDRD: 43 ML/MIN/1.73M2
GFR SERPL CREATININE-BSD FRML MDRD: 43 ML/MIN/1.73M2
GFR SERPL CREATININE-BSD FRML MDRD: 53 ML/MIN/1.73M2
GFR SERPL CREATININE-BSD FRML MDRD: 54 ML/MIN/1.73M2
GFR SERPL CREATININE-BSD FRML MDRD: 58 ML/MIN/1.73M2
GFR SERPL CREATININE-BSD FRML MDRD: 67 ML/MIN/1.73M2
GFR SERPL CREATININE-BSD FRML MDRD: 70 ML/MIN/1.73M2
GLUCOSE BLD-MCNC: 100 MG/DL (ref 70–125)
GLUCOSE BLD-MCNC: 101 MG/DL (ref 70–125)
GLUCOSE BLD-MCNC: 103 MG/DL (ref 70–125)
GLUCOSE BLD-MCNC: 106 MG/DL (ref 70–125)
GLUCOSE BLD-MCNC: 107 MG/DL (ref 70–125)
GLUCOSE BLD-MCNC: 115 MG/DL (ref 70–125)
GLUCOSE BLD-MCNC: 116 MG/DL (ref 70–125)
GLUCOSE BLD-MCNC: 125 MG/DL (ref 70–125)
GLUCOSE BLD-MCNC: 140 MG/DL (ref 70–125)
GLUCOSE BLD-MCNC: 147 MG/DL (ref 70–125)
GLUCOSE BLD-MCNC: 89 MG/DL (ref 70–125)
GLUCOSE BLD-MCNC: 98 MG/DL (ref 70–125)
GLUCOSE BLD-MCNC: 99 MG/DL (ref 70–125)
GLUCOSE BLDC GLUCOMTR-MCNC: 106 MG/DL (ref 70–99)
GLUCOSE BLDC GLUCOMTR-MCNC: 107 MG/DL (ref 70–99)
GLUCOSE SERPL-MCNC: 112 MG/DL (ref 70–99)
HCT VFR BLD AUTO: 33.7 % (ref 35–47)
HCT VFR BLD AUTO: 33.8 % (ref 35–47)
HCT VFR BLD AUTO: 35.5 % (ref 35–47)
HCT VFR BLD AUTO: 36.7 % (ref 35–47)
HCT VFR BLD AUTO: 37.8 % (ref 35–47)
HCT VFR BLD AUTO: 41.9 % (ref 35–47)
HGB BLD-MCNC: 10.6 G/DL (ref 11.7–15.7)
HGB BLD-MCNC: 10.6 G/DL (ref 11.7–15.7)
HGB BLD-MCNC: 11.4 G/DL (ref 11.7–15.7)
HGB BLD-MCNC: 11.8 G/DL (ref 11.7–15.7)
HGB BLD-MCNC: 11.9 G/DL (ref 11.7–15.7)
HGB BLD-MCNC: 13.1 G/DL (ref 11.7–15.7)
HOLD SPECIMEN: NORMAL
IMM GRANULOCYTES # BLD: 0 10E3/UL
IMM GRANULOCYTES # BLD: 0 10E3/UL
IMM GRANULOCYTES NFR BLD: 0 %
IMM GRANULOCYTES NFR BLD: 0 %
INTERPRETATION ECG - MUSE: NORMAL
LACTATE SERPL-SCNC: 0.8 MMOL/L (ref 0.7–2)
LACTATE SERPL-SCNC: 0.9 MMOL/L (ref 0.7–2)
LACTATE SERPL-SCNC: 1 MMOL/L (ref 0.7–2)
LVEF ECHO: NORMAL
LYMPHOCYTES # BLD AUTO: 0.8 10E3/UL (ref 0.8–5.3)
LYMPHOCYTES # BLD AUTO: 1.1 10E3/UL (ref 0.8–5.3)
LYMPHOCYTES NFR BLD AUTO: 20 %
LYMPHOCYTES NFR BLD AUTO: 30 %
MAGNESIUM SERPL-MCNC: 2.1 MG/DL (ref 1.8–2.6)
MAGNESIUM SERPL-MCNC: 2.2 MG/DL (ref 1.8–2.6)
MAGNESIUM SERPL-MCNC: 2.3 MG/DL (ref 1.8–2.6)
MAGNESIUM SERPL-MCNC: 2.4 MG/DL (ref 1.8–2.6)
MCH RBC QN AUTO: 29.3 PG (ref 26.5–33)
MCH RBC QN AUTO: 30.2 PG (ref 26.5–33)
MCH RBC QN AUTO: 30.3 PG (ref 26.5–33)
MCH RBC QN AUTO: 30.7 PG (ref 26.5–33)
MCH RBC QN AUTO: 30.7 PG (ref 26.5–33)
MCH RBC QN AUTO: 31.1 PG (ref 26.5–33)
MCHC RBC AUTO-ENTMCNC: 31.3 G/DL (ref 31.5–36.5)
MCHC RBC AUTO-ENTMCNC: 31.4 G/DL (ref 31.5–36.5)
MCHC RBC AUTO-ENTMCNC: 31.5 G/DL (ref 31.5–36.5)
MCHC RBC AUTO-ENTMCNC: 31.5 G/DL (ref 31.5–36.5)
MCHC RBC AUTO-ENTMCNC: 32.1 G/DL (ref 31.5–36.5)
MCHC RBC AUTO-ENTMCNC: 32.2 G/DL (ref 31.5–36.5)
MCV RBC AUTO: 94 FL (ref 78–100)
MCV RBC AUTO: 94 FL (ref 78–100)
MCV RBC AUTO: 96 FL (ref 78–100)
MCV RBC AUTO: 97 FL (ref 78–100)
MCV RBC AUTO: 98 FL (ref 78–100)
MCV RBC AUTO: 98 FL (ref 78–100)
MDC_IDC_EPISODE_DTM: NORMAL
MDC_IDC_EPISODE_DURATION: 1064 S
MDC_IDC_EPISODE_DURATION: 134 S
MDC_IDC_EPISODE_DURATION: 15 S
MDC_IDC_EPISODE_DURATION: 209 S
MDC_IDC_EPISODE_DURATION: 28 S
MDC_IDC_EPISODE_DURATION: 29 S
MDC_IDC_EPISODE_DURATION: 30 S
MDC_IDC_EPISODE_DURATION: 3496 S
MDC_IDC_EPISODE_DURATION: 35 S
MDC_IDC_EPISODE_DURATION: 41 S
MDC_IDC_EPISODE_DURATION: 42 S
MDC_IDC_EPISODE_DURATION: 4370 S
MDC_IDC_EPISODE_DURATION: 44 S
MDC_IDC_EPISODE_DURATION: 5 S
MDC_IDC_EPISODE_DURATION: 58 S
MDC_IDC_EPISODE_DURATION: 6251 S
MDC_IDC_EPISODE_DURATION: 7 S
MDC_IDC_EPISODE_DURATION: 8457 S
MDC_IDC_EPISODE_DURATION: 85 S
MDC_IDC_EPISODE_DURATION: NORMAL S
MDC_IDC_EPISODE_ID: 10
MDC_IDC_EPISODE_ID: 11
MDC_IDC_EPISODE_ID: 12
MDC_IDC_EPISODE_ID: 13
MDC_IDC_EPISODE_ID: 14
MDC_IDC_EPISODE_ID: 15
MDC_IDC_EPISODE_ID: 16
MDC_IDC_EPISODE_ID: 17
MDC_IDC_EPISODE_ID: 18
MDC_IDC_EPISODE_ID: 19
MDC_IDC_EPISODE_ID: 20
MDC_IDC_EPISODE_ID: 21
MDC_IDC_EPISODE_ID: 22
MDC_IDC_EPISODE_ID: 23
MDC_IDC_EPISODE_ID: 24
MDC_IDC_EPISODE_ID: 25
MDC_IDC_EPISODE_ID: 26
MDC_IDC_EPISODE_ID: 3
MDC_IDC_EPISODE_ID: 4
MDC_IDC_EPISODE_ID: 5
MDC_IDC_EPISODE_ID: 6
MDC_IDC_EPISODE_ID: 7
MDC_IDC_EPISODE_ID: 8
MDC_IDC_EPISODE_ID: 9
MDC_IDC_EPISODE_TYPE: NORMAL
MDC_IDC_LEAD_IMPLANT_DT: NORMAL
MDC_IDC_LEAD_LOCATION: NORMAL
MDC_IDC_LEAD_LOCATION_DETAIL_1: NORMAL
MDC_IDC_LEAD_MFG: NORMAL
MDC_IDC_LEAD_MODEL: NORMAL
MDC_IDC_LEAD_POLARITY_TYPE: NORMAL
MDC_IDC_LEAD_SERIAL: NORMAL
MDC_IDC_MSMT_BATTERY_DTM: NORMAL
MDC_IDC_MSMT_BATTERY_REMAINING_LONGEVITY: 137 MO
MDC_IDC_MSMT_BATTERY_REMAINING_LONGEVITY: 140 MO
MDC_IDC_MSMT_BATTERY_REMAINING_LONGEVITY: 146 MO
MDC_IDC_MSMT_BATTERY_RRT_TRIGGER: 2.62
MDC_IDC_MSMT_BATTERY_STATUS: NORMAL
MDC_IDC_MSMT_BATTERY_VOLTAGE: 3.06 V
MDC_IDC_MSMT_BATTERY_VOLTAGE: 3.12 V
MDC_IDC_MSMT_BATTERY_VOLTAGE: 3.17 V
MDC_IDC_MSMT_LEADCHNL_RA_IMPEDANCE_VALUE: 304 OHM
MDC_IDC_MSMT_LEADCHNL_RA_IMPEDANCE_VALUE: 342 OHM
MDC_IDC_MSMT_LEADCHNL_RA_IMPEDANCE_VALUE: 342 OHM
MDC_IDC_MSMT_LEADCHNL_RA_IMPEDANCE_VALUE: 361 OHM
MDC_IDC_MSMT_LEADCHNL_RA_IMPEDANCE_VALUE: 361 OHM
MDC_IDC_MSMT_LEADCHNL_RA_PACING_THRESHOLD_AMPLITUDE: 0.38 V
MDC_IDC_MSMT_LEADCHNL_RA_PACING_THRESHOLD_AMPLITUDE: 0.38 V
MDC_IDC_MSMT_LEADCHNL_RA_PACING_THRESHOLD_AMPLITUDE: 0.5 V
MDC_IDC_MSMT_LEADCHNL_RA_PACING_THRESHOLD_AMPLITUDE: 0.5 V
MDC_IDC_MSMT_LEADCHNL_RA_PACING_THRESHOLD_PULSEWIDTH: 0.4 MS
MDC_IDC_MSMT_LEADCHNL_RA_SENSING_INTR_AMPL: 1 MV
MDC_IDC_MSMT_LEADCHNL_RA_SENSING_INTR_AMPL: 1 MV
MDC_IDC_MSMT_LEADCHNL_RA_SENSING_INTR_AMPL: 1.25 MV
MDC_IDC_MSMT_LEADCHNL_RV_IMPEDANCE_VALUE: 437 OHM
MDC_IDC_MSMT_LEADCHNL_RV_IMPEDANCE_VALUE: 456 OHM
MDC_IDC_MSMT_LEADCHNL_RV_IMPEDANCE_VALUE: 494 OHM
MDC_IDC_MSMT_LEADCHNL_RV_IMPEDANCE_VALUE: 494 OHM
MDC_IDC_MSMT_LEADCHNL_RV_IMPEDANCE_VALUE: 589 OHM
MDC_IDC_MSMT_LEADCHNL_RV_IMPEDANCE_VALUE: 608 OHM
MDC_IDC_MSMT_LEADCHNL_RV_IMPEDANCE_VALUE: 646 OHM
MDC_IDC_MSMT_LEADCHNL_RV_IMPEDANCE_VALUE: 646 OHM
MDC_IDC_MSMT_LEADCHNL_RV_PACING_THRESHOLD_AMPLITUDE: 0.5 V
MDC_IDC_MSMT_LEADCHNL_RV_PACING_THRESHOLD_AMPLITUDE: 0.5 V
MDC_IDC_MSMT_LEADCHNL_RV_PACING_THRESHOLD_AMPLITUDE: 0.62 V
MDC_IDC_MSMT_LEADCHNL_RV_PACING_THRESHOLD_AMPLITUDE: 0.62 V
MDC_IDC_MSMT_LEADCHNL_RV_PACING_THRESHOLD_AMPLITUDE: 0.75 V
MDC_IDC_MSMT_LEADCHNL_RV_PACING_THRESHOLD_PULSEWIDTH: 0.4 MS
MDC_IDC_MSMT_LEADCHNL_RV_SENSING_INTR_AMPL: 31.62 MV
MDC_IDC_PG_IMPLANT_DTM: NORMAL
MDC_IDC_PG_MFG: NORMAL
MDC_IDC_PG_MODEL: NORMAL
MDC_IDC_PG_SERIAL: NORMAL
MDC_IDC_PG_TYPE: NORMAL
MDC_IDC_SESS_CLINIC_NAME: NORMAL
MDC_IDC_SESS_DTM: NORMAL
MDC_IDC_SESS_TYPE: NORMAL
MDC_IDC_SET_BRADY_AT_MODE_SWITCH_RATE: 171 {BEATS}/MIN
MDC_IDC_SET_BRADY_HYSTRATE: NORMAL
MDC_IDC_SET_BRADY_LOWRATE: 60 {BEATS}/MIN
MDC_IDC_SET_BRADY_MAX_SENSOR_RATE: 120 {BEATS}/MIN
MDC_IDC_SET_BRADY_MAX_TRACKING_RATE: 120 {BEATS}/MIN
MDC_IDC_SET_BRADY_MODE: NORMAL
MDC_IDC_SET_BRADY_PAV_DELAY_LOW: 180 MS
MDC_IDC_SET_BRADY_SAV_DELAY_LOW: 150 MS
MDC_IDC_SET_LEADCHNL_RA_PACING_AMPLITUDE: 1.5 V
MDC_IDC_SET_LEADCHNL_RA_PACING_ANODE_ELECTRODE_1: NORMAL
MDC_IDC_SET_LEADCHNL_RA_PACING_ANODE_LOCATION_1: NORMAL
MDC_IDC_SET_LEADCHNL_RA_PACING_CAPTURE_MODE: NORMAL
MDC_IDC_SET_LEADCHNL_RA_PACING_CATHODE_ELECTRODE_1: NORMAL
MDC_IDC_SET_LEADCHNL_RA_PACING_CATHODE_LOCATION_1: NORMAL
MDC_IDC_SET_LEADCHNL_RA_PACING_POLARITY: NORMAL
MDC_IDC_SET_LEADCHNL_RA_PACING_PULSEWIDTH: 0.4 MS
MDC_IDC_SET_LEADCHNL_RA_SENSING_ANODE_ELECTRODE_1: NORMAL
MDC_IDC_SET_LEADCHNL_RA_SENSING_ANODE_LOCATION_1: NORMAL
MDC_IDC_SET_LEADCHNL_RA_SENSING_CATHODE_ELECTRODE_1: NORMAL
MDC_IDC_SET_LEADCHNL_RA_SENSING_CATHODE_LOCATION_1: NORMAL
MDC_IDC_SET_LEADCHNL_RA_SENSING_POLARITY: NORMAL
MDC_IDC_SET_LEADCHNL_RA_SENSING_SENSITIVITY: 0.15 MV
MDC_IDC_SET_LEADCHNL_RV_PACING_AMPLITUDE: 1.5 V
MDC_IDC_SET_LEADCHNL_RV_PACING_ANODE_ELECTRODE_1: NORMAL
MDC_IDC_SET_LEADCHNL_RV_PACING_ANODE_LOCATION_1: NORMAL
MDC_IDC_SET_LEADCHNL_RV_PACING_CAPTURE_MODE: NORMAL
MDC_IDC_SET_LEADCHNL_RV_PACING_CATHODE_ELECTRODE_1: NORMAL
MDC_IDC_SET_LEADCHNL_RV_PACING_CATHODE_LOCATION_1: NORMAL
MDC_IDC_SET_LEADCHNL_RV_PACING_POLARITY: NORMAL
MDC_IDC_SET_LEADCHNL_RV_PACING_PULSEWIDTH: 0.4 MS
MDC_IDC_SET_LEADCHNL_RV_SENSING_ANODE_ELECTRODE_1: NORMAL
MDC_IDC_SET_LEADCHNL_RV_SENSING_ANODE_LOCATION_1: NORMAL
MDC_IDC_SET_LEADCHNL_RV_SENSING_CATHODE_ELECTRODE_1: NORMAL
MDC_IDC_SET_LEADCHNL_RV_SENSING_CATHODE_LOCATION_1: NORMAL
MDC_IDC_SET_LEADCHNL_RV_SENSING_POLARITY: NORMAL
MDC_IDC_SET_LEADCHNL_RV_SENSING_SENSITIVITY: 0.9 MV
MDC_IDC_SET_ZONE_DETECTION_INTERVAL: 200 MS
MDC_IDC_SET_ZONE_DETECTION_INTERVAL: 350 MS
MDC_IDC_SET_ZONE_DETECTION_INTERVAL: 400 MS
MDC_IDC_SET_ZONE_TYPE: NORMAL
MDC_IDC_STAT_AT_BURDEN_PERCENT: 0.6 %
MDC_IDC_STAT_AT_BURDEN_PERCENT: 0.7 %
MDC_IDC_STAT_AT_BURDEN_PERCENT: 6.9 %
MDC_IDC_STAT_AT_DTM_END: NORMAL
MDC_IDC_STAT_AT_DTM_START: NORMAL
MDC_IDC_STAT_BRADY_AP_VP_PERCENT: 82.3 %
MDC_IDC_STAT_BRADY_AP_VP_PERCENT: 84.19 %
MDC_IDC_STAT_BRADY_AP_VP_PERCENT: 92.03 %
MDC_IDC_STAT_BRADY_AP_VS_PERCENT: 0.01 %
MDC_IDC_STAT_BRADY_AP_VS_PERCENT: 0.86 %
MDC_IDC_STAT_BRADY_AP_VS_PERCENT: 3.27 %
MDC_IDC_STAT_BRADY_AS_VP_PERCENT: 11.93 %
MDC_IDC_STAT_BRADY_AS_VP_PERCENT: 17.42 %
MDC_IDC_STAT_BRADY_AS_VP_PERCENT: 6.91 %
MDC_IDC_STAT_BRADY_AS_VS_PERCENT: 0.19 %
MDC_IDC_STAT_BRADY_AS_VS_PERCENT: 0.29 %
MDC_IDC_STAT_BRADY_AS_VS_PERCENT: 0.62 %
MDC_IDC_STAT_BRADY_DTM_END: NORMAL
MDC_IDC_STAT_BRADY_DTM_START: NORMAL
MDC_IDC_STAT_BRADY_RA_PERCENT_PACED: 81.95 %
MDC_IDC_STAT_BRADY_RA_PERCENT_PACED: 86.75 %
MDC_IDC_STAT_BRADY_RA_PERCENT_PACED: 87.27 %
MDC_IDC_STAT_BRADY_RV_PERCENT_PACED: 95.62 %
MDC_IDC_STAT_BRADY_RV_PERCENT_PACED: 97.75 %
MDC_IDC_STAT_BRADY_RV_PERCENT_PACED: 99.7 %
MDC_IDC_STAT_EPISODE_RECENT_COUNT: 0
MDC_IDC_STAT_EPISODE_RECENT_COUNT: 12
MDC_IDC_STAT_EPISODE_RECENT_COUNT: 3
MDC_IDC_STAT_EPISODE_RECENT_COUNT: 4
MDC_IDC_STAT_EPISODE_RECENT_COUNT: 5
MDC_IDC_STAT_EPISODE_RECENT_COUNT_DTM_END: NORMAL
MDC_IDC_STAT_EPISODE_RECENT_COUNT_DTM_START: NORMAL
MDC_IDC_STAT_EPISODE_TOTAL_COUNT: 0
MDC_IDC_STAT_EPISODE_TOTAL_COUNT: 21
MDC_IDC_STAT_EPISODE_TOTAL_COUNT: 5
MDC_IDC_STAT_EPISODE_TOTAL_COUNT: 5
MDC_IDC_STAT_EPISODE_TOTAL_COUNT: 9
MDC_IDC_STAT_EPISODE_TOTAL_COUNT_DTM_END: NORMAL
MDC_IDC_STAT_EPISODE_TOTAL_COUNT_DTM_START: NORMAL
MDC_IDC_STAT_EPISODE_TYPE: NORMAL
MONOCYTES # BLD AUTO: 0.4 10E3/UL (ref 0–1.3)
MONOCYTES # BLD AUTO: 0.4 10E3/UL (ref 0–1.3)
MONOCYTES NFR BLD AUTO: 10 %
MONOCYTES NFR BLD AUTO: 10 %
NEUTROPHILS # BLD AUTO: 2 10E3/UL (ref 1.6–8.3)
NEUTROPHILS # BLD AUTO: 2.7 10E3/UL (ref 1.6–8.3)
NEUTROPHILS NFR BLD AUTO: 55 %
NEUTROPHILS NFR BLD AUTO: 65 %
NRBC # BLD AUTO: 0 10E3/UL
NRBC # BLD AUTO: 0 10E3/UL
NRBC BLD AUTO-RTO: 0 /100
NRBC BLD AUTO-RTO: 0 /100
P AXIS - MUSE: NORMAL DEGREES
PLATELET # BLD AUTO: 138 10E3/UL (ref 150–450)
PLATELET # BLD AUTO: 144 10E3/UL (ref 150–450)
PLATELET # BLD AUTO: 159 10E3/UL (ref 150–450)
PLATELET # BLD AUTO: 161 10E3/UL (ref 150–450)
PLATELET # BLD AUTO: 189 10E3/UL (ref 150–450)
PLATELET # BLD AUTO: 233 10E3/UL (ref 150–450)
POTASSIUM BLD-SCNC: 3.3 MMOL/L (ref 3.5–5)
POTASSIUM BLD-SCNC: 3.5 MMOL/L (ref 3.5–5)
POTASSIUM BLD-SCNC: 3.5 MMOL/L (ref 3.5–5)
POTASSIUM BLD-SCNC: 3.6 MMOL/L (ref 3.5–5)
POTASSIUM BLD-SCNC: 3.7 MMOL/L (ref 3.5–5)
POTASSIUM BLD-SCNC: 3.8 MMOL/L (ref 3.5–5)
POTASSIUM BLD-SCNC: 3.9 MMOL/L (ref 3.5–5)
POTASSIUM BLD-SCNC: 4.2 MMOL/L (ref 3.5–5)
POTASSIUM BLD-SCNC: 4.6 MMOL/L (ref 3.5–5)
POTASSIUM BLD-SCNC: 4.7 MMOL/L (ref 3.5–5)
POTASSIUM SERPL-SCNC: 3.6 MMOL/L (ref 3.4–5.3)
PR INTERVAL - MUSE: 172 MS
PR INTERVAL - MUSE: NORMAL MS
PR INTERVAL - MUSE: NORMAL MS
QRS DURATION - MUSE: 130 MS
QRS DURATION - MUSE: 142 MS
QRS DURATION - MUSE: 146 MS
QT - MUSE: 324 MS
QT - MUSE: 416 MS
QT - MUSE: 484 MS
QTC - MUSE: 428 MS
QTC - MUSE: 486 MS
QTC - MUSE: 495 MS
R AXIS - MUSE: -14 DEGREES
R AXIS - MUSE: 10 DEGREES
R AXIS - MUSE: 33 DEGREES
RBC # BLD AUTO: 3.45 10E6/UL (ref 3.8–5.2)
RBC # BLD AUTO: 3.45 10E6/UL (ref 3.8–5.2)
RBC # BLD AUTO: 3.67 10E6/UL (ref 3.8–5.2)
RBC # BLD AUTO: 3.91 10E6/UL (ref 3.8–5.2)
RBC # BLD AUTO: 3.93 10E6/UL (ref 3.8–5.2)
RBC # BLD AUTO: 4.47 10E6/UL (ref 3.8–5.2)
SARS-COV-2 RNA RESP QL NAA+PROBE: NEGATIVE
SODIUM SERPL-SCNC: 135 MMOL/L (ref 136–145)
SODIUM SERPL-SCNC: 136 MMOL/L (ref 136–145)
SODIUM SERPL-SCNC: 137 MMOL/L (ref 136–145)
SODIUM SERPL-SCNC: 138 MMOL/L (ref 136–145)
SODIUM SERPL-SCNC: 139 MMOL/L (ref 136–145)
SODIUM SERPL-SCNC: 140 MMOL/L (ref 136–145)
SODIUM SERPL-SCNC: 140 MMOL/L (ref 136–145)
SODIUM SERPL-SCNC: 142 MMOL/L (ref 136–145)
SODIUM SERPL-SCNC: 142 MMOL/L (ref 136–145)
SODIUM SERPL-SCNC: 143 MMOL/L (ref 136–145)
SODIUM SERPL-SCNC: 143 MMOL/L (ref 136–145)
SODIUM SERPL-SCNC: 145 MMOL/L (ref 136–145)
SYSTOLIC BLOOD PRESSURE - MUSE: NORMAL MMHG
T AXIS - MUSE: 144 DEGREES
T AXIS - MUSE: 189 DEGREES
T AXIS - MUSE: 91 DEGREES
TROPONIN I SERPL-MCNC: 0.02 NG/ML (ref 0–0.29)
TROPONIN I SERPL-MCNC: 0.04 NG/ML (ref 0–0.29)
TROPONIN I SERPL-MCNC: 0.05 NG/ML (ref 0–0.29)
TROPONIN T BLD-MCNC: 0.03 UG/L
VENTRICULAR RATE- MUSE: 105 BPM
VENTRICULAR RATE- MUSE: 63 BPM
VENTRICULAR RATE- MUSE: 82 BPM
WBC # BLD AUTO: 3 10E3/UL (ref 4–11)
WBC # BLD AUTO: 3.3 10E3/UL (ref 4–11)
WBC # BLD AUTO: 3.8 10E3/UL (ref 4–11)
WBC # BLD AUTO: 4.1 10E3/UL (ref 4–11)
WBC # BLD AUTO: 4.4 10E3/UL (ref 4–11)
WBC # BLD AUTO: 4.7 10E3/UL (ref 4–11)

## 2022-01-01 PROCEDURE — 36415 COLL VENOUS BLD VENIPUNCTURE: CPT | Performed by: EMERGENCY MEDICINE

## 2022-01-01 PROCEDURE — 93010 ELECTROCARDIOGRAM REPORT: CPT | Mod: HIP | Performed by: INTERNAL MEDICINE

## 2022-01-01 PROCEDURE — 99232 SBSQ HOSP IP/OBS MODERATE 35: CPT | Performed by: INTERNAL MEDICINE

## 2022-01-01 PROCEDURE — 250N000013 HC RX MED GY IP 250 OP 250 PS 637: Performed by: INTERNAL MEDICINE

## 2022-01-01 PROCEDURE — 80048 BASIC METABOLIC PNL TOTAL CA: CPT | Mod: ORL | Performed by: FAMILY MEDICINE

## 2022-01-01 PROCEDURE — 250N000013 HC RX MED GY IP 250 OP 250 PS 637: Performed by: HOSPITALIST

## 2022-01-01 PROCEDURE — 83605 ASSAY OF LACTIC ACID: CPT | Performed by: HOSPITALIST

## 2022-01-01 PROCEDURE — 80048 BASIC METABOLIC PNL TOTAL CA: CPT | Performed by: INTERNAL MEDICINE

## 2022-01-01 PROCEDURE — 74176 CT ABD & PELVIS W/O CONTRAST: CPT | Mod: MA

## 2022-01-01 PROCEDURE — 210N000002 HC R&B HEART CARE

## 2022-01-01 PROCEDURE — 99232 SBSQ HOSP IP/OBS MODERATE 35: CPT | Performed by: HOSPITALIST

## 2022-01-01 PROCEDURE — 83605 ASSAY OF LACTIC ACID: CPT | Performed by: INTERNAL MEDICINE

## 2022-01-01 PROCEDURE — 84484 ASSAY OF TROPONIN QUANT: CPT | Performed by: FAMILY MEDICINE

## 2022-01-01 PROCEDURE — 250N000011 HC RX IP 250 OP 636: Performed by: HOSPITALIST

## 2022-01-01 PROCEDURE — 250N000013 HC RX MED GY IP 250 OP 250 PS 637: Performed by: PHYSICIAN ASSISTANT

## 2022-01-01 PROCEDURE — 96375 TX/PRO/DX INJ NEW DRUG ADDON: CPT

## 2022-01-01 PROCEDURE — 36415 COLL VENOUS BLD VENIPUNCTURE: CPT | Performed by: PHYSICIAN ASSISTANT

## 2022-01-01 PROCEDURE — 85027 COMPLETE CBC AUTOMATED: CPT | Mod: ORL | Performed by: FAMILY MEDICINE

## 2022-01-01 PROCEDURE — 71045 X-RAY EXAM CHEST 1 VIEW: CPT

## 2022-01-01 PROCEDURE — 36415 COLL VENOUS BLD VENIPUNCTURE: CPT | Performed by: INTERNAL MEDICINE

## 2022-01-01 PROCEDURE — 250N000011 HC RX IP 250 OP 636: Performed by: INTERNAL MEDICINE

## 2022-01-01 PROCEDURE — 74019 RADEX ABDOMEN 2 VIEWS: CPT

## 2022-01-01 PROCEDURE — 85027 COMPLETE CBC AUTOMATED: CPT | Performed by: PHYSICIAN ASSISTANT

## 2022-01-01 PROCEDURE — 80048 BASIC METABOLIC PNL TOTAL CA: CPT | Performed by: HOSPITALIST

## 2022-01-01 PROCEDURE — 93005 ELECTROCARDIOGRAM TRACING: CPT

## 2022-01-01 PROCEDURE — 96374 THER/PROPH/DIAG INJ IV PUSH: CPT

## 2022-01-01 PROCEDURE — 97116 GAIT TRAINING THERAPY: CPT | Mod: GP

## 2022-01-01 PROCEDURE — 83735 ASSAY OF MAGNESIUM: CPT | Performed by: INTERNAL MEDICINE

## 2022-01-01 PROCEDURE — 36415 COLL VENOUS BLD VENIPUNCTURE: CPT | Mod: ORL | Performed by: PHYSICIAN ASSISTANT

## 2022-01-01 PROCEDURE — 36415 COLL VENOUS BLD VENIPUNCTURE: CPT | Mod: ORL | Performed by: FAMILY MEDICINE

## 2022-01-01 PROCEDURE — 85027 COMPLETE CBC AUTOMATED: CPT | Performed by: INTERNAL MEDICINE

## 2022-01-01 PROCEDURE — 84132 ASSAY OF SERUM POTASSIUM: CPT | Performed by: HOSPITALIST

## 2022-01-01 PROCEDURE — 99214 OFFICE O/P EST MOD 30 MIN: CPT | Performed by: INTERNAL MEDICINE

## 2022-01-01 PROCEDURE — 250N000013 HC RX MED GY IP 250 OP 250 PS 637: Performed by: NURSE PRACTITIONER

## 2022-01-01 PROCEDURE — 99233 SBSQ HOSP IP/OBS HIGH 50: CPT | Performed by: INTERNAL MEDICINE

## 2022-01-01 PROCEDURE — 99215 OFFICE O/P EST HI 40 MIN: CPT | Performed by: FAMILY MEDICINE

## 2022-01-01 PROCEDURE — C9803 HOPD COVID-19 SPEC COLLECT: HCPCS

## 2022-01-01 PROCEDURE — 36415 COLL VENOUS BLD VENIPUNCTURE: CPT | Performed by: FAMILY MEDICINE

## 2022-01-01 PROCEDURE — 97535 SELF CARE MNGMENT TRAINING: CPT | Mod: GO

## 2022-01-01 PROCEDURE — 99285 EMERGENCY DEPT VISIT HI MDM: CPT | Mod: 25

## 2022-01-01 PROCEDURE — 93280 PM DEVICE PROGR EVAL DUAL: CPT | Performed by: INTERNAL MEDICINE

## 2022-01-01 PROCEDURE — 93005 ELECTROCARDIOGRAM TRACING: CPT | Performed by: FAMILY MEDICINE

## 2022-01-01 PROCEDURE — 71101 X-RAY EXAM UNILAT RIBS/CHEST: CPT | Mod: RT

## 2022-01-01 PROCEDURE — P9603 ONE-WAY ALLOW PRORATED MILES: HCPCS | Mod: ORL | Performed by: PHYSICIAN ASSISTANT

## 2022-01-01 PROCEDURE — 36415 COLL VENOUS BLD VENIPUNCTURE: CPT | Performed by: HOSPITALIST

## 2022-01-01 PROCEDURE — 99233 SBSQ HOSP IP/OBS HIGH 50: CPT | Performed by: HOSPITALIST

## 2022-01-01 PROCEDURE — 99233 SBSQ HOSP IP/OBS HIGH 50: CPT | Performed by: FAMILY MEDICINE

## 2022-01-01 PROCEDURE — 82310 ASSAY OF CALCIUM: CPT | Performed by: PHYSICIAN ASSISTANT

## 2022-01-01 PROCEDURE — 250N000009 HC RX 250: Performed by: FAMILY MEDICINE

## 2022-01-01 PROCEDURE — 85027 COMPLETE CBC AUTOMATED: CPT | Performed by: HOSPITALIST

## 2022-01-01 PROCEDURE — 83735 ASSAY OF MAGNESIUM: CPT | Performed by: FAMILY MEDICINE

## 2022-01-01 PROCEDURE — 250N000013 HC RX MED GY IP 250 OP 250 PS 637: Performed by: FAMILY MEDICINE

## 2022-01-01 PROCEDURE — 97110 THERAPEUTIC EXERCISES: CPT | Mod: GO

## 2022-01-01 PROCEDURE — 97110 THERAPEUTIC EXERCISES: CPT | Mod: GP

## 2022-01-01 PROCEDURE — 99222 1ST HOSP IP/OBS MODERATE 55: CPT | Performed by: INTERNAL MEDICINE

## 2022-01-01 PROCEDURE — 99239 HOSP IP/OBS DSCHRG MGMT >30: CPT | Performed by: FAMILY MEDICINE

## 2022-01-01 PROCEDURE — P9603 ONE-WAY ALLOW PRORATED MILES: HCPCS | Mod: ORL | Performed by: FAMILY MEDICINE

## 2022-01-01 PROCEDURE — P9604 ONE-WAY ALLOW PRORATED TRIP: HCPCS | Mod: ORL | Performed by: PHYSICIAN ASSISTANT

## 2022-01-01 PROCEDURE — 99223 1ST HOSP IP/OBS HIGH 75: CPT | Mod: AI | Performed by: HOSPITALIST

## 2022-01-01 PROCEDURE — U0005 INFEC AGEN DETEC AMPLI PROBE: HCPCS | Performed by: FAMILY MEDICINE

## 2022-01-01 PROCEDURE — 82310 ASSAY OF CALCIUM: CPT | Performed by: HOSPITALIST

## 2022-01-01 PROCEDURE — 93296 REM INTERROG EVL PM/IDS: CPT | Performed by: INTERNAL MEDICINE

## 2022-01-01 PROCEDURE — 93005 ELECTROCARDIOGRAM TRACING: CPT | Performed by: PHYSICIAN ASSISTANT

## 2022-01-01 PROCEDURE — 80048 BASIC METABOLIC PNL TOTAL CA: CPT | Mod: ORL | Performed by: PHYSICIAN ASSISTANT

## 2022-01-01 PROCEDURE — 250N000011 HC RX IP 250 OP 636: Performed by: FAMILY MEDICINE

## 2022-01-01 PROCEDURE — 93294 REM INTERROG EVL PM/LDLS PM: CPT | Performed by: INTERNAL MEDICINE

## 2022-01-01 PROCEDURE — 97166 OT EVAL MOD COMPLEX 45 MIN: CPT | Mod: GO

## 2022-01-01 PROCEDURE — 97161 PT EVAL LOW COMPLEX 20 MIN: CPT | Mod: GP

## 2022-01-01 PROCEDURE — 84484 ASSAY OF TROPONIN QUANT: CPT | Performed by: HOSPITALIST

## 2022-01-01 PROCEDURE — 83880 ASSAY OF NATRIURETIC PEPTIDE: CPT | Performed by: FAMILY MEDICINE

## 2022-01-01 PROCEDURE — 80048 BASIC METABOLIC PNL TOTAL CA: CPT | Performed by: FAMILY MEDICINE

## 2022-01-01 PROCEDURE — 85025 COMPLETE CBC W/AUTO DIFF WBC: CPT | Mod: ORL | Performed by: PHYSICIAN ASSISTANT

## 2022-01-01 PROCEDURE — 99223 1ST HOSP IP/OBS HIGH 75: CPT | Performed by: NURSE PRACTITIONER

## 2022-01-01 PROCEDURE — 84484 ASSAY OF TROPONIN QUANT: CPT | Performed by: PHYSICIAN ASSISTANT

## 2022-01-01 PROCEDURE — G1010 CDSM STANSON: HCPCS

## 2022-01-01 PROCEDURE — 93306 TTE W/DOPPLER COMPLETE: CPT

## 2022-01-01 PROCEDURE — 82550 ASSAY OF CK (CPK): CPT | Performed by: HOSPITALIST

## 2022-01-01 PROCEDURE — 85025 COMPLETE CBC W/AUTO DIFF WBC: CPT | Performed by: FAMILY MEDICINE

## 2022-01-01 PROCEDURE — 93306 TTE W/DOPPLER COMPLETE: CPT | Mod: 26 | Performed by: INTERNAL MEDICINE

## 2022-01-01 RX ORDER — LORAZEPAM 0.5 MG/1
0.25 TABLET ORAL EVERY 4 HOURS PRN
Status: DISCONTINUED | OUTPATIENT
Start: 2022-01-01 | End: 2022-01-01 | Stop reason: HOSPADM

## 2022-01-01 RX ORDER — METHOCARBAMOL 500 MG/1
1000 TABLET, FILM COATED ORAL ONCE
Status: COMPLETED | OUTPATIENT
Start: 2022-01-01 | End: 2022-01-01

## 2022-01-01 RX ORDER — METOPROLOL TARTRATE 25 MG/1
25 TABLET, FILM COATED ORAL 2 TIMES DAILY
Status: DISCONTINUED | OUTPATIENT
Start: 2022-01-01 | End: 2022-01-01

## 2022-01-01 RX ORDER — NALOXONE HYDROCHLORIDE 0.4 MG/ML
0.2 INJECTION, SOLUTION INTRAMUSCULAR; INTRAVENOUS; SUBCUTANEOUS
Status: DISCONTINUED | OUTPATIENT
Start: 2022-01-01 | End: 2022-01-01 | Stop reason: HOSPADM

## 2022-01-01 RX ORDER — LIDOCAINE 50 MG/G
1 PATCH TOPICAL EVERY 24 HOURS
Qty: 30 PATCH | Refills: 0 | Status: SHIPPED | OUTPATIENT
Start: 2022-01-01 | End: 2022-01-01

## 2022-01-01 RX ORDER — MORPHINE SULFATE 2 MG/ML
2 INJECTION, SOLUTION INTRAMUSCULAR; INTRAVENOUS ONCE
Status: DISCONTINUED | OUTPATIENT
Start: 2022-01-01 | End: 2022-01-01 | Stop reason: HOSPADM

## 2022-01-01 RX ORDER — FUROSEMIDE 40 MG
40 TABLET ORAL
Status: DISCONTINUED | OUTPATIENT
Start: 2022-01-01 | End: 2022-01-01 | Stop reason: HOSPADM

## 2022-01-01 RX ORDER — METOPROLOL TARTRATE 50 MG
50 TABLET ORAL 2 TIMES DAILY
Status: DISCONTINUED | OUTPATIENT
Start: 2022-01-01 | End: 2022-01-01

## 2022-01-01 RX ORDER — FUROSEMIDE 10 MG/ML
40 INJECTION INTRAMUSCULAR; INTRAVENOUS ONCE
Status: COMPLETED | OUTPATIENT
Start: 2022-01-01 | End: 2022-01-01

## 2022-01-01 RX ORDER — FUROSEMIDE 40 MG
40 TABLET ORAL
Qty: 60 TABLET | Refills: 0 | Status: SHIPPED | OUTPATIENT
Start: 2022-01-01

## 2022-01-01 RX ORDER — FUROSEMIDE 10 MG/ML
40 INJECTION INTRAMUSCULAR; INTRAVENOUS EVERY 12 HOURS
Status: DISCONTINUED | OUTPATIENT
Start: 2022-01-01 | End: 2022-01-01

## 2022-01-01 RX ORDER — ONDANSETRON 2 MG/ML
4 INJECTION INTRAMUSCULAR; INTRAVENOUS EVERY 6 HOURS PRN
Status: DISCONTINUED | OUTPATIENT
Start: 2022-01-01 | End: 2022-01-01 | Stop reason: HOSPADM

## 2022-01-01 RX ORDER — RIVAROXABAN 15 MG/1
TABLET, FILM COATED ORAL
Qty: 90 TABLET | Refills: 1 | Status: SHIPPED | OUTPATIENT
Start: 2022-01-01

## 2022-01-01 RX ORDER — METOPROLOL SUCCINATE 100 MG/1
100 TABLET, EXTENDED RELEASE ORAL DAILY
Qty: 30 TABLET | Refills: 0 | Status: SHIPPED | OUTPATIENT
Start: 2022-01-01 | End: 2022-01-01

## 2022-01-01 RX ORDER — LORAZEPAM 2 MG/ML
1 INJECTION INTRAMUSCULAR
Status: COMPLETED | OUTPATIENT
Start: 2022-01-01 | End: 2022-01-01

## 2022-01-01 RX ORDER — POLYETHYLENE GLYCOL 3350 17 G/17G
17 POWDER, FOR SOLUTION ORAL DAILY
Status: DISCONTINUED | OUTPATIENT
Start: 2022-01-01 | End: 2022-01-01

## 2022-01-01 RX ORDER — LIDOCAINE 4 G/G
1 PATCH TOPICAL EVERY 24 HOURS
Status: DISCONTINUED | OUTPATIENT
Start: 2022-01-01 | End: 2022-01-01 | Stop reason: HOSPADM

## 2022-01-01 RX ORDER — FUROSEMIDE 40 MG
40 TABLET ORAL
Qty: 60 TABLET | Refills: 0 | Status: SHIPPED | OUTPATIENT
Start: 2022-01-01 | End: 2022-01-01

## 2022-01-01 RX ORDER — BISACODYL 10 MG
10 SUPPOSITORY, RECTAL RECTAL DAILY PRN
Status: DISCONTINUED | OUTPATIENT
Start: 2022-01-01 | End: 2022-01-01

## 2022-01-01 RX ORDER — METOPROLOL TARTRATE 1 MG/ML
2.5 INJECTION, SOLUTION INTRAVENOUS ONCE
Status: COMPLETED | OUTPATIENT
Start: 2022-01-01 | End: 2022-01-01

## 2022-01-01 RX ORDER — VITAMIN B COMPLEX
25 TABLET ORAL DAILY
Status: DISCONTINUED | OUTPATIENT
Start: 2022-01-01 | End: 2022-01-01 | Stop reason: HOSPADM

## 2022-01-01 RX ORDER — LIDOCAINE 4 G/G
1 PATCH TOPICAL EVERY 24 HOURS
COMMUNITY

## 2022-01-01 RX ORDER — DIPHENHYDRAMINE HCL 25 MG
25 CAPSULE ORAL ONCE
Status: COMPLETED | OUTPATIENT
Start: 2022-01-01 | End: 2022-01-01

## 2022-01-01 RX ORDER — FUROSEMIDE 10 MG/ML
40 INJECTION INTRAMUSCULAR; INTRAVENOUS EVERY 8 HOURS
Status: DISCONTINUED | OUTPATIENT
Start: 2022-01-01 | End: 2022-01-01

## 2022-01-01 RX ORDER — ACETAMINOPHEN 650 MG/1
650 SUPPOSITORY RECTAL EVERY 6 HOURS PRN
Status: DISCONTINUED | OUTPATIENT
Start: 2022-01-01 | End: 2022-01-01 | Stop reason: HOSPADM

## 2022-01-01 RX ORDER — MORPHINE SULFATE 15 MG/1
15 TABLET ORAL ONCE
Status: COMPLETED | OUTPATIENT
Start: 2022-01-01 | End: 2022-01-01

## 2022-01-01 RX ORDER — MORPHINE SULFATE 15 MG/1
15 TABLET ORAL EVERY 8 HOURS PRN
Status: DISCONTINUED | OUTPATIENT
Start: 2022-01-01 | End: 2022-01-01 | Stop reason: HOSPADM

## 2022-01-01 RX ORDER — METOLAZONE 2.5 MG/1
5 TABLET ORAL ONCE
Status: COMPLETED | OUTPATIENT
Start: 2022-01-01 | End: 2022-01-01

## 2022-01-01 RX ORDER — LIDOCAINE 40 MG/G
CREAM TOPICAL
Status: DISCONTINUED | OUTPATIENT
Start: 2022-01-01 | End: 2022-01-01 | Stop reason: HOSPADM

## 2022-01-01 RX ORDER — POLYETHYLENE GLYCOL 3350 17 G/17G
17 POWDER, FOR SOLUTION ORAL 2 TIMES DAILY PRN
Status: DISCONTINUED | OUTPATIENT
Start: 2022-01-01 | End: 2022-01-01 | Stop reason: HOSPADM

## 2022-01-01 RX ORDER — METHOCARBAMOL 500 MG/1
1000 TABLET, FILM COATED ORAL 3 TIMES DAILY PRN
Qty: 30 TABLET | Refills: 0 | Status: SHIPPED | OUTPATIENT
Start: 2022-01-01 | End: 2022-01-01

## 2022-01-01 RX ORDER — POTASSIUM CHLORIDE 1500 MG/1
20 TABLET, EXTENDED RELEASE ORAL ONCE
Status: COMPLETED | OUTPATIENT
Start: 2022-01-01 | End: 2022-01-01

## 2022-01-01 RX ORDER — ACETAMINOPHEN 325 MG/1
650 TABLET ORAL EVERY 6 HOURS PRN
Status: DISCONTINUED | OUTPATIENT
Start: 2022-01-01 | End: 2022-01-01 | Stop reason: HOSPADM

## 2022-01-01 RX ORDER — FAMOTIDINE 20 MG/1
20 TABLET, FILM COATED ORAL DAILY
Qty: 60 TABLET | Refills: 0 | Status: SHIPPED | OUTPATIENT
Start: 2022-01-01

## 2022-01-01 RX ORDER — DOCUSATE SODIUM 100 MG/1
100 CAPSULE, LIQUID FILLED ORAL 2 TIMES DAILY PRN
Status: DISCONTINUED | OUTPATIENT
Start: 2022-01-01 | End: 2022-01-01

## 2022-01-01 RX ORDER — SENNOSIDES 8.6 MG
1-2 TABLET ORAL AT BEDTIME
Status: DISCONTINUED | OUTPATIENT
Start: 2022-01-01 | End: 2022-01-01

## 2022-01-01 RX ORDER — METOPROLOL TARTRATE 25 MG/1
TABLET, FILM COATED ORAL
Qty: 180 TABLET | Refills: 1 | Status: ON HOLD | OUTPATIENT
Start: 2022-01-01 | End: 2022-01-01

## 2022-01-01 RX ORDER — LIDOCAINE 4 G/G
1 PATCH TOPICAL EVERY 24 HOURS
Status: DISCONTINUED | OUTPATIENT
Start: 2022-01-01 | End: 2022-01-01

## 2022-01-01 RX ORDER — NALOXONE HYDROCHLORIDE 0.4 MG/ML
0.4 INJECTION, SOLUTION INTRAMUSCULAR; INTRAVENOUS; SUBCUTANEOUS
Status: DISCONTINUED | OUTPATIENT
Start: 2022-01-01 | End: 2022-01-01 | Stop reason: HOSPADM

## 2022-01-01 RX ORDER — FUROSEMIDE 40 MG
40 TABLET ORAL DAILY
Status: DISCONTINUED | OUTPATIENT
Start: 2022-01-01 | End: 2022-01-01

## 2022-01-01 RX ORDER — POLYETHYLENE GLYCOL 3350 17 G/17G
17 POWDER, FOR SOLUTION ORAL DAILY
Qty: 510 G | COMMUNITY
Start: 2022-01-01

## 2022-01-01 RX ORDER — FERROUS SULFATE 325(65) MG
325 TABLET ORAL DAILY
Status: DISCONTINUED | OUTPATIENT
Start: 2022-01-01 | End: 2022-01-01 | Stop reason: HOSPADM

## 2022-01-01 RX ORDER — LIDOCAINE 4 G/G
1 PATCH TOPICAL EVERY 24 HOURS
Qty: 14 PATCH | Refills: 0 | Status: SHIPPED | OUTPATIENT
Start: 2022-01-01 | End: 2022-01-01

## 2022-01-01 RX ORDER — FUROSEMIDE 10 MG/ML
20 INJECTION INTRAMUSCULAR; INTRAVENOUS EVERY 8 HOURS
Status: DISCONTINUED | OUTPATIENT
Start: 2022-01-01 | End: 2022-01-01

## 2022-01-01 RX ORDER — LIDOCAINE 4 G/G
1 PATCH TOPICAL ONCE
Status: DISCONTINUED | OUTPATIENT
Start: 2022-01-01 | End: 2022-01-01 | Stop reason: HOSPADM

## 2022-01-01 RX ORDER — METOPROLOL SUCCINATE 100 MG/1
100 TABLET, EXTENDED RELEASE ORAL DAILY
Qty: 30 TABLET | Refills: 0 | Status: SHIPPED | OUTPATIENT
Start: 2022-01-01

## 2022-01-01 RX ORDER — MULTIVITAMIN WITH IRON
1 TABLET ORAL 2 TIMES DAILY
COMMUNITY

## 2022-01-01 RX ORDER — NITROGLYCERIN 0.4 MG/1
0.4 TABLET SUBLINGUAL EVERY 5 MIN PRN
Status: DISCONTINUED | OUTPATIENT
Start: 2022-01-01 | End: 2022-01-01 | Stop reason: HOSPADM

## 2022-01-01 RX ORDER — MORPHINE SULFATE 15 MG/1
15 TABLET ORAL EVERY 8 HOURS PRN
Qty: 10 TABLET | Refills: 0 | Status: SHIPPED | OUTPATIENT
Start: 2022-01-01

## 2022-01-01 RX ORDER — ASPIRIN 81 MG/1
81 TABLET ORAL EVERY OTHER DAY
Status: DISCONTINUED | OUTPATIENT
Start: 2022-01-01 | End: 2022-01-01 | Stop reason: HOSPADM

## 2022-01-01 RX ORDER — SENNOSIDES 8.6 MG
1-2 TABLET ORAL DAILY PRN
Status: DISCONTINUED | OUTPATIENT
Start: 2022-01-01 | End: 2022-01-01

## 2022-01-01 RX ORDER — LORAZEPAM 0.5 MG/1
0.25 TABLET ORAL ONCE
Status: COMPLETED | OUTPATIENT
Start: 2022-01-01 | End: 2022-01-01

## 2022-01-01 RX ORDER — VIT C/E/ZN/COPPR/LUTEIN/ZEAXAN 60 MG-6 MG
1 CAPSULE ORAL 2 TIMES DAILY
Status: DISCONTINUED | OUTPATIENT
Start: 2022-01-01 | End: 2022-01-01 | Stop reason: HOSPADM

## 2022-01-01 RX ORDER — MORPHINE SULFATE 15 MG/1
15 TABLET ORAL EVERY 8 HOURS PRN
Qty: 10 TABLET | Refills: 0 | Status: SHIPPED | OUTPATIENT
Start: 2022-01-01 | End: 2022-01-01

## 2022-01-01 RX ORDER — FAMOTIDINE 20 MG/1
20 TABLET, FILM COATED ORAL DAILY
Status: DISCONTINUED | OUTPATIENT
Start: 2022-01-01 | End: 2022-01-01 | Stop reason: HOSPADM

## 2022-01-01 RX ORDER — FERROUS SULFATE 325(65) MG
325 TABLET ORAL DAILY
COMMUNITY

## 2022-01-01 RX ORDER — METOPROLOL SUCCINATE 100 MG/1
100 TABLET, EXTENDED RELEASE ORAL DAILY
Status: DISCONTINUED | OUTPATIENT
Start: 2022-01-01 | End: 2022-01-01 | Stop reason: HOSPADM

## 2022-01-01 RX ORDER — CETIRIZINE HYDROCHLORIDE 10 MG/1
10 TABLET ORAL DAILY
Status: DISCONTINUED | OUTPATIENT
Start: 2022-01-01 | End: 2022-01-01 | Stop reason: HOSPADM

## 2022-01-01 RX ADMIN — MORPHINE SULFATE 15 MG: 15 TABLET ORAL at 22:24

## 2022-01-01 RX ADMIN — DIPHENHYDRAMINE HYDROCHLORIDE 25 MG: 25 CAPSULE ORAL at 21:41

## 2022-01-01 RX ADMIN — Medication 25 MCG: at 09:34

## 2022-01-01 RX ADMIN — RIVAROXABAN 15 MG: 15 TABLET, FILM COATED ORAL at 17:29

## 2022-01-01 RX ADMIN — FERROUS SULFATE TAB 325 MG (65 MG ELEMENTAL FE) 325 MG: 325 (65 FE) TAB at 09:02

## 2022-01-01 RX ADMIN — ONDANSETRON 4 MG: 2 INJECTION INTRAMUSCULAR; INTRAVENOUS at 02:00

## 2022-01-01 RX ADMIN — ACETAMINOPHEN 650 MG: 325 TABLET ORAL at 08:39

## 2022-01-01 RX ADMIN — ONDANSETRON 4 MG: 2 INJECTION INTRAMUSCULAR; INTRAVENOUS at 01:35

## 2022-01-01 RX ADMIN — FUROSEMIDE 40 MG: 40 TABLET ORAL at 10:12

## 2022-01-01 RX ADMIN — CETIRIZINE HYDROCHLORIDE 10 MG: 10 TABLET, FILM COATED ORAL at 09:01

## 2022-01-01 RX ADMIN — FERROUS SULFATE TAB 325 MG (65 MG ELEMENTAL FE) 325 MG: 325 (65 FE) TAB at 09:01

## 2022-01-01 RX ADMIN — Medication 25 MCG: at 09:02

## 2022-01-01 RX ADMIN — CETIRIZINE HYDROCHLORIDE 10 MG: 10 TABLET, FILM COATED ORAL at 10:15

## 2022-01-01 RX ADMIN — METOPROLOL TARTRATE 37.5 MG: 25 TABLET, FILM COATED ORAL at 20:40

## 2022-01-01 RX ADMIN — LIDOCAINE 1 PATCH: 246 PATCH TOPICAL at 09:23

## 2022-01-01 RX ADMIN — Medication 1 CAPSULE: at 09:25

## 2022-01-01 RX ADMIN — FUROSEMIDE 20 MG: 10 INJECTION, SOLUTION INTRAVENOUS at 03:34

## 2022-01-01 RX ADMIN — METOPROLOL TARTRATE 37.5 MG: 25 TABLET, FILM COATED ORAL at 09:15

## 2022-01-01 RX ADMIN — Medication 1 CAPSULE: at 19:56

## 2022-01-01 RX ADMIN — POTASSIUM CHLORIDE 20 MEQ: 1500 TABLET, EXTENDED RELEASE ORAL at 17:16

## 2022-01-01 RX ADMIN — Medication 25 MCG: at 09:39

## 2022-01-01 RX ADMIN — MORPHINE SULFATE 15 MG: 15 TABLET ORAL at 02:03

## 2022-01-01 RX ADMIN — RIVAROXABAN 15 MG: 15 TABLET, FILM COATED ORAL at 17:37

## 2022-01-01 RX ADMIN — METOPROLOL TARTRATE 37.5 MG: 25 TABLET, FILM COATED ORAL at 20:31

## 2022-01-01 RX ADMIN — ACETAMINOPHEN 650 MG: 325 TABLET ORAL at 07:36

## 2022-01-01 RX ADMIN — ACETAMINOPHEN 650 MG: 325 TABLET ORAL at 10:41

## 2022-01-01 RX ADMIN — FUROSEMIDE 40 MG: 10 INJECTION, SOLUTION INTRAMUSCULAR; INTRAVENOUS at 19:57

## 2022-01-01 RX ADMIN — RIVAROXABAN 15 MG: 15 TABLET, FILM COATED ORAL at 16:26

## 2022-01-01 RX ADMIN — Medication 1 CAPSULE: at 21:01

## 2022-01-01 RX ADMIN — MORPHINE SULFATE 15 MG: 15 TABLET ORAL at 02:59

## 2022-01-01 RX ADMIN — Medication 25 MCG: at 09:16

## 2022-01-01 RX ADMIN — METOPROLOL TARTRATE 25 MG: 25 TABLET, FILM COATED ORAL at 20:19

## 2022-01-01 RX ADMIN — ACETAMINOPHEN 650 MG: 325 TABLET ORAL at 01:38

## 2022-01-01 RX ADMIN — ACETAMINOPHEN 650 MG: 325 TABLET ORAL at 18:14

## 2022-01-01 RX ADMIN — MORPHINE SULFATE 15 MG: 15 TABLET ORAL at 17:37

## 2022-01-01 RX ADMIN — LORAZEPAM 0.25 MG: 0.5 TABLET ORAL at 00:08

## 2022-01-01 RX ADMIN — LIDOCAINE 1 PATCH: 246 PATCH TOPICAL at 07:36

## 2022-01-01 RX ADMIN — ONDANSETRON 4 MG: 2 INJECTION INTRAMUSCULAR; INTRAVENOUS at 17:10

## 2022-01-01 RX ADMIN — METOPROLOL TARTRATE 37.5 MG: 25 TABLET, FILM COATED ORAL at 05:10

## 2022-01-01 RX ADMIN — CETIRIZINE HYDROCHLORIDE 10 MG: 10 TABLET, FILM COATED ORAL at 12:22

## 2022-01-01 RX ADMIN — ACETAMINOPHEN 650 MG: 325 TABLET ORAL at 00:28

## 2022-01-01 RX ADMIN — Medication 1 CAPSULE: at 10:11

## 2022-01-01 RX ADMIN — FERROUS SULFATE TAB 325 MG (65 MG ELEMENTAL FE) 325 MG: 325 (65 FE) TAB at 08:39

## 2022-01-01 RX ADMIN — Medication 25 MCG: at 09:01

## 2022-01-01 RX ADMIN — CETIRIZINE HYDROCHLORIDE 10 MG: 10 TABLET, FILM COATED ORAL at 09:15

## 2022-01-01 RX ADMIN — RIVAROXABAN 15 MG: 15 TABLET, FILM COATED ORAL at 16:00

## 2022-01-01 RX ADMIN — DOCUSATE SODIUM 100 MG: 100 CAPSULE, LIQUID FILLED ORAL at 09:01

## 2022-01-01 RX ADMIN — Medication 25 MCG: at 09:25

## 2022-01-01 RX ADMIN — RIVAROXABAN 15 MG: 15 TABLET, FILM COATED ORAL at 21:16

## 2022-01-01 RX ADMIN — Medication 1 CAPSULE: at 20:19

## 2022-01-01 RX ADMIN — METOPROLOL TARTRATE 25 MG: 25 TABLET, FILM COATED ORAL at 21:01

## 2022-01-01 RX ADMIN — FAMOTIDINE 20 MG: 20 TABLET ORAL at 10:11

## 2022-01-01 RX ADMIN — FERROUS SULFATE TAB 325 MG (65 MG ELEMENTAL FE) 325 MG: 325 (65 FE) TAB at 10:15

## 2022-01-01 RX ADMIN — ASPIRIN 81 MG: 81 TABLET, COATED ORAL at 09:25

## 2022-01-01 RX ADMIN — Medication 1 MG: at 00:26

## 2022-01-01 RX ADMIN — ACETAMINOPHEN 650 MG: 325 TABLET ORAL at 10:51

## 2022-01-01 RX ADMIN — Medication 25 MCG: at 12:22

## 2022-01-01 RX ADMIN — Medication 1 CAPSULE: at 19:13

## 2022-01-01 RX ADMIN — POLYETHYLENE GLYCOL (3350) 17 G: 17 POWDER, FOR SOLUTION ORAL at 11:14

## 2022-01-01 RX ADMIN — ACETAMINOPHEN 650 MG: 325 TABLET ORAL at 21:01

## 2022-01-01 RX ADMIN — ACETAMINOPHEN 650 MG: 325 TABLET ORAL at 14:41

## 2022-01-01 RX ADMIN — Medication 1 CAPSULE: at 12:22

## 2022-01-01 RX ADMIN — MORPHINE SULFATE 15 MG: 15 TABLET ORAL at 10:44

## 2022-01-01 RX ADMIN — Medication 1 CAPSULE: at 08:39

## 2022-01-01 RX ADMIN — FUROSEMIDE 40 MG: 10 INJECTION, SOLUTION INTRAMUSCULAR; INTRAVENOUS at 06:27

## 2022-01-01 RX ADMIN — FERROUS SULFATE TAB 325 MG (65 MG ELEMENTAL FE) 325 MG: 325 (65 FE) TAB at 12:22

## 2022-01-01 RX ADMIN — ACETAMINOPHEN 650 MG: 325 TABLET ORAL at 13:24

## 2022-01-01 RX ADMIN — ASPIRIN 81 MG: 81 TABLET, COATED ORAL at 10:15

## 2022-01-01 RX ADMIN — LIDOCAINE 1 PATCH: 246 PATCH TOPICAL at 19:57

## 2022-01-01 RX ADMIN — LORAZEPAM 0.25 MG: 0.5 TABLET ORAL at 08:23

## 2022-01-01 RX ADMIN — ACETAMINOPHEN 650 MG: 325 TABLET ORAL at 09:01

## 2022-01-01 RX ADMIN — FUROSEMIDE 20 MG: 10 INJECTION, SOLUTION INTRAVENOUS at 11:38

## 2022-01-01 RX ADMIN — METOPROLOL SUCCINATE 100 MG: 100 TABLET, EXTENDED RELEASE ORAL at 09:25

## 2022-01-01 RX ADMIN — METOPROLOL TARTRATE 25 MG: 25 TABLET, FILM COATED ORAL at 10:15

## 2022-01-01 RX ADMIN — MINERAL OIL, PETROLATUM, PHENYLEPHRINE HCL: 2.5; 140; 749 OINTMENT TOPICAL at 17:16

## 2022-01-01 RX ADMIN — LIDOCAINE 1 PATCH: 246 PATCH TOPICAL at 20:28

## 2022-01-01 RX ADMIN — SENNOSIDES 2 TABLET: 8.6 TABLET, FILM COATED ORAL at 20:07

## 2022-01-01 RX ADMIN — FUROSEMIDE 40 MG: 10 INJECTION, SOLUTION INTRAMUSCULAR; INTRAVENOUS at 17:28

## 2022-01-01 RX ADMIN — Medication 1 CAPSULE: at 20:31

## 2022-01-01 RX ADMIN — FUROSEMIDE 40 MG: 10 INJECTION, SOLUTION INTRAMUSCULAR; INTRAVENOUS at 20:22

## 2022-01-01 RX ADMIN — FUROSEMIDE 20 MG: 10 INJECTION, SOLUTION INTRAVENOUS at 03:29

## 2022-01-01 RX ADMIN — Medication 1 CAPSULE: at 20:16

## 2022-01-01 RX ADMIN — Medication 1 MG: at 23:55

## 2022-01-01 RX ADMIN — CETIRIZINE HYDROCHLORIDE 10 MG: 10 TABLET, FILM COATED ORAL at 09:25

## 2022-01-01 RX ADMIN — Medication 1 CAPSULE: at 09:34

## 2022-01-01 RX ADMIN — DOCUSATE SODIUM 100 MG: 100 CAPSULE, LIQUID FILLED ORAL at 18:02

## 2022-01-01 RX ADMIN — METOPROLOL TARTRATE 25 MG: 25 TABLET, FILM COATED ORAL at 08:39

## 2022-01-01 RX ADMIN — CETIRIZINE HYDROCHLORIDE 10 MG: 10 TABLET, FILM COATED ORAL at 09:02

## 2022-01-01 RX ADMIN — ASPIRIN 81 MG: 81 TABLET, COATED ORAL at 09:00

## 2022-01-01 RX ADMIN — CETIRIZINE HYDROCHLORIDE 10 MG: 10 TABLET, FILM COATED ORAL at 10:11

## 2022-01-01 RX ADMIN — ONDANSETRON 4 MG: 2 INJECTION INTRAMUSCULAR; INTRAVENOUS at 18:41

## 2022-01-01 RX ADMIN — DIPHENHYDRAMINE HYDROCHLORIDE 25 MG: 25 CAPSULE ORAL at 23:13

## 2022-01-01 RX ADMIN — CETIRIZINE HYDROCHLORIDE 10 MG: 10 TABLET, FILM COATED ORAL at 08:39

## 2022-01-01 RX ADMIN — ACETAMINOPHEN 650 MG: 325 TABLET ORAL at 19:13

## 2022-01-01 RX ADMIN — ACETAMINOPHEN 650 MG: 325 TABLET ORAL at 18:37

## 2022-01-01 RX ADMIN — ONDANSETRON 4 MG: 2 INJECTION INTRAMUSCULAR; INTRAVENOUS at 22:04

## 2022-01-01 RX ADMIN — FUROSEMIDE 20 MG: 10 INJECTION, SOLUTION INTRAVENOUS at 18:44

## 2022-01-01 RX ADMIN — ONDANSETRON 4 MG: 2 INJECTION INTRAMUSCULAR; INTRAVENOUS at 14:21

## 2022-01-01 RX ADMIN — METOLAZONE 5 MG: 2.5 TABLET ORAL at 12:48

## 2022-01-01 RX ADMIN — FUROSEMIDE 40 MG: 10 INJECTION, SOLUTION INTRAMUSCULAR; INTRAVENOUS at 04:53

## 2022-01-01 RX ADMIN — ONDANSETRON 4 MG: 2 INJECTION INTRAMUSCULAR; INTRAVENOUS at 09:19

## 2022-01-01 RX ADMIN — FUROSEMIDE 40 MG: 10 INJECTION, SOLUTION INTRAVENOUS at 10:16

## 2022-01-01 RX ADMIN — ACETAMINOPHEN 650 MG: 325 TABLET ORAL at 00:04

## 2022-01-01 RX ADMIN — LORAZEPAM 0.25 MG: 0.5 TABLET ORAL at 03:06

## 2022-01-01 RX ADMIN — ACETAMINOPHEN 650 MG: 325 TABLET ORAL at 06:17

## 2022-01-01 RX ADMIN — FUROSEMIDE 40 MG: 40 TABLET ORAL at 17:23

## 2022-01-01 RX ADMIN — BISACODYL 10 MG: 10 SUPPOSITORY RECTAL at 17:55

## 2022-01-01 RX ADMIN — METOPROLOL TARTRATE 2.5 MG: 5 INJECTION INTRAVENOUS at 11:29

## 2022-01-01 RX ADMIN — Medication 1 CAPSULE: at 10:15

## 2022-01-01 RX ADMIN — METOPROLOL TARTRATE 37.5 MG: 25 TABLET, FILM COATED ORAL at 08:58

## 2022-01-01 RX ADMIN — LIDOCAINE 1 PATCH: 246 PATCH TOPICAL at 19:20

## 2022-01-01 RX ADMIN — Medication 1 CAPSULE: at 20:40

## 2022-01-01 RX ADMIN — Medication 1 CAPSULE: at 22:17

## 2022-01-01 RX ADMIN — Medication 1 CAPSULE: at 09:39

## 2022-01-01 RX ADMIN — LIDOCAINE 1 PATCH: 246 PATCH TOPICAL at 08:58

## 2022-01-01 RX ADMIN — METOPROLOL TARTRATE 37.5 MG: 25 TABLET, FILM COATED ORAL at 09:39

## 2022-01-01 RX ADMIN — METOPROLOL TARTRATE 25 MG: 25 TABLET, FILM COATED ORAL at 20:02

## 2022-01-01 RX ADMIN — FERROUS SULFATE TAB 325 MG (65 MG ELEMENTAL FE) 325 MG: 325 (65 FE) TAB at 09:34

## 2022-01-01 RX ADMIN — FUROSEMIDE 40 MG: 10 INJECTION, SOLUTION INTRAMUSCULAR; INTRAVENOUS at 12:50

## 2022-01-01 RX ADMIN — FUROSEMIDE 40 MG: 40 TABLET ORAL at 12:22

## 2022-01-01 RX ADMIN — LORAZEPAM 0.25 MG: 0.5 TABLET ORAL at 03:34

## 2022-01-01 RX ADMIN — Medication 1 CAPSULE: at 09:02

## 2022-01-01 RX ADMIN — RIVAROXABAN 15 MG: 15 TABLET, FILM COATED ORAL at 19:13

## 2022-01-01 RX ADMIN — FAMOTIDINE 20 MG: 20 TABLET ORAL at 17:23

## 2022-01-01 RX ADMIN — ACETAMINOPHEN 650 MG: 325 TABLET ORAL at 23:54

## 2022-01-01 RX ADMIN — Medication 25 MCG: at 10:14

## 2022-01-01 RX ADMIN — POLYETHYLENE GLYCOL (3350) 17 G: 17 POWDER, FOR SOLUTION ORAL at 10:46

## 2022-01-01 RX ADMIN — Medication 1 CAPSULE: at 09:00

## 2022-01-01 RX ADMIN — MORPHINE SULFATE 15 MG: 15 TABLET ORAL at 10:31

## 2022-01-01 RX ADMIN — CETIRIZINE HYDROCHLORIDE 10 MG: 10 TABLET, FILM COATED ORAL at 09:40

## 2022-01-01 RX ADMIN — Medication 25 MCG: at 10:15

## 2022-01-01 RX ADMIN — ALUMINUM HYDROXIDE, MAGNESIUM HYDROXIDE, AND SIMETHICONE 30 ML: 200; 200; 20 SUSPENSION ORAL at 12:55

## 2022-01-01 RX ADMIN — LORAZEPAM 1 MG: 2 INJECTION INTRAMUSCULAR; INTRAVENOUS at 17:29

## 2022-01-01 RX ADMIN — RIVAROXABAN 15 MG: 15 TABLET, FILM COATED ORAL at 17:23

## 2022-01-01 RX ADMIN — Medication 25 MCG: at 08:39

## 2022-01-01 RX ADMIN — CETIRIZINE HYDROCHLORIDE 10 MG: 10 TABLET, FILM COATED ORAL at 09:34

## 2022-01-01 RX ADMIN — MORPHINE SULFATE 15 MG: 15 TABLET ORAL at 20:47

## 2022-01-01 RX ADMIN — METHOCARBAMOL TABLETS 1000 MG: 500 TABLET, COATED ORAL at 12:28

## 2022-01-01 RX ADMIN — FERROUS SULFATE TAB 325 MG (65 MG ELEMENTAL FE) 325 MG: 325 (65 FE) TAB at 10:12

## 2022-01-01 RX ADMIN — ONDANSETRON 4 MG: 2 INJECTION INTRAMUSCULAR; INTRAVENOUS at 10:31

## 2022-01-01 RX ADMIN — Medication 1 MG: at 01:38

## 2022-01-01 RX ADMIN — ONDANSETRON 4 MG: 2 INJECTION INTRAMUSCULAR; INTRAVENOUS at 10:45

## 2022-01-01 RX ADMIN — FERROUS SULFATE TAB 325 MG (65 MG ELEMENTAL FE) 325 MG: 325 (65 FE) TAB at 09:15

## 2022-01-01 RX ADMIN — POLYETHYLENE GLYCOL 3350 17 G: 17 POWDER, FOR SOLUTION ORAL at 17:37

## 2022-01-01 RX ADMIN — LIDOCAINE 1 PATCH: 246 PATCH TOPICAL at 10:14

## 2022-01-01 RX ADMIN — FUROSEMIDE 20 MG: 10 INJECTION, SOLUTION INTRAVENOUS at 13:04

## 2022-01-01 RX ADMIN — FUROSEMIDE 40 MG: 40 TABLET ORAL at 09:26

## 2022-01-01 RX ADMIN — FERROUS SULFATE TAB 325 MG (65 MG ELEMENTAL FE) 325 MG: 325 (65 FE) TAB at 09:40

## 2022-01-01 RX ADMIN — ACETAMINOPHEN 650 MG: 325 TABLET ORAL at 23:37

## 2022-01-01 RX ADMIN — METOPROLOL SUCCINATE 100 MG: 100 TABLET, EXTENDED RELEASE ORAL at 10:12

## 2022-01-01 RX ADMIN — FUROSEMIDE 40 MG: 10 INJECTION, SOLUTION INTRAMUSCULAR; INTRAVENOUS at 05:30

## 2022-01-01 RX ADMIN — NITROGLYCERIN 0.4 MG: 0.4 TABLET SUBLINGUAL at 12:40

## 2022-01-01 RX ADMIN — Medication 1 CAPSULE: at 20:07

## 2022-01-01 RX ADMIN — ASPIRIN 81 MG: 81 TABLET, COATED ORAL at 09:02

## 2022-01-01 RX ADMIN — METOPROLOL TARTRATE 25 MG: 25 TABLET, FILM COATED ORAL at 09:34

## 2022-01-01 RX ADMIN — RIVAROXABAN 15 MG: 15 TABLET, FILM COATED ORAL at 17:04

## 2022-01-01 RX ADMIN — LIDOCAINE 1 PATCH: 246 PATCH TOPICAL at 12:21

## 2022-01-01 RX ADMIN — ACETAMINOPHEN 650 MG: 325 TABLET ORAL at 14:24

## 2022-01-01 RX ADMIN — LIDOCAINE 1 PATCH: 246 PATCH TOPICAL at 21:01

## 2022-01-01 RX ADMIN — FERROUS SULFATE TAB 325 MG (65 MG ELEMENTAL FE) 325 MG: 325 (65 FE) TAB at 09:25

## 2022-01-01 RX ADMIN — FUROSEMIDE 20 MG: 10 INJECTION, SOLUTION INTRAVENOUS at 18:58

## 2022-01-01 RX ADMIN — LIDOCAINE 1 PATCH: 246 PATCH TOPICAL at 12:27

## 2022-01-01 RX ADMIN — Medication 1 CAPSULE: at 09:15

## 2022-01-01 RX ADMIN — METOPROLOL TARTRATE 37.5 MG: 25 TABLET, FILM COATED ORAL at 09:02

## 2022-01-01 RX ADMIN — FUROSEMIDE 40 MG: 10 INJECTION, SOLUTION INTRAMUSCULAR; INTRAVENOUS at 04:12

## 2022-01-01 RX ADMIN — ACETAMINOPHEN 650 MG: 325 TABLET ORAL at 17:40

## 2022-01-01 RX ADMIN — ASPIRIN 81 MG: 81 TABLET, COATED ORAL at 08:39

## 2022-01-01 ASSESSMENT — ACTIVITIES OF DAILY LIVING (ADL)
ADLS_ACUITY_SCORE: 29
ADLS_ACUITY_SCORE: 33
ADLS_ACUITY_SCORE: 32
ADLS_ACUITY_SCORE: 29
ADLS_ACUITY_SCORE: 38
ADLS_ACUITY_SCORE: 39
ADLS_ACUITY_SCORE: 35
ADLS_ACUITY_SCORE: 34
ADLS_ACUITY_SCORE: 39
ADLS_ACUITY_SCORE: 34
ADLS_ACUITY_SCORE: 32
ADLS_ACUITY_SCORE: 35
WALKING_OR_CLIMBING_STAIRS: AMBULATION DIFFICULTY, REQUIRES EQUIPMENT
ADLS_ACUITY_SCORE: 39
TOILETING_ISSUES: NO
ADLS_ACUITY_SCORE: 32
ADLS_ACUITY_SCORE: 39
ADLS_ACUITY_SCORE: 32
ADLS_ACUITY_SCORE: 39
ADLS_ACUITY_SCORE: 39
ADLS_ACUITY_SCORE: 35
ADLS_ACUITY_SCORE: 32
ADLS_ACUITY_SCORE: 39
ADLS_ACUITY_SCORE: 35
EATING/SWALLOWING: EATING;SWALLOWING SOLID FOOD
DIFFICULTY_EATING/SWALLOWING: YES
ADLS_ACUITY_SCORE: 35
ADLS_ACUITY_SCORE: 39
ADLS_ACUITY_SCORE: 32
ADLS_ACUITY_SCORE: 29
WALKING_OR_CLIMBING_STAIRS_DIFFICULTY: YES
ADLS_ACUITY_SCORE: 29
ADLS_ACUITY_SCORE: 39
ADLS_ACUITY_SCORE: 39
ADLS_ACUITY_SCORE: 29
ADLS_ACUITY_SCORE: 35
CONCENTRATING,_REMEMBERING_OR_MAKING_DECISIONS_DIFFICULTY: NO
FALL_HISTORY_WITHIN_LAST_SIX_MONTHS: NO
TRANSFERRING: 0-->ASSISTANCE NEEDED (DEVELOPMENTALLY APPROPRIATE)
ADLS_ACUITY_SCORE: 33
ADLS_ACUITY_SCORE: 34
ADLS_ACUITY_SCORE: 29
ADLS_ACUITY_SCORE: 35
ADLS_ACUITY_SCORE: 35
TRANSFERRING: 1-->ASSISTANCE (EQUIPMENT/PERSON) NEEDED
ADLS_ACUITY_SCORE: 34
WEAR_GLASSES_OR_BLIND: YES
CHANGE_IN_FUNCTIONAL_STATUS_SINCE_ONSET_OF_CURRENT_ILLNESS/INJURY: NO
ADLS_ACUITY_SCORE: 35
ADLS_ACUITY_SCORE: 29
ADLS_ACUITY_SCORE: 32
ADLS_ACUITY_SCORE: 38
ADLS_ACUITY_SCORE: 35
ADLS_ACUITY_SCORE: 34
ADLS_ACUITY_SCORE: 29
ADLS_ACUITY_SCORE: 38
ADLS_ACUITY_SCORE: 29
ADLS_ACUITY_SCORE: 39
ADLS_ACUITY_SCORE: 32
ADLS_ACUITY_SCORE: 32
EATING: 0-->ASSISTANCE NEEDED (DEVELOPMENTALLY APPROPRIATE)
ADLS_ACUITY_SCORE: 39
ADLS_ACUITY_SCORE: 35
ADLS_ACUITY_SCORE: 35
ADLS_ACUITY_SCORE: 39
ADLS_ACUITY_SCORE: 35
ADLS_ACUITY_SCORE: 34
ADLS_ACUITY_SCORE: 35
ADLS_ACUITY_SCORE: 32
ADLS_ACUITY_SCORE: 32
ADLS_ACUITY_SCORE: 29
ADLS_ACUITY_SCORE: 32
ADLS_ACUITY_SCORE: 31
ADLS_ACUITY_SCORE: 35
SWALLOWING: 2-->DIFFICULTY SWALLOWING FOODS
SWALLOWING: 2-->DIFFICULTY SWALLOWING FOODS
DRESSING/BATHING_DIFFICULTY: NO
ADLS_ACUITY_SCORE: 39
ADLS_ACUITY_SCORE: 34
ADLS_ACUITY_SCORE: 39
ADLS_ACUITY_SCORE: 39
ADLS_ACUITY_SCORE: 32
ADLS_ACUITY_SCORE: 35
ADLS_ACUITY_SCORE: 39
ADLS_ACUITY_SCORE: 29
ADLS_ACUITY_SCORE: 29
ADLS_ACUITY_SCORE: 35
ADLS_ACUITY_SCORE: 35
ADLS_ACUITY_SCORE: 32
ADLS_ACUITY_SCORE: 34
ADLS_ACUITY_SCORE: 35
ADLS_ACUITY_SCORE: 32
ADLS_ACUITY_SCORE: 29
ADLS_ACUITY_SCORE: 29
ADLS_ACUITY_SCORE: 39
ADLS_ACUITY_SCORE: 33
ADLS_ACUITY_SCORE: 34
ADLS_ACUITY_SCORE: 35
ADLS_ACUITY_SCORE: 34
ADLS_ACUITY_SCORE: 35
ADLS_ACUITY_SCORE: 38
ADLS_ACUITY_SCORE: 32
ADLS_ACUITY_SCORE: 32
EATING: 0-->INDEPENDENT
ADLS_ACUITY_SCORE: 39
DOING_ERRANDS_INDEPENDENTLY_DIFFICULTY: YES
ADLS_ACUITY_SCORE: 38
ADLS_ACUITY_SCORE: 32
ADLS_ACUITY_SCORE: 35
ADLS_ACUITY_SCORE: 35
ADLS_ACUITY_SCORE: 29
VISION_MANAGEMENT: GLASSES
ADLS_ACUITY_SCORE: 39
ADLS_ACUITY_SCORE: 34
ADLS_ACUITY_SCORE: 32
ADLS_ACUITY_SCORE: 29
ADLS_ACUITY_SCORE: 35
ADLS_ACUITY_SCORE: 35
ADLS_ACUITY_SCORE: 29
ADLS_ACUITY_SCORE: 39
ADLS_ACUITY_SCORE: 29
ADLS_ACUITY_SCORE: 29
ADLS_ACUITY_SCORE: 35
ADLS_ACUITY_SCORE: 32
ADLS_ACUITY_SCORE: 27
ADLS_ACUITY_SCORE: 35
ADLS_ACUITY_SCORE: 35
ADLS_ACUITY_SCORE: 34
ADLS_ACUITY_SCORE: 35
ADLS_ACUITY_SCORE: 39
ADLS_ACUITY_SCORE: 32
ADLS_ACUITY_SCORE: 35

## 2022-01-01 ASSESSMENT — ENCOUNTER SYMPTOMS
ABDOMINAL PAIN: 0
COLOR CHANGE: 0
SHORTNESS OF BREATH: 1
VOMITING: 0
WEAKNESS: 0
DIARRHEA: 0
HEMATURIA: 0
FREQUENCY: 0
FEVER: 0
DIAPHORESIS: 1
SHORTNESS OF BREATH: 0
DYSURIA: 0
CHILLS: 0
BACK PAIN: 1
VOMITING: 0
NAUSEA: 1
SLEEP DISTURBANCE: 1
FEVER: 0
COUGH: 0
TROUBLE SWALLOWING: 1
NUMBNESS: 0
DIFFICULTY URINATING: 0
CHILLS: 0
UNEXPECTED WEIGHT CHANGE: 1
NAUSEA: 0
COUGH: 0

## 2022-01-01 ASSESSMENT — MIFFLIN-ST. JEOR: SCORE: 992.42

## 2022-01-25 NOTE — PATIENT INSTRUCTIONS
Ms Cissea DANNY Bray,  I enjoyed visiting with you again today.  I am glad to hear you are doing well.  Per our conversation feel free to let ATORVASTATIN run out and do not refill.  Try the BUMEX just once a day and if bloating, weight is up or shortness of breath go to 2 a day.  When running out of XARELTO call [phone] and we can get you a 90 day supply.  Your next Pacemaker check will be on 4/28/2022 with your home monitor- this will occur automatically.  I will plan on seeing you 1 year or sooner if needed.  Faisal Cardoza

## 2022-01-25 NOTE — LETTER
1/25/2022    Dm Oreilly MD  [address] [place] [address]  [facility] [id number]    RE: Veronica DELGADO Asa       Dear Colleague,     I had the pleasure of seeing Veronica DANNY Bray in the [facility].      [facility]  Heart Care Clinic Follow-up Note    Assessment & Plan        (I25.10,  I25.83) Coronary atherosclerosis due to lipid rich plaque  (primary encounter diagnosis)  Comment:  Unknown anatomy with total occlusion of the LAD noted, this prompted coronary artery bypass grafting.  No current symptoms.    (Z95.1) S/P CABG x 2  Comment: From 2004 she had a LIMA to the LAD and a vein graft to first diagonal.  No symptoms.  No recent ischemic evaluation.  Might arrange for stress nuclear stress test if her shortness of breath worsens.    (I10) Essential hypertension  Comment: Borderline elevated today, given the age of 89 very hesitant to increase antihypertensive therapy further.    (Z95.0) Cardiac pacemaker in situ  Comment: September 14 at [facility] had pacemaker placed secondary to second-degree 2:1 heart block.  Still on metoprolol .  Medtronic model 0RXT DR MRI W1 DR 01 serial number [id number].  The Medtronic atrial lead is 45 cm 5076 serial number [id number] and the ventricular lead is model Secura select 3830-69 cm serial number [id number].  Today's check shows 87% atrial pacing with 98% ventricular pacing.    (I34.0) Nonrheumatic mitral valve regurgitation  Comment: Mild to moderate regurgitation with no mitral stenosis, moderate mitral annular calcification and nonspecific valve thickening.    (I48.0) PAF (paroxysmal atrial fibrillation) (H)  Comment:  Given this on Xarelto with her advanced [id number] score.      (E78.00) Pure hypercholesterolemia  Comment: Cholesterol 159 with an LDL of 73, she is interested in diminishing her medication burden, given her age we will have her discontinue her atorvastatin.    Plan  1.  Stop atorvastatin when she runs out.  2.  When  she runs out of Xarelto call us up for a 90-day supply.  3.  Follow-up with me in November and then yearly thereafter.  4.  Can decrease Bumex down to once a day but if she notices signs or symptoms of heart failure let us know.  5.  If shortness breath worsens consider a pharmacological stress nuclear.    Subjective  CC: 89-year-old white female here for follow-up.  She still lives in assisted living.  She does not drive.  She wonders if we can help her get a handicap parking pass.  She does have shortness of breath and heavy activity, but no syncope, PND, orthopnea, dizziness, chest discomfort, palpitations or peripheral edema.    Medications  Current Outpatient Medications   Medication Sig Dispense Refill     acetaminophen (TYLENOL) 500 MG tablet Take 500-1,000 mg by mouth every 6 hours as needed for mild pain       aspirin 81 MG EC tablet Take 81 mg by mouth every other day       atorvastatin (LIPITOR) 10 MG tablet Take 1 tablet (10 mg) by mouth At Bedtime 90 tablet 4     bumetanide (BUMEX) 1 MG tablet Take 1 tablet (1 mg) by mouth 2 times daily 180 tablet 0     cetirizine (ZYRTEC) 10 MG tablet Take 10 mg by mouth daily        Cholecalciferol (VITAMIN D3) 50 MCG (2000 UT) CAPS Take 8,000 Units by mouth daily Take 4 capsules by mouth once daily       FEROSUL 325 (65 Fe) MG tablet Take 325 mg by mouth daily (with breakfast)       fluticasone (FLONASE) 50 MCG/ACT nasal spray Spray 2 sprays into both nostrils daily (may take up to 2 weeks to take effect) 16 g 1     metoprolol tartrate (LOPRESSOR) 25 MG tablet Take 1 tablet (25 mg) by mouth 2 times daily 180 tablet 0     mirtazapine (REMERON) 7.5 MG tablet Take 7.5 mg by mouth daily as needed        Multiple Vitamins-Minerals (PRESERVISION AREDS 2) CAPS Take 1 capsule by mouth 2 times daily        rivaroxaban ANTICOAGULANT (XARELTO ANTICOAGULANT) 15 MG TABS tablet Take 1 tablet (15 mg) by mouth daily (with dinner) 45 tablet 0     sertraline (ZOLOFT) 50 MG tablet Take  "50 mg by mouth daily (Patient not taking: Reported on 1/25/2022)         Objective  BP (!) 140/60 (BP Location: Right arm, Patient Position: Sitting, Cuff Size: Adult Regular)   Pulse 63   Resp 20   Ht 1.549 m (5' 1\")   Wt 63.5 kg (140 lb)   BMI 26.45 kg/m      General Appearance:    Alert, cooperative, no distress, appears stated age   Head:    Normocephalic, without obvious abnormality, atraumatic   Throat:   Lips, mucosa, and tongue normal; teeth and gums normal   Neck:   Supple, symmetrical, trachea midline, no adenopathy;        thyroid:  No enlargement/tenderness/nodules; no carotid    bruit or JVD   Back:     Symmetric, no curvature, ROM normal, no CVA tenderness   Lungs:     Clear to auscultation bilaterally, respirations unlabored   Chest wall:    No tenderness, midline sternotomy scar and left-sided pacemaker   Heart:    Regular rate and rhythm, S1 and S2 normal, no murmur, rub   or gallop   Abdomen:     Soft, non-tender, bowel sounds active all four quadrants,     no masses, no organomegaly   Extremities:   Normal, atraumatic, no cyanosis or edema   Pulses:   2+ and symmetric all extremities   Skin:   Skin color, texture, turgor normal, no rashes or lesions     Results    Lab Results personally reviewed   Lab Results   Component Value Date    CHOL 159 07/17/2020    CHOL 163 06/07/2019     Lab Results   Component Value Date    HDL 68 07/17/2020    HDL 56 06/07/2019     No components found for: LDLCALC  Lab Results   Component Value Date    TRIG 90 07/17/2020    TRIG 115 06/07/2019     Lab Results   Component Value Date    WBC 3.8 (L) 01/06/2022    HGB 13.1 01/06/2022    HCT 41.9 01/06/2022     01/06/2022     Lab Results   Component Value Date    BUN 27 11/27/2021     11/27/2021    CO2 32 (H) 11/27/2021               cc:   Ry Guy MD  [address] [place] [address]  "

## 2022-01-25 NOTE — PROGRESS NOTES
[facility]  Heart Care Clinic Follow-up Note    Assessment & Plan        (I25.10,  I25.83) Coronary atherosclerosis due to lipid rich plaque  (primary encounter diagnosis)  Comment:  Unknown anatomy with total occlusion of the LAD noted, this prompted coronary artery bypass grafting.  No current symptoms.    (Z95.1) S/P CABG x 2  Comment: From 2004 she had a LIMA to the LAD and a vein graft to first diagonal.  No symptoms.  No recent ischemic evaluation.  Might arrange for stress nuclear stress test if her shortness of breath worsens.    (I10) Essential hypertension  Comment: Borderline elevated today, given the age of 89 very hesitant to increase antihypertensive therapy further.    (Z95.0) Cardiac pacemaker in situ  Comment: September 14 at [facility] had pacemaker placed secondary to second-degree 2:1 heart block.  Still on metoprolol .  [employer] model 0RXT DR MRI W1 DR 01 serial number [id number].  The Medtronic atrial lead is 45 cm 5076 serial number [id number] and the ventricular lead is model Secura select 3830-69 cm serial number [id number].  Today's check shows 87% atrial pacing with 98% ventricular pacing.    (I34.0) Nonrheumatic mitral valve regurgitation  Comment: Mild to moderate regurgitation with no mitral stenosis, moderate mitral annular calcification and nonspecific valve thickening.    (I48.0) PAF (paroxysmal atrial fibrillation) (H)  Comment:  Given this on Xarelto with her advanced [id number] score.      (E78.00) Pure hypercholesterolemia  Comment: Cholesterol 159 with an LDL of 73, she is interested in diminishing her medication burden, given her age we will have her discontinue her atorvastatin.    Plan  1.  Stop atorvastatin when she runs out.  2.  When she runs out of Xarelto call us up for a 90-day supply.  3.  Follow-up with me in November and then yearly thereafter.  4.  Can decrease Bumex down to once a day but if she notices signs or symptoms of heart failure let  us know.  5.  If shortness breath worsens consider a pharmacological stress nuclear.    Subjective  CC: 89-year-old white female here for follow-up.  She still lives in assisted living.  She does not drive.  She wonders if we can help her get a handicap parking pass.  She does have shortness of breath and heavy activity, but no syncope, PND, orthopnea, dizziness, chest discomfort, palpitations or peripheral edema.    Medications  Current Outpatient Medications   Medication Sig Dispense Refill     acetaminophen (TYLENOL) 500 MG tablet Take 500-1,000 mg by mouth every 6 hours as needed for mild pain       aspirin 81 MG EC tablet Take 81 mg by mouth every other day       atorvastatin (LIPITOR) 10 MG tablet Take 1 tablet (10 mg) by mouth At Bedtime 90 tablet 4     bumetanide (BUMEX) 1 MG tablet Take 1 tablet (1 mg) by mouth 2 times daily 180 tablet 0     cetirizine (ZYRTEC) 10 MG tablet Take 10 mg by mouth daily        Cholecalciferol (VITAMIN D3) 50 MCG (2000 UT) CAPS Take 8,000 Units by mouth daily Take 4 capsules by mouth once daily       FEROSUL 325 (65 Fe) MG tablet Take 325 mg by mouth daily (with breakfast)       fluticasone (FLONASE) 50 MCG/ACT nasal spray Spray 2 sprays into both nostrils daily (may take up to 2 weeks to take effect) 16 g 1     metoprolol tartrate (LOPRESSOR) 25 MG tablet Take 1 tablet (25 mg) by mouth 2 times daily 180 tablet 0     mirtazapine (REMERON) 7.5 MG tablet Take 7.5 mg by mouth daily as needed        Multiple Vitamins-Minerals (PRESERVISION AREDS 2) CAPS Take 1 capsule by mouth 2 times daily        rivaroxaban ANTICOAGULANT (XARELTO ANTICOAGULANT) 15 MG TABS tablet Take 1 tablet (15 mg) by mouth daily (with dinner) 45 tablet 0     sertraline (ZOLOFT) 50 MG tablet Take 50 mg by mouth daily (Patient not taking: Reported on 1/25/2022)         Objective  BP (!) 140/60 (BP Location: Right arm, Patient Position: Sitting, Cuff Size: Adult Regular)   Pulse 63   Resp 20   Ht 1.549 m (5'  "1\")   Wt 63.5 kg (140 lb)   BMI 26.45 kg/m      General Appearance:    Alert, cooperative, no distress, appears stated age   Head:    Normocephalic, without obvious abnormality, atraumatic   Throat:   Lips, mucosa, and tongue normal; teeth and gums normal   Neck:   Supple, symmetrical, trachea midline, no adenopathy;        thyroid:  No enlargement/tenderness/nodules; no carotid    bruit or JVD   Back:     Symmetric, no curvature, ROM normal, no CVA tenderness   Lungs:     Clear to auscultation bilaterally, respirations unlabored   Chest wall:    No tenderness, midline sternotomy scar and left-sided pacemaker   Heart:    Regular rate and rhythm, S1 and S2 normal, no murmur, rub   or gallop   Abdomen:     Soft, non-tender, bowel sounds active all four quadrants,     no masses, no organomegaly   Extremities:   Normal, atraumatic, no cyanosis or edema   Pulses:   2+ and symmetric all extremities   Skin:   Skin color, texture, turgor normal, no rashes or lesions     Results    Lab Results personally reviewed   Lab Results   Component Value Date    CHOL 159 07/17/2020    CHOL 163 06/07/2019     Lab Results   Component Value Date    HDL 68 07/17/2020    HDL 56 06/07/2019     No components found for: LDLCALC  Lab Results   Component Value Date    TRIG 90 07/17/2020    TRIG 115 06/07/2019     Lab Results   Component Value Date    WBC 3.8 (L) 01/06/2022    HGB 13.1 01/06/2022    HCT 41.9 01/06/2022     01/06/2022     Lab Results   Component Value Date    BUN 27 11/27/2021     11/27/2021    CO2 32 (H) 11/27/2021         "

## 2022-01-26 NOTE — TELEPHONE ENCOUNTER
----- Message from Faisal Cardoza MD sent at 1/25/2022  2:54 PM CST -----  Little help, can you help me get her a handicap parking sticker please.LF        ==Printed off application- Pt was updated that it will be mailed home to her for her to fill out her portion of the application. -[facility] [place]

## 2022-01-27 NOTE — TELEPHONE ENCOUNTER
Paperwork signed and copy made- sent to [facility] to scan to chart. Copy to [facility]; original mailed to [place]. -[facility] [place]

## 2022-02-16 NOTE — TELEPHONE ENCOUNTER
Health Call Center    Phone Message    May a detailed message be left on voicemail: no     Reason for Call: Medication Question or concern regarding medication   Prescription Clarification  Name of Medication: rivaroxaban ANTICOAGULANT (XARELTO ANTICOAGULANT) 15 MG TABS tablet  Prescribing Provider: Faisal Cardoza MD   Pharmacy: [facility] - [place], [place] - [address] AT PORTAL TO REGISTERED [facility] SITES Phone# [phone]   What on the order needs clarification?   Refill request. Dispense 90 tabs per Pt request.            Action Taken: Other: Reading Cardiology    Travel Screening: Not Applicable                                                       Rx sent in for 90 day supply.-[facility] [place]

## 2022-08-09 NOTE — ED TRIAGE NOTES
Has been doing PT for a few weeks. States last night began having back stiffiness to right mid back below scapula. Movement makes pain worse. This am, pain increased and EMS called. Denies any known injury. Given Fentanyl enroute and states helped with pain. Does have history of tendonitis and has splint on left wrist.      Triage Assessment     Row Name 08/09/22 1012       Triage Assessment (Adult)    Airway WDL WDL       Respiratory WDL    Respiratory WDL WDL       Skin Circulation/Temperature WDL    Skin Circulation/Temperature WDL WDL       Cardiac WDL    Cardiac WDL WDL       Peripheral/Neurovascular WDL    Peripheral Neurovascular WDL WDL       Cognitive/Neuro/Behavioral WDL    Cognitive/Neuro/Behavioral WDL WDL

## 2022-08-09 NOTE — DISCHARGE INSTRUCTIONS
Your work-up is reassuring.  Exam is consistent with musculoskeletal back pain.  I have prescribed muscle relaxants and lidocaine patches to use as needed.  Follow-up with primary care provider in the next 2 days to ensure symptoms are improving.  You can use heat or ice, whichever provides more relief.  Do not use a heating pad over a lidocaine patch as this can irritate the skin.  If you develop any new or worsening symptoms including chest pain, shortness of breath, fevers, intractable pain return to the emergency department.

## 2022-08-09 NOTE — ED PROVIDER NOTES
EMERGENCY DEPARTMENT ENCOUNTER      NAME: Veronica Bray  AGE: 89 year old female  YOB: 1931  MRN: [id number]  EVALUATION DATE & TIME: 8/9/2022 11:46 AM    PCP: Dm Oreilly    ED PROVIDER: Ruby Tim PA-C      Chief Complaint   Patient presents with     Back Pain         FINAL IMPRESSION:  1. Acute right-sided thoracic back pain          MEDICAL DECISION MAKING:    Pertinent Labs & Imaging studies reviewed. (See chart for details)  89 year old female with a pertinent history of LBBB, a fib on xarelto, mitral regurgitation, HLD, HTN, CAD s/p CABG x 2, cardiac pacemaker, CHF, CVA presents to the Emergency Department for evaluation of right sided back pain over her lateral ribs to just below scapula beginning yesterday. Denies injury.     Vitals reviewed and unremarkable. Patient uncomfortable appearing and tearful. On exam, point tenderness to right lateral ribs posteriorly to just below scapula. No overlying skin changes. No palpable deformity or crepitus. Pain worsens with any movement. No tenderness of the CTLS spine. No step off deformities. Lungs CTAB. Differential diagnosis includes but not limited to musculoskeletal, rib fracture, PTX, pleural effusion, pneumonia, ACS, shingles.    EKG without acute ischemic changes and troponin not elevated. Pain reproducible with palpation and not characteristic of ACS. Xray without obvious rib fracture. No pleural effusion or consolidation. She feels improvement with lidocaine patch and muscle relaxant and I suspect her symptoms are musculoskeletal. We discussed pain management options at home. She would like to refrain from using narcotics and feels comfortable with continuing muscle relaxant and lidocaine patch. Instructed her to follow up with PCP in the next few days to ensure improvement. Discussed return precautions and patient discharged home in stable condition.     0 minutes of critical care time     ED COURSE:  11:57 AM I met with the patient,  obtained history, performed an initial exam, and discussed options and plan for diagnostics and treatment here in the ED.  1:15 PM Patient discharged after being provided with extensive anticipatory guidance and given return precautions, importance of PCP follow-up emphasized.    At the conclusion of the encounter I discussed the results of all of the tests and the disposition. The questions were answered. The patient and family acknowledged understanding and were agreeable with the care plan.     MEDICATIONS GIVEN IN THE EMERGENCY:  Medications   methocarbamol (ROBAXIN) tablet 1,000 mg (1,000 mg Oral Given 8/9/22 1228)       NEW PRESCRIPTIONS STARTED AT TODAY'S ER VISIT  Discharge Medication List as of 8/9/2022  1:41 PM      START taking these medications    Details   Lidocaine (LIDOCARE) 4 % Patch Place 1 patch onto the skin every 24 hours To prevent lidocaine toxicity, patient should be patch free for 12 hrs daily.Disp-14 patch, [id number]      methocarbamol (ROBAXIN) 500 MG tablet Take 2 tablets (1,000 mg) by mouth 3 times daily as needed for muscle spasms, Disp-30 tablet, R-0, E-Prescribe                  =================================================================    HPI:    Patient information was obtained from: Patient    Use of Interpretor: N/A      Veronica Bray is a 89 year old female with a pertinent history of LBBB, a fib on xarelto, mitral regurgitation, HLD, HTN, CAD s/p CABG x 2, cardiac pacemaker, CHF, CVA who presents to this ED via EMS for evaluation of back pain.    Patient reports onset of right sided back pain over her lateral ribs to just below scapula beginning yesterday. She denies any trauma or injury. She has been doing PT exercises but reports these are not new exercises. She denies any chest pain, pleuritic pain, shortness of breath, vomiting, diarrhea, abdominal pain, urinary symptoms, fevers, cough. Pain is worse with movement. She has been taking tylenol without  improvement.    REVIEW OF SYSTEMS:  Review of Systems   Constitutional: Negative for chills and fever.   Respiratory: Negative for cough and shortness of breath.    Cardiovascular: Negative for chest pain.   Gastrointestinal: Negative for abdominal pain, diarrhea, nausea and vomiting.   Genitourinary: Negative for difficulty urinating, dysuria, frequency and hematuria.   Musculoskeletal: Positive for back pain.   Skin: Negative for color change.   Neurological: Negative for weakness and numbness.   All other systems reviewed and are negative.      PAST MEDICAL HISTORY:  Past Medical History:   Diagnosis Date     Arthritis      Asthma      Atrial flutter (H)      CAD (coronary artery disease)     LIMA graft to the LAD and vein graft to the diagonal are patent.      Cardiomyopathy, unspecified (H)     tachy induced     CHF (congestive heart failure) (H)      Hyperlipidemia      Hypertension      Hypertension      LBBB (left bundle branch block)      Mitral regurgitation      NSTEMI (non-ST elevated myocardial infarction) (H)      PAF (paroxysmal atrial fibrillation) (H)     converted on Amiodarone     Sleep apnea     CPAP     Stroke (H)     TIA     SVT (supraventricular tachycardia) (H)      Thrombocytopenia, unspecified (H) 7/13/2017     Tobacco use        PAST SURGICAL HISTORY:  Past Surgical History:   Procedure Laterality Date     ABDOMEN SURGERY      gallbladder     CARDIAC SURGERY       CHOLECYSTECTOMY       CORONARY ANGIOGRAPHY ADULT ORDER  1/2005    Stent: Distal cfx, Mid LAD, Stent: D -1     GYN SURGERY      hysterectomy     HC LEFT HEART CATHETERIZATION  11/2006    mid distal-anterior/ distal inferior/ Apical Yossi, 85     HC LEFT HEART CATHETERIZATION  5/2015    100% Proximal LAD, patent LIMA-LAD, SVG-D1, LVEDP 13, 10-18 mmHg AV gradient, 3+ MR after PVC     HERNIA REPAIR       HYSTERECTOMY       JOINT REPLACEMENT      Knee hip     ORTHOPEDIC SURGERY      hip, knee, wrist     PICC MIDLINE INSERTION  5/6/2021           CA ESOPHAGOGASTRODUODENOSCOPY TRANSORAL DIAGNOSTIC N/A 6/26/2021    Procedure: ESOPHAGOGASTRODUODENOSCOPY (EGD);  Surgeon: Marc Wolff MD;  Location: [facility];  Service: Gastroenterology     TONSILLECTOMY       VASCULAR SURGERY             CURRENT MEDICATIONS:    No current facility-administered medications for this encounter.    Current Outpatient Medications:      Lidocaine (LIDOCARE) 4 % Patch, Place 1 patch onto the skin every 24 hours To prevent lidocaine toxicity, patient should be patch free for 12 hrs daily., Disp: 14 patch, Rfl: 0     methocarbamol (ROBAXIN) 500 MG tablet, Take 2 tablets (1,000 mg) by mouth 3 times daily as needed for muscle spasms, Disp: 30 tablet, Rfl: 0     acetaminophen (TYLENOL) 500 MG tablet, Take 500-1,000 mg by mouth every 6 hours as needed for mild pain, Disp: , Rfl:      aspirin 81 MG EC tablet, Take 81 mg by mouth every other day, Disp: , Rfl:      atorvastatin (LIPITOR) 10 MG tablet, Take 1 tablet (10 mg) by mouth At Bedtime, Disp: 90 tablet, Rfl: 4     bumetanide (BUMEX) 1 MG tablet, TAKE 1 TABLET TWICE A DAY, Disp: 180 tablet, Rfl: 1     cetirizine (ZYRTEC) 10 MG tablet, Take 10 mg by mouth daily , Disp: , Rfl:      Cholecalciferol (VITAMIN D3) 50 MCG (2000 UT) CAPS, Take 8,000 Units by mouth daily Take 4 capsules by mouth once daily, Disp: , Rfl:      FEROSUL 325 (65 Fe) MG tablet, Take 325 mg by mouth daily (with breakfast), Disp: , Rfl:      fluticasone (FLONASE) 50 MCG/ACT nasal spray, Spray 2 sprays into both nostrils daily (may take up to 2 weeks to take effect), Disp: 16 g, Rfl: 1     metoprolol tartrate (LOPRESSOR) 25 MG tablet, TAKE 1 TABLET TWICE A DAY, Disp: 180 tablet, Rfl: 1     mirtazapine (REMERON) 7.5 MG tablet, Take 7.5 mg by mouth daily as needed , Disp: , Rfl:      Multiple Vitamins-Minerals (PRESERVISION AREDS 2) CAPS, Take 1 capsule by mouth 2 times daily , Disp: , Rfl:      rivaroxaban ANTICOAGULANT (XARELTO ANTICOAGULANT) 15 MG  "TABS tablet, Take 1 tablet (15 mg) by mouth daily (with dinner), Disp: 90 tablet, Rfl: 1     sertraline (ZOLOFT) 50 MG tablet, Take 50 mg by mouth daily (Patient not taking: Reported on 1/25/2022), Disp: , Rfl:       ALLERGIES:  Allergies   Allergen Reactions     Aleve [Naproxen] Anaphylaxis     Celecoxib Unknown     Codeine Unknown     Hydrocodone-Acetaminophen Unknown     Hydromorphone Unknown     Meperidine Unknown     Oxycodone-Acetaminophen Unknown     Tramadol Unknown       FAMILY HISTORY:  Family History   Problem Relation Age of Onset     Unknown/Adopted Mother      Myocardial Infarction Mother      Unknown/Adopted Father      Myocardial Infarction Brother         Rheumatic fever     Myocardial Infarction Brother      Myocardial Infarction Brother      Heart Disease Brother        SOCIAL HISTORY:   Social History     Socioeconomic History     Marital status:    Tobacco Use     Smoking status: Never Smoker     Smokeless tobacco: Never Used     Tobacco comment: quit approx 1967    Substance and Sexual Activity     Alcohol use: Never     Alcohol/week: 4.0 standard drinks     Types: 4 Glasses of wine per week     Comment: rarely     Drug use: Never     Sexual activity: Not Currently     Partners: Male   Other Topics Concern     Caffeine Concern No     Special Diet No     Exercise No       VITALS:  Patient Vitals for the past 24 hrs:   BP Temp Temp src Pulse Resp SpO2 Height Weight   08/09/22 1410 139/60 -- -- 60 25 -- -- --   08/09/22 1400 127/60 -- -- 60 27 -- -- --   08/09/22 1315 (!) 146/81 -- -- 66 27 -- -- --   08/09/22 1300 (!) 144/68 -- -- 60 13 -- -- --   08/09/22 1245 (!) 171/62 -- -- 69 -- -- -- --   08/09/22 1230 (!) 159/67 -- -- 60 -- -- -- --   08/09/22 1008 133/57 98.3  F (36.8  C) Oral 74 20 97 % 1.549 m (5' 1\") 65.8 kg (145 lb)       PHYSICAL EXAM  Constitutional: Well developed, Well nourished, uncomfortable appearing, tearful  HENT: Normocephalic, Atraumatic  Neck: Normal range of " motion, No tenderness, Supple, No stridor.  Eyes: Conjunctiva normal, No discharge.   Respiratory: Normal breath sounds, No respiratory distress, No wheezing, Speaks full sentences easily. No cough.  Cardiovascular: Normal heart rate, Regular rhythm, No murmurs, No rubs, No gallops. Symmetric peripheral pulses.  GI: Soft, No tenderness, No masses, No flank tenderness. No rebound or guarding.  Musculoskeletal: 2+ DP pulses. No edema. No cyanosis, No clubbing. Good range of motion in all major joints. Point tenderness to right lateral ribs posteriorly to just below scapula. No overlying skin changes. No palpable deformity or crepitus. Pain worsens with any movement. No tenderness of the CTLS spine. No step off deformities.  Integument: Warm, Dry, No erythema, No rash. No petechiae.  Neurologic: Alert & oriented x 3, Normal motor function, Normal sensory function, No focal deficits noted. Normal gait.  Psychiatric: Affect normal, Judgment normal, Mood normal. Cooperative.    LAB:  All pertinent labs reviewed and interpreted.  Recent Results (from the past 24 hour(s))   ECG 12-LEAD WITH MUSE (L[facility])    Collection Time: 08/09/22 12:22 PM   Result Value Ref Range    Systolic Blood Pressure  mmHg    Diastolic Blood Pressure  mmHg    Ventricular Rate 63 BPM    Atrial Rate 63 BPM    DC Interval 172 ms    QRS Duration 130 ms     ms    QTc 495 ms    P Axis  degrees    R AXIS 10 degrees    T Axis 91 degrees    Interpretation ECG       Atrial-paced rhythm with occasional Premature ventricular complexes  Left bundle branch block  Abnormal ECG  When compared with ECG of 13-SEP-2021 10:04,  Electronic atrial pacemaker has replaced Sinus rhythm  Vent. rate has increased BY  29 BPM  QT has lengthened  Confirmed by SEE ED PROVIDER NOTE FOR, ECG INTERPRETATION ([id number]),  KATIE ESCALANTE ([id number]) on 8/9/2022 1:03:52 PM     CBC (+ platelets, no diff)    Collection Time: 08/09/22 12:30 PM   Result Value Ref Range    WBC Count 4.4  4.0 - 11.0 10e3/uL    RBC Count 3.91 3.80 - 5.20 10e6/uL    Hemoglobin 11.8 11.7 - 15.7 g/dL    Hematocrit 36.7 35.0 - 47.0 %    MCV 94 78 - 100 fL    MCH 30.2 26.5 - 33.0 pg    MCHC 32.2 31.5 - 36.5 g/dL    RDW 13.0 10.0 - 15.0 %    Platelet Count 138 (L) 150 - 450 10e3/uL   Basic metabolic panel    Collection Time: 08/09/22 12:30 PM   Result Value Ref Range    Sodium 142 136 - 145 mmol/L    Potassium 4.6 3.5 - 5.0 mmol/L    Chloride 101 98 - 107 mmol/L    Carbon Dioxide (CO2) 33 (H) 22 - 31 mmol/L    Anion Gap 8 5 - 18 mmol/L    Urea Nitrogen 29 (H) 8 - 28 mg/dL    Creatinine 0.92 0.60 - 1.10 mg/dL    Calcium 9.3 8.5 - 10.5 mg/dL    Glucose 98 70 - 125 mg/dL    GFR Estimate 58 (L) >60 mL/min/1.73m2   Troponin I (now)    Collection Time: 08/09/22 12:30 PM   Result Value Ref Range    Troponin I 0.02 0.00 - 0.29 ng/mL         RADIOLOGY:  Reviewed all pertinent imaging. Please see official radiology report.  Ribs XR, unilat 3 views + PA chest, right   Final Result   IMPRESSION: Emphysema with areas of scarring, unchanged. No new airspace opacities.       No rib fractures, although assessment is somewhat limited in areas where there are extensive costochondral calcifications.      Dual-lead pacemaker leads projecting over the right atrium and right ventricle.        EKG:      Performed at: 1222    Impression: atrial paced with PVCs, LBBB    Rate: 63  Rhythm: paced  Axis: 10  TX interval: 172  QRS Interval: 130  QTc Interval: 495  ST Changes: no acute ST elevations or depressions  Comparison: 9/13/21 paced rhythm has replaced sinus. Ventricular rate has increased. QT has lengthened    I have independently reviewed and interpreted the EKG(s) documented above.  Reviewed with Dr. Gopal Tim PA-C  Emergency Medicine  [facility]  8/9/2022     Ruby Tim PA-C  08/15/22 [id number]

## 2022-08-15 NOTE — TELEPHONE ENCOUNTER
Health Call Center    Phone Message    May a detailed message be left on voicemail: no     Reason for Call: Other: Veronica called to speak with a member of the care team. Veronica's children advised her to reach out to Dr. Cardoza for assistance with medication.      Action Taken: Other: [facility]    Travel Screening: Not Applicable                                                                    ==Called Veronica to address her concerns. She was recently in the ER for right sided back pain. She sees a physician at her [facility] that will not write prescriptions for her medicines because she sets them up herself. She was worked up and it was determined a musculoskeletal pain. She requested pain medicine. We do not prescribe pain medicines in the cardiology clinic. She was recommended to establish with [facility] clinic so that she can be see post-ER visit and appropriately treated. Number for the clinic was provided. She verbalized understanding and had no further questions or concerns. [place] [place]

## 2022-08-17 NOTE — PATIENT INSTRUCTIONS
Continue tylenol 1000 mg -no more than 4000 mg in 24 hours  -  Continue lidocaine patches- start the new ones I prescribed 12 hours during the day and take off at night    Take the tizaindine  4 mg only at night    Stop cold and only do heat     Sleep in a more upright position    Try thermacare heat wraps extra large lower back wrap, nonstick     Consider a referral to PMR-Physical medicine and rehabilitation-if back pain not getting better can refer you to one our providers at [facility] to further evaluated your back and treat and physical therapy, if needed send you to a back specialist    If pain becomes more severe, then return to PATTI Hoskins MD

## 2022-08-17 NOTE — PROGRESS NOTES
Patient presents with:  pulled muscle: Pulled muscle in back       Subjective     Veronica Bray is a 89 year old female who presents to clinic with her daughter, Dale, today for the following health issues:    HPI       Back Pain       Duration:  8/8/2022         Specific cause:  Keeping her arm on a high arm couch in a resting position watching TV    Description:   Location of pain: middle of back right  Character of pain: dull ache  Pain radiation:none  New numbness or weakness in legs, not attributed to pain:  no     Intensity: Currently 7/10    History:   Pain interferes with job: YES, No, Not applicable  History of back problems: no prior back problems  Any previous MRI or X-rays: Yes- at [place].  Date 8/9/2022  Sees a specialist for back pain:  Son chiroprator-manipulation and massage   Started physical therapy   Therapies tried without relief: acetaminophen (Tylenol) 1000 mg every 4-6 hours  and muscle relaxants    Alleviating factors:   Improved by: acetaminophen (Tylenol), cold, heat and stretch      Precipitating factors:  Worsened by: Lifting, Bending, Standing, Sitting, Lying Flat, Walking and Coughing        Past Medical History:   Diagnosis Date     Arthritis      Asthma      Atrial flutter (H)      CAD (coronary artery disease)     LIMA graft to the LAD and vein graft to the diagonal are patent.      Cardiomyopathy, unspecified (H)     tachy induced     CHF (congestive heart failure) (H)      Hyperlipidemia      Hypertension      Hypertension      LBBB (left bundle branch block)      Mitral regurgitation      NSTEMI (non-ST elevated myocardial infarction) (H)      PAF (paroxysmal atrial fibrillation) (H)     converted on Amiodarone     Sleep apnea     CPAP     Stroke (H)     TIA     SVT (supraventricular tachycardia) (H)      Thrombocytopenia, unspecified (H) 7/13/2017     Tobacco use      Social History     Tobacco Use     Smoking status: Never Smoker     Smokeless tobacco: Never Used      Tobacco comment: quit approx 1967    Substance Use Topics     Alcohol use: Never     Alcohol/week: 4.0 standard drinks     Types: 4 Glasses of wine per week     Comment: rarely       Current Outpatient Medications   Medication Sig Dispense Refill     acetaminophen (TYLENOL) 500 MG tablet Take 500-1,000 mg by mouth every 6 hours as needed for mild pain       aspirin 81 MG EC tablet Take 81 mg by mouth every other day       bumetanide (BUMEX) 1 MG tablet TAKE 1 TABLET TWICE A  tablet 1     cetirizine (ZYRTEC) 10 MG tablet Take 10 mg by mouth daily        Cholecalciferol (VITAMIN D3) 50 MCG (2000 UT) CAPS Take 8,000 Units by mouth daily Take 4 capsules by mouth once daily       metoprolol tartrate (LOPRESSOR) 25 MG tablet TAKE 1 TABLET TWICE A  tablet 1     morphine (MSIR) 15 MG IR tablet Take 1 tablet (15 mg) by mouth every 8 hours as needed for severe pain 10 tablet 0     Multiple Vitamins-Minerals (PRESERVISION AREDS 2) CAPS Take 1 capsule by mouth 2 times daily        rivaroxaban ANTICOAGULANT (XARELTO ANTICOAGULANT) 15 MG TABS tablet Take 1 tablet (15 mg) by mouth daily (with dinner) 90 tablet 1     tiZANidine (ZANAFLEX) 4 MG tablet Take 1 tablet (4 mg) by mouth At Bedtime 90 tablet 0     Allergies   Allergen Reactions     Aleve [Naproxen] Anaphylaxis     Celecoxib Unknown     Codeine Unknown     Hydrocodone-Acetaminophen Unknown     Hydromorphone Unknown     Meperidine Unknown     Oxycodone-Acetaminophen Unknown     Tramadol Unknown             ROS are negative, except as otherwise noted HPI      Objective    /62 (BP Location: Right arm, Patient Position: Sitting, Cuff Size: Adult Regular)   Pulse 78   Wt 65.3 kg (144 lb)   BMI 27.21 kg/m    Body mass index is 27.21 kg/m .  Physical Exam   GENERAL: healthy, alert and no distress  NECK: no adenopathy, no asymmetry, masses, or scars and thyroid normal to palpation  RESP: lungs clear to auscultation - no rales, rhonchi or wheezes  Chest  nontender to palpation   CV: regular rate and rhythm, normal S1 S2, no S3 or S4, no murmur, click or rub,   MS: no gross musculoskeletal defects noted, no edema  SKIN: no suspicious lesions or rashes  NEURO: Normal strength and tone, mentation intact and speech normal, gait at baseline  BACK: no CVA tenderness, no paralumbar tenderness  Comprehensive back pain exam:  Tenderness of to palpation over vertebral bodies mid thoracic spine-T6/7/8, tenderness to palpation of soft tissue/muscles along border of scapula on right,  paraspinous tenderness from base of cervical spine to mid lumbar spine Range of motion limited by pain, Minimal flexion and extension due to pain, Upper and Lower extremity strength functional and equal on both sides, upper and Lower extremity reflexes within normal limits bilaterally and Lower extremity sensation normal and equal on both sides      Diagnostic Test Results:  Labs reviewed in Epic  Xray results are pending          ASSESSMENT/PLAN:      ICD-10-CM    1. Nontraumatic compression fracture of T7 vertebra, initial encounter (H)  M48.54XA Spine  Referral     morphine (MSIR) 15 MG IR tablet     DISCONTINUED: morphine (MSIR) 15 MG IR tablet   2. Acute right-sided thoracic back pain  M54.6 tiZANidine (ZANAFLEX) 4 MG tablet     DISCONTINUED: lidocaine (LIDODERM) 5 % patch     DISCONTINUED: tiZANidine (ZANAFLEX) 4 MG tablet   3. Back pain of thoracolumbar region  M54.50 XR Thoracic Lumbar Spine 2 Views    M54.6 tiZANidine (ZANAFLEX) 4 MG tablet     DISCONTINUED: lidocaine (LIDODERM) 5 % patch     DISCONTINUED: tiZANidine (ZANAFLEX) 4 MG tablet             Reviewed medication instructions and side effects. Follow up if experiences side effects.     I reviewed supportive care, otc meds to use if needed, expected course, and signs of concern.  Follow up as needed or if she does not improve within  1-2 days or if worsens in any way.  Reviewed red flag symptoms and is to go to the ER if  experiences any of these.     The use of Dragon/[employer] dictation services may have been used to construct the content in this note; any grammatical or spelling errors are non-intentional. Please contact the author of this note directly if you are in need of any clarification.      On the day of the encounter, time spend on chart review, patient visit, review of testing, documentation was 45  minutes          Patient Instructions     Continue tylenol 1000 mg -no more than 4000 mg in 24 hours  -  Continue lidocaine patches- start the new ones I prescribed 12 hours during the day and take off at night    Take the tizaindine  4 mg only at night    Stop cold and only do heat     Sleep in a more upright position    Try thermacare heat wraps extra large lower back wrap, nonstick     Consider a referral to PMR-Physical medicine and rehabilitation-if back pain not getting better can refer you to one our providers at [facility] to further evaluated your back and treat and physical therapy, if needed send you to a back specialist    If pain becomes more severe, then return to ER       Amanda Hoskins MD                Diagnostic Test Results:  Labs reviewed in Epic  Results for orders placed or performed in visit on 08/17/22   XR Thoracic Lumbar Spine 2 Views     Status: None    Narrative    EXAM: XR THORACIC LUMBAR SPINE 2 VIEWS  LOCATION: [facility]  DATE/TIME: 8/17/2022 2:38 PM    INDICATION: pain in mid to lower thoracic spine and upper lumbar spine  COMPARISON: 09/19/2021. 08/09/2022.  TECHNIQUE: CR Thoraco-Lumbar Spine.      Impression    IMPRESSION: There is diffuse osteopenia of the bony structures. There is good anatomic alignment of the thoracic spine. There is a severe compression fracture of the T7 vertebral body with no evidence of retropulsion. This was not visualized on the   previous study from 2021 and there is suboptimal visualization of this region on the 08/09/2022 study. There may  have been a mild to moderate compression fracture on this study that has progressed. Recommend clinical correlation for pain at this level. A   CT or MRI may be helpful for further evaluation. The rest of the vertebral body heights are maintained. There is mild degenerative disc disease involving the mid to lower thoracic disc spaces with a mild loss of height, endplate changes and small   anterior osteophyte formations. Pacemaker wires are visualized.         Patient final x-ray shows a compression fracture at T7.  Spoke to her son,  Trae Bray who is a chiropractor.  He called and notified his mother of the results of the x-ray.   I then called the patient to review the x-ray and plan for referral to the spine .  I gave her the phone number to call to schedule an appointment.      In addition she is having significant pain and unable to sleep at night.  She tried the tizanidine prescribed by Dr. Oreilly.  It made her feel confused.  She did not sleep last night because she was fearful of how she felt on the medication.  She is allergic to tramadol, oxycodone, Vicodin, and codeine.  She has taken morphine in the past.  Per chart review, she has taken morphine IR 15 mg up to every 4 hours in the past for pain after a humeral head fracture.  I have placed an order for morphine 15 IR #10 to [facility] on [address].  Her son will  the medication and bring the medication to the patient at [facility] assisted living.  I called patient informed her I placed the prescription and that her son will be bringing her the medication.      PDMP Review       Value Time User    State PDMP site checked  Yes 8/18/2022  8:21 PM Amanda Hoskins MD           PDMP Review       Value Time User    State PDMP site checked  Yes 8/18/2022  8:21 PM Amanda Hoskins MD

## 2022-08-18 NOTE — TELEPHONE ENCOUNTER
Central Prior Authorization Team  Phone: [phone]    PA Initiation    Medication: lidocaine (LIDODERM) 5 % patch  Insurance Company: [employer] - Phone [phone] Fax [phone]  Pharmacy Filling the Rx: [id number] [place], [place] - [address]  Filling Pharmacy Phone: [phone]  Filling Pharmacy Fax:    Start Date: 8/18/2022

## 2022-08-18 NOTE — TELEPHONE ENCOUNTER
PRIOR AUTHORIZATION DENIED    Medication: lidocaine (LIDODERM) 5 % patch- DENIED     Denial Date: 8/18/2022    Denial Rational: Lidoderm patches are only covered with the diagnosis of post-herpetic neuralgia, diabetic neuropathy, or cancer related pain. It will not be covered for the associated diagnosis.       Appeal Information: If provider would like to appeal please provide a letter of medical necessity.

## 2022-08-19 NOTE — TELEPHONE ENCOUNTER
Patient with a compression fracture and limited options for pain management.  She does not meet the criteria as noted by medicare. I will discontinue this prescription and notified patient to resume the over the counter lidocaine patches with a lower dose of 4% lidocaine.  Amanda Hoskins MD

## 2022-08-25 PROBLEM — I44.1 HEART BLOCK AV SECOND DEGREE: Status: ACTIVE | Noted: 2021-09-13

## 2022-08-25 PROBLEM — I48.91 ATRIAL FIBRILLATION WITH RVR (H): Status: ACTIVE | Noted: 2022-01-01

## 2022-08-25 PROBLEM — T14.91XA SUICIDE ATTEMPT (H): Status: ACTIVE | Noted: 2021-05-03

## 2022-08-25 PROBLEM — I50.9 ACUTE CONGESTIVE HEART FAILURE, UNSPECIFIED HEART FAILURE TYPE (H): Status: ACTIVE | Noted: 2021-05-08

## 2022-08-25 PROBLEM — F43.0 EMOTIONAL CRISIS, ACUTE REACTION TO STRESS: Status: ACTIVE | Noted: 2022-01-01

## 2022-08-25 PROBLEM — Z72.820 SLEEP DEPRIVATION: Status: ACTIVE | Noted: 2022-01-01

## 2022-08-25 PROBLEM — K59.01 SLOW TRANSIT CONSTIPATION: Status: ACTIVE | Noted: 2021-07-01

## 2022-08-25 PROBLEM — F32.9 REACTIVE DEPRESSION: Status: ACTIVE | Noted: 2022-01-01

## 2022-08-25 PROBLEM — I50.9 ACUTE ON CHRONIC CONGESTIVE HEART FAILURE, UNSPECIFIED HEART FAILURE TYPE (H): Status: ACTIVE | Noted: 2022-01-01

## 2022-08-25 PROBLEM — D64.9 ACUTE ON CHRONIC ANEMIA: Status: ACTIVE | Noted: 2021-06-24

## 2022-08-25 PROBLEM — F43.23 ADJUSTMENT DISORDER WITH MIXED ANXIETY AND DEPRESSED MOOD: Status: ACTIVE | Noted: 2022-01-01

## 2022-08-25 PROBLEM — N17.9 ACUTE KIDNEY FAILURE, UNSPECIFIED (H): Status: ACTIVE | Noted: 2021-06-28

## 2022-08-25 PROBLEM — R55 VAGAL REACTION: Status: ACTIVE | Noted: 2021-05-02

## 2022-08-25 NOTE — H&P
"Hospital Medicine Service History and Physical  [facility]    Veronica Bray is a 89 year old female who  has a past medical history of Arthritis, Asthma, Atrial flutter (H), CAD (coronary artery disease), Cardiomyopathy, unspecified (H), CHF (congestive heart failure) (H), Hyperlipidemia, Hypertension, Hypertension, LBBB (left bundle branch block), Mitral regurgitation, NSTEMI (non-ST elevated myocardial infarction) (H), PAF (paroxysmal atrial fibrillation) (H), Sleep apnea, Stroke (H), SVT (supraventricular tachycardia) (H), Thrombocytopenia, unspecified (H) (7/13/2017), and Tobacco use.   Chief Complaint: Chest pain, SOB    Assessment and Plan  Acute heart failure with preserved ejection fraction exacerbation  Last echo May 2021 shows abnormal taps, normal LV systolic function  Responding to Lasix in ED, continue 40 mg IV 3 times daily  Daily weights, I's and O's  No home O2, now requiring 3 L with improved  EKG reviewed, noted T wave changes, initial troponin 0.05  Will trend troponin, no active chest pain  Weight is up 6 pounds since about a week ago    Atrial fibrillation with RVR  H/o pacemaker  History of CABG x2  History coronary stenting  LBBB  Minimal rapid ventricular response upon arrival, rate reassuring on my exam  Received metoprolol in ED, did take morning metoprolol  Monitor on telemetry    Acute kidney injury  Monitor creatinine with diuresis  Check CPK    Normocytic anemia  No reported melena, bleeding  On anticoagulation    Recent thoracic compression fracture  [facility] for [facility] while inpatient  Continue home morphine    DVTP: Home DOAC  Code Status: Prior Confirmed DNR  Disposition: Inpatient   Estimated body mass index is 28.34 kg/m  as calculated from the following:    Height as of 8/9/22: 1.549 m (5' 1\").    Weight as of this encounter: 68 kg (150 lb).    History of Present Illness  Veronica Bray presents to ED for swallowing " difficulty, shortness of breath exacerbated by exertion, nausea, diaphoresis that started this morning.  She has noticed weight gain over the past few days and increased leg swelling.  She has also felt increased fatigability with any degree of exertion.  She has felt off of her baseline in the past few weeks after suffering a thoracic compression fracture and being treated daily with morphine.  Today she was actually leaving with her daughter to go get fitted for a brace, when they decided that better going to the hospital for her new symptoms.    ROS + diaphoresis, edema, weight gain, dyspnea on exertion, nausea  ROS -chills, fever, chest pain, vomiting  All other systems reviewed and are negative    In the ED, patient is afebrile, soft BP's, on 3L NC for sats of 87% on room air. In Afib  on presentation  Hemoglobin 11.4, BNP 1900, creatinine 1.6, chest x-ray venous pacemaker, cardiomegaly, mild pulmonary vascular redistribution.  Received lasix    Physical exam:  Appears stated age, makes good eye contact  Anicteric conjunctiva, PERRL  Moist mucous membranes, poor dentition  Trachea midline, increased JVD  Clear to auscultation, Respiratory effort normal on 3 L  Irregular, rate high 90s, no murmur  Abdomen soft, nondistended, nontender  Bilateral ankle edema edema, clubbing of nails absent  Skin normal temperature, dry  Normal affect, alert  Vital signs reviewed by me    Wt Readings from Last 4 Encounters:   08/25/22 68 kg (150 lb)   08/17/22 65.3 kg (144 lb)   08/09/22 65.8 kg (145 lb)   01/25/22 63.5 kg (140 lb)        reports that she has never smoked. She has never used smokeless tobacco. She reports that she does not drink alcohol and does not use drugs.  family history includes Heart Disease in her brother; Myocardial Infarction in her brother, brother, brother, and mother; Unknown/Adopted in her father and mother.   has a past surgical history that includes LEFT HEART CATHETERIZATION (11/2006); LEFT  HEART CATHETERIZATION (5/2015); Coronary Angiography Adult Order (1/2005); orthopedic surgery; GYN surgery; Cholecystectomy; Picc Midline Insertion (5/6/2021); joint replacement; hernia repair; Cardiac surgery; Abdomen surgery; Hysterectomy; Tonsillectomy; vascular surgery; and Pr Esophagogastroduodenoscopy Transoral Diagnostic (N/A, 6/26/2021).   Allergies   Allergen Reactions     Aleve [Naproxen] Anaphylaxis     Celecoxib Unknown     Codeine Unknown     Hydrocodone-Acetaminophen Unknown     Hydromorphone Unknown     Meperidine Unknown     Oxycodone-Acetaminophen Unknown     Tramadol Unknown       Sumeet Allred MD, MPH  [facility]   Phone: #[phone]

## 2022-08-25 NOTE — ED TRIAGE NOTES
Pt reports chest pain/epigastric, shortness of breath that started today.  Pt is dealing with back pain from a recent thoracic fracture.  Oxygen saturations 87% at triage.  EKG appears to be a-fib

## 2022-08-25 NOTE — CONSULTS
Care Management Initial Consult    General Information  Assessment completed with: Patient, Megan, Nurse with [facility] [phone]  Type of CM/SW Visit: Initial Assessment    Primary Care Provider verified and updated as needed: Yes   Readmission within the last 30 days:   No hospital admits but was seen in ER on 8/9        Advance Care Planning:  Has HCD on file- confirmed. Son Nabeel should be primary family contact.     Communication Assessment  Patient's communication style: spoken language (English or Bilingual)     Cognitive  Cognitive/Neuro/Behavioral: WDL                      Living Environment:   People in home: alone     Current living Arrangements: assisted living  Name of Facility: [facility]   Able to return to prior arrangements: yes       Family/Social Support:  Care provided by: self, child(mak)  Provides care for: no one  Marital Status:   Children (one son and two daughters)          Description of Support System: Supportive, Involved         Current Resources:   Patient receiving home care services: Yes  Skilled Home Care Services: Physicial Therapy, Occupational Therapy ([facility], arranged by [facility])  Community Resources: None  Equipment currently used at home:  (walker)  Supplies currently used at home: None    Employment/Financial:  Employment Status: retired        Financial Concerns: No concerns identified     Lifestyle & Psychosocial Needs:  Social Determinants of Health     Tobacco Use: Low Risk      Smoking Tobacco Use: Never Smoker     Smokeless Tobacco Use: Never Used   Alcohol Use: Not on file   Financial Resource Strain: Not on file   Food Insecurity: Not on file   Transportation Needs: Not on file   Physical Activity: Not on file   Stress: Not on file   Social Connections: Not on file   Intimate Partner Violence: Not on file   Depression: At risk     PHQ-2 Score: 3   Housing Stability: Not on file       Functional Status:  Prior to admission patient needed  assistance:   Dependent ADLs:: Independent, Ambulation-walker  Dependent IADLs:: Transportation, Shopping, Laundry, Meal Preparation     Mental Health Status:  Mental Health Status:  (denies)       Chemical Dependency Status:  Chemical Dependency Status:  (denies)           Values/Beliefs:  Spiritual, Cultural Beliefs, Scientologist Practices, Values that affect care: no               Additional Information:  CM met with patient and completed assessment. Confirms residing at [facility]. Independent with most cares, does her own medications, does not drive (children- one son and two daughters assist with that). Uses a walker; no other assistive devices. Confirms having home PT/OT.     [facility]- Nurse line [phone] Megan, Nurse states patient handles her own medications. Gets PT/OT from [facility] and this can be continued as long as there is an order for home PT/OT included in discharge orders- [facility] will then arrange resuming. [facility] does safety checks, meals, laundry, light housekeeping duties. Is able to return at anytime but prefer between 8am-5pm weekdays or between 8am-2pm on the weekend when a Nurse is on site.     She is hopeful to return to [facility] and resume HC services for PT/OT. Family will transport at hospital discharge.     CM updated son/[facility] Nabeel (he is a Chiropractor) regarding plan for admission. He would like to be the primary family contact and included in discharge planning. Patient confirms same.   Seda Miller, RN

## 2022-08-25 NOTE — ED PROVIDER NOTES
EMERGENCY DEPARTMENT ENCOUNTER      NAME: Veronica Bray  AGE: 89 year old female  YOB: 1931  MRN: [id number]  EVALUATION DATE & TIME: 8/25/2022  9:34 AM    PCP: Dm Oreilly    ED PROVIDER: Tr Harris M.D.    Chief Complaint   Patient presents with     Chest Pain     Shortness of Breath       FINAL IMPRESSION:  1. Acute on chronic congestive heart failure, unspecified heart failure type (H)    2. Atrial fibrillation with RVR (H)        ED COURSE & MEDICAL DECISION MAKING:    Pertinent Labs & Imaging studies personally reviewed and interpreted by me. (See chart for details)  9:37 AM Patient seen and examined, prior records reviewed.  I met with the patient and performed my initial exam.  Differential diagnosis includes but not limited to COPD, asthma, CHF, pneumonia, bronchitis, pneumothorax, myocardial infarction, pulmonary embolism, anxiety.  Patient presents with shortness of breath today, some leg swelling.  She denies chest pain or cough.  On exam here, tachycardic and irregular consistent with prior diagnosis of atrial fibrillation, bilateral crackles and trace lower extremity edema.  Symptoms are consistent with congestive heart failure exacerbation, unclear what may have triggered this.  Labs and chest x-ray ordered along with Lasix 40 mg IV.  I discussed the plan for care and the patient is agreeable with this plan. PPE: Provider wore gloves and paper mask.    10:44 AM labs demonstrate normal troponin, basic panel demonstrates creatinine significantly elevated from baseline.  Remaining labs and chest x-ray are pending.  11:11 AM chest x-ray negative for any acute intrathoracic findings, personally reviewed and interpreted by me.  BNP is pending, lab was contacted due to delay.  Patient is becoming a little bit more tachycardic, still atrial fibrillation with RVR, metoprolol IV is ordered although this may be compensatory and so permissive tachycardia may be preferable to aggressive  rate control.  12:16 PM I rechecked and updated the patient.  She says her breathing is better although still appears a little uncomfortable.  Blood pressure is a little bit low, likely related to metoprolol.  Heart rate has improved.  We will continue to monitor closely, suspect that blood pressure will improve with a little bit of time.  12:31 PM care discussed with Dr. Allred for admission.    At the conclusion of the encounter I discussed the results of all of the tests and the disposition. The questions were answered. The patient or family acknowledged understanding and was agreeable with the care plan.     PROCEDURES:   Procedures    MEDICATIONS GIVEN IN THE EMERGENCY:  Medications   furosemide (LASIX) injection 40 mg (40 mg Intravenous Given 8/25/22 1016)   morphine (MSIR) IR tablet 15 mg (15 mg Oral Given 8/25/22 1031)   metoprolol (LOPRESSOR) injection 2.5 mg (2.5 mg Intravenous Given 8/25/22 1129)       NEW PRESCRIPTIONS STARTED AT TODAY'S ER VISIT  New Prescriptions    No medications on file       =================================================================    HPI    Patient information was obtained from: the patient      Veronica Bray is a 89 year old year old female with a pertinent medical history of LBBB, PAF, SVT, atrial flutter, mitral regurgitation, hypertension, asthma, coagulation disorder, CABG x2, stented coronary artery, aneurysm, pacemaker, heart failure HELGA, anemia, heart block, suicide attempt who presents to the ED by personal vehicle for the evaluation of chest pain, shortness of breath.    The patient presents to the ED reporting she was with her daughter en route to an appointment to get a back brace for her thoracic spine compression fracture, when she told her daughter to bring her to the hospital.  The patient is concerned she is having an MI.  Endorses difficulty swallowing, shortness of breath which worsens with movement, nausea, diaphoresis all starting this AM.  She also  reports that she gained 5 lbs 3-4 days ago and has leg swelling.  Last night she was more restless than baseline.      The patient denies vomiting, cough, chest pain, fever, chills, and any other symptoms or complaints at this time.     MHx: Open heart surgery.  Stent placement.  Pacemaker.  On metoprolol and xarelto.  History of MI.      REVIEW OF SYSTEMS   Review of Systems   Constitutional: Positive for diaphoresis and unexpected weight change (increase 5 lbs). Negative for chills and fever.   HENT: Positive for trouble swallowing.    Respiratory: Positive for shortness of breath (worse with movement). Negative for cough.    Cardiovascular: Positive for leg swelling. Negative for chest pain.   Gastrointestinal: Positive for nausea. Negative for vomiting.   Psychiatric/Behavioral: Positive for sleep disturbance (more restless last night).   All other systems reviewed and are negative.     All other systems reviewed and negative    PAST MEDICAL HISTORY:  Past Medical History:   Diagnosis Date     Arthritis      Asthma      Atrial flutter (H)      CAD (coronary artery disease)     LIMA graft to the LAD and vein graft to the diagonal are patent.      Cardiomyopathy, unspecified (H)     tachy induced     CHF (congestive heart failure) (H)      Hyperlipidemia      Hypertension      Hypertension      LBBB (left bundle branch block)      Mitral regurgitation      NSTEMI (non-ST elevated myocardial infarction) (H)      PAF (paroxysmal atrial fibrillation) (H)     converted on Amiodarone     Sleep apnea     CPAP     Stroke (H)     TIA     SVT (supraventricular tachycardia) (H)      Thrombocytopenia, unspecified (H) 7/13/2017     Tobacco use        PAST SURGICAL HISTORY:  Past Surgical History:   Procedure Laterality Date     ABDOMEN SURGERY      gallbladder     CARDIAC SURGERY       CHOLECYSTECTOMY       CORONARY ANGIOGRAPHY ADULT ORDER  1/2005    Stent: Distal cfx, Mid LAD, Stent: D -1     GYN SURGERY      hysterectomy      HC LEFT HEART CATHETERIZATION  11/2006    mid distal-anterior/ distal inferior/ Apical Yossi, 85     HC LEFT HEART CATHETERIZATION  5/2015    100% Proximal LAD, patent LIMA-LAD, SVG-D1, LVEDP 13, 10-18 mmHg AV gradient, 3+ MR after PVC     HERNIA REPAIR       HYSTERECTOMY       JOINT REPLACEMENT      Knee hip     ORTHOPEDIC SURGERY      hip, knee, wrist     PICC MIDLINE INSERTION  5/6/2021          TX ESOPHAGOGASTRODUODENOSCOPY TRANSORAL DIAGNOSTIC N/A 6/26/2021    Procedure: ESOPHAGOGASTRODUODENOSCOPY (EGD);  Surgeon: Marc Wolff MD;  Location: [facility];  Service: Gastroenterology     TONSILLECTOMY       VASCULAR SURGERY         CURRENT MEDICATIONS:    No current facility-administered medications for this encounter.     Current Outpatient Medications   Medication     acetaminophen (TYLENOL) 500 MG tablet     aspirin 81 MG EC tablet     bumetanide (BUMEX) 1 MG tablet     cetirizine (ZYRTEC) 10 MG tablet     Cholecalciferol (VITAMIN D3) 50 MCG (2000 UT) CAPS     metoprolol tartrate (LOPRESSOR) 25 MG tablet     morphine (MSIR) 15 MG IR tablet     Multiple Vitamins-Minerals (PRESERVISION AREDS 2) CAPS     tiZANidine (ZANAFLEX) 4 MG tablet     XARELTO ANTICOAGULANT 15 MG TABS tablet       ALLERGIES:  Allergies   Allergen Reactions     Aleve [Naproxen] Anaphylaxis     Celecoxib Unknown     Codeine Unknown     Hydrocodone-Acetaminophen Unknown     Hydromorphone Unknown     Meperidine Unknown     Oxycodone-Acetaminophen Unknown     Tramadol Unknown       FAMILY HISTORY:  Family History   Problem Relation Age of Onset     Unknown/Adopted Mother      Myocardial Infarction Mother      Unknown/Adopted Father      Myocardial Infarction Brother         Rheumatic fever     Myocardial Infarction Brother      Myocardial Infarction Brother      Heart Disease Brother        SOCIAL HISTORY:   Social History     Socioeconomic History     Marital status:    Tobacco Use     Smoking status: Never Smoker      Smokeless tobacco: Never Used     Tobacco comment: quit approx 1967    Substance and Sexual Activity     Alcohol use: Never     Alcohol/week: 4.0 standard drinks     Types: 4 Glasses of wine per week     Comment: rarely     Drug use: Never     Sexual activity: Not Currently     Partners: Male   Other Topics Concern     Caffeine Concern No     Special Diet No     Exercise No       VITALS:  BP 93/59   Pulse 102   Temp 97.6  F (36.4  C) (Temporal)   Resp 28   Wt 68 kg (150 lb)   SpO2 93%   BMI 28.34 kg/m      PHYSICAL EXAM:  Physical Exam  Vitals and nursing note reviewed.   Constitutional:       Appearance: Normal appearance.   HENT:      Head: Normocephalic and atraumatic.      Right Ear: External ear normal.      Left Ear: External ear normal.      Nose: Nose normal.      Mouth/Throat:      Mouth: Mucous membranes are moist.   Eyes:      Extraocular Movements: Extraocular movements intact.      Conjunctiva/sclera: Conjunctivae normal.      Pupils: Pupils are equal, round, and reactive to light.   Cardiovascular:      Rate and Rhythm: Tachycardia present. Rhythm irregular.   Pulmonary:      Effort: Pulmonary effort is normal.      Breath sounds: No wheezing or rales.      Comments: Bilateral crackles.   Abdominal:      General: Abdomen is flat. There is no distension.      Palpations: Abdomen is soft.      Tenderness: There is no abdominal tenderness. There is no guarding.   Musculoskeletal:         General: Normal range of motion.      Cervical back: Normal range of motion and neck supple.      Right lower leg: No edema.      Left lower leg: No edema.   Lymphadenopathy:      Cervical: No cervical adenopathy.   Skin:     General: Skin is warm and dry.   Neurological:      General: No focal deficit present.      Mental Status: She is alert and oriented to person, place, and time. Mental status is at baseline.      Comments: No gross focal neurologic deficits   Psychiatric:         Mood and Affect: Mood normal.          Behavior: Behavior normal.         Thought Content: Thought content normal.          LAB:  All pertinent labs reviewed and interpreted.  Results for orders placed or performed during the hospital encounter of 08/25/22   XR Chest Port 1 View    Impression    IMPRESSION: Poststernotomy changes. Dual-lead left subclavian venous pacer again noted. Leads are intact. Cardiomegaly is stable without signs of failure. Mild pulmonary vascular redistribution. Linear areas of fibrosis in both lungs are unchanged.   Extra Blue Top Tube   Result Value Ref Range    Hold Specimen JIC    Extra Red Top Tube   Result Value Ref Range    Hold Specimen JIC    Extra Green Top (Lithium Heparin) Tube   Result Value Ref Range    Hold Specimen JIC    Extra Purple Top Tube   Result Value Ref Range    Hold Specimen     Basic metabolic panel   Result Value Ref Range    Sodium 142 136 - 145 mmol/L    Potassium 3.9 3.5 - 5.0 mmol/L    Chloride 101 98 - 107 mmol/L    Carbon Dioxide (CO2) 27 22 - 31 mmol/L    Anion Gap 14 5 - 18 mmol/L    Urea Nitrogen 65 (H) 8 - 28 mg/dL    Creatinine 1.61 (H) 0.60 - 1.10 mg/dL    Calcium 9.2 8.5 - 10.5 mg/dL    Glucose 125 70 - 125 mg/dL    GFR Estimate 30 (L) >60 mL/min/1.73m2   Result Value Ref Range    Troponin I 0.05 0.00 - 0.29 ng/mL   Result Value Ref Range    Magnesium 2.4 1.8 - 2.6 mg/dL   B-Type Natriuretic Peptide ([facility] Only)   Result Value Ref Range    BNP 1,947 (H) 0 - 167 pg/mL   CBC with platelets and differential   Result Value Ref Range    WBC Count 4.1 4.0 - 11.0 10e3/uL    RBC Count 3.67 (L) 3.80 - 5.20 10e6/uL    Hemoglobin 11.4 (L) 11.7 - 15.7 g/dL    Hematocrit 35.5 35.0 - 47.0 %    MCV 97 78 - 100 fL    MCH 31.1 26.5 - 33.0 pg    MCHC 32.1 31.5 - 36.5 g/dL    RDW 14.3 10.0 - 15.0 %    Platelet Count 233 150 - 450 10e3/uL    % Neutrophils 65 %    % Lymphocytes 20 %    % Monocytes 10 %    % Eosinophils 4 %    % Basophils 1 %    % Immature Granulocytes 0 %    NRBCs per 100 WBC 0 <1 /100     Absolute Neutrophils 2.7 1.6 - 8.3 10e3/uL    Absolute Lymphocytes 0.8 0.8 - 5.3 10e3/uL    Absolute Monocytes 0.4 0.0 - 1.3 10e3/uL    Absolute Eosinophils 0.2 0.0 - 0.7 10e3/uL    Absolute Basophils 0.0 0.0 - 0.2 10e3/uL    Absolute Immature Granulocytes 0.0 <=0.4 10e3/uL    Absolute NRBCs 0.0 10e3/uL   iStat Troponin, POCT   Result Value Ref Range    TROPPC POCT 0.03 <=0.12 ug/L   ECG 12-LEAD WITH MUSE ([facility])   Result Value Ref Range    Systolic Blood Pressure  mmHg    Diastolic Blood Pressure  mmHg    Ventricular Rate 105 BPM    Atrial Rate 110 BPM    IA Interval  ms    QRS Duration 142 ms     ms    QTc 428 ms    P Axis  degrees    R AXIS 33 degrees    T Axis 189 degrees    Interpretation ECG       Atrial fibrillation with rapid ventricular response with premature ventricular or aberrantly conducted complexes  Left bundle branch block  Abnormal ECG  When compared with ECG of 09-AUG-2022 12:22,  Atrial fibrillation has replaced Electronic atrial pacemaker  Vent. rate has increased BY  42 BPM  T wave inversion now evident in Inferior leads  T wave inversion more evident in Lateral leads  QT has shortened  Confirmed by SEE ED PROVIDER NOTE FOR, ECG INTERPRETATION ([id number]),  AMOL DAVE ([id number]) on 8/25/2022 11:01:38 AM         RADIOLOGY:  Reviewed all pertinent imaging. Please see official radiology report.  XR Chest Port 1 View   Final Result   IMPRESSION: Poststernotomy changes. Dual-lead left subclavian venous pacer again noted. Leads are intact. Cardiomegaly is stable without signs of failure. Mild pulmonary vascular redistribution. Linear areas of fibrosis in both lungs are unchanged.          EKG:    Performed at: 9:28 AM  Impression: Atrial fibrillation with RVR, PVCs  Rate: 105  Rhythm: Atrial fibrillation  Axis : Normal  IA Interval: Atrial fibrillation  QRS Interval: 142  QTc Interval: 428  ST Changes: No acute ischemic changes  Comparison: August 9, prior EKG was atrially paced, new T wave  inversions inferior laterally    I have independently reviewed and interpreted the EKG(s) documented above.    I, Sohail Tavera, am serving as a scribe to document services personally performed by Dr. Harris based on my observation and the provider's statements to me. I, Tr Harris MD attest that Sohail Ayse is acting in a scribe capacity, has observed my performance of the services and has documented them in accordance with my direction.    Tr Harris M.D.  Emergency Medicine  [facility] EMERGENCY ROOM  [address] [facility] [address]  [phone]  Dept: [phone]     Tr Harris MD  08/25/22 1238

## 2022-08-25 NOTE — PHARMACY-ADMISSION MEDICATION HISTORY
Pharmacy Note - Admission Medication History    Pertinent Provider Information:   1. Aspirin - patient currently on every other day regimen but couldn't recall whether she took it today or yesterday.  2. Tizanidine 4 mg - patient denies taking this medication, per [employer] 90 days supply was dispensed on 08/13/22.     ______________________________________________________________________    Prior To Admission (PTA) med list completed and updated in EMR.       PTA Med List   Medication Sig Last Dose     acetaminophen (TYLENOL) 500 MG tablet Take 500-1,000 mg by mouth every 6 hours as needed for mild pain Past Week at Unknown time     aspirin 81 MG EC tablet Take 81 mg by mouth every other day Unknown at Unknown time     bumetanide (BUMEX) 1 MG tablet TAKE 1 TABLET TWICE A DAY 8/25/2022 at AM     cetirizine (ZYRTEC) 10 MG tablet Take 10 mg by mouth daily  8/25/2022 at AM     Cholecalciferol (VITAMIN D3) 50 MCG (2000 UT) CAPS Take 2,000 Units by mouth daily 8/25/2022 at AM     ferrous sulfate (FEROSUL) 325 (65 Fe) MG tablet Take 325 mg by mouth daily 8/25/2022 at AM     Lidocaine (LIDOCARE) 4 % Patch Place 1 patch onto the skin every 24 hours To prevent lidocaine toxicity, patient should be patch free for 12 hrs daily. 8/24/2022 at Unknown time     magnesium 250 MG tablet Take 1 tablet by mouth 2 times daily 8/25/2022 at AM     metoprolol tartrate (LOPRESSOR) 25 MG tablet TAKE 1 TABLET TWICE A DAY 8/25/2022 at AM     morphine (MSIR) 15 MG IR tablet Take 1 tablet (15 mg) by mouth every 8 hours as needed for severe pain 8/24/2022 at Unknown time     Multiple Vitamins-Minerals (PRESERVISION AREDS 2) CAPS Take 1 capsule by mouth 2 times daily  8/25/2022 at AM     XARELTO ANTICOAGULANT 15 MG TABS tablet TAKE 1 TABLET DAILY WITH   DINNER 8/24/2022 at HS       Information source(s): Patient and CareEverywhere/[employer]  Method of interview communication: in-person    Summary of Changes to PTA Med List  New: Lidocaine,  Ferrous sulfate, Magnesium  Discontinued: Tizanidine  Changed: None.    Patient was asked about OTC/herbal products specifically.  PTA med list reflects this.    In the past week, patient estimated taking medication this percent of the time:  greater than 90%.    Allergies were reviewed, assessed, and updated with the patient.      Patient does not use any multi-dose medications prior to admission.    The information provided in this note is only as accurate as the sources available at the time of the update(s).    Thank you for the opportunity to participate in the care of this patient.    CELSA ONEAL, PharmD.  8/25/2022 12:43 PM

## 2022-08-26 NOTE — PLAN OF CARE
Goal: Optimal Comfort and Wellbeing  Outcome: Ongoing, Progressing    Problem: Dysrhythmia  Goal: Normalized Cardiac Rhythm  Outcome: Ongoing, Progressing     Pt complained of back pain, placed lidocaine patch and later gave PRN morphine, was effective. Pt still in A-fib. Swallowing difficulty improved by instructing pt to tuck chin while swallowing. Pt tearful this AM, emotional support given. Pt resting, will continue to monitor.

## 2022-08-26 NOTE — PLAN OF CARE
Problem: Plan of Care - These are the overarching goals to be used throughout the patient stay.    Goal: Optimal Comfort and Wellbeing  Outcome: Ongoing, Progressing    Problem: Dysrhythmia  Goal: Normalized Cardiac Rhythm  Outcome: Ongoing, Progressing     Patient Aox4, pleasant, cooperative. C/o of back pain that is well-controlled with PRN pain medication. Pills with applesauce. Assist of 1 w/w and baitbelt. 2L oxygen via NC. Blood pressures soft 93/49.

## 2022-08-26 NOTE — PLAN OF CARE
Problem: Plan of Care - These are the overarching goals to be used throughout the patient stay.    Goal: Optimal Comfort and Wellbeing  Outcome: Ongoing, Progressing    Alert and oriented x 4. VSS. Lung sounds clear diminished. On 2 L/min NC; sat in low 90s. Denies SOB, chest pain and N/V. Sent down to CT this shift. PRN ativan 1 mg given prior for anxiety; effective relief. Very sleepy and resting after med administration.

## 2022-08-27 NOTE — PROGRESS NOTES
[facility]    Medicine Progress Note - Hospitalist Service    Date of Admission:  8/25/2022    Assessment & Plan              Veronica Bray is a 89 year old female who  has a past medical history of Arthritis, Asthma, Atrial flutter (H), CAD (coronary artery disease), Cardiomyopathy, unspecified (H), CHF (congestive heart failure) (H), Hyperlipidemia, Hypertension, Hypertension, LBBB (left bundle branch block), Mitral regurgitation, NSTEMI (non-ST elevated myocardial infarction) (H), PAF (paroxysmal atrial fibrillation) (H), Sleep apnea, Stroke (H), SVT (supraventricular tachycardia) (H), Thrombocytopenia, unspecified (H) (7/13/2017), and Tobacco use.   Chief Complaint: Chest pain, SOB      8/26 :     Sob improving, still on oxygen  Continue iv diuresis    Spine surgery consulted for spinal #'s      Not medically ready for discharge at this time.  Iv diuresis       Assessment and Plan  Acute heart failure with preserved ejection fraction exacerbation  Last echo May 2021 shows abnormal taps, normal LV systolic function  Responding to Lasix in ED, continue 40 mg IV 3 times daily  Daily weights, I's and O's  No home O2, now requiring 3 L with improved  EKG reviewed, noted T wave changes, initial troponin 0.05  Will trend troponin, no active chest pain  Weight is up 6 pounds since about a week ago     Atrial fibrillation with RVR  H/o pacemaker  History of CABG x2  History coronary stenting  LBBB  Minimal rapid ventricular response upon arrival, rate reassuring on my exam  Received metoprolol in ED, did take morning metoprolol  Monitor on telemetry     Acute kidney injury  Monitor creatinine with diuresis  Check CPK     Normocytic anemia  No reported melena, bleeding  On anticoagulation     Recent thoracic compression fracture  [facility] if she can be fitted for brace while inpatient  Continue home morphine       Diet: Combination Diet Low Saturated Fat Na <2400mg Diet, No  "Caffeine Diet  Room Service    DVT Prophylaxis: DOAC  Kelly Catheter: Not present  Central Lines: None  Cardiac Monitoring: ACTIVE order. Indication: Acute decompensated heart failure (48 hours)  Code Status: No CPR- Do NOT Intubate      Disposition Plan      Expected Discharge Date: 08/28/2022      Destination: assisted living;home with help/services (return to intermediate and resume Home PT/OT)  Discharge Comments: iv lasix and on 2Lo2        The patient's care was discussed with the Bedside Nurse and Patient.    Xavier Gale MD  Hospitalist Service  [facility]  Securely message with the Vocera Web Console (learn more here)  Text page via Curbside Paging/Directory         Clinically Significant Risk Factors Present on Admission               # Overweight: Estimated body mass index is 27.96 kg/m  as calculated from the following:    Height as of 8/9/22: 1.549 m (5' 1\").    Weight as of this encounter: 67.1 kg (148 lb).        ______________________________________________________________________        Data reviewed today: I reviewed all medications, new labs and imaging results over the last 24 hours. I personally reviewed no images or EKG's today.    Physical Exam   Vital Signs: Temp: 97.6  F (36.4  C) Temp src: Oral BP: 137/62 Pulse: 98   Resp: 18 SpO2: 98 % O2 Device: Nasal cannula Oxygen Delivery: 2 LPM  Weight: 148 lbs 0 oz     GENERAL: The patient is not in any acute distressed. Awake and alert.  HEENT: Nonicteric sclerae, PERRLA, EOMI. Oropharynx clear. Moist mucous membranes. Conjunctivae appear well perfused.  HEART: Regular rate and rhythm without murmurs.  LUNGS: Clear to auscultation bilaterally. No wheezing or crackles.  ABDOMEN: Soft, positive bowel sounds, nontender.  SKIN: No rash, no excessive bruising, petechiae, or purpura.  EXTREMITIES : no rashes, no swelling in legs.  NEUROLOGIC: conscious and oriented, follows commands, no obvious focal deficits.  ROS: All other systems " negative       Data   Recent Labs   Lab 08/26/22  0432 08/25/22  0944   WBC 4.7 4.1   HGB 10.6* 11.4*   MCV 98 97    233    142   POTASSIUM 3.7 3.9   CHLORIDE 101 101   CO2 27 27   BUN 56* 65*   CR 1.20* 1.61*   ANIONGAP 11 14   BARRETT 8.3* 9.2   GLC 89 125

## 2022-08-27 NOTE — PROGRESS NOTES
[facility] MEDICINE PROGRESS NOTE    Assessment & Plan              Veronica Bray is a 89 year old female who  has a past medical history of Arthritis, Asthma, Atrial flutter (H), CAD (coronary artery disease), Cardiomyopathy, unspecified (H), CHF (congestive heart failure) (H), Hyperlipidemia, Hypertension, Hypertension, LBBB (left bundle branch block), Mitral regurgitation, NSTEMI (non-ST elevated myocardial infarction) (H), PAF (paroxysmal atrial fibrillation) (H), Sleep apnea, Stroke (H), SVT (supraventricular tachycardia) (H), Thrombocytopenia, unspecified (H) (7/13/2017), and Tobacco use.   Chief Complaint: Chest pain, SOB  She still continues to be volume overloaded at this time.        Assessment and Plan  Acute heart failure with preserved ejection fraction exacerbation  Last echo May 2021 shows abnormal taps, normal LV systolic function  Responding to Lasix in ED, continue 40 mg IV 3 times daily.  Add metolazone and give 5 mg at this time.  Daily weights, I's and O's  No home O2, now requiring 3 L with improved  EKG reviewed, noted T wave changes, initial troponin 0.05  Will trend troponin, no active chest pain     Atrial fibrillation with RVR  H/o pacemaker  History of CABG x2  History coronary stenting  LBBB  Minimal rapid ventricular response upon arrival, rate reassuring on my exam  Received metoprolol in ED  Daily metoprolol tartrate 25 mg p.o. twice daily and Xarelto at this time.  Monitor on telemetry     Acute kidney injury  Monitor creatinine with diuresis  Check CPK     Normocytic anemia  No reported melena, bleeding  On anticoagulation     Recent thoracic compression fracture  Will [facility] if she can be fitted for [facility] while inpatient  Continue home morphine      Diet: Combination Diet Low Saturated Fat Na <2400mg Diet, No Caffeine Diet  Room Service    DVT Prophylaxis: DOAC  Kelly Catheter: Not present  Central Lines: None  Cardiac Monitoring: ACTIVE  order. Indication: Acute decompensated heart failure (48 hours)  Code Status: No CPR- Do NOT Intubate          Disposition Plan    Disposition Plan     Expected Discharge Date: 08/28/2022      Destination: assisted living;home with help/services (return to [facility] and resume Home PT/OT)  Discharge Comments: iv lasix and on 2Lo2        The patient's care was discussed with the Bedside Nurse.    Tommie Anderson MD  [facility]  Phone: #[phone]    Interval History/Subjective:    No acute events overnight.  Patient overall doing well.  Vitals are mostly within normal limits currently on 2 L oxygen.  Patient diuresed -680 cc yesterday and -775 cc this morning.  She is -1.3 L.  Currently patient on aspirin 81 mg a day, Lasix 40 mg twice daily which was increased to 3 times daily, metoprolol tartrate 25 mg p.o. twice daily and Xarelto 15 mg a day.    Physical Exam/Objective:  Temp:  [97.5  F (36.4  C)-98.1  F (36.7  C)] 98.1  F (36.7  C)  Pulse:  [] 119  Resp:  [18-21] 20  BP: ()/(49-71) 111/62  SpO2:  [86 %-98 %] 92 %  Body mass index is 27.96 kg/m .    GENERAL:  Alert, appears comfortable, in no acute distress, appears stated age   HEAD:  Normocephalic, without obvious abnormality, atraumatic   EYES:  PERRL, conjunctiva/corneas clear, no scleral icterus, EOM's intact   NOSE: Nares normal, septum midline, mucosa normal, no drainage   THROAT: Lips, mucosa, and tongue normal; teeth and gums normal, mouth moist   NECK: Supple, symmetrical, trachea midline   BACK:   Symmetric, no curvature, ROM normal   LUNGS:   Bibasilar crackles    CHEST WALL:  No tenderness or deformity   HEART:  Regular rate and rhythm, S1 and S2 normal, no murmur, rub, or gallop, JVD to the mid neck    ABDOMEN:   Soft, non-tender, bowel sounds active all four quadrants, no masses, no organomegaly, no rebound or guarding   EXTREMITIES: Extremities normal, atraumatic, no cyanosis or edema     SKIN: Dry to touch, no exanthems in the visualized areas   NEURO: Alert, oriented x3, moves all four extremities freely   PSYCH: Cooperative, behavior is appropriate      Data reviewed today: I personally reviewed all new medications, labs, imaging/diagnostics reports over the past 24 hours. Pertinent findings include:    Imaging:   Recent Results (from the past 24 hour(s))   CT Thoracic Spine w/o Contrast    Narrative    EXAM: CT THORACIC SPINE W/O CONTRAST  LOCATION: [facility]  DATE/TIME: 8/26/2022 6:12 PM    INDICATION: T7 fracture  COMPARISON: X-ray 08/17/2022, CT 03/27/2021.  TECHNIQUE: Routine CT Thoracic Spine without IV contrast. Multiplanar reformats. Dose reduction techniques were used.     FINDINGS:  VERTEBRA: Severe compression fracture T7 vertebral body with 70% loss of height. No change from prior. Chronic wedge compression fracture T1 with 50% loss of height. Mild kyphosis. No fracture or posttraumatic subluxation. Osteopenic bones.    CANAL/FORAMINA: No high-grade canal or neural foraminal stenosis. Mild to moderate multilevel degenerative changes.    PARASPINAL: Moderate cardiomegaly.      Impression    IMPRESSION:  1.  Chronic compression fracture T7 is unchanged.  2.  Chronic T1 compression fracture, unchanged from prior.           Labs:  Most Recent 3 CBC's:Recent Labs   Lab Test 08/26/22  0432 08/25/22  0944 08/09/22  1230   WBC 4.7 4.1 4.4   HGB 10.6* 11.4* 11.8   MCV 98 97 94    233 138*     Most Recent 3 BMP's:Recent Labs   Lab Test 08/27/22  0843 08/26/22  0432 08/25/22  0944    139 142   POTASSIUM 3.8 3.7 3.9   CHLORIDE 103 101 101   CO2 34* 27 27   BUN 43* 56* 65*   CR 0.99 1.20* 1.61*   ANIONGAP 8 11 14   BARRETT 8.4* 8.3* 9.2   GLC 99 89 125     Most Recent 2 LFT's:Recent Labs   Lab Test 06/27/21  0539 06/24/21  1722   AST 29 27   ALT 21 16   ALKPHOS 69 71   BILITOTAL 0.3 0.4     Most Recent 3 INR's:Recent Labs   Lab Test 10/28/21  1111  10/07/21  1216 09/30/21  0910   INR 1.09 1.89* 1.98*       Medications:   Personally Reviewed.  Medications     - MEDICATION INSTRUCTIONS -         aspirin  81 mg Oral Every Other Day     cetirizine  10 mg Oral Daily     ferrous sulfate  325 mg Oral Daily     furosemide  40 mg Intravenous Q8H     Lidocaine  1 patch Transdermal Q24H     metolazone  5 mg Oral Once     metoprolol tartrate  25 mg Oral BID     multivitamin  with lutein  1 capsule Oral BID     rivaroxaban ANTICOAGULANT  15 mg Oral Daily with supper     sodium chloride (PF)  3 mL Intracatheter Q8H     Vitamin D3  25 mcg Oral Daily

## 2022-08-27 NOTE — PLAN OF CARE
"Problem: Risk for Delirium  Goal: Improved Attention and Thought Clarity  Outcome: Ongoing, Progressing     Problem: Dysrhythmia  Goal: Normalized Cardiac Rhythm  Outcome: Ongoing, Progressing    Goal Outcome Evaluation:  VSS, afebrile. Tele Afib with HR 's, does get up to 140 with activity. Denies pain through the night, stated that \"whatever I got for my test knocked me out.\" Slept through most of the paitence and night. Was disoriented at first when woke from sleep, but reoriented quickly. Bed alarm in place.       "

## 2022-08-27 NOTE — CONSULTS
Neurosurgery plan of care-    Full consult note to follow-  Veronica c/o atraumatic R sided thoracic pain since August 8. No leg symptoms. CT thoracic yesterday demonstrates chronic T1 and T7 compression fractures. Pt deferred MRI due to claustrophobia. She is neuro intact on exam with lower lumbar pt tenderness.    Today she tells me that she was managed prior to hospitalization by Dr Gan of [facility] as an outpatient and was reportedly referred for brace. We will therefore sign off and consult ortho for further management.    Angeli Keys FNP-C  [facility] Neurosurgery  O. [phone]      NEUROSURGERY CONSULTATION NOTE    Veronica Bray   [facility]  [address] [place] [id number]  89 year old female  Admission Date/Time: 8/25/2022  9:34 AM  Primary Care Provider: [facility] Attending Physician: Tommie Anderson MD    Neurosurgery was asked to see this patient by Tommie Anderson MD for evaluation of T7 fracture    PROBLEM LIST:  Active Problems:    Atrial fibrillation with RVR (H)    Acute on chronic congestive heart failure, unspecified heart failure type (H)       Neurosurgery Attending: Dr Leon    CONSULTATION ASSESSMENT AND PLAN:    Veronica is 89yo F c/o atraumatic R sided thoracic pain since August 8. A CT thoracic demonstrates chronic T1 and T7 compression fractures.     Veronica is neuro intact on exam with lower lumbar pt tenderness which is not in the area of her pain. Overall her pain is better than it was since being in the hospital.  We talked about her chronic thoracic fractures. We would not offer any specific treatment for these. She would like to continue [facility]'s plan for a brace from prior to her admission. Certainly understandable. Placed consult for [facility] for continued care. We will sign off.      HPI:    Veronica is a 89yo F who c/o atraumatic R sided thoracic pain since August 8. She was reportedly on the way to get a back brace and  started experiencing shortness of breath, nausea, diaphoresis. She had seen Dr Gan from [facility] about her back and was very satisfied with her care by that team. They had referred her for the brace. There are no outside notes I can review regarding this. Today she states her pain is actually much better than it has been. Seated in the chair at present, she has no pain at all. She acknowledges that it may be the morphine she is taking. She denies any leg pain, new numbness, weakness, or change in bowel or bladder control. she has some chronic numbness in her right leg from after she experienced an extreme anaphylaxis reaction to aleve while in [place]. She is tearful, tells me about her  who passed away 13mos ago. We reviewed her thoracic CT results from yesterday which demonstrates chronic T1 and T7 compression fractures. Pt had deferred MRI due to claustrophobia. Given that she was previously managed by a different team and was very happy with their plan for her problem, I gave her the option of continuing her care under their guidance. She strongly agreed.       Past Medical History:   Diagnosis Date     Arthritis      Asthma      Atrial flutter (H)      CAD (coronary artery disease)     LIMA graft to the LAD and vein graft to the diagonal are patent.      Cardiomyopathy, unspecified (H)     tachy induced     CHF (congestive heart failure) (H)      Hyperlipidemia      Hypertension      Hypertension      LBBB (left bundle branch block)      Mitral regurgitation      NSTEMI (non-ST elevated myocardial infarction) (H)      PAF (paroxysmal atrial fibrillation) (H)     converted on Amiodarone     Sleep apnea     CPAP     Stroke (H)     TIA     SVT (supraventricular tachycardia) (H)      Thrombocytopenia, unspecified (H) 7/13/2017     Tobacco use      Past Surgical History:   Procedure Laterality Date     ABDOMEN SURGERY      gallbladder     CARDIAC SURGERY       CHOLECYSTECTOMY       CORONARY ANGIOGRAPHY  ADULT ORDER  1/2005    Stent: Distal cfx, Mid LAD, Stent: D -1     GYN SURGERY      hysterectomy     HC LEFT HEART CATHETERIZATION  11/2006    mid distal-anterior/ distal inferior/ Apical Yossi, 85     HC LEFT HEART CATHETERIZATION  5/2015    100% Proximal LAD, patent LIMA-LAD, SVG-D1, LVEDP 13, 10-18 mmHg AV gradient, 3+ MR after PVC     HERNIA REPAIR       HYSTERECTOMY       JOINT REPLACEMENT      Knee hip     ORTHOPEDIC SURGERY      hip, knee, wrist     PICC MIDLINE INSERTION  5/6/2021          ND ESOPHAGOGASTRODUODENOSCOPY TRANSORAL DIAGNOSTIC N/A 6/26/2021    Procedure: ESOPHAGOGASTRODUODENOSCOPY (EGD);  Surgeon: Marc Wolff MD;  Location: [facility];  Service: Gastroenterology     TONSILLECTOMY       VASCULAR SURGERY         REVIEW OF SYSTEMS:   negative    MEDICATIONS:  No current outpatient medications on file.         ALLERGIES/SENSITIVITIES:     Allergies   Allergen Reactions     Aleve [Naproxen] Anaphylaxis     Celecoxib Unknown     Codeine Unknown     Hydrocodone-Acetaminophen Unknown     Hydromorphone Unknown     Meperidine Unknown     Oxycodone-Acetaminophen Unknown     Tramadol Unknown       PERTINENT SOCIAL HISTORY: per below  Social History     Socioeconomic History     Marital status:    Tobacco Use     Smoking status: Never Smoker     Smokeless tobacco: Never Used     Tobacco comment: quit approx 1967    Substance and Sexual Activity     Alcohol use: Never     Alcohol/week: 4.0 standard drinks     Types: 4 Glasses of wine per week     Comment: rarely     Drug use: Never     Sexual activity: Not Currently     Partners: Male   Other Topics Concern     Caffeine Concern No     Special Diet No     Exercise No         FAMILY HISTORY:  Family History   Problem Relation Age of Onset     Unknown/Adopted Mother      Myocardial Infarction Mother      Unknown/Adopted Father      Myocardial Infarction Brother         Rheumatic fever     Myocardial Infarction Brother      Myocardial Infarction  Brother      Heart Disease Brother         PHYSICAL EXAM:   Constitutional: /53 (BP Location: Right arm)   Pulse 100   Temp 97.6  F (36.4  C) (Oral)   Resp 20   Wt 148 lb (67.1 kg)   SpO2 97%   BMI 27.96 kg/m       Mental Status: A & O in no acute distress.  Affect is tearful.  Speech is fluent.  Recent and remote memory are intact.  Attention span and concentration are normal. Seated In the recliner     Cranial Nerves: CN1: grossly intact per patient recall. CN2: No funduscopic exam performed. CN3,4 & 6: Pupillary light response, lateral and vertical gaze normal.  No nystagmus.  Visual fields are full to confrontation. CN5: Intact to touch CN7: No facial weakness, smile, facial symmetry intact. CN8: Intact to spoken voice. CN9&10: Gag reflex, uvula midline, palate rises with phonation. CN11: Shoulder shrug 5/5 intact bilaterally. CN12: Tongue midline and moves freely from side to side.     Motor: Normal bulk and tone all muscle groups of upper and lower extremities.    Strength:   Full strength throughout both lower extremities, all planes     Sensory: Sensation intact bilaterally to light touch throughout both lower extremities     Normal gait and station.     Point tenderness of lower lumbar spine. No thoracic point tenderness  No paraspinal musculature tenderness    IMAGING:  I personally reviewed all radiographic images     EXAM: CT THORACIC SPINE W/O CONTRAST  LOCATION: [facility]  DATE/TIME: 8/26/2022 6:12 PM     INDICATION: T7 fracture  COMPARISON: X-ray 08/17/2022, CT 03/27/2021.  TECHNIQUE: Routine CT Thoracic Spine without IV contrast. Multiplanar reformats. Dose reduction techniques were used.      FINDINGS:  VERTEBRA: Severe compression fracture T7 vertebral body with 70% loss of height. No change from prior. Chronic wedge compression fracture T1 with 50% loss of height. Mild kyphosis. No fracture or posttraumatic subluxation. Osteopenic bones.     CANAL/FORAMINA:  No high-grade canal or neural foraminal stenosis. Mild to moderate multilevel degenerative changes.     PARASPINAL: Moderate cardiomegaly.                                                                      IMPRESSION:  1.  Chronic compression fracture T7 is unchanged.  2.  Chronic T1 compression fracture, unchanged from prior.          (non critical care) I spent more than 45 minutes in this apt, examining the pt, reviewing the scans, reviewing notes from chart, discussing treatment options with risks and benefits and coordinating care. >50 % clinic time was spent in face to face counseling and coordinating care    Angeli ROBERTS  [facility] Neurosurgery  O. [phone]

## 2022-08-28 NOTE — CONSULTS
[facility]  [phone]      Assessment/Recommendations   Patient with known coronary artery disease, status post bypass surgery in the past as well as hypertension, and AV block requiring pacemaker in the past.  Echocardiogram in 2021 showed an ejection fraction of 54% with mild left ventricular hypertrophy.  RV was mildly dilated.  Moderate mitral insufficiency.    She is admitted with worsening shortness of breath and also has back pain related to some spine issues.  She has a history of paroxysmal atrial fibrillation and has been in atrial fibrillation primarily here with a rapid ventricular response.  Some low blood pressures as well.    She feels like her breathing is better as she has received intravenous diuretics but not back to normal.    Echocardiogram is pending today.  We will slightly bump up her beta-blocker to 37.5 mg twice daily to see if better control of her ventricular response.  If she is having trouble with this, I would consider loading her with amiodarone and this could be done orally and this may help her maintain sinus rhythm as well.  She is appropriately anticoagulated for a elevated CHADS2 vascular score.    Would continue intravenous diuretics as I think she still has crackles at the bases and positive hepatojugular reflux.    Thank you for allowing us to precipitate in her care.       History of Present Illness/Subjective    Ms. Veronica Bray is a 89 year old female with known coronary artery disease, status post bypass surgery, known AV block status post permanent pacemaker, and also has hypertension, and paroxysmal atrial fibrillation.  She is admitted with worsening shortness of breath and has a diagnosis in her chart of heart failure with preserved ejection fraction.  She was given intravenous diuretics with improvement but not total normal breathing as of yet.  Her initial B natruretic peptide was 1947.    She has not had any chest discomfort and  echocardiogram is pending.  She does not lay flat in bed because of her back.  She had peripheral edema before she came in and this is going away with IV diuretic.    She denies palpitations, syncope or near syncopal episodes.  No chest pains.    ECG: Personally reviewed.  Shows atrial fibrillation with a heart rate of 105 bpm and left bundle branch block pattern.        Physical Examination Review of Systems   /51 (BP Location: Right arm)   Pulse 106   Temp 99.8  F (37.7  C) (Axillary)   Resp 20   Wt 67.3 kg (148 lb 6.4 oz)   SpO2 94%   BMI 28.04 kg/m    Body mass index is 28.04 kg/m .  Wt Readings from Last 3 Encounters:   08/28/22 67.3 kg (148 lb 6.4 oz)   08/17/22 65.3 kg (144 lb)   08/09/22 65.8 kg (145 lb)     General Appearance:   Alert, cooperative and in no acute distress.   ENT/Mouth: Patient wearing a mask.      EYES:  no scleral icterus, normal conjunctivae   Neck: JVP normal.  Positive hepatojugular reflux. Thyroid not visualized.   Chest/Lungs:   Lungs show scattered rhonchi and crackles at the bases, equal chest wall expansion.   Cardiovascular:   S1, S2 with 2/6 systolic murmur , no clicks or rubs. Brachial, radial  pulses are intact and symetric. No carotid bruits noted   Abdomen:  Nontender. BS+.    Extremities: No cyanosis, clubbing or edema   Skin: no xanthelasma, warm.    Neurologic: normal arm movement bilateral, no tremors     Psychiatric: Appropriate affect.      Enc Vitals  BP: 101/51  Pulse: 106  Resp: 20  Temp: 99.8  F (37.7  C)  Temp src: Axillary  SpO2: 94 %  Weight: 67.3 kg (148 lb 6.4 oz)                                           Medical History  Surgical History Family History Social History   Past Medical History:   Diagnosis Date     Arthritis      Asthma      Atrial flutter (H)      CAD (coronary artery disease)     LIMA graft to the LAD and vein graft to the diagonal are patent.      Cardiomyopathy, unspecified (H)     tachy induced     CHF (congestive heart failure)  (H)      Hyperlipidemia      Hypertension      Hypertension      LBBB (left bundle branch block)      Mitral regurgitation      NSTEMI (non-ST elevated myocardial infarction) (H)      PAF (paroxysmal atrial fibrillation) (H)     converted on Amiodarone     Sleep apnea     CPAP     Stroke (H)     TIA     SVT (supraventricular tachycardia) (H)      Thrombocytopenia, unspecified (H) 7/13/2017     Tobacco use     Past Surgical History:   Procedure Laterality Date     ABDOMEN SURGERY      gallbladder     CARDIAC SURGERY       CHOLECYSTECTOMY       CORONARY ANGIOGRAPHY ADULT ORDER  1/2005    Stent: Distal cfx, Mid LAD, Stent: D -1     GYN SURGERY      hysterectomy     HC LEFT HEART CATHETERIZATION  11/2006    mid distal-anterior/ distal inferior/ Apical Yossi, 85     HC LEFT HEART CATHETERIZATION  5/2015    100% Proximal LAD, patent LIMA-LAD, SVG-D1, LVEDP 13, 10-18 mmHg AV gradient, 3+ MR after PVC     HERNIA REPAIR       HYSTERECTOMY       JOINT REPLACEMENT      Knee hip     ORTHOPEDIC SURGERY      hip, knee, wrist     PICC MIDLINE INSERTION  5/6/2021          [place] ESOPHAGOGASTRODUODENOSCOPY TRANSORAL DIAGNOSTIC N/A 6/26/2021    Procedure: ESOPHAGOGASTRODUODENOSCOPY (EGD);  Surgeon: Marc Wolff MD;  Location: [facility];  Service: Gastroenterology     TONSILLECTOMY       VASCULAR SURGERY      Family History   Problem Relation Age of Onset     Unknown/Adopted Mother      Myocardial Infarction Mother      Unknown/Adopted Father      Myocardial Infarction Brother         Rheumatic fever     Myocardial Infarction Brother      Myocardial Infarction Brother      Heart Disease Brother     Social History     Socioeconomic History     Marital status:      Spouse name: Not on file     Number of children: Not on file     Years of education: Not on file     Highest education level: Not on file   Occupational History     Not on file   Tobacco Use     Smoking status: Never Smoker     Smokeless tobacco: Never Used      Tobacco comment: quit approx 1967    Substance and Sexual Activity     Alcohol use: Never     Alcohol/week: 4.0 standard drinks     Types: 4 Glasses of wine per week     Comment: rarely     Drug use: Never     Sexual activity: Not Currently     Partners: Male   Other Topics Concern     Parent/sibling w/ CABG, MI or angioplasty before 65F 55M? Not Asked      Service Not Asked     Blood Transfusions Not Asked     Caffeine Concern No     Occupational Exposure Not Asked     Hobby Hazards Not Asked     Sleep Concern Not Asked     Stress Concern Not Asked     Weight Concern Not Asked     Special Diet No     Back Care Not Asked     Exercise No     Bike Helmet Not Asked     Seat Belt Not Asked     Self-Exams Not Asked   Social History Narrative     Not on file     Social Determinants of Health     Financial Resource Strain: Not on file   Food Insecurity: Not on file   Transportation Needs: Not on file   Physical Activity: Not on file   Stress: Not on file   Social Connections: Not on file   Intimate Partner Violence: Not on file   Housing Stability: Not on file          Medications  Allergies   No current outpatient medications on file.    Allergies   Allergen Reactions     Aleve [Naproxen] Anaphylaxis     Celecoxib Unknown     Codeine Unknown     Hydrocodone-Acetaminophen Unknown     Hydromorphone Unknown     Meperidine Unknown     Oxycodone-Acetaminophen Unknown     Tramadol Unknown         Lab Results    Chemistry/lipid CBC Cardiac Enzymes/BNP/TSH/INR   Lab Results   Component Value Date    CHOL 159 07/17/2020    HDL 68 07/17/2020    TRIG 90 07/17/2020    BUN 34 (H) 08/28/2022     08/28/2022    CO2 41 (HH) 08/28/2022    Lab Results   Component Value Date    WBC 4.7 08/26/2022    HGB 10.6 (L) 08/26/2022    HCT 33.8 (L) 08/26/2022    MCV 98 08/26/2022     08/26/2022    Lab Results   Component Value Date    TROPONINI 0.04 08/25/2022    BNP 1,947 (H) 08/25/2022    TSH 1.79 05/02/2021    INR 1.09  10/28/2021

## 2022-08-28 NOTE — PLAN OF CARE
Problem: Dysrhythmia  Goal: Normalized Cardiac Rhythm  Outcome: Ongoing, Progressing    Problem: Risk for Delirium  Goal: Improved Attention and Thought Clarity  Outcome: Ongoing, Progressing     Pt reporting having a tough day today. Back pain has not been well controlled, prn morphine & lidocaine patch applied w/ some results. Pt was teary/sad about her current state, emotional support provided. She is A&O x4, able to make needs known. No episodes of confusion noted. She has decreased sight in both eyes from macular degeneration. Endorses n/t to all extremities. Tele showing A fib w/ R BBB. She reports LAINEZ & is currently on 2L NC w/ saturations WDL. One time dose of benadryl administered for generalized pruritis. Purewick in place overnight w/ IV lasix administration. Denies any chest pain, SOB at rest or n/v. Continue to monitor tele, activity & pain levels.

## 2022-08-28 NOTE — PROGRESS NOTES
[facility] MEDICINE PROGRESS NOTE      Assessment & Plan              Veronica Bray is a 89 year old female who  has a past medical history of Arthritis, Asthma, Atrial flutter (H), CAD (coronary artery disease), Cardiomyopathy, unspecified (H), CHF (congestive heart failure) (H), Hyperlipidemia, Hypertension, Hypertension, LBBB (left bundle branch block), Mitral regurgitation, NSTEMI (non-ST elevated myocardial infarction) (H), PAF (paroxysmal atrial fibrillation) (H), Sleep apnea, Stroke (H), SVT (supraventricular tachycardia) (H), Thrombocytopenia, unspecified (H) (7/13/2017), and Tobacco use.   Chief Complaint: Chest pain, SOB  Presented with Dyspnea found to be in acute on chronic diastolic HF.         Assessment and Plan  Acute heart failure with preserved ejection fraction exacerbation  Last echo May 2021 shows abnormal taps, normal LV systolic function  Responding to Lasix in ED, Lasix 40 IV TID to BID. She is developing contraction alkalosis.   Daily weights, I's and O's  No home O2, now requiring 2 L with improved  EKG reviewed, noted T wave changes, initial troponin 0.05  no active chest pain  Will check a limited TTE to ensure no reduction in LVEF. Would appreciate Cardiology input.      Atrial fibrillation with RVR  H/o pacemaker  History of CABG x2  History coronary stenting  LBBB  Minimal rapid ventricular response upon arrival, rate reassuring on my exam  Received metoprolol in ED  Daily metoprolol tartrate 25 mg p.o. twice daily and Xarelto at this time.  Monitor on telemetry     Acute kidney injury  Monitor creatinine with diuresis     Normocytic anemia  No reported melena, bleeding  On anticoagulation     Recent thoracic compression fracture  [facility] she can be fitted for brace while inpatient  Continue home morphine      Diet: Combination Diet Low Saturated Fat Na <2400mg Diet, No Caffeine Diet  Room Service    DVT Prophylaxis: DOAC  Kelly  "Catheter: Not present  Central Lines: None  Cardiac Monitoring: ACTIVE order. Indication: Acute decompensated heart failure (48 hours)  Code Status: No CPR- Do NOT Intubate           Clinically Significant Risk Factors Present on Admission               # Overweight: Estimated body mass index is 28.04 kg/m  as calculated from the following:    Height as of 8/9/22: 1.549 m (5' 1\").    Weight as of this encounter: 67.3 kg (148 lb 6.4 oz).        Disposition Plan      Expected Discharge Date: 08/29/2022    Discharge Delays: IV Medication - consider oral or Home Infusion  Oxygen Needs - Arrange Home O2  Destination: assisted living;home with help/services (return to [facility] and resume Home PT/OT)  Discharge Comments: still on 02 & ortho to see for back brace        The patient's care was discussed with the Bedside Nurse.    Tommie Anderson MD  [facility]  [facility]  [facility]  Phone: #[phone]    Interval History/Subjective:   increased fatigue at this time.  She has vitals which indicate an oxygen saturation at 2 L/min.  Heart rate is in the 90s to 110s, lab work shows a creatinine of 1.0, bicarbonate is 41 indicating elements of a contraction alkalosis.  She diuresed -1.1 L yesterday and -200 cc today.  Cumulatively patient has put out -1.9 L.  Patient was on aspirin 81 mg a day, furosemide 40 mg every 8 hours, metoprolol tartrate 25 mg p.o. twice daily      Physical Exam/Objective:  Temp:  [97.6  F (36.4  C)-98.6  F (37  C)] 98.6  F (37  C)  Pulse:  [] 111  Resp:  [17-24] 24  BP: (106-121)/(53-68) 114/53  SpO2:  [94 %-98 %] 96 %  Body mass index is 28.04 kg/m .    GENERAL:  Alert, appears comfortable, in no acute distress, appears stated age   HEAD:  Normocephalic, without obvious abnormality, atraumatic   EYES:  PERRL, conjunctiva/corneas clear, no scleral icterus, EOM's intact   NOSE: Nares normal, septum midline, mucosa normal, no drainage   THROAT: Lips, mucosa, and tongue " normal; teeth and gums normal, mouth moist   NECK: Supple, symmetrical, trachea midline   BACK:   Symmetric, no curvature, ROM normal   LUNGS:   Mild bibasilar crackles    CHEST WALL:  No tenderness or deformity   HEART:  Regular rate and rhythm, S1 and S2 normal, no murmur, rub, or gallop, Mild-Mod JVD    ABDOMEN:   Soft, non-tender, bowel sounds active all four quadrants, no masses, no organomegaly, no rebound or guarding   EXTREMITIES: Extremities normal, atraumatic, no cyanosis or edema    SKIN: Dry to touch, no exanthems in the visualized areas   NEURO: Alert, oriented x3, moves all four extremities freely   PSYCH: Cooperative, behavior is appropriate      Data reviewed today: I personally reviewed all new medications, labs, imaging/diagnostics reports over the past 24 hours. Pertinent findings include:    Imaging:   No results found for this or any previous visit (from the past 24 hour(s)).    Labs:  Most Recent 3 CBC's:Recent Labs   Lab Test 08/26/22  0432 08/25/22  0944 08/09/22  1230   WBC 4.7 4.1 4.4   HGB 10.6* 11.4* 11.8   MCV 98 97 94    233 138*     Most Recent 3 BMP's:Recent Labs   Lab Test 08/28/22  0905 08/27/22  0843 08/26/22  0432    145 139   POTASSIUM 3.5 3.8 3.7   CHLORIDE 94* 103 101   CO2 41* 34* 27   BUN 34* 43* 56*   CR 1.00 0.99 1.20*   ANIONGAP 8 8 11   BARRETT 9.0 8.4* 8.3*   * 99 89     Most Recent 2 LFT's:Recent Labs   Lab Test 06/27/21  0539 06/24/21  1722   AST 29 27   ALT 21 16   ALKPHOS 69 71   BILITOTAL 0.3 0.4     Most Recent 3 INR's:Recent Labs   Lab Test 10/28/21  1111 10/07/21  1216 09/30/21  0910   INR 1.09 1.89* 1.98*       Medications:   Personally Reviewed.  Medications     - MEDICATION INSTRUCTIONS -         aspirin  81 mg Oral Every Other Day     cetirizine  10 mg Oral Daily     ferrous sulfate  325 mg Oral Daily     furosemide  40 mg Intravenous Q12H     Lidocaine  1 patch Transdermal Q24H     metoprolol tartrate  25 mg Oral BID     multivitamin  with  lutein  1 capsule Oral BID     rivaroxaban ANTICOAGULANT  15 mg Oral Daily with supper     sodium chloride (PF)  3 mL Intracatheter Q8H     Vitamin D3  25 mcg Oral Daily

## 2022-08-29 NOTE — PROGRESS NOTES
Assessment/Plan:  1.  Acute on chronic congestive heart failure with preserved ejection fraction: The patient states that her shortness of breath is improved.  She lost 4 pounds since admission.  Echo was reported elevated pulmonary artery systolic pressure and elevated left ventricular filling pressure.  Her creatinine is elevated to 1.54 this morning.  Lasix IV was held.  She still has crackles in both lung bases.    2.  Atrial fibrillation with RVR: The patient's ventricular rate is much better controlled after metoprolol was increased to 37.5 mg twice a day.  Continue Xarelto for chronic anticoagulation.    3.  Coronary artery disease s/p LIMA to LAD, SVG to D1: No chest pain.  Troponins normal.  Echocardiogram was reported normal LV function, no regional wall motion abnormality.    4.  Benign essential hypertension: Her blood pressure is in normal range.    5.  Acute renal injury: May related to diuresis.  Monitor creatinine level.    Subjective:  Interval History:  Has no complaints of chest pain.  Improved shortness of breath.  Improved palpitations.    Current Medications:  Scheduled Meds:    aspirin  81 mg Oral Every Other Day     cetirizine  10 mg Oral Daily     ferrous sulfate  325 mg Oral Daily     [Held by provider] furosemide  40 mg Intravenous Q12H     Lidocaine  1 patch Transdermal Q24H     metoprolol tartrate  37.5 mg Oral BID     multivitamin  with lutein  1 capsule Oral BID     rivaroxaban ANTICOAGULANT  15 mg Oral Daily with supper     sodium chloride (PF)  3 mL Intracatheter Q8H     Vitamin D3  25 mcg Oral Daily     Continuous Infusions:    - MEDICATION INSTRUCTIONS -       PRN Meds:.acetaminophen **OR** acetaminophen, lidocaine 4%, lidocaine (buffered or not buffered), melatonin, morphine, naloxone **OR** naloxone **OR** naloxone **OR** naloxone, ondansetron, - MEDICATION INSTRUCTIONS -, sodium chloride (PF)    Objective:   Vital signs in last 24 hours:  Temp:  [97.5  F (36.4  C)-98.2  F  (36.8  C)] 98.2  F (36.8  C)  Pulse:  [] 79  Resp:  [16-22] 20  BP: ()/(46-74) 103/55  Cuff Mean (mmHg):  [65-87] 65  SpO2:  [81 %-100 %] 94 %  Weight:   [unfilled]     Physical Exam:  General Appearance:   Awake, Alert, No acute distress.   HEENT:  No scleral icterus; the mucous membranes were moist.   Neck: No cervical bruits.  Positive jugular venous distention   Lungs:    Bibasilar crackles. No wheezing.   Cardiovascular:   IRRR, normal first and second heart sounds with no murmurs. No rubs or gallops.     Abdomen:  Soft. No tenderness. Bowels sounds are present   Extremities: No leg edema. Equal posterior tibial pulsese.   Skin: Warm, Dry. No rashes.   Neurologic: Mood and affect are appropriate. No focal deficits.         Cardiographics:   Report: personally reviewed. .      Tele monitoring -atrial fibrillation with controlled ventricular rate    Echocardiogram on August 28, 2022:  The left ventricle is normal in size.  There is severe eccentric left ventricular hypertrophy.  There is mild global hypokinesia of the left ventricle.  No regional wall motion abnormalities noted.  TAPSE is abnormal, which is consistent with abnormal right ventricular  systolic function.  The left atrium is severely dilated.  There is moderate (2+) mitral regurgitation.  There is mild mitral stenosis.  The right ventricular systolic pressure is approximated at 41mmHg plus the  right atrial pressure.  There is moderate (2+) tricuspid regurgitation.  Mild valvular aortic stenosis.  Right ventricular diastolic pressure is approximated at 7mmHg plus the right  atrial pressure.  Compared to echo from 5/9/21 the LV septal thickness is more prominent.    Lab Results:   personally reviewed.     Lab Results   Component Value Date     08/29/2022     03/28/2021    CO2 43 08/29/2022    CO2 27 03/28/2021    BUN 40 08/29/2022    BUN 19 03/28/2021     Lab Results   Component Value Date    TROPONINI 0.04 08/25/2022     Lab  Results   Component Value Date    WBC 4.7 08/26/2022    WBC 3.1 03/27/2021    HGB 10.6 08/26/2022    HGB 12.8 03/27/2021    HCT 33.8 08/26/2022    HCT 41.7 03/27/2021    MCV 98 08/26/2022    MCV 97 03/27/2021     08/26/2022     03/27/2021     Lab Results   Component Value Date    CHOL 159 07/17/2020    TRIG 90 07/17/2020    HDL 68 07/17/2020    LDL 73 07/17/2020

## 2022-08-29 NOTE — PROGRESS NOTES
08/29/22 1115   Quick Adds   Type of Visit Initial PT Evaluation   Living Environment   People in Home alone   Current Living Arrangements assisted living   Self-Care   Usual Activity Tolerance moderate   Current Activity Tolerance moderate   Equipment Currently Used at Home walker, rolling   Fall history within last six months no   General Information   Onset of Illness/Injury or Date of Surgery 08/25/22   Referring Physician Dr. Henao   Patient/Family Therapy Goals Statement (PT) Decrease pain with mobility   Pertinent History of Current Problem (include personal factors and/or comorbidities that impact the POC) CHF   Range of Motion (ROM)   ROM Comment WFL   Strength (Manual Muscle Testing)   Strength Comments WFL   Bed Mobility   Bed Mobility supine-sit   Supine-Sit Otto (Bed Mobility) verbal cues;supervision   Transfers   Transfers sit-stand transfer   Sit-Stand Transfer   Sit-Stand Otto (Transfers) contact guard   Gait/Stairs (Locomotion)   Otto Level (Gait) contact guard   Assistive Device (Gait) walker, standard   Distance in Feet (Required for LE Total Joints) 10'   Pattern (Gait) step-through   Deviations/Abnormal Patterns (Gait) roel decreased;gait speed decreased   Clinical Impression   Criteria for Skilled Therapeutic Intervention Yes, treatment indicated   PT Diagnosis (PT) Impaired functional mobility   Influenced by the following impairments Weakness, pain   Functional limitations due to impairments Transfers, gait, stairs   Clinical Presentation (PT Evaluation Complexity) Stable/Uncomplicated   Clinical Presentation Rationale Pt presents as medically diagnosed   Clinical Decision Making (Complexity) low complexity   Planned Therapy Interventions (PT) gait training;home exercise program;patient/family education;ROM (range of motion);strengthening   Anticipated Equipment Needs at Discharge (PT) walker, rolling   Risk & Benefits of therapy have been explained care  plan/treatment goals reviewed;patient   PT Discharge Planning   PT Discharge Recommendation (DC Rec) (S)  home with assist;home with home care physical therapy   PT Rationale for DC Rec Pt normally lives in [facility], only providing meal service, reports getting PT at facility, pt could use continuation of this to improve functional status. Transfers and ambulating well, if pain controlled and status continues to improve, pt should be able to safely return to facility. Not at baseline with O2 needs at this time.   Plan of Care Review   Plan of Care Reviewed With patient   Total Evaluation Time   Total Evaluation Time (Minutes) 10   Physical Therapy Goals   PT Frequency Daily   PT Predicted Duration/Target Date for Goal Attainment 09/02/22   PT Goals Transfers;Gait   PT: Transfers Supervision/stand-by assist;Sit to/from stand   PT: Gait Supervision/stand-by assist;Rolling walker;150 feet

## 2022-08-29 NOTE — PLAN OF CARE
Remains in Afib on telemetry. HR 80s-120. BP soft tonight MAP 66-71. Unable to administer Lasix or metoprolol at this time per parameters.     Rates pain in R upper medial back 6-7 with movement. 0/10 at rest with Tylenol and Morphine PRN. Lidocaine patch in place. Encouraged movement in bed and up to chair.     Only ate one meal today of fruit and tomato soup. No appetite. Denies nausea.     Minimal weight change since admission. No further edema to BLE's.     Dyspnea with exertion, SOB. Crackles to BLL's Shallow breathing. Encouraged C/DB, declines d/t back pain.       Problem: Plan of Care - These are the overarching goals to be used throughout the patient stay.    Goal: Absence of Hospital-Acquired Illness or Injury  Intervention: Prevent Infection     Problem: Plan of Care - These are the overarching goals to be used throughout the patient stay.    Goal: Optimal Comfort and Wellbeing  Outcome: Ongoing, Not Progressing  Intervention: Monitor Pain and Promote Comfort     Goal Outcome Evaluation:    Plan of Care Reviewed With: patient

## 2022-08-29 NOTE — PLAN OF CARE
Pt complains of severe pain with movement but mild pain with rest. Refused morphine, taking tylenol for pain. SBA with walker for ambulation. Take pills in apple sauce. Orthotics to bring pt brace tomorrow AM. Able to make needs known. Telemetry reading atrial fibrillation with occasional PVCs.

## 2022-08-29 NOTE — PROGRESS NOTES
08/29/22 1345   Quick Adds   Type of Visit Initial Occupational Therapy Evaluation   Living Environment   People in Home alone   Current Living Arrangements assisted living   Transportation Anticipated family or friend will provide   Living Environment Comments WIS with shower chair and GB, RTS with vanity on the R and GB on the L.   Self-Care   Usual Activity Tolerance moderate   Current Activity Tolerance moderate   General Information   Onset of Illness/Injury or Date of Surgery 08/25/22   Referring Physician Dr. Jackson Henao   Patient/Family Therapy Goal Statement (OT) To return home.   General Observations and Info Will be getting a TLSO   Cognitive Status Examination   Orientation Status orientation to person, place and time   Follows Commands WNL   Visual Perception   Visual Impairment/Limitations other (see comments)  (limited vision.  Unable to see therapist's name tag when shown.)   Range of Motion Comprehensive   General Range of Motion upper extremity range of motion deficits identified   General Upper Extremity Assessment (Range of Motion)   Comment: Upper Extremity ROM shoulder flex 0-90 degrees   Upper Extremity: Range of Motion shoulder, left: UE ROM;shoulder, right: UE ROM   Bed Mobility   Bed Mobility rolling right;rolling left;supine-sit;sit-supine   Rolling Left [place] (Bed Mobility) modified independence   Rolling Right [place] (Bed Mobility) modified independence   Supine-Sit [place] (Bed Mobility) modified independence   Sit-Supine [place] (Bed Mobility) modified independence   Assistive Device (Bed Mobility) bed rails;other (see comments)  (HOB elevated.)   Transfers   Transfers bed-chair transfer;sit-stand transfer;toilet transfer   Transfer Skill: Bed to Chair/Chair to Bed   Bed-Chair Magn[place] (Transfers) supervision;verbal cues   Assistive Device (Bed-Chair Transfers) rolling walker   Sit-Stand Transfer   Sit-Stand [place] (Transfers)  supervision;verbal cues   Assistive Device (Sit-Stand Transfers) walker, front-wheeled   Toilet Transfer   Type (Toilet Transfer) stand-sit;sit-stand   Wilmer Level (Toilet Transfer) supervision;verbal cues   Assistive Device (Toilet Transfer) walker, front-wheeled;raised toilet seat;grab bars/safety frame   Balance   Balance Assessment no deficits were identified   Activities of Daily Living   BADL Assessment/Intervention lower body dressing;grooming;toileting   Lower Body Dressing Assessment/Training   Position (Lower Body Dressing) supported sitting   Wilmer Level (Lower Body Dressing) supervision;verbal cues   Grooming Assessment/Training   Position (Grooming) sink side   Wilmer Level (Grooming) supervision;verbal cues   Comment, (Grooming) standing   Toileting   Position (Toileting) supported sitting;supported standing   Assistive Devices (Toileting) raised toilet seat;grab bar, toilet   Wilmer Level (Toileting) supervision;verbal cues   Clinical Impression   Criteria for Skilled Therapeutic Interventions Met (OT) Yes, treatment indicated   OT Diagnosis decreased indep with ADLs   OT Problem List-Impairments impacting ADL mobility   Assessment of Occupational Performance 3-5 Performance Deficits   Identified Performance Deficits dressing, toileting, G/H, bathing. trsfs   Planned Therapy Interventions (OT) ADL retraining   Clinical Decision Making Complexity (OT) moderate complexity   Risk & Benefits of therapy have been explained patient;evaluation/treatment results reviewed   OT Discharge Planning   OT Discharge Recommendation (DC Rec) (S)  home with assist;home with home care occupational therapy   OT Rationale for DC Rec Pt is close to baseline for her ADLs and functinal mobility.  Currently on 2L of NC O2 which is not her baseline.  Home OT to encure safety with functinal mobility and self cares feliciano if she will need home O2.   OT Goals   Therapy Frequency (OT) Daily   OT Predicted  Duration/Target Date for Goal Attainment 09/02/22   OT Goals Hygiene/Grooming;Lower Body Dressing;Bed Mobility;Transfers   OT: Hygiene/Grooming modified independent   OT: Lower Body Dressing Independent   OT: Bed Mobility Modified independent   OT: Transfer Modified independent

## 2022-08-29 NOTE — PROGRESS NOTES
[facility] Daily Progress Note    Assessment/Plan:  89-year-old female with history of atrial flutter, coronary disease, cardiomyopathy, heart failure with preserved ejection fraction, hyperlipidemia, hypertension, left bundle branch block, mitral regurgitation, sleep apnea, stroke, supraventricular tachycardia, tobacco abuse who presented with acute on chronic diastolic congestive heart failure.    Acute heart failure preserved ejection fraction exacerbation  Acute respiratory failure with hypoxia  Continue metoprolol 37.5 mg twice daily  Hold furosemide 40 mg IV every 12 hours for elevation in creatinine.  Continue cardiac monitoring.  Order I-S encourage use  Appreciate cardiology recommendations.    atrial fibrillation with RVR  History of AV block requiring pacemaker placement.  Continue metoprolol 37.5 mg twice daily.  Continue rivaroxaban 15 mg daily    Acute renal insufficiency unlikely acute kidney injury.  This is likely secondary to overdiuresis.  Patient with evidence of contraction alkalosis.  Hold diuretics today.  Repeat basic blood profile in a.m.    Normocytic anemia  No clinical evidence for bleeding.  Continue anticoagulation as above.  Repeat CBC in AM.    Thoracic compression fracture  Chronic pain  MRI with chronic T1 T7 compression fractures.  Neurologically intact on exam.  [facility] was consulted will see today.  Likely will require TLSO brace fitting.  Continue home PTA medication: Morphine MS IR 15 mg every 8 hours as needed.    Diet: Combination Diet Low Saturated Fat Na <2400mg Diet, No Caffeine Diet  Room Service  DVT Prophylaxis:  DOAC  Code Status: No CPR- Do NOT Intubate    Active Problems:    Atrial fibrillation with RVR (H)    Acute on chronic congestive heart failure, unspecified heart failure type (H)     LOS: 4 days     Barriers to discharge: Acute renal insufficiency, secondary to diuretic effect, atrial fibrillation, congestive heart failure, hypoxia, cardiology  recommendations, orthopedic evaluation.  Discharge Disposition: Pending orthopedic evaluation.  PT OT assessment    Subjective:  Veronica is sitting in bed.  She reports poor sleep overnight and is trying to catch up on sleep today.  Denies chest pain or shortness of breath.  No palpitations.  Denies orthopnea or PND.  Denies headache or vision changes.  No abdominal pain nausea or vomiting.  Tolerating diet.  Patient's pain is currently controlled with MS IR medications.  Awaiting orthopedic evaluation.  Follows with nonoperative orthopedic provider, Dr. Gan with [facility].      aspirin  81 mg Oral Every Other Day     cetirizine  10 mg Oral Daily     ferrous sulfate  325 mg Oral Daily     furosemide  40 mg Intravenous Q12H     Lidocaine  1 patch Transdermal Q24H     metoprolol tartrate  37.5 mg Oral BID     multivitamin  with lutein  1 capsule Oral BID     rivaroxaban ANTICOAGULANT  15 mg Oral Daily with supper     sodium chloride (PF)  3 mL Intracatheter Q8H     Vitamin D3  25 mcg Oral Daily       Objective:  Vital signs in last 24 hours:  Temp:  [97.5  F (36.4  C)-99.8  F (37.7  C)] 97.6  F (36.4  C)  Pulse:  [] 91  Resp:  [16-22] 18  BP: ()/(47-74) 104/53  Cuff Mean (mmHg):  [65-87] 65  SpO2:  [81 %-100 %] 98 %  Weight:   Weight:   @THISENCWEIGHTS(1)@  Weight change: -0.998 kg (-2 lb 3.2 oz)  Body mass index is 27.62 kg/m .    Intake/Output last 3 shifts:  I/O last 3 completed shifts:  In: 606 [P.O.:600; I.V.:6]  Out: 850 [Urine:850]  Intake/Output this shift:  I/O this shift:  In: -   Out: 300 [Urine:300]    Review of Systems:   As per subjective, all others negative.    Physical Exam:    GENERAL:  Alert, appears comfortable, in no acute distress, appears stated age   HEAD:  Normocephalic, without obvious abnormality, atraumatic   NECK: Supple, symmetrical, trachea midline   BACK:   Symmetric, no curvature, ROM normal   LUNGS:    Diminished breath sounds at bases, no rales, rhonchi, or  wheezing, symmetric chest rise on inhalation, respirations unlabored   CHEST WALL:  No tenderness or deformity   HEART:   Irregular rhythm, S1 and S2 normal, no murmur, rub, or gallop    ABDOMEN:   Soft, non-tender, bowel sounds active all four quadrants, no masses, no organomegaly, no rebound or guarding   EXTREMITIES: Extremities normal, atraumatic, no cyanosis or edema    SKIN: Dry to touch, no exanthems in the visualized areas   NEURO: Alert, oriented x3, moves all four extremities freely, non-focal   PSYCH: Cooperative, behavior is appropriate      Cardiac telemetry: Atrial fibrillation rate controlled.    Imaging:  Personally Reviewed.  Results for orders placed or performed during the hospital encounter of 08/25/22   XR Chest Port 1 View    Impression    IMPRESSION: Poststernotomy changes. Dual-lead left subclavian venous pacer again noted. Leads are intact. Cardiomegaly is stable without signs of failure. Mild pulmonary vascular redistribution. Linear areas of fibrosis in both lungs are unchanged.   CT Thoracic Spine w/o Contrast    Impression    IMPRESSION:  1.  Chronic compression fracture T7 is unchanged.  2.  Chronic T1 compression fracture, unchanged from prior.       XR Chest Port 1 View    Impression    IMPRESSION: Left chest cardiac device, sternotomy wires, CABG changes are similar. The lungs are hypoinflated with patchy bibasilar atelectasis/scarring. Cardiomegaly without overt pulmonary edema. Mild pulmonary vascular congestion. Degenerative change   of the bilateral shoulders.   Echocardiogram Complete   Result Value Ref Range    LVEF  45-50%        Lab Results:  Personally Reviewed.   Recent Labs   Lab 08/26/22  0432 08/25/22  0944   WBC 4.7 4.1   HGB 10.6* 11.4*   HCT 33.8* 35.5    233     Recent Labs   Lab 08/29/22  0436 08/28/22  0905 08/27/22  0843    143 145   CO2 43* 41* 34*   BUN 40* 34* 43*     No results for input(s): INR in the last 168 hours.    I reviewed all labs and  imaging studies as of this date and I reviewed all current inpatient medications and updated them    Jackson Henao DO, MS  [facility] Service  Internal Medicine

## 2022-08-29 NOTE — CONSULTS
ORTHOPEDIC CONSULTATION    Consultation  Veronica Bray,  1931, MRN [id number]    [unfilled]  Atrial fibrillation with RVR (H) [I48.91]  Acute on chronic congestive heart failure, unspecified heart failure type (H) [I50.9]    PCP: Dm Oreilly, [phone]   Code status:  No CPR- Do NOT Intubate       Extended Emergency Contact Information  Primary Emergency Contact: Dr Nabeel Bray  Address: [id number] [place] [address] [facility]  Home Phone: [phone]  Work Phone: [phone]  Mobile Phone: [phone]  Relation: Son  Preferred language: English   needed? No  Secondary Emergency Contact: Dale Hamlin  Address: [address] [place], [place] [id number] [place] [address]  Home Phone: [phone]  Mobile Phone: [phone]  Relation: Daughter  Preferred language: English   needed? No         IMPRESSION:  1. T7 compression fracture with 50-60% height loss and pain beginning 22 without injury  2. Chronic T1 compression fracture- unchanged from prior imaging study  3. Pacemaker, on Xarelto  4. Allergy to many opiates including tramadol, oxycodone, Vicodin and codeine, but tolerates morphine     PLAN:  This patient was discussed with Dr. Gan, on-call surgeon for [facility] and they are in agreement with the following plan.     Veronica was just seen by Dr. Gan as an outpatient on  for the T7 compression fracture. At that visit, a TLSO for bracing was recommended for 6-8 weeks. While on her way to get the brace, she had chest pain and presented to the ED instead. She did not receive her brace yet. I will go a head and place consult for orthotics to fit her for a TLSO brace. She should wear this at all times for 6-8 weeks. Continue good pain control. After discharge, she should also see her PCP for a DEXA scan and bone health management. Follow-up with Dr. Gan in 2 weeks for updated xrays and further management.    Thank you  for including [facility] in the care of Veronica Bray. It has been a pleasure participating in Veronica Bray's care.    CHIEF COMPLAINT: back pain without injury    HISTORY OF PRESENT ILLNESS:  The patient is seen in orthopedic consultation at the request of Dr. Henao. Veronica Bray is a very pleasant 89 year old female with a past history of Arthritis, Asthma, CAD, CHF, Hyperlipidemia, Hypertension, NSTEMI, atrial fibrillation, stroke and thrombocytopenia who presented to the ED with chest pain and shortness of breath. She was admitted for acute heart failure and atrial fibrillation with RVR. It was noted that she also has a T7 compression fracture. She was just seen by Dr. Gan as an outpatient on 8/22 for her compression fracture. At that visit, Dr. Gan recommended a TLSO x6-8 weeks. Patient was on her way to get the brace when she developed the chest pain and went to the ER, so she didn't get the brace. She denies any new injury since seen by Dr. Gan. She denies any new numbness or tingling in her bilateral lower extremities. She has the same back pain that's improved with morphine. She would like to get the brace placed here while admitted. She has no other new concerns.    PAST MEDICAL HISTORY:  Past Medical History:   Diagnosis Date     Arthritis      Asthma      Atrial flutter (H)      CAD (coronary artery disease)     LIMA graft to the LAD and vein graft to the diagonal are patent.      Cardiomyopathy, unspecified (H)     tachy induced     CHF (congestive heart failure) (H)      Hyperlipidemia      Hypertension      Hypertension      LBBB (left bundle branch block)      Mitral regurgitation      NSTEMI (non-ST elevated myocardial infarction) (H)      PAF (paroxysmal atrial fibrillation) (H)     converted on Amiodarone     Sleep apnea     CPAP     Stroke (H)     TIA     SVT (supraventricular tachycardia) (H)      Thrombocytopenia, unspecified (H) 7/13/2017     Tobacco use      ALLERGIES:    Review of patient's allergies indicates   Allergies   Allergen Reactions     Aleve [Naproxen] Anaphylaxis     Celecoxib Unknown     Codeine Unknown     Hydrocodone-Acetaminophen Unknown     Hydromorphone Unknown     Meperidine Unknown     Oxycodone-Acetaminophen Unknown     Tramadol Unknown     MEDICATIONS UPON ADMISSION:  Medications were reviewed.  They include:   Medications Prior to Admission   Medication Sig Dispense Refill Last Dose     acetaminophen (TYLENOL) 500 MG tablet Take 500-1,000 mg by mouth every 6 hours as needed for mild pain   Past Week at Unknown time     aspirin 81 MG EC tablet Take 81 mg by mouth every other day   Unknown at Unknown time     bumetanide (BUMEX) 1 MG tablet TAKE 1 TABLET TWICE A  tablet 1 8/25/2022 at AM     cetirizine (ZYRTEC) 10 MG tablet Take 10 mg by mouth daily    8/25/2022 at AM     Cholecalciferol (VITAMIN D3) 50 MCG (2000 UT) CAPS Take 2,000 Units by mouth daily   8/25/2022 at AM     ferrous sulfate (FEROSUL) 325 (65 Fe) MG tablet Take 325 mg by mouth daily   8/25/2022 at AM     Lidocaine (LIDOCARE) 4 % Patch Place 1 patch onto the skin every 24 hours To prevent lidocaine toxicity, patient should be patch free for 12 hrs daily.   8/24/2022 at Unknown time     magnesium 250 MG tablet Take 1 tablet by mouth 2 times daily   8/25/2022 at AM     metoprolol tartrate (LOPRESSOR) 25 MG tablet TAKE 1 TABLET TWICE A  tablet 1 8/25/2022 at AM     morphine (MSIR) 15 MG IR tablet Take 1 tablet (15 mg) by mouth every 8 hours as needed for severe pain 10 tablet 0 8/24/2022 at Unknown time     Multiple Vitamins-Minerals (PRESERVISION AREDS 2) CAPS Take 1 capsule by mouth 2 times daily    8/25/2022 at AM     XARELTO ANTICOAGULANT 15 MG TABS tablet TAKE 1 TABLET DAILY WITH   DINNER 90 tablet 1 8/24/2022 at HS     tiZANidine (ZANAFLEX) 4 MG tablet Take 1 tablet (4 mg) by mouth At Bedtime (Patient not taking: Reported on 8/25/2022) 90 tablet 0 Not Taking at Unknown time      SOCIAL HISTORY:   she  reports that she has never smoked. She has never used smokeless tobacco. She reports that she does not drink alcohol and does not use drugs.    FAMILY HISTORY:  family history includes Heart Disease in her brother; Myocardial Infarction in her brother, brother, brother, and mother; Unknown/Adopted in her father and mother.    REVIEW OF SYSTEMS:   Reviewed with patient. See HPI, otherwise negative    PHYSICAL EXAMINATION:  Vitals: Temp:  [97.5  F (36.4  C)-99.8  F (37.7  C)] 97.6  F (36.4  C)  Pulse:  [] 91  Resp:  [16-22] 18  BP: ()/(46-74) 101/46  Cuff Mean (mmHg):  [65-87] 65  SpO2:  [81 %-100 %] 98 %  General: On examination, the patient is resting comfortably in chair at bedside, NAD, awake and alert and oriented to person, place and time, conversational  SKIN: There is no evidence of lesions or rashes on back  Pulses:  Palpable bilateral dorsalis pedis pulses  Sensation: SILT in L2-S1 dermatomes in bilateral lower extremities  Tenderness: Cervical, thoracic and lumbar spine without tenderness; mild paraspinal tenderness in lumbar region bilaterally   ROM: Intact hip flexion, knee extension, ankle PF/DF, moves all toes bilaterally  Motor: +5/5 hip flexion, knee extension, ankle PF/DF, EHL bilaterally  Straight leg raise without tenderness bilaterally     RADIOGRAPHIC EVALUATION:  Personally reviewed    8/26/22 CT Thoracic spine:   1.  Chronic compression fracture T7 is unchanged.  2.  Chronic T1 compression fracture, unchanged from prior.    8/17/22 Thoracic xray: There is diffuse osteopenia of the bony structures. There is good anatomic alignment of the thoracic spine. There is a severe compression fracture of the T7 vertebral body with no evidence of retropulsion. This was not visualized on the   previous study from 2021 and there is suboptimal visualization of this region on the 08/09/2022 study. There may have been a mild to moderate compression fracture on this study  that has progressed. Recommend clinical correlation for pain at this level. A  CT or MRI may be helpful for further evaluation. The rest of the vertebral body heights are maintained. There is mild degenerative disc disease involving the mid to lower thoracic disc spaces with a mild loss of height, endplate changes and small     PERTINENT LABS:  Lab Results: personally reviewed.   not applicable      Ruby Menjivar PA-C  Date: 8/29/2022  Time: 11:39 AM    CC1:   Jackson Henao DO    CC2:   Dm Oreilly

## 2022-08-29 NOTE — PROGRESS NOTES
S: Veronica Bray was seen at [facility], Room 332-01, for the evaluation and measurements for a custom TLSO. Please note that Veronica has low vision and would like to  feel how to put the brace on,  when the brace is delivered.    Dx: Acute on chronic LBP, L1-2 intervertebral disc fx vs. possible discitis: MRI L Spine (8/27/22) showed new bone marrow edema and anterior osteophyte between L1 and L2 as well as new abnormal T2 signal hyperintensity in the L1-L2 disc suspicious for fracture involving the intervertebral discs along with soft tissue edema in the midline posterior paraspinous soft tissues at L1-L2.  CT L Spine (8/27/22) showed irregularity and lucency along the anterior vertebral bodies at L1-L2 with a fragmented appearance of the bridging osteophyte and loss of the normal vacuum disc changes. Findings may reflect a subacute fracture versus discitis osteomyelitis.  S/P L4-L5 interlaminar epidural steroid and anesthetic injection (8/23/22):  -pain team following  -neurosurgery following. Activity per neurosurgery  -await IR lumbar disc cultures    O: A Custom TLSO is medically necessary and recommended by Neurosurgery due to severity and instability of spinal fracture.     A: Measurements were completed without incident. Custom [employer] TLSO with a Flex Foam liner and rigid thermoplastic external frame.      P: The TLSO will be fabricated and delivered, pending any unforeseen circumstances, by Tuesday afternoon (8/30/2022).  If possible, the TLSO will be delivered earlier.    At the time of delivery, the TLSO will be fit to the patient and all donning/doffing and care instructions will be provided to the patient.  As mentioned earlier, Veronica has low vision and would like to  feel how to put the brace on,  when the brace is delivered. (Instructions are provided below for quick reference.)    G: The TLSO increases medial-lateral, rotational and anterior-posterior stability of the spine.  The  TLSO also applies compression to the patient s soft tissues.  Compression of the patient s soft tissues serves to unload the injured vertebrae which reduces pain and promotes healing.  The goal of this orthosis is to provide spinal stability, promote healing and reduce pain while the patient performs their ADLs.    Electronically signed by Ruddy Avina CPO, JESSICA    How to Put on a TLSO Back Brace  For optimal fit brace should NOT be donned with patient sitting. Ideal fit is achieved by donning in either laying or standing position. Due to changes in belly tissue, it is difficult to achieve a proper fit when patient is sitting.  1. Apply a snug-fitting cotton t-shirt.  Loose shirts may cause wrinkles and skin irritation.  2. Patient should be supine. Palpate for waist (below ribs but above hips) so you know where to line up waist grooves of the brace.  3. Logroll patient and slide back section of brace under patient lining up waist groove of brace with patient's waist.  4. Roll patient onto their back with the posterior section behind them. Recheck alignment of waist groove on brace with patient's waist. It may be necessary to logroll patient to the opposite side to get posterior section centered on patient, where it extends anteriorly equal amounts on patient's sides. Repeat logrolling side to side as necessary.  5. Once posterior section is positioned correctly, lay anterior section on patient. Again, line up waist groove of brace to patient's waist. Waist grooves of anterior and posterior section should match up and fit together. Anterior shell should go over the top of posterior shell. Velcro straps should line up with the chafes/D-rings, if they don't, either the anterior or the posterior sections are not in the proper place.  6. Fasten the middle straps first and tighten both sides evenly. Then fasten either top or bottom straps in the same manner. Brace should be snug so as to prevent brace from sliding up on  patient. If it is too loose, the brace will slide up and cause discomfort to the patient in the chin and/or axilla area.  7. When properly fit, brace the inferior aspect should allow clearance so as not to cut into the tops of the thighs when sitting but needs to be as low on the pelvic area as possible.    Wearing Schedule  Always check with prescribing doctor for a precise wearing schedule. At times the TLSO is worn only when out of bed while sitting or standing. Other times, the doctor requires the TLSO to be worn at all times.    Cleaning and Maintenance  Rubbing alcohol may be used to clean the inside and outside of the TLSO. Spray TLSO with alcohol and wipe gently with a cloth. Be sure that TLSO is completely dry prior to application to ensure no skin issues will occur. Always wear a clean, dry, snug-fitting shirt under the brace.    Tips and Problem Solving  Brace migration is inevitable, especially when patient is going from laying to upright in bed. The mattress may push posterior section of brace up, which will push the whole brace up. Migration may also occur when patient sits in a soft cushioned chair. Don't be afraid to readjust brace and pull it down and back to its proper position.    Avoid soft seat cushioned chairs and sit up straight. Soft seat cushions or leaning back into a chair will cause the brace to migrate upward and may place pressure underarms.      Instead of leaning back, try sitting on the front half of the seat of the chair and work your way back until you contact the backrest of the chair.  Sometimes using a pillow in the gap between the back of the chair and the brace will provide more support.    Do not lean forward over a table while eating. Bring the food up to the mouth. This will reduce any pressure on the thighs and chest.    If the TLSO starts to migrate upward under the throat/armpits, ensure that straps are snug. Straps that are loose will allow the brace to shift.

## 2022-08-30 NOTE — PLAN OF CARE
Patient alert & oriented. VSS. Pain rated 6/10 in head & neck; given PRN tylenol & ice pack. Pt on tele reading afib with BBB with PVC. Pt on 2L O2. Pt able to sleep comfortably overnight in bed and in chair.

## 2022-08-30 NOTE — PROGRESS NOTES
Care Management Follow Up    Length of Stay (days): 5    Expected Discharge Date: 08/31/2022     Concerns to be Addressed:       Patient plan of care discussed at interdisciplinary rounds: Yes    Anticipated Discharge Disposition: Assisted Living, Home Care (return to [facility] and resume Home PT/OT)  Disposition Comments: Hopeful to return to [facility] and resume home PT/OT. Family will transport.  Anticipated Discharge Services:  (return to [facility] and resume Home PT/OT)  Anticipated Discharge DME:  (TBD)    Patient/family educated on Medicare website which has current facility and service quality ratings:  (None indicated at this time)  Education Provided on the Discharge Plan:    Patient/Family in Agreement with the Plan: yes    Referrals Placed by CM/SW: Post Acute Facilities  Private pay costs discussed: Not applicable    Additional Information:  Chart reviewed, pt from [facility].  Pt is open to [facility] for home PT and OT.  Anticipate return to [facility] at discharge.      EDMUND Turner

## 2022-08-30 NOTE — PROGRESS NOTES
[facility] Daily Progress Note    Assessment/Plan:  89-year-old female with history of atrial flutter, coronary disease, cardiomyopathy, heart failure with preserved ejection fraction, hyperlipidemia, hypertension, left bundle branch block, mitral regurgitation, sleep apnea, stroke, supraventricular tachycardia, tobacco abuse who presented with acute on chronic diastolic congestive heart failure.  Developed acute renal insufficiency due to IV diuretic effects.  Orthopedics ordered TLSO brace and recommending conservative management of chronic thoracic compression fractures. Cardiology is following.       Acute heart failure preserved ejection fraction exacerbation  Acute respiratory failure with hypoxia  Continue metoprolol 37.5 mg twice daily  Hold furosemide 40 mg IV every 12 hours for elevation in creatinine.  Continue cardiac monitoring.  I-S encourage use, continue supplemental oxygen.   Attempt to wean off of oxygen as tolerated.   Appreciate cardiology recommendations.  Check BMP for a.m., if renal function better will initiate oral lasix per cardiology recommendations.     atrial fibrillation with RVR  History of AV block requiring pacemaker placement.  Continue metoprolol 37.5 mg twice daily.  Continue rivaroxaban 15 mg daily     Acute renal insufficiency unlikely acute kidney injury.  Metabolic alkalosis  This is likely secondary to overdiuresis.  Patient with evidence of contraction alkalosis.  Hold diuretics today.  Repeat basic blood profile in a.m.     Normocytic anemia  No clinical evidence for bleeding.  Continue anticoagulation as above.     Thoracic compression fracture  Chronic pain  MRI with chronic T1 T7 compression fractures.  Neurologically intact on exam.  TLSO brace fitting.  Continue home PTA medication: Morphine MS IR 15 mg every 8 hours as needed.  Follow up with non-operative orthopedics, Dr. Gan.        Diet: Combination Diet Low Saturated Fat Na <2400mg Diet, No Caffeine Diet  Room  "Service  DVT Prophylaxis:  DOAC  Code Status: No CPR- Do NOT Intubate    Active Problems:    Atrial fibrillation with RVR (H)    Acute on chronic congestive heart failure, unspecified heart failure type (H)     LOS: 5 days     Barriers to discharge: Cardiology recommendations, renal function, diuretic management.  Discharge Disposition: Home with home cares.     Subjective:  Normal reports feeling \"normal,\" today. She denies chest pain, shortness of breath.  Her back pain has improved.         aspirin  81 mg Oral Every Other Day     cetirizine  10 mg Oral Daily     ferrous sulfate  325 mg Oral Daily     [Held by provider] furosemide  40 mg Intravenous Q12H     Lidocaine  1 patch Transdermal Q24H     lidocaine   Transdermal Q24H     metoprolol tartrate  37.5 mg Oral BID     multivitamin  with lutein  1 capsule Oral BID     rivaroxaban ANTICOAGULANT  15 mg Oral Daily with supper     sodium chloride (PF)  3 mL Intracatheter Q8H     Vitamin D3  25 mcg Oral Daily       Objective:  Vital signs in last 24 hours:  Temp:  [97.4  F (36.3  C)-98.4  F (36.9  C)] 98.4  F (36.9  C)  Pulse:  [71-97] 73  Resp:  [18-20] 18  BP: (101-121)/(46-66) 108/55  SpO2:  [92 %-98 %] 98 %  Weight:   Weight:   @THISENCWEIGHTS(1)@  Weight change: -2.313 kg (-5 lb 1.6 oz)  Body mass index is 26.66 kg/m .    Intake/Output last 3 shifts:  I/O last 3 completed shifts:  In: 600 [P.O.:600]  Out: 675 [Urine:675]  Intake/Output this shift:  No intake/output data recorded.    Review of Systems:   As per subjective, all others negative.    Physical Exam:    GENERAL:  Alert, appears comfortable, in no acute distress, appears stated age   HEAD:  Normocephalic, without obvious abnormality, atraumatic   NECK: Supple, symmetrical, trachea midline   BACK:   Symmetric, no curvature, ROM normal   LUNGS:   Bibasilar crackles, symmetric chest rise on inhalation, respirations unlabored   CHEST WALL:  No tenderness or deformity   HEART:  Irregular rate and rhythm, S1 " and S2 normal, no murmur, rub, or gallop    ABDOMEN:   Soft, non-tender, bowel sounds active all four quadrants, no masses, no organomegaly, no rebound or guarding   EXTREMITIES: Extremities normal, atraumatic, no cyanosis or edema    SKIN: Dry to touch, no exanthems in the visualized areas   NEURO: Alert, oriented x3, moves all four extremities freely, non-focal   PSYCH: Cooperative, behavior is appropriate      Imaging:  Personally Reviewed.  Results for orders placed or performed during the hospital encounter of 08/25/22   XR Chest Port 1 View    Impression    IMPRESSION: Poststernotomy changes. Dual-lead left subclavian venous pacer again noted. Leads are intact. Cardiomegaly is stable without signs of failure. Mild pulmonary vascular redistribution. Linear areas of fibrosis in both lungs are unchanged.   CT Thoracic Spine w/o Contrast    Impression    IMPRESSION:  1.  Chronic compression fracture T7 is unchanged.  2.  Chronic T1 compression fracture, unchanged from prior.       XR Chest Port 1 View    Impression    IMPRESSION: Left chest cardiac device, sternotomy wires, CABG changes are similar. The lungs are hypoinflated with patchy bibasilar atelectasis/scarring. Cardiomegaly without overt pulmonary edema. Mild pulmonary vascular congestion. Degenerative change   of the bilateral shoulders.   Echocardiogram Complete   Result Value Ref Range    LVEF  45-50%        Lab Results:  Personally Reviewed.   Recent Labs   Lab 08/30/22  0441 08/26/22  0432 08/25/22  0944   WBC 3.3* 4.7 4.1   HGB 10.6* 10.6* 11.4*   HCT 33.7* 33.8* 35.5   * 189 233     Recent Labs   Lab 08/30/22  0441 08/29/22  0436 08/28/22  0905    140 143   CO2 42* 43* 41*   BUN 43* 40* 34*     No results for input(s): INR in the last 168 hours.    I reviewed all labs and imaging studies as of this date and I reviewed all current inpatient medications and updated them      Jackson Henao DO, MS  [facility] Service  Internal  Medicine

## 2022-08-30 NOTE — PROGRESS NOTES
SPIRITUAL HE[facility] SERVICES Progress Note  I met with pt this afternoon. She was very tearful. She shared that she did not like the idea of wearing the back brace for 3-6 months. She also shared with me that she misses her  who  last year. I asked her if she could have her way with what to do next in her life she said she wishes she could close her eyes and die peacefully in her sleep. As I listened to her I thought this and told her that I would talk with the team about possibly having the Palliative Care team join her team to help her clarify her goals of care to help her be the most comfortable. She was okay with me bringing this up with her nurse.     Following my visit I did speak with ARMANDO Jeter about placing a Palliative consult to help Veronica feel more comfortable with the plan of care.       German Rolon  Associate

## 2022-08-30 NOTE — PROGRESS NOTES
Orthopedic Progress Note      Assessment:    1. T7 compression fracture with 50-60% height loss and pain beginning 8/8/22 without injury  2. Chronic T1 compression fracture- unchanged from prior imaging study  3. Pacemaker, on Xarelto  4. Allergy to many opiates including tramadol, oxycodone, Vicodin and codeine, but tolerates morphine    Plan:   - Orthotist to place TLSO today.  - Continue PT/OT.  - Weightbearing status: WBAT, Wear TLSO brace at all times x6-8 weeks.  - Continue pain control. On Tylenol & aspirin.  - Discharge planning: Follow up with Dr. Gan in a couple weeks. Ortho discharge orders placed. Will sign-off, unless called.    Subjective:  Pain: back pain controlled on Tylenol & aspirin  Chest pain, SOB: none  Nausea, Vomiting:  no  Neuro:  Patient denies new onset numbness or paresthesias in bilateral lower extremities    Patient states that orthotist came and measured her yesterday for her TLSO. Back pain controlled. No new concerns from ortho standpoint.    Objective:  /60 (BP Location: Left arm)   Pulse 90   Temp 97.7  F (36.5  C) (Oral)   Resp 18   Wt 64 kg (141 lb 1.6 oz)   SpO2 97%   BMI 26.66 kg/m    General: On examination, the patient is resting comfortably in chair at bedside, NAD, awake and alert and oriented to person, place and time  Pulses:  Palpable bilateral dorsalis pedis pulses  Sensation: SILT in L2-S1 dermatomes in bilateral lower extremities  ROM: Intact hip flexion, knee extension, ankle PF/DF, moves all toes bilaterally  Motor: +5/5 hip flexion, knee extension, ankle PF/DF, EHL bilaterally  Straight leg raise without tenderness bilaterally     Pertinent Labs   Lab Results: personally reviewed.   Lab Results   Component Value Date    INR 1.09 10/28/2021    INR 1.89 (H) 10/07/2021    INR 1.98 (H) 09/30/2021     Lab Results   Component Value Date    WBC 3.3 (L) 08/30/2022    HGB 10.6 (L) 08/30/2022    HCT 33.7 (L) 08/30/2022    MCV 98 08/30/2022     (L)  08/30/2022     Lab Results   Component Value Date     08/30/2022    CO2 42 (HH) 08/30/2022     Report completed by:  Ruby Menjivar PA-C  [facility]    Date: 8/30/2022  Time: 12:00 PM

## 2022-08-30 NOTE — PROGRESS NOTES
Assessment/Plan:  1.  Acute on chronic congestive heart failure with preserved ejection fraction: The patient states that her shortness of breath is improved.  She lost 9 pounds since admission.  Echo was reported elevated pulmonary artery systolic pressure and elevated left ventricular filling pressure.  Her creatinine is elevated to 1.58 this morning.  Lasix IV was held.  She still has crackles in both lung bases.  Restart Lasix when her creatinine is improved.    2.  Atrial fibrillation with RVR: The patient's ventricular rate is much better controlled after metoprolol was increased to 37.5 mg twice a day.  Continue Xarelto for chronic anticoagulation.    3.  Coronary artery disease s/p LIMA to LAD, SVG to D1: No chest pain.  Troponins normal.  Echocardiogram was reported normal LV function, no regional wall motion abnormality.    4.  Benign essential hypertension: Her blood pressure is in normal range.    5.  Acute renal injury: May related to diuresis.  Monitor creatinine level.    Subjective:  Interval History:  Has no complaints of chest pain.  Improved shortness of breath.  No palpitations.    Current Medications:  Scheduled Meds:    aspirin  81 mg Oral Every Other Day     cetirizine  10 mg Oral Daily     ferrous sulfate  325 mg Oral Daily     [Held by provider] furosemide  40 mg Intravenous Q12H     Lidocaine  1 patch Transdermal Q24H     lidocaine   Transdermal Q24H     metoprolol tartrate  37.5 mg Oral BID     multivitamin  with lutein  1 capsule Oral BID     rivaroxaban ANTICOAGULANT  15 mg Oral Daily with supper     sodium chloride (PF)  3 mL Intracatheter Q8H     Vitamin D3  25 mcg Oral Daily     Continuous Infusions:    - MEDICATION INSTRUCTIONS -       PRN Meds:.acetaminophen **OR** acetaminophen, lidocaine 4%, lidocaine (buffered or not buffered), melatonin, morphine, naloxone **OR** naloxone **OR** naloxone **OR** naloxone, ondansetron, - MEDICATION INSTRUCTIONS -, sodium chloride  (PF)    Objective:   Vital signs in last 24 hours:  Temp:  [97.4  F (36.3  C)-98.4  F (36.9  C)] 97.7  F (36.5  C)  Pulse:  [71-97] 90  Resp:  [18-20] 18  BP: (103-121)/(53-66) 112/60  SpO2:  [92 %-98 %] 97 %  Weight:   [unfilled]     Physical Exam:  General Appearance:   Awake, Alert, No acute distress.   HEENT:  No scleral icterus; the mucous membranes were moist.   Neck: No cervical bruits.  Positive jugular venous distention   Lungs:    Bibasilar crackles. No wheezing.   Cardiovascular:   IRRR, normal first and second heart sounds with no murmurs. No rubs or gallops.     Abdomen:  Soft. No tenderness. Bowels sounds are present   Extremities: No leg edema. Equal posterior tibial pulsese.   Skin: Warm, Dry. No rashes.   Neurologic: Mood and affect are appropriate. No focal deficits.         Cardiographics:   Report: personally reviewed. .      Tele monitoring -atrial fibrillation with controlled ventricular rate    Echocardiogram on August 28, 2022:  The left ventricle is normal in size.  There is severe eccentric left ventricular hypertrophy.  There is mild global hypokinesia of the left ventricle.  No regional wall motion abnormalities noted.  TAPSE is abnormal, which is consistent with abnormal right ventricular  systolic function.  The left atrium is severely dilated.  There is moderate (2+) mitral regurgitation.  There is mild mitral stenosis.  The right ventricular systolic pressure is approximated at 41mmHg plus the  right atrial pressure.  There is moderate (2+) tricuspid regurgitation.  Mild valvular aortic stenosis.  Right ventricular diastolic pressure is approximated at 7mmHg plus the right  atrial pressure.  Compared to echo from 5/9/21 the LV septal thickness is more prominent.    Lab Results:   personally reviewed.     Lab Results   Component Value Date     08/29/2022     03/28/2021    CO2 43 08/29/2022    CO2 27 03/28/2021    BUN 40 08/29/2022    BUN 19 03/28/2021     Lab Results    Component Value Date    TROPONINI 0.04 08/25/2022     Lab Results   Component Value Date    WBC 4.7 08/26/2022    WBC 3.1 03/27/2021    HGB 10.6 08/26/2022    HGB 12.8 03/27/2021    HCT 33.8 08/26/2022    HCT 41.7 03/27/2021    MCV 98 08/26/2022    MCV 97 03/27/2021     08/26/2022     03/27/2021     Lab Results   Component Value Date    CHOL 159 07/17/2020    TRIG 90 07/17/2020    HDL 68 07/17/2020    LDL 73 07/17/2020

## 2022-08-31 NOTE — PROGRESS NOTES
[facility]    Medicine Progress Note - Hospitalist Service    Date of Admission:  8/25/2022    Assessment & Plan            89-year-old female with history of atrial flutter, coronary disease, cardiomyopathy, heart failure with preserved ejection fraction, hyperlipidemia, hypertension, left bundle branch block, mitral regurgitation, sleep apnea, stroke, supraventricular tachycardia, tobacco abuse who presented with acute on chronic diastolic congestive heart failure.  Developed acute renal insufficiency due to IV diuretic effects.  Orthopedics ordered TLSO brace and recommending conservative management of chronic thoracic compression fractures. Cardiology is following.       Acute heart failure preserved ejection fraction exacerbation  Acute respiratory failure with hypoxia  Continue metoprolol 37.5 mg twice daily  Had been on furosemide 40 mg IV every 12 hours but then held due to increased Cr; able to resume at lower dose today, 20mg IV q8h  Monitor I/Os  Continue cardiac monitoring.  I-S encourage use, continue supplemental oxygen.   Attempt to wean off of oxygen as tolerated.   Appreciate cardiology recommendations.  Palliative Care consult pending     Atrial fibrillation with RVR  History of AV block requiring pacemaker placement.  Continue metoprolol 37.5 mg twice daily.  Continue rivaroxaban 15 mg daily    Constipation  Added senna, colace  Prn dulcolax suppository, tap water enema to assist with constipation  Would avoid Miralax given diuresis and decreased PO intake     Acute renal insufficiency unlikely acute kidney injury.  Metabolic alkalosis  This is likely secondary to overdiuresis.  Patient with evidence of contraction alkalosis.  Diuretics had been held with some improvement in labs; resume at lower dose as above  Cont to monitor     Normocytic anemia  No clinical evidence for bleeding.  Continue anticoagulation as above.     Thoracic compression fracture  Chronic pain  MRI  with chronic T1, T7 compression fractures.  Neurologically intact on exam.  TLSO brace fitting.  Continue home PTA medication: Morphine MS IR 15 mg every 8 hours as needed.  Follow up with non-operative orthopedics, Dr. Gan.        Diet: Combination Diet Low Saturated Fat Na <2400mg Diet, No Caffeine Diet  Room Service    DVT Prophylaxis: DOAC  Kelly Catheter: Not present  Central Lines: None  Cardiac Monitoring: None  Code Status: No CPR- Do NOT Intubate      Disposition Plan      Expected Discharge Date: 09/01/2022    Discharge Delays: IV Medication - consider oral or Home Infusion  Oxygen Needs - Arrange Home O2  Destination: assisted living;home with help/services (return to [facility] and resume Home PT/OT)  Discharge Comments: IV Lasix; 2L/NC        The patient's care was discussed with the Bedside Nurse, Care Coordinator/ and Patient.    Sonja Young MD  Hospitalist Service  [facility]  Securely message with the Vocera Web Console (learn more here)  Text page via [facility] Paging/Directory         Clinically Significant Risk Factors Present on Admission                      ______________________________________________________________________    Interval History   Pt reports feeling very constipated. Currently in the bathroom. Has had N/V earlier. Thinks her breathing may be a little better.    Data reviewed today: I reviewed all medications, new labs and imaging results over the last 24 hours. I personally reviewed no images or EKG's today.    Physical Exam   Vital Signs: Temp: 97.5  F (36.4  C) Temp src: Oral BP: 136/83 Pulse: 85   Resp: 20 SpO2: 97 % O2 Device: Nasal cannula Oxygen Delivery: 2 LPM  Weight: 143 lbs 0 oz  Constitutional: WDWN sitting on the toilet in mild distress due to pain  HEENT: NC/AT, no scleral icterus, nl conjunctiva, moist oral mucosa  Respiratory: good inspiratory effort, breathing unlabored, not auscultated due to being in the  bathroom  Cardiovascular: not examined due to being in the bathroom  GI: nondistended  Skin: no rashes, warm, dry  Neuro: CN 2-12 grossly intact  MSK:  able to move all 4 exts  Psych: nl mood/affect/judgment.      Data   Recent Labs   Lab 08/31/22  0428 08/30/22  0441 08/29/22  0751 08/29/22  0436 08/27/22  0843 08/26/22  0432 08/25/22  0944   WBC  --  3.3*  --   --   --  4.7 4.1   HGB  --  10.6*  --   --   --  10.6* 11.4*   MCV  --  98  --   --   --  98 97   PLT  --  144*  --   --   --  189 233    137  --  140   < > 139 142   POTASSIUM 3.5 3.6  --  4.7   < > 3.7 3.9   CHLORIDE 88* 88*  --  91*   < > 101 101   CO2 40* 42*  --  43*   < > 27 27   BUN 42* 43*  --  40*   < > 56* 65*   CR 1.39* 1.58*  --  1.57*   < > 1.20* 1.61*   ANIONGAP 9 7  --  6   < > 11 14   BARRETT 8.7 8.9  --  9.0   < > 8.3* 9.2    107 107* 103   < > 89 125    < > = values in this interval not displayed.     No results found for this or any previous visit (from the past 24 hour(s)).  Medications     - MEDICATION INSTRUCTIONS -         aspirin  81 mg Oral Every Other Day     cetirizine  10 mg Oral Daily     ferrous sulfate  325 mg Oral Daily     furosemide  20 mg Intravenous Q8H     Lidocaine  1 patch Transdermal Q24H     lidocaine   Transdermal Q24H     metoprolol tartrate  37.5 mg Oral BID     multivitamin  with lutein  1 capsule Oral BID     rivaroxaban ANTICOAGULANT  15 mg Oral Daily with supper     sodium chloride (PF)  3 mL Intracatheter Q8H     Vitamin D3  25 mcg Oral Daily

## 2022-08-31 NOTE — PLAN OF CARE
Problem: Plan of Care - These are the overarching goals to be used throughout the patient stay.    Goal: Optimal Comfort and Wellbeing  Outcome: Ongoing, Progressing  Intervention: Monitor Pain and Promote Comfort  Recent Flowsheet Documentation  Taken 8/30/2022 [id number] by Vanessa Ayala, RN  Pain Management Interventions: medication (see MAR)     Problem: Adjustment to Illness (Heart Failure)  Goal: Optimal Coping  Outcome: Ongoing, Progressing   Goal Outcome Evaluation:             Pt tearful at times tonight.  Feels scared at night and missing her .  Pt c/o discomfort in back.  Adamantly refusing to wear her TLSO brace at all.  States she will Never wear it again.  Medicated at HS with Tylenol and Melatonin.  Pt up and down in the room from her chair to bed.  Pt noted to have either a hemmorhoid or some rectal prolaspe when up to BR.  Some scant bloody drainage noted in her pad.

## 2022-08-31 NOTE — PROGRESS NOTES
Assessment/Plan:  1.  Acute on chronic congestive heart failure with preserved ejection fraction: The patient states that her shortness of breath is improved.  Her creatinine is improved this morning.  Restart Lasix iv diuresis 20 Q8H, monitor weight, creatine and symptoms.    2.  Atrial fibrillation with RVR: The patient's ventricular rate is controlled after metoprolol was increased to 37.5 mg twice a day.  Continue Xarelto for chronic anticoagulation.    3.  Coronary artery disease s/p LIMA to LAD, SVG to D1: No chest pain.  Troponins normal.  Echocardiogram was reported normal LV function, no regional wall motion abnormality.    4.  Benign essential hypertension: Her blood pressure is in normal range.    5.  Acute renal injury: May related to diuresis.  Monitor creatinine level.    Subjective:  Interval History:  Has no complaints of chest pain.  Improved shortness of breath.  No palpitations.    Current Medications:  Scheduled Meds:    aspirin  81 mg Oral Every Other Day     cetirizine  10 mg Oral Daily     ferrous sulfate  325 mg Oral Daily     furosemide  20 mg Intravenous Q8H     Lidocaine  1 patch Transdermal Q24H     lidocaine   Transdermal Q24H     metoprolol tartrate  37.5 mg Oral BID     multivitamin  with lutein  1 capsule Oral BID     rivaroxaban ANTICOAGULANT  15 mg Oral Daily with supper     sodium chloride (PF)  3 mL Intracatheter Q8H     Vitamin D3  25 mcg Oral Daily     Continuous Infusions:    - MEDICATION INSTRUCTIONS -       PRN Meds:.acetaminophen **OR** acetaminophen, lidocaine 4%, lidocaine (buffered or not buffered), LORazepam, melatonin, morphine, naloxone **OR** naloxone **OR** naloxone **OR** naloxone, ondansetron, - MEDICATION INSTRUCTIONS -, sodium chloride (PF)    Objective:   Vital signs in last 24 hours:  Temp:  [97.5  F (36.4  C)-97.9  F (36.6  C)] 97.7  F (36.5  C)  Pulse:  [70-88] 82  Resp:  [16-20] 16  BP: (110-131)/(52-58) 124/55  SpO2:  [90 %-97 %] 95 %  Weight:    [unfilled]     Physical Exam:  General Appearance:   Awake, Alert, No acute distress.   HEENT:  No scleral icterus; the mucous membranes were moist.   Neck: No cervical bruits.  Positive jugular venous distention   Lungs:    Bibasilar crackles. No wheezing.   Cardiovascular:   IRRR, normal first and second heart sounds with no murmurs. No rubs or gallops.     Abdomen:  Soft. No tenderness. Bowels sounds are present   Extremities: No leg edema. Equal posterior tibial pulsese.   Skin: Warm, Dry. No rashes.   Neurologic: Mood and affect are appropriate. No focal deficits.         Cardiographics:   Report: personally reviewed. .      Tele monitoring -atrial fibrillation with controlled ventricular rate    Echocardiogram on August 28, 2022:  The left ventricle is normal in size.  There is severe eccentric left ventricular hypertrophy.  There is mild global hypokinesia of the left ventricle.  No regional wall motion abnormalities noted.  TAPSE is abnormal, which is consistent with abnormal right ventricular  systolic function.  The left atrium is severely dilated.  There is moderate (2+) mitral regurgitation.  There is mild mitral stenosis.  The right ventricular systolic pressure is approximated at 41mmHg plus the  right atrial pressure.  There is moderate (2+) tricuspid regurgitation.  Mild valvular aortic stenosis.  Right ventricular diastolic pressure is approximated at 7mmHg plus the right  atrial pressure.  Compared to echo from 5/9/21 the LV septal thickness is more prominent.    Lab Results:   personally reviewed.     Lab Results   Component Value Date     08/29/2022     03/28/2021    CO2 43 08/29/2022    CO2 27 03/28/2021    BUN 40 08/29/2022    BUN 19 03/28/2021     Lab Results   Component Value Date    TROPONINI 0.04 08/25/2022     Lab Results   Component Value Date    WBC 4.7 08/26/2022    WBC 3.1 03/27/2021    HGB 10.6 08/26/2022    HGB 12.8 03/27/2021    HCT 33.8 08/26/2022    HCT 41.7  03/27/2021    MCV 98 08/26/2022    MCV 97 03/27/2021     08/26/2022     03/27/2021     Lab Results   Component Value Date    CHOL 159 07/17/2020    TRIG 90 07/17/2020    HDL 68 07/17/2020    LDL 73 07/17/2020

## 2022-08-31 NOTE — PROGRESS NOTES
Hospitalist follow up note:    Contacted by RN regarding anxiety, tearfulness. Requesting something mild for anxiety. Ordered 0.25mg ativan po prn.     David Wills DO   Pager #: [phone]

## 2022-08-31 NOTE — CONSULTS
"[facility]  Palliative Care Consultation Note    Patient: Veronica Bray  Date of Admission:  8/25/2022     Requesting Clinician / Team: Jackson Henao  Reason for consult: Goals of care    Impression & Recommendations:  1. Goals of care:Veronica has little tolerance for medical intervention with life prolonging goal.  She is interested in shifting the focus of her medical care towards comfort and no further hospitalization.  She is not asking to die, but feels unwilling to go through more invasive or uncomfortable medical care for the chance of more time, or with goal of living longer.   Veronica would like assistance discussing her wishes with her family.  She is fearful of letting them down or causing there grief.  Will assist n care conferencing.    2. Care Planning:  Social Work consulted.  I am unclear that Veronica would be a candidate for hospice which would be consistent with her goals as I cannot identify an end-stage illness.  For hospice with heart failure patient must be NYHA Class IV (Unable to carry on any physical activity without symptoms; Symptoms are present even at rest; If any physical activity is undertaken, symptoms are increased.)   It is possible hospice would take her with aligned goals, advanced age, multiple comorbid conditions and recent acute care stay however she may become to stable and need to sign off.  Veronica requests \"[facility]?\" hospice be consulted to review her eligibility.  If she is uneligible, she can be connected with this hospice and enroll with next acute illness.    3. Advance care planning:    DNR/DNI  Plan to complete POLST prior to discharge with comfort care goals.    4. Grief   R/t death of  of 71 years in July 2021.  Appreciate  support.  Request follow-up with Palliative LICSW.    5. Constipation-   No BM since admission.    Scheduled senna-S and Miralax.  Suppository of enema PRN, encouraged.    6. Nausea-   May be related to " constipation.  Continue PRN zofran.         Thank you for the opportunity to participate in the care of this patient and family. Our team: will continue to follow.     During regular M-F work hours -- if you are not sure who specifically to contact -- please contact us by calling us directly at the Palliative Care Main Line [phone]    After regular work hours and on weekends/holidays, you can leave a message at [phone]      Assessments:  Veronica Bray is a 89 year old female, admitted on 8/25 with SOB and chest pain. She is treated for decompensated heart failure with preserved EF. Acute hypoxic respiratory failure, Afib with RVR, Acute renal insufficiency thought related to overdiuresis.  This is in the setting of Arthritis, Asthma, Atrial flutter (H), CAD (coronary artery disease), Cardiomyopathy, unspecified (H), CHF (congestive heart failure) (H), Hyperlipidemia, Hypertension, Hypertension, LBBB (left bundle branch block), Mitral regurgitation, NSTEMI (non-ST elevated myocardial infarction) (H), PAF (paroxysmal atrial fibrillation) (H), Sleep apnea, Stroke (H), SVT (supraventricular tachycardia) (H), Thrombocytopenia, unspecified (H) (7/13/2017),Tobacco use and recent T7 compression fracture with 50-60% height loss and pain beginning 8/8/22 without injury  It has been a year since her last hospitalzation. She has had no recent weight loss and has been stable in function living in assisted living facility.    Today, the patient was seen for:  Goals of care    Prognosis, Goals, & Planning:      Functional Status just prior to hospitalization: 3 (Capable of only limited self-care; needs help with ADLs; in bed/chair >50% of waking hours)      Prognosis, Goals, and/or Advance Care Planning were addressed today: Yes        Summary/Comments: Veronica has little tolerance for medical intervention with life prolonging goal.  She is interested in shifting the focus of her medical care towards comfort and no  "further hospitalization.  Veronica's  of 71 years dies in 2021, she grieving, she notes she always thought she would \"go before him\" never having to live without him. Veronica is uninterested in wearing her TLSO brace which is recommended for pain management and promote healing.   Veronica tells me she had a wonderful experience with \"[facility]\" hospice when her  .      While Veronica has multiple comorbid conditions, I do not believe she has an end-stage disease that would qualify her for hospice.  She is ambulatory at home, although limited, without shortness of breath. For hospice patient must be NYHA Class IV (Unable to carry on any physical activity without symptoms; Symptoms are present even at rest; If any physical activity is undertaken, symptoms are increased.)  It is possible hospice would take her with aligned goals, advanced age, multiple comorbid conditions and recent acute care stay however she may become to stable and need to sign off.  We discussed this and if she does not qualify completing a POLST to relay her Comfort Care wishes, hospice could be considered then with next acute illness.      Patient's decision making preferences: independently    Veronica is most concerned about being a burden to her family.  She does not want to cause them grief and requests we meet to discuss her wishes with them.          Patient has decision-making capacity today for complex decisions: Yes            I have concerns about the patient/family's health literacy today: No           Patient has a completed Health Care Directive: Yes, and on file.      Code status: No CPR / No Intubation    Coping, Meaning, & Spirituality:   Mood, coping, and/or meaning in the context of serious illness were addressed today: Yes  Summary/Comments: Veronica has always felt she would die well before this, Many of her family  at a young age and she expected to not outlive her  of 71 years.  She finds nitin in her " "family.    Social:     Living situation: [facility]    Ngo family / caregivers: [facility] staff, Grandson, Son Devika and daughter Dale live locally    Current in-home services: [facility] services    History of Present Illness:  History gathered today from: patient, medical chart    Veronica's dyspnea has improved/resolved since admission.  She has not had pain x multiple days despite being unable to tolerate and unwilling to continue wearing TLSO.  She notes no BM since admission other than one \"hard ball\" 2 days ago.    Veronica is grieving the death of her  of 71years in July 2021.   She reflects on a long and good life and a loving family.  She is not asking to die, but feels unwilling to go through more invasive or uncomfortable medical care for the chance of more time, or with goal of living longer.  She has refused TLSO.  She would like to make a care plan that does not include rehospitalization but transition to hospice when appropriate.    Key Palliative Symptom Data:  # Pain severity the last 12 hours: none  # Dyspnea severity the last 12 hours: low  # Nausea severity the last 12 hours: moderate  # Anxiety severity the last 12 hours: low    Patient is on opioids: assessed and I made recommendations about bowel care as above.   Feels constipated, pain in anus with need to defecate but unable, abdominal cramping at times.    ROS:  Comprehensive ROS is reviewed and is negative except as here & per HPI    She was taking Morphine at home for back pain, she reports she did not tolerate well.  Would rather continue regular tylenol if possible.    Appretite is poor but not concerning to her.    Nausea is multiple times a day, resolved with IV medication.    Denies chronic insomnia, but difficulty sleeping in current bed.         Past Medical History:  Past Medical History:   Diagnosis Date     Arthritis      Asthma      Atrial flutter (H)      CAD (coronary artery disease)     LIMA graft to the LAD and vein graft to the diagonal " are patent.      Cardiomyopathy, unspecified (H)     tachy induced     CHF (congestive heart failure) (H)      Hyperlipidemia      Hypertension      Hypertension      LBBB (left bundle branch block)      Mitral regurgitation      NSTEMI (non-ST elevated myocardial infarction) (H)      PAF (paroxysmal atrial fibrillation) (H)     converted on Amiodarone     Sleep apnea     CPAP     Stroke (H)     TIA     SVT (supraventricular tachycardia) (H)      Thrombocytopenia, unspecified (H) 7/13/2017     Tobacco use         Past Surgical History:  Past Surgical History:   Procedure Laterality Date     ABDOMEN SURGERY      gallbladder     CARDIAC SURGERY       CHOLECYSTECTOMY       CORONARY ANGIOGRAPHY ADULT ORDER  1/2005    Stent: Distal cfx, Mid LAD, Stent: D -1     GYN SURGERY      hysterectomy     HC LEFT HEART CATHETERIZATION  11/2006    mid distal-anterior/ distal inferior/ Apical Yossi, 85     HC LEFT HEART CATHETERIZATION  5/2015    100% Proximal LAD, patent LIMA-LAD, SVG-D1, LVEDP 13, 10-18 mmHg AV gradient, 3+ MR after PVC     HERNIA REPAIR       HYSTERECTOMY       JOINT REPLACEMENT      Knee hip     ORTHOPEDIC SURGERY      hip, knee, wrist     PICC MIDLINE INSERTION  5/6/2021          UT ESOPHAGOGASTRODUODENOSCOPY TRANSORAL DIAGNOSTIC N/A 6/26/2021    Procedure: ESOPHAGOGASTRODUODENOSCOPY (EGD);  Surgeon: Marc Wolff MD;  Location: [facility] OR;  Service: Gastroenterology     TONSILLECTOMY       VASCULAR SURGERY           Family History:  Family History   Problem Relation Age of Onset     Unknown/Adopted Mother      Myocardial Infarction Mother      Unknown/Adopted Father      Myocardial Infarction Brother         Rheumatic fever     Myocardial Infarction Brother      Myocardial Infarction Brother      Heart Disease Brother          Allergies:  Allergies   Allergen Reactions     Aleve [Naproxen] Anaphylaxis     Celecoxib Unknown     Codeine Unknown     Hydrocodone-Acetaminophen Unknown     Hydromorphone  Unknown     Meperidine Unknown     Oxycodone-Acetaminophen Unknown     Tramadol Unknown        Medications:  I have reviewed this patient's medication profile and medications from this hospitalization.   Noted:  Tylenol 500-1000mg Q 6 hours  Lidocaine PAtch PRN to back  Morphine 15mg PRN at home, no use here  tizanadine 4mg Q HS (home- none here)  Ativan 0.25mg x 1 overnight  Melatonin PRN  Zofran 4mg IV PRN used 1-2x daily      PERTINENT PHYSICAL EXAMINATION:  Vital Signs: Blood pressure 136/83, pulse 85, temperature 97.5  F (36.4  C), temperature source Oral, resp. rate 20, weight 64.9 kg (143 lb), SpO2 97 %, not currently breastfeeding.   GENERAL: Alert and oriented, sitting up in chair, no obvious distress.  SKIN: Warm and dry   HEENT: Normocephalic, anicteric sclera, moist mucous membranes  LUNGS: Clear to auscultation anterolaterally; non-labored   CARDIAC: RRR, normal s1/s2, w/o m/r/g   ABDOMINAL: BS (+), soft, non distended, non tender  MUSKL: no gross joint deformities   EXTREMITIES: No edema or cyanosis, pulses 2+ and symmetrical  NEUROLOGIC: moves all extremeties  PSYCH: conversant appropriately, teary when talking about her .    Data reviewed:  Recent imaging reviewed, my comments on pertinents:   EXAM: CT THORACIC SPINE W/O CONTRAST  LOCATION: [facility]  DATE/TIME: 8/26/2022 6:12 PM     INDICATION: T7 fracture  COMPARISON: X-ray 08/17/2022, CT 03/27/2021.  TECHNIQUE: Routine CT Thoracic Spine without IV contrast. Multiplanar reformats. Dose reduction techniques were used.      FINDINGS:  VERTEBRA: Severe compression fracture T7 vertebral body with 70% loss of height. No change from prior. Chronic wedge compression fracture T1 with 50% loss of height. Mild kyphosis. No fracture or posttraumatic subluxation. Osteopenic bones.     CANAL/FORAMINA: No high-grade canal or neural foraminal stenosis. Mild to moderate multilevel degenerative changes.     PARASPINAL: Moderate  cardiomegaly.                                                                      IMPRESSION:  1.  Chronic compression fracture T7 is unchanged.  2.  Chronic T1 compression fracture, unchanged from prior.    Procedure  Complete Echo Adult.  ______________________________________________________________________________  Interpretation Summary     The left ventricle is normal in size.  There is severe eccentric left ventricular hypertrophy.  There is mild global hypokinesia of the left ventricle.  No regional wall motion abnormalities noted.  TAPSE is abnormal, which is consistent with abnormal right ventricular  systolic function.  The left atrium is severely dilated.  There is moderate (2+) mitral regurgitation.  There is mild mitral stenosis.  The right ventricular systolic pressure is approximated at 41mmHg plus the  right atrial pressure.  There is moderate (2+) tricuspid regurgitation.  Mild valvular aortic stenosis.  Right ventricular diastolic pressure is approximated at 7mmHg plus the right  atrial pressure.  Compared to echo from 5/9/21 the LV septal thickness is more prominent.  ______________________________________________________________________________  Left Ventricle  The left ventricle is normal in size. There is severe eccentric left  ventricular hypertrophy. Diastolic Doppler findings (E/E' ratio and/or other  parameters) suggest left ventricular filling pressures are increased. The  visual ejection fraction is 45-50%. There is mild global hypokinesia of the  left ventricle. No regional wall motion abnormalities noted.     Right Ventricle  The right ventricle is not well visualized. TAPSE is abnormal, which is  consistent with abnormal right ventricular systolic function. There is a  pacemaker lead in the right ventricle.     Atria  The left atrium is severely dilated. Pacer wire in right atrium. The right  atrium is moderately dilated.     Mitral Valve  There is moderate mitral annular  calcification. There is moderate (2+) mitral  regurgitation. There is mild mitral stenosis.     Tricuspid Valve  Tricuspid valve leaflets appear normal. There is moderate (2+) tricuspid  regurgitation. The right ventricular systolic pressure is approximated at  41mmHg plus the right atrial pressure.     Aortic Valve  There is mild trileaflet aortic sclerosis. No aortic regurgitation is present.  Mild valvular aortic stenosis.     Pulmonic Valve  The pulmonic valve is not well visualized. There is mild (1+) pulmonic  valvular regurgitation. Right ventricular diastolic pressure is approximated  at 7mmHg plus the right atrial pressure.     Vessels  The aorta root is normal. Normal size ascending aorta.     Pericardium  There is no pericardial effusion.     Rhythm  The rhythm was paced.  ______________________________________________________________________________  MMode/2D Measurements & Calculations  IVSd: 2.0 cm  LVIDd: 3.3 cm  LVIDs: 2.5 cm  LVPWd: 1.2 cm  FS: 24.3 %  LV mass(C)d: 204.9 grams  LV mass(C)dI: 123.3 grams/m2  Ao root diam: 3.0 cm  LA dimension: 4.8 cm  asc Aorta Diam: 3.0 cm  LA/Ao: 1.6  LVOT diam: 1.5 cm  LVOT area: 1.9 cm2  LA Volume Indexed (AL/bp): 87.8 ml/m2  RWT: 0.71     Time Measurements  MM HR: 87.0 BPM     Doppler Measurements & Calculations  MV E max sunny: 136.3 cm/sec  MV max P.9 mmHg  MV mean PG: 3.7 mmHg  MV V2 VTI: 31.5 cm  MVA(VTI): 1.6 cm2  MV dec slope: 578.6 cm/sec2  MV dec time: 0.24 sec  Ao V2 max: 186.7 cm/sec  Ao max P.0 mmHg  Ao V2 mean: 142.5 cm/sec  Ao mean P.2 mmHg  Ao V2 VTI: 35.1 cm  ANGELIQUE(I,D): 1.4 cm2  ANGELIQUE(V,D): 1.6 cm2  LV V1 max P.9 mmHg  LV V1 max: 157.3 cm/sec  LV V1 VTI: 26.5 cm  SV(LVOT): 49.3 ml  SI(LVOT): 29.7 ml/m2  PI end-d sunny: 134.4 cm/sec  TR max sunny: 321.7 cm/sec  TR max P.4 mmHg  AV Sunny Ratio (DI): 0.84  ANGELIQUE Index (cm2/m2): 0.85  E/E': 29.0     E/E' av.6  Lateral E/e': 18.3  Medial E/e': 28.8  Peak E' Sunny: 4.7 cm/sec      ______________________________________________________________________________  Report approved by: Benjamin Pinzon 08/28/2022 03:38 PM       Recent lab data reviewed, my comments on pertinents:   Last Comprehensive Metabolic Panel:  Sodium   Date Value Ref Range Status   08/31/2022 137 136 - 145 mmol/L Final   03/28/2021 139 133 - 144 mmol/L Final     Potassium   Date Value Ref Range Status   08/31/2022 3.5 3.5 - 5.0 mmol/L Final   05/12/2021 3.8 3.5 - 5.0 mmol/L Final     Chloride   Date Value Ref Range Status   08/31/2022 88 (L) 98 - 107 mmol/L Final   03/28/2021 105 94 - 109 mmol/L Final     Carbon Dioxide   Date Value Ref Range Status   03/28/2021 27 20 - 32 mmol/L Final     Carbon Dioxide (CO2)   Date Value Ref Range Status   08/31/2022 40 (H) 22 - 31 mmol/L Final     Anion Gap   Date Value Ref Range Status   08/31/2022 9 5 - 18 mmol/L Final   03/28/2021 7 3 - 14 mmol/L Final     Glucose   Date Value Ref Range Status   08/31/2022 116 70 - 125 mg/dL Final   05/12/2021 103 70 - 125 mg/dL Final     Urea Nitrogen   Date Value Ref Range Status   08/31/2022 42 (H) 8 - 28 mg/dL Final   03/28/2021 19 7 - 30 mg/dL Final     Creatinine   Date Value Ref Range Status   08/31/2022 1.39 (H) 0.60 - 1.10 mg/dL Final   05/12/2021 0.73 0.60 - 1.10 mg/dL Final     GFR Estimate   Date Value Ref Range Status   08/31/2022 36 (L) >60 mL/min/1.73m2 Final     Comment:     Effective December 21, 2021 eGFRcr in adults is calculated using the 2021 CKD-EPI creatinine equation which includes age and gender (Nitza et al., NEJM, DOI: 10.1056/QKIXan6170090)   07/08/2021 42 (L) >60 mL/min/1.73m2 Final   05/12/2021 >60 >60 ml/min/1.73m2 Final     Calcium   Date Value Ref Range Status   08/31/2022 8.7 8.5 - 10.5 mg/dL Final   03/28/2021 8.3 (L) 8.5 - 10.1 mg/dL Final     CBC RESULTS: Recent Labs   Lab Test 08/30/22  0441   WBC 3.3*   RBC 3.45*   HGB 10.6*   HCT 33.7*   MCV 98   MCH 30.7   MCHC 31.5   RDW 13.7   *       TTS: I  have personally spent a total of 120 minutes today reviewing patient's medical record, consultation with the medical providers, assessing patient and symptoms and providing emotional support with more than 50% of this time spent in counseling, coordination of care  re: goals of care and symptom management.  FAce to face with patient from 1445- 1605 (80min)    Donna Oliveros APRN, CNS, CNP  [facility], Palliative Care  [phone]

## 2022-08-31 NOTE — PLAN OF CARE
"  Problem: Plan of Care - These are the overarching goals to be used throughout the patient stay.    Goal: Plan of Care Review/Shift Note  Description: The Plan of Care Review/Shift note should be completed every shift.  The Outcome Evaluation is a brief statement about your assessment that the patient is improving, declining, or no change.  This information will be displayed automatically on your shift note.  Outcome: Ongoing, Progressing  Goal: Patient-Specific Goal (Individualized)  Description: You can add care plan individualizations to a care plan. Examples of Individualization might be:  \"Parent requests to be called daily at 9am for status\", \"I have a hard time hearing out of my right ear\", or \"Do not touch me to wake me up as it startles me\".  Outcome: Ongoing, Progressing  Goal: Absence of Hospital-Acquired Illness or Injury  Outcome: Ongoing, Progressing  Intervention: Identify and Manage Fall Risk  Recent Flowsheet Documentation  Taken 8/31/2022 1200 by Diane Mtz RN  Safety Promotion/Fall Prevention:   fall prevention program maintained   activity supervised  Taken 8/31/2022 0800 by Diane Mtz RN  Safety Promotion/Fall Prevention:   fall prevention program maintained   activity supervised  Intervention: Prevent Skin Injury  Recent Flowsheet Documentation  Taken 8/31/2022 1200 by Diane Mtz RN  Body Position: position changed independently  Taken 8/31/2022 0935 by Diane Mtz RN  Body Position:   turned   supine  Taken 8/31/2022 0800 by Diane Mtz RN  Body Position: position changed independently  Intervention: Prevent and Manage VTE (Venous Thromboembolism) Risk  Recent Flowsheet Documentation  Taken 8/31/2022 1844 by Diane Mtz RN  Activity Management: ambulated to bathroom  Taken 8/31/2022 1800 by Diane Mtz RN  Activity Management: ambulated to bathroom  Taken 8/31/2022 1740 by Diane Mtz RN  Activity Management:   ambulated in room   ambulated to " bathroom  Taken 8/31/2022 1200 by Diane Mtz RN  Range of Motion: active ROM (range of motion) encouraged  Activity Management:   activity adjusted per tolerance   ambulated outside  Taken 8/31/2022 0800 by Diane Mtz RN  Range of Motion: active ROM (range of motion) encouraged  Activity Management:   activity adjusted per tolerance   ambulated outside  Goal: Optimal Comfort and Wellbeing  Outcome: Ongoing, Progressing  Intervention: Provide Person-Centered Care  Recent Flowsheet Documentation  Taken 8/31/2022 1200 by Diane Mtz RN  Trust Relationship/Rapport:   emotional support provided   empathic listening provided   reassurance provided  Taken 8/31/2022 1100 by Diane Mtz RN  Trust Relationship/Rapport:   emotional support provided   empathic listening provided   reassurance provided   thoughts/feelings acknowledged  Taken 8/31/2022 0800 by Diane Mtz RN  Trust Relationship/Rapport:   emotional support provided   empathic listening provided   reassurance provided  Goal: Readiness for Transition of Care  Outcome: Ongoing, Progressing     Problem: Risk for Delirium  Goal: Optimal Coping  Outcome: Ongoing, Progressing  Goal: Improved Behavioral Control  Outcome: Ongoing, Progressing  Goal: Improved Attention and Thought Clarity  Outcome: Ongoing, Progressing  Goal: Improved Sleep  Outcome: Ongoing, Progressing     Problem: Dysrhythmia  Goal: Normalized Cardiac Rhythm  Outcome: Ongoing, Progressing     Problem: Pain Acute  Goal: Acceptable Pain Control and Functional Ability  Outcome: Ongoing, Progressing     Problem: Adjustment to Illness (Heart Failure)  Goal: Optimal Coping  Outcome: Ongoing, Progressing   Goal Outcome Evaluation:      Pt remains in atrial fibrillation with a controlled rate. Pt denies SOB or chest pain. Pt is voiding. Pt had one episode of nausea, treated with ondansetron. Pt continues to have abdominal discomfort rated at a 5/10, and has not had a bowel movement in  two days. Enema, bisacodyl suppository, Miralax, and docusate given. Pt ambulating around room and has a good appetite. RN will continue to assess.     Diane Mtz RN on 8/31/2022 at 6:50 PM

## 2022-08-31 NOTE — PROGRESS NOTES
S: Veronica Bray was seen at [facility], Room 332-01, for F/D of a custom TLSO.    O:A: A Custom TLSO is medically necessary and recommended by Neurosurgery due to severity and instability of spinal fracture. Today I F/D a Custom [employer] TLSO with a Flex Foam liner and rigid thermoplastic external frame. Overall fit is good. Donning/doffing and care instructions provided to the patient. (Instructions are provided below for quick reference.)    G: The TLSO increases medial-lateral, rotational and anterior-posterior stability of the spine. The TLSO also applies compression to the patient s soft tissues. Compression of the patient s soft tissues serves to unload the injured vertebrae which reduces pain and promotes healing.    P: Pt. to be seen as needed.    Omar VAZQUEZ,JET

## 2022-09-01 NOTE — PROGRESS NOTES
Assessment/Plan:  1.  Acute on chronic congestive heart failure with preserved ejection fraction: The patient states that her shortness of breath is improved.  Her creatinine is improved this morning.  Continue Lasix iv diuresis 20 Q8H, monitor weight, creatine and symptoms.    2.  Atrial fibrillation with RVR: The patient's ventricular rate is controlled after metoprolol was increased to 37.5 mg twice a day.  Continue Xarelto for chronic anticoagulation.    3.  Coronary artery disease s/p LIMA to LAD, SVG to D1: No chest pain.  Troponins normal.  Echocardiogram was reported normal LV function, no regional wall motion abnormality.    4.  Benign essential hypertension: Her blood pressure is in normal range.    5.  Acute renal injury: Creatinine is improved to 1.23 this morning.    If she continues doing better, may consider to switch IV to oral diuretics tomorrow, possibly discharge on Saturday.    Subjective:  Interval History:  Has no complaints of chest pain.  Improved shortness of breath.  No palpitations.  She states that she feels better today.    Current Medications:  Scheduled Meds:    aspirin  81 mg Oral Every Other Day     cetirizine  10 mg Oral Daily     ferrous sulfate  325 mg Oral Daily     furosemide  20 mg Intravenous Q8H     Lidocaine  1 patch Transdermal Q24H     lidocaine   Transdermal Q24H     metoprolol tartrate  37.5 mg Oral BID     multivitamin  with lutein  1 capsule Oral BID     rivaroxaban ANTICOAGULANT  15 mg Oral Daily with supper     sennosides  1-2 tablet Oral At Bedtime     sodium chloride (PF)  3 mL Intracatheter Q8H     Vitamin D3  25 mcg Oral Daily     Continuous Infusions:    - MEDICATION INSTRUCTIONS -       PRN Meds:.acetaminophen **OR** acetaminophen, bisacodyl, bisacodyl, docusate sodium, lidocaine 4%, lidocaine (buffered or not buffered), LORazepam, melatonin, morphine, naloxone **OR** naloxone **OR** naloxone **OR** naloxone, ondansetron, - MEDICATION INSTRUCTIONS -, sodium  chloride (PF), sodium phosphate    Objective:   Vital signs in last 24 hours:  Temp:  [97.5  F (36.4  C)-97.9  F (36.6  C)] 97.5  F (36.4  C)  Pulse:  [] 96  Resp:  [16-22] 22  BP: ()/(42-83) 123/55  SpO2:  [89 %-97 %] 92 %  Weight:   [unfilled]     Physical Exam:  General Appearance:   Awake, Alert, No acute distress.   HEENT:  No scleral icterus; the mucous membranes were moist.   Neck: No cervical bruits.  Positive jugular venous distention   Lungs:   Less bibasilar crackles. No wheezing.   Cardiovascular:   IRRR, normal first and second heart sounds with no murmurs. No rubs or gallops.     Abdomen:  Soft. No tenderness. Bowels sounds are present   Extremities: No leg edema. Equal posterior tibial pulsese.   Skin: Warm, Dry. No rashes.   Neurologic: Mood and affect are appropriate. No focal deficits.         Cardiographics:   Report: personally reviewed. .      Tele monitoring -atrial fibrillation with controlled ventricular rate    Echocardiogram on August 28, 2022:  The left ventricle is normal in size.  There is severe eccentric left ventricular hypertrophy.  There is mild global hypokinesia of the left ventricle.  No regional wall motion abnormalities noted.  TAPSE is abnormal, which is consistent with abnormal right ventricular  systolic function.  The left atrium is severely dilated.  There is moderate (2+) mitral regurgitation.  There is mild mitral stenosis.  The right ventricular systolic pressure is approximated at 41mmHg plus the  right atrial pressure.  There is moderate (2+) tricuspid regurgitation.  Mild valvular aortic stenosis.  Right ventricular diastolic pressure is approximated at 7mmHg plus the right  atrial pressure.  Compared to echo from 5/9/21 the LV septal thickness is more prominent.    Lab Results:   personally reviewed.     Lab Results   Component Value Date     08/29/2022     03/28/2021    CO2 43 08/29/2022    CO2 27 03/28/2021    BUN 40 08/29/2022    BUN 19  03/28/2021     Lab Results   Component Value Date    TROPONINI 0.04 08/25/2022     Lab Results   Component Value Date    WBC 4.7 08/26/2022    WBC 3.1 03/27/2021    HGB 10.6 08/26/2022    HGB 12.8 03/27/2021    HCT 33.8 08/26/2022    HCT 41.7 03/27/2021    MCV 98 08/26/2022    MCV 97 03/27/2021     08/26/2022     03/27/2021     Lab Results   Component Value Date    CHOL 159 07/17/2020    TRIG 90 07/17/2020    HDL 68 07/17/2020    LDL 73 07/17/2020

## 2022-09-01 NOTE — PLAN OF CARE
Problem: Plan of Care - These are the overarching goals to be used throughout the patient stay.    Goal: Optimal Comfort and Wellbeing  Outcome: Ongoing, Progressing  Intervention: Provide Person-Centered Care  Recent Flowsheet Documentation  Taken 8/31/2022 2015 by Miya Rollins RN  Trust Relationship/Rapport:   emotional support provided   empathic listening provided   reassurance provided    Pt emotional and tearful. Reports abdomen discomfort from constipation. Taken to bathroom multiple times this shift. Pt was able to have a small bm. Will continue to monitor and intervene as needed.      Problem: Dysrhythmia  Goal: Normalized Cardiac Rhythm  Outcome: Ongoing, Progressing    Tele- A fib. Denies chest pain and N/V. Dyspnea on exertion. Lung sounds diminished. Up with assist 1. Merrick placed @ . Calls appropriately.

## 2022-09-01 NOTE — PROGRESS NOTES
"[facility]  Palliative Care Daily Progress Note    Met briefly with patient in room.  She shared that her goal is to die a natural death, does not want aggressively treat her medical conditions but instead focus on comfort until she passes.  She stated \"I have nothing to live for.\" and shared that she is having significant grief around the death of her  ( 1 year ago).  Also named several other family members that have passed \"quickly\" and hopes this is how she will go.  Does not want to be in and out of the hospital.    Veronica gave me permission to call her son Trae to discuss her wishes and possibly set up time to meet at the hospital.    Left message for Trae to call back.    Recommend completing POLST prior to discharge  Will follow up tomorrow.     NIKITA Otoole, GCNS-BC  Clinical Nurse Specialist  [facility]  [phone]    "

## 2022-09-01 NOTE — PLAN OF CARE
[id number]    Patient anxious about not having had a bowel movement after receiving interventions during the day. Reports rectal discomfort with attempting to stool. 2 large hemorrhoids visualized protruding from rectum when wiping patient after an attempt to stool during the night. Patient reporting inability to sleep due to frequent abdominal cramping and urge to stool and is requesting for sleep aid. PRN melatonin administered per patient request, then later PRN ativan given to patient per her request to aid with sleeping. Patient also provided with emotional support and warm blankets with effective results.     After 0000 assessment, sepsis BPA fired in Epic; Lactic resulted as 0.9. When doing serial BP's with sepsis protocol, BP noted to be low. Patient attempting to sleep/sleeping at time of serial BP assessment. Patient easily aroused from sleep, remained alert and oriented x4, and denies symptoms associated with low blood pressure. MD updated. Continuing to assess patient status and follow most appropriate plan of care.    Problem: Plan of Care - These are the overarching goals to be used throughout the patient stay.    Goal: Absence of Hospital-Acquired Illness or Injury  Outcome: Ongoing, Progressing  Intervention: Identify and Manage Fall Risk  Recent Flowsheet Documentation  Taken 9/1/2022 0000 by Dominga Martell, RN  Safety Promotion/Fall Prevention:   assistive device/personal items within reach   clutter free environment maintained   fall prevention program maintained   lighting adjusted   mobility aid in reach   nonskid shoes/slippers when out of bed   patient and family education   safety round/check completed   treat reversible contributory factors   treat underlying cause   supervised activity  Goal: Optimal Comfort and Wellbeing  Outcome: Ongoing, Progressing     Problem: Risk for Delirium  Goal: Improved Sleep  Outcome: Ongoing, Progressing     Problem: Pain Acute  Goal: Acceptable Pain Control  and Functional Ability  Intervention: Prevent or Manage Pain  Recent Flowsheet Documentation  Taken 9/1/2022 0000 by Dominga Martell, RN  Medication Review/Management: medications reviewed   Goal Outcome Evaluation:

## 2022-09-01 NOTE — PROGRESS NOTES
"CLINICAL NUTRITION SERVICES - ASSESSMENT NOTE     Nutrition Prescription    RECOMMENDATIONS FOR MDs/PROVIDERS TO ORDER:  None    Malnutrition Status:    Patient does not meet two of the established criteria necessary for diagnosing malnutrition    Recommendations already ordered by Registered Dietitian (RD):  Orange magicup at dinner    Future/Additional Recommendations:  Monitor intake     REASON FOR ASSESSMENT  Veronica Bray is a/an 89 year old female assessed by the dietitian for LOS    NUTRITION HISTORY  Patient states that she nurys not like the hospital food and that it has been very hard to eat a full meal while admitted d/t the food not being to her liking. She agreed to try orange magicup daily to supplement her nutrition.    CURRENT NUTRITION ORDERS  Diet: Low Saturated Fat/2400 mg Sodium, no caffeine  Intake/Tolerance: 25-75%    LABS  Labs reviewed    MEDICATIONS  Ferosul, lasix, lopressor, occuvite, senokot, vit D3    ANTHROPOMETRICS  Height: 5' 1\"  Most Recent Weight: 65.4 kg (144 lb 3.2 oz)    IBW: 47 kg  BMI: Overweight BMI 25-29.9  Weight History:   Wt Readings from Last 10 Encounters:   09/01/22 65.4 kg (144 lb 3.2 oz)   08/17/22 65.3 kg (144 lb)   08/09/22 65.8 kg (145 lb)   01/25/22 63.5 kg (140 lb)   10/26/21 63.3 kg (139 lb 8 oz)   09/22/21 62.6 kg (138 lb 1.6 oz)   09/13/21 63 kg (139 lb)   07/02/21 67.2 kg (148 lb 1.6 oz)   07/01/21 68.5 kg (151 lb)   06/30/21 70.4 kg (155 lb 3.2 oz)   Dosing Weight: 47 kg IBW    ASSESSED NUTRITION NEEDS  Estimated Energy Needs: 6587-4118 kcals/day (25 - 30 kcals/kg)  Justification: Maintenance  Estimated Protein Needs: 38-47 grams protein/day (0.8 - 1 grams of pro/kg)  Justification: Maintenance  Estimated Fluid Needs:  (1 mL/kcal)   Justification: Maintenance    MALNUTRITION  % Intake: Decreased intake does not meet criteria  % Weight Loss: None noted  Subcutaneous Fat Loss: None observed  Muscle Loss: None observed  Fluid Accumulation/Edema: Does not meet " criteria - trace  Malnutrition Diagnosis: Patient does not meet two of the established criteria necessary for diagnosing malnutrition    NUTRITION DIAGNOSIS  Inadequate oral intake related to food preferences as evidenced by patient disliking the hospital food    INTERVENTIONS  Implementation  Medical food supplement therapy - Orange magicup at dinner    Goals  Patient to consume % of nutritionally adequate meal trays TID, or the equivalent with supplements/snacks.     Monitoring/Evaluation  Progress toward goals will be monitored and evaluated per protocol.  Evie Mejia RDN, LD

## 2022-09-01 NOTE — PROGRESS NOTES
"[facility]  Social Work Note    Summary of visit  Met with Veronica to introduce self and role on Palliative Care team. She was resting initially, but then engaged in conversation. She feels supported by her kids and grand kids. She enjoys where she lives ([place]) and it is important for her to \"get back there.\" She was involved in card games and balloon volleyball prior to hospitalization. She recognizes that she won't be able to participate in ways she once did and that is hard for her. Quality of life and the least amount of medical intervention is very important to her. She has started to talk with her kids about this, but not in great detail. When it is her time she is ready \"to go and be with her .\" They were  71 years and he  last summer. She misses him terribly.    She was closing her eyes on and off and acknowledged being very tired. Shortened visit so she could rest. She welcomes continued support.    Collaborated with [facility].     Interventions Provided  Build trust/rapport  Active/empathetic listening  Assess mood and coping   Assist with processing grief/emotions  Emotional support     Plan  PC SW will continue to follow for psychosocial/emotional support.     Jazlyn Hendrickson, [facility]  Palliative Care   Office # [phone]  "

## 2022-09-02 NOTE — SIGNIFICANT EVENT
Significant Event Note    Time of event: 8:46 PM September 1, 2022    Description of event:  Per RN, patient has frothy emesis and abdominal pain - both new.     Vital stable. Tachycardia in 100-110s with afib being treated. MAP of 93, SBP in 130s.     Abdomen soft, not tender, not rigid. She endorses abdominal pain but not to palpation. Bowel sounds hypoactive. Lungs without rhonchi but with bibasilar rales posteriorly.      Plan:  Suspect ileus or SBO    -NPO  -Pain control with heating pad and topical menthol and/or sarna  -Plain film XRAY flat and upright  -Discontinued all bowel meds (suppository, enema, senna, colace, etc).   -Discussed case with nocturnist and to follow-up XRAY findings  -Discussed case with RN and unit charge RN    Discussed with: bedside nurse, on-call team, nocturnist and unit charge RN      Reginald Joyner MD  Internal Medicine  [facility]  Phone: #[phone]

## 2022-09-02 NOTE — PROVIDER NOTIFICATION
Paged on-call MD regarding severe abdominal pain, emesis. Requested pain control, imaging and anti-emetic.     MD stated she could not help through the phone and requested I page the in house MD to assess patient. Paged Dr Joyner.

## 2022-09-02 NOTE — PROGRESS NOTES
"Patient states, \"I feel much better today.\" Uneventful day. BP's are on the softer side (90's/50's); asymptomatic. [facility] is aware. New parameters on metoprolol and lasix.   "

## 2022-09-02 NOTE — PLAN OF CARE
Problem: Dysrhythmia  Goal: Normalized Cardiac Rhythm  Outcome: Ongoing, Not Progressing     Problem: Pain Acute  Goal: Acceptable Pain Control and Functional Ability  Outcome: Ongoing, Not Progressing  Intervention: Develop Pain Management Plan  Recent Flowsheet Documentation  Taken 9/1/2022 2000 by Johnny Saini RN  Pain Management Interventions: medication (see MAR)    Pt had a tough shift, c/o severe abdominal cramping majority of shift. Looking at MAR, pt has received several laxatives/bowel meds since yesterday at this time. She is experiencing severe cramping, frequent stools & severe pain to the hemorrhoid. MD paged, stat XR ordered & pt made NPO at this time. Preperation H applied to hemorrhoid, zofran given for nausea, tylenol given for pain. Tele showing A fib, tachycardic especially w/ movement & painful moments. K protocol inititated this shift. Replacement administered, recheck of 3.6 at 2200. Reporting some LAINEZ on 1L NC. Continue to monitor activity, pain & output.

## 2022-09-02 NOTE — PROGRESS NOTES
Pt HR has been more consistently greater than 120 this hour.  Spoke with Dr. Wills.  Ordered to give metoprolol AM dose early at this time.  Also asked Dr. Wills about need for CT scan.  Dr. Wills states [facility] MD to decide on CT scan.

## 2022-09-02 NOTE — PROGRESS NOTES
Care Management Follow Up    Length of Stay (days): 8    Expected Discharge Date: 09/03/2022     Concerns to be Addressed: discharge planning, care progression      Patient plan of care discussed at interdisciplinary rounds: No    Anticipated Discharge Disposition: Assisted Living, Home Care (return to [facility] and resume Home PT/OT)  Disposition Comments: Hopeful to return to [facility] and resume home PT/OT. Family will transport.  Anticipated Discharge Services:  (return to [facility] and resume Home PT/OT)  Anticipated Discharge DME:  (TBD)    Patient/family educated on Medicare website which has current facility and service quality ratings:  (None indicated at this time)  Education Provided on the Discharge Plan:    Patient/Family in Agreement with the Plan: yes    Referrals Placed by CM/SW: Post Acute Facilities  Private pay costs discussed: Not applicable    Additional Information:  Chart reviewed.  Pt resides at [facility]) and has Home Care services from [facility] for PT and OT.    Therapy recommends home OT and PT.    Per Cardiology, plan to discharge tomorrow.    Call placed to nurse's office at [facility] and left message with update and requested return call.  Pt will need resumption of Home Care orders at time of discharge.    Call placed to son, Nabeel and provided update.  He states he is agreeable with this plan and either he or his sister, Dale, will provide transport at time of discharge.    3:51 PM  Received message back from Violet at [facility]. She states if pt is ready for discharge tomorrow (Saturday) they will have a nurse until 1400 so pt would need to arrive prior to 1300.  She also provided contact info for [facility]. Their phone number is [phone] and fax is [phone].     Andreina Quach RN

## 2022-09-02 NOTE — PROGRESS NOTES
[facility]  Palliative Care Daily Progress Note    Met again with patient today.  States her constipation has resolved.  Again stated she wants to discuss with all 3 of her children regarding her care goals and her wish to avoid aggressive treatments, focus more on comfort.  I have left messages for her son Trae and unfortunately have been unable to connect with him to schedule at time to meet.  Recommended follow up in outpatient palliative care clinic for advance care planning, discussion of her care goals with family, and completion of advance directive.  Referral placed.  If she still remains inpatient next week, will follow up.  Likely will discharge over the weekend.  Please call if our team can be of further assistance.   Thank you,    NIKITA Otoole, GCNS-BC  Clinical Nurse Specialist  [facility]  [phone]

## 2022-09-02 NOTE — PROGRESS NOTES
Assessment/Plan:  1.  Acute on chronic congestive heart failure with preserved ejection fraction: The patient states that her shortness of breath is improved.  Her creatinine is stable.  Discontinue Lasix iv diuresis 20 Q8H.  Start oral Lasix 40 mg twice a day.  Monitor weight, creatine and symptoms.    2.  Atrial fibrillation with RVR: The patient has intermittent atrial fibrillation with RVR.  Increase metoprolol to 50 mg twice a day.  Continue Xarelto for chronic anticoagulation.    3.  Coronary artery disease s/p LIMA to LAD, SVG to D1: No chest pain.  Troponins normal.  Echocardiogram was reported normal LV function, no regional wall motion abnormality.    4.  Benign essential hypertension: Her blood pressure is in normal range.    5.  Acute renal injury: Creatinine is improved to 1.23 this morning.    Plan to discharge patient tomorrow.    Subjective:  Interval History:  Has no complaints of chest pain.  Improved shortness of breath.  No palpitations.  She states that she feels better today.    Current Medications:  Scheduled Meds:    aspirin  81 mg Oral Every Other Day     cetirizine  10 mg Oral Daily     ferrous sulfate  325 mg Oral Daily     furosemide  20 mg Intravenous Q8H     Lidocaine  1 patch Transdermal Q24H     lidocaine   Transdermal Q24H     metoprolol tartrate  50 mg Oral BID     multivitamin  with lutein  1 capsule Oral BID     rivaroxaban ANTICOAGULANT  15 mg Oral Daily with supper     sodium chloride (PF)  3 mL Intracatheter Q8H     Vitamin D3  25 mcg Oral Daily     Continuous Infusions:    - MEDICATION INSTRUCTIONS -       PRN Meds:.acetaminophen **OR** acetaminophen, camphor-menthol, lidocaine 4%, lidocaine (buffered or not buffered), LORazepam, melatonin, morphine, naloxone **OR** naloxone **OR** naloxone **OR** naloxone, ondansetron, - MEDICATION INSTRUCTIONS -, phenylephrine-mineral oil-petrolatum, sodium chloride (PF)    Objective:   Vital signs in last 24 hours:  Temp:  [97.4  F  (36.3  C)-98.2  F (36.8  C)] 97.7  F (36.5  C)  Pulse:  [] 80  Resp:  [16-20] 18  BP: (108-143)/(54-78) 108/55  SpO2:  [92 %-96 %] 92 %  Weight:   [unfilled]     Physical Exam:  General Appearance:   Awake, Alert, No acute distress.   HEENT:  No scleral icterus; the mucous membranes were moist.   Neck: No cervical bruits.  Positive jugular venous distention   Lungs:   Less bibasilar crackles. No wheezing.   Cardiovascular:   IRRR, normal first and second heart sounds with no murmurs. No rubs or gallops.     Abdomen:  Soft. No tenderness. Bowels sounds are present   Extremities: No leg edema. Equal posterior tibial pulsese.   Skin: Warm, Dry. No rashes.   Neurologic: Mood and affect are appropriate. No focal deficits.         Cardiographics:   Report: personally reviewed. .      Tele monitoring -atrial fibrillation with controlled ventricular rate    Echocardiogram on August 28, 2022:  The left ventricle is normal in size.  There is severe eccentric left ventricular hypertrophy.  There is mild global hypokinesia of the left ventricle.  No regional wall motion abnormalities noted.  TAPSE is abnormal, which is consistent with abnormal right ventricular  systolic function.  The left atrium is severely dilated.  There is moderate (2+) mitral regurgitation.  There is mild mitral stenosis.  The right ventricular systolic pressure is approximated at 41mmHg plus the  right atrial pressure.  There is moderate (2+) tricuspid regurgitation.  Mild valvular aortic stenosis.  Right ventricular diastolic pressure is approximated at 7mmHg plus the right  atrial pressure.  Compared to echo from 5/9/21 the LV septal thickness is more prominent.    Lab Results:   personally reviewed.     Lab Results   Component Value Date     08/29/2022     03/28/2021    CO2 43 08/29/2022    CO2 27 03/28/2021    BUN 40 08/29/2022    BUN 19 03/28/2021     Lab Results   Component Value Date    TROPONINI 0.04 08/25/2022     Lab Results    Component Value Date    WBC 4.7 08/26/2022    WBC 3.1 03/27/2021    HGB 10.6 08/26/2022    HGB 12.8 03/27/2021    HCT 33.8 08/26/2022    HCT 41.7 03/27/2021    MCV 98 08/26/2022    MCV 97 03/27/2021     08/26/2022     03/27/2021     Lab Results   Component Value Date    CHOL 159 07/17/2020    TRIG 90 07/17/2020    HDL 68 07/17/2020    LDL 73 07/17/2020

## 2022-09-02 NOTE — PROGRESS NOTES
[facility]    Medicine Progress Note - Hospitalist Service    Date of Admission:  8/25/2022    Assessment & Plan            89-year-old female with history of atrial flutter, coronary disease, cardiomyopathy, heart failure with preserved ejection fraction, hyperlipidemia, hypertension, left bundle branch block, mitral regurgitation, sleep apnea, stroke, supraventricular tachycardia, tobacco abuse who presented with acute on chronic diastolic congestive heart failure.  Developed acute renal insufficiency due to IV diuretic effects so they were held briefly, but have since been resumed at a lower dose. Cardiology is following. Orthopedics ordered TLSO brace for 6-8 weeks which pt isn't sure she wants to do at her age and for her comfort. They have recommended conservative management of chronic thoracic compression fractures and outpt follow up. She also developed severe constipation and finally was able to have larger bowel movements on 9/1 with significant cramping, abd pain, and episode of frothy emesis overnight on 9/1. Abd xray with colonic distention to 13cm so checked CT A/P which showed resolution.        Acute heart failure preserved ejection fraction exacerbation  Acute respiratory failure with hypoxia  Continue metoprolol 37.5 mg twice daily  Had been on furosemide 40 mg IV every 12 hours but then held due to increased Cr; able to resume at lower dose on 8/31, 20mg IV q8h -Cardio changing to PO today in anticipation of potential discharge tomorrow  Cardiology following - appreciate their input  Monitor I/Os  Continue cardiac monitoring.  I-S encourage use, continue supplemental oxygen prn   Attempt to wean off of oxygen as tolerated.   Palliative Care consulted - appreciate their input     Atrial fibrillation with RVR  History of AV block requiring pacemaker placement.  Had episodes of tachycardia overnight when having increased abd pain/cramping, still in the 90-100s this AM but  improved from 130s overnight  Per Cardio increase metoprolol to 50mg twice daily.  Continue rivaroxaban 15 mg daily    Constipation  Colonic distention  Abd cramping, N/V overnight 9/1  Added senna, colace; appreciate Palliative input with scheduling meds; given increased bowel movements with significant cramping and vomiting overnight, will change back to prn per pt preference  Prn dulcolax suppository, tap water enema to assist with constipation  Would avoid Miralax given diuresis and decreased PO intake  Cont preparation H for hemorrhoids  Colonic distention seen on abd xray from 9/1 PM up to 13cm; checked CT scan this AM which showed resolution  Lactate 1.0     Acute renal insufficiency  Metabolic alkalosis  This is likely secondary to overdiuresis.  Cr stable at 1.24 today and has been trending down  Patient had previous evidence of contraction alkalosis.  Diuretics had been held with some improvement in labs; resumed at lower dose as above and will hopefully be able to switch to PO soon  Cont to monitor     Normocytic anemia  No clinical evidence for bleeding.  Continue anticoagulation as above.     Thoracic compression fracture  Chronic pain  MRI with chronic T1, T7 compression fractures.  Neurologically intact on exam.  WBAT  TLSO brace fitting done while here - pt does not think she wants to wear it for 3-6 months at her age; per Ortho note they recommend the TLSO at all times for 6-8 weeks  Continue home PTA medication: Morphine MS IR 15 mg every 8 hours as needed.  Follow up with non-operative orthopedics, Dr. Gan.        Diet: Room Service  2 Gram Sodium Diet No Caffeinated Beverages    DVT Prophylaxis: DOAC  Kelly Catheter: Not present  Central Lines: None  Cardiac Monitoring: None  Code Status: No CPR- Do NOT Intubate      Disposition Plan     Expected Discharge Date: 09/02/2022    Discharge Delays: IV Medication - consider oral or Home Infusion  Oxygen Needs - Arrange Home O2  Destination: assisted  living;home with help/services (return to shelter and resume Home PT/OT)  Discharge Comments: IV Lasix; 2L/NC        The patient's care was discussed with the Bedside Nurse, Care Coordinator/, Patient and Patient's Family.    Sonja Young MD  Hospitalist Service  [facility]  Securely message with the Vocera Web Console (learn more here)  Text page via [employer] Paging/Directory         Clinically Significant Risk Factors Present on Admission                      ______________________________________________________________________    Interval History   Overnight events noted. Pt with frothy emesis, abd cramping, and multiple bowel movements overnight. Less cramping this morning. Still passing gas. Very tearful about how much pain and how many bowel movements she was having. Anxious about getting CT scan. Also reporting some itching from the morphine which happens to her periodically.    Data reviewed today: I reviewed all medications, new labs and imaging results over the last 24 hours. I personally reviewed no images or EKG's today.    Physical Exam   Vital Signs: Temp: 97.7  F (36.5  C) Temp src: Oral BP: 108/55 Pulse: 80   Resp: 18 SpO2: 92 % O2 Device: Nasal cannula Oxygen Delivery: 1 LPM  Weight: 149 lbs 11.2 oz  Constitutional: WDWN female lying in bed, intermittently tearful  HEENT: NC/AT, no scleral icterus, nl conjunctiva, dry oral mucosa  Respiratory: good inspiratory effort, diminished but clear  Cardiovascular: irreg irregular, mildly tachycardic, no M/R/G, 2+ radial pulses  GI: nondistended, soft, mildly tender in upper abd, no rebound/guarding  Skin: no rashes, warm, dry  Neuro: CN 2-12 grossly intact  MSK:  able to move all 4 exts  Psych: anxious, intermittently tearful but appropriate, intact judgment      Data   Recent Labs   Lab 09/02/22  0451 09/01/22  2212 09/01/22  0446 09/01/22  0001 08/31/22  0428 08/30/22  0441   WBC  --   --   --   --   --  3.3*   HGB  --    --   --   --   --  10.6*   MCV  --   --   --   --   --  98   PLT  --   --   --   --   --  144*   *  --  137  --  137 137   POTASSIUM 3.6 3.6 3.3*  --  3.5 3.6   CHLORIDE 88*  --  88*  --  88* 88*   CO2 34*  --  38*  --  40* 42*   BUN 40*  --  34*  --  42* 43*   CR 1.24*  --  1.23*  --  1.39* 1.58*   ANIONGAP 13  --  11  --  9 7   BARRETT 9.1  --  8.7  --  8.7 8.9   *  --  101 106* 116 107     Recent Results (from the past 24 hour(s))   XR Abdomen Port 2 Views    Narrative    EXAM: XR ABDOMEN PORT 2 VIEWS  LOCATION: [facility]  DATE/TIME: 9/1/2022 9:50 PM    INDICATION: possible obstruction, possible perferation  COMPARISON: Abdomen CT 05/02/2021, chest x-ray 08/28/2022      Impression    IMPRESSION: Poststernotomy changes with pacemaker leads. Likely trace left pleural effusion.    No evidence of free air on the decubitus view. Paucity of stool.    Redundant colon with gaseous distention of colon in the right midabdomen which is distended to 13 cm. On the decubitus view this has an air-fluid level within it. No thumbprinting.    Suggest CT of the abdomen and pelvis to evaluate the bowel further.    Prior cholecystectomy. Postoperative changes of right inguinal hernia repair. Prior right total hip arthroplasty.    NOTE: ABNORMAL REPORT    THE DICTATION ABOVE DESCRIBES AN ABNORMALITY FOR WHICH FOLLOW-UP IS NEEDED.    CT Abdomen Pelvis w/o Contrast    Narrative    EXAM: CT ABDOMEN PELVIS W/O CONTRAST  LOCATION: [facility]  DATE/TIME: 9/2/2022 9:00 AM    INDICATION: Severe constipation with abdominal pain. Abnormal abd xray with colonic distention  COMPARISON: Abdomen films yesterday, CT 05/02/2021    TECHNIQUE: CT scan of the abdomen and pelvis was performed without IV contrast. Multiplanar reformats were obtained. Dose reduction techniques were used.  CONTRAST: None.    FINDINGS:   LOWER CHEST: Poststernotomy changes with multichamber cardiac enlargement.  Pacemaker leads. Extensive mitral annular calcifications. New fibroatelectasis inferiorly in the right middle lobe. Bibasilar areas of pleural thickening is unchanged. No   effusions or signs of failure.    HEPATOBILIARY: Prior cholecystectomy. Small cyst in the left lobe of the liver again noted and decreased in size measuring 1.6 cm instead of 2.5 cm. This needs no follow-up.    PANCREAS: Diffuse atrophy.    SPLEEN: Normal.    ADRENAL GLANDS: Normal.    KIDNEYS/BLADDER: There is a punctate nonobstructing calculus in the  lower pole of the right kidney.    BOWEL: Colonic distention has resolved overnight. Quite redundant colon with extensive distal colonic diverticulosis. No inflammatory changes or bowel obstruction. Mild stool burden in the right colon. Small bowel is of normal caliber.    LYMPH NODES: Normal.    VASCULATURE: Extensive aortic and branch calcifications. No aneurysms.    PELVIC ORGANS: Hysterectomy.    MUSCULOSKELETAL: Right total hip arthroplasty. Mild rotoscoliosis of the lumbar spine with moderate degenerative changes L4-S1. No suspicious lesions. There is a small left inguinal hernia containing only fat. Prior repair of a right inguinal hernia.      Impression    IMPRESSION:   1.  Notable resolution of the colonic distention overnight. Minimal stool in the right colon.  2.  Entire colon is redundant with extensive distal colonic diverticulosis.  3.  There is a punctate nonobstructing calculus in the lower pole of the right kidney.  4.  Other noncritical findings as noted above.       Medications     - MEDICATION INSTRUCTIONS -         aspirin  81 mg Oral Every Other Day     cetirizine  10 mg Oral Daily     ferrous sulfate  325 mg Oral Daily     furosemide  40 mg Oral Daily     Lidocaine  1 patch Transdermal Q24H     lidocaine   Transdermal Q24H     metoprolol tartrate  50 mg Oral BID     multivitamin  with lutein  1 capsule Oral BID     rivaroxaban ANTICOAGULANT  15 mg Oral Daily with supper      sodium chloride (PF)  3 mL Intracatheter Q8H     Vitamin D3  25 mcg Oral Daily

## 2022-09-03 NOTE — PROGRESS NOTES
Working on using IS and increasing activity by walking more in the hallway. Gave prn Tylenol for 4/10 pain; this was effective. Gave prn zofran for nausea, this was effective. Patient reported feeling constipated and having good relief with prn mirilax. New order for mirilax and this was given.    At 1225 patient reported having chest pain. VSS, no changes on tele. Stat page to Dr. Keys. New orders for stat trop, EKG, nitroglycerin, and GI cocktail. After first dose of nitroglycerin patient denied having anymore pain and fell asleep in bed and was resting comfortably.

## 2022-09-03 NOTE — PROGRESS NOTES
Care Management Discharge Note    Discharge Date: 09/03/2022       Discharge Disposition: Assisted Living, Home Care (return to [facility] and resume Home PT/OT)    Discharge Services:  resume [facility]    Discharge DME:      Discharge Transportation: family     Private pay costs discussed: Not applicable       Education Provided on the Discharge Plan:   yes  Persons Notified of Discharge Plans: yes  Patient/Family in Agreement with the Plan: yes       Handoff Referral Completed: yes     Additional Information:  9/3/2022 Received update from Dr Keys, Cardiology and Hospitalist cleared patient to return to her assisted living. Per  progress note 9/2, assisted living facility ([facility])  Requesting return by 1300 as assisted living nurse only working until 1400.     No family present. 8:55 AM Called and left message for patient's son, Nabeel re: transporting his mother home by time requested by assisted living. Requested return call to [phone]. Attempted to call daughter but reached automated message that number was not in service. Called alternate daughter, Andreina, listed- and left message requesting a return call re: transport.     Called and spoke with Janelle @ [facility] re: patient cleared for discharge from hospital today 9/3/2022.  Awaiting call back from family re: ride time. No further questions.     Called and left message for [facility]  @ [phone] re: patient returning to assisted living today and plan to resume home care.  Requested return call to [phone] if questions.     10:34 AM Return call from patient's son, Nabeel. He would be available to  is mother at 1145.     Update from Dr Keys- patient still requiring oxygen, not ready for discharge; plan to keep one more day.     Called son back and updated re: MD cancelling discharge. Son planning on grandson giving patient a ride tomorrow if patient ready for discharge.    Called and  left message for admissions @ [facility] re: MD cancelled discharge; anticipate discharge tomorrow 9/4.  Requested return call to [phone] if questions.

## 2022-09-03 NOTE — PROGRESS NOTES
Pt A&Ox4. Able to make needs known. Denies chest pain or SOB. BP on softer side, held metoprolol- pt asymptomatic. Denies any abdominal pain- prn tylenol given for back pain- with improvement. Possible discharge today. Will continue to monitor.

## 2022-09-03 NOTE — PROGRESS NOTES
Assessment/Plan:  1.  Acute on chronic congestive heart failure with preserved ejection fraction: The patient states that her shortness of breath is improved.  Her creatinine is stable.  She lost 8 pounds since admission.  Continue oral Lasix 40 mg twice a day.  Her home diuretic dosage Bumex 1 mg bid.  Monitor weight, creatine and symptoms.    2.  Atrial fibrillation with RVR: The patient has intermittent atrial fibrillation with RVR.  Continue metoprolol  mg daily.  Continue Xarelto for chronic anticoagulation.    3.  Coronary artery disease s/p LIMA to LAD, SVG to D1: No chest pain.  Troponins normal.  Echocardiogram was reported normal LV function, no regional wall motion abnormality.    4.  Benign essential hypertension: Her blood pressure is in normal range.    5.  Acute renal injury: Creatinine is improved to 1.23 this morning.    The patient is stable to be discharged today.  She is scheduled to follow up with Heart failure clinic in 7-10 days and Dr. Cardoza in 2 months.      Subjective:  Interval History:  Has no complaints of chest pain.  Improved shortness of breath.  No palpitations.  She states that she feels better today.    Current Medications:  Scheduled Meds:    aspirin  81 mg Oral Every Other Day     cetirizine  10 mg Oral Daily     ferrous sulfate  325 mg Oral Daily     furosemide  40 mg Oral Daily     Lidocaine  1 patch Transdermal Q24H     lidocaine   Transdermal Q24H     metoprolol succinate ER  100 mg Oral Daily     multivitamin  with lutein  1 capsule Oral BID     rivaroxaban ANTICOAGULANT  15 mg Oral Daily with supper     sodium chloride (PF)  3 mL Intracatheter Q8H     Vitamin D3  25 mcg Oral Daily     Continuous Infusions:    - MEDICATION INSTRUCTIONS -       PRN Meds:.acetaminophen **OR** acetaminophen, camphor-menthol, lidocaine 4%, lidocaine (buffered or not buffered), LORazepam, melatonin, morphine, naloxone **OR** naloxone **OR** naloxone **OR** naloxone, ondansetron, -  MEDICATION INSTRUCTIONS -, phenylephrine-mineral oil-petrolatum, sodium chloride (PF)    Objective:   Vital signs in last 24 hours:  Temp:  [97.5  F (36.4  C)-98.4  F (36.9  C)] 97.7  F (36.5  C)  Pulse:  [] 78  Resp:  [18-20] 20  BP: ()/(50-64) 139/64  SpO2:  [95 %-97 %] 97 %  Weight:   [unfilled]     Physical Exam:  General Appearance:   Awake, Alert, No acute distress.   HEENT:  No scleral icterus; the mucous membranes were moist.   Neck: No cervical bruits.  Positive jugular venous distention   Lungs:   A few scatter crackles in bases. No wheezing.   Cardiovascular:   IRRR, normal first and second heart sounds with no murmurs. No rubs or gallops.     Abdomen:  Soft. No tenderness. Bowels sounds are present   Extremities: No leg edema. Equal posterior tibial pulsese.   Skin: Warm, Dry. No rashes.   Neurologic: Mood and affect are appropriate. No focal deficits.         Cardiographics:   Report: personally reviewed. .      Tele monitoring -atrial fibrillation with controlled ventricular rate    Echocardiogram on August 28, 2022:  The left ventricle is normal in size.  There is severe eccentric left ventricular hypertrophy.  There is mild global hypokinesia of the left ventricle.  No regional wall motion abnormalities noted.  TAPSE is abnormal, which is consistent with abnormal right ventricular  systolic function.  The left atrium is severely dilated.  There is moderate (2+) mitral regurgitation.  There is mild mitral stenosis.  The right ventricular systolic pressure is approximated at 41mmHg plus the  right atrial pressure.  There is moderate (2+) tricuspid regurgitation.  Mild valvular aortic stenosis.  Right ventricular diastolic pressure is approximated at 7mmHg plus the right  atrial pressure.  Compared to echo from 5/9/21 the LV septal thickness is more prominent.    Lab Results:   personally reviewed.     Lab Results   Component Value Date     08/29/2022     03/28/2021    CO2 43  08/29/2022    CO2 27 03/28/2021    BUN 40 08/29/2022    BUN 19 03/28/2021     Lab Results   Component Value Date    TROPONINI 0.04 08/25/2022     Lab Results   Component Value Date    WBC 4.7 08/26/2022    WBC 3.1 03/27/2021    HGB 10.6 08/26/2022    HGB 12.8 03/27/2021    HCT 33.8 08/26/2022    HCT 41.7 03/27/2021    MCV 98 08/26/2022    MCV 97 03/27/2021     08/26/2022     03/27/2021     Lab Results   Component Value Date    CHOL 159 07/17/2020    TRIG 90 07/17/2020    HDL 68 07/17/2020    LDL 73 07/17/2020

## 2022-09-04 NOTE — PROGRESS NOTES
Discharge education provided to patient and nephew about medication changes, follow up appointments, and home oxygen. Both patient and nephew verbalized understanding.

## 2022-09-04 NOTE — PROGRESS NOTES
Received intake call for home oxygen at 11:25 AM . Reviewed patient's chart; Patient qualifies under insurance guidelines and all documentation is in the chart including a good order.   .   11:35 AM - Spoke with [facility], confirmed we received the order, and provided them with ETA of oxygen. Service goal = 4 hours weekends/after hours.

## 2022-09-04 NOTE — PLAN OF CARE
Goal Outcome Evaluation: ongoing.     Plan of Care Reviewed With: patient     Overall Patient Progress: improving       Pt A&Ox4. Able to make needs known. Denies chest pain or SOB. Denies any abdominal pain- prn tylenol given for back pain- some improvement- throughout night pt has reported pain to be severe in back- offered morphine but declines- repositioned and used heating pad with some relief. On 1 L of O2 overnight- was on RA but then was dipping down when asleep to 85% Possible discharge today. Will continue to monitor.

## 2022-09-04 NOTE — PROGRESS NOTES
Patient has been assessed for Home Oxygen needs.     Pulse oximetry (SpO2) and Oxygen flow readings:    SpO2 = 87% on room air at rest while awake.    SpO2 improved to 94% on 3 liters/minute at rest.    SpO2 = 83% on room air during activity/with exercise.    *SpO2 improved to 92% on 3 liters/minute during activity/with exercise.

## 2022-09-04 NOTE — PLAN OF CARE
Physical Therapy Discharge Summary    Reason for therapy discharge:    Discharged to home with home therapy.    Progress towards therapy goal(s). See goals on Care Plan in [facility] electronic health record for goal details.  Goals not met.  Barriers to achieving goals:   discharge from facility.    Therapy recommendation(s):    Continued therapy is recommended.  Rationale/Recommendations:  continued PT with home PT.  Continue home exercise program.

## 2022-09-04 NOTE — PROGRESS NOTES
Spoke with Mindi from [facility] to check on status of home O2; suppose to hear back about status by noon.    ETA of home O2 is 4 hours. So Home O2 should be delivered by 4pm today.

## 2022-09-04 NOTE — PROGRESS NOTES
Care Management Discharge Note    Discharge Date: 09/04/2022       Discharge Disposition: Assisted Living, Home Care (return to [facility] and resume Home PT/OT)    Discharge Services:  (return to [facility] and resume Home PT/OT)    Discharge DME:  Home 02 per [facility] (delivered to patient room)    Discharge Transportation: family or friend will provide- timoteo Woodward to transport, kaylyn Hunter will be meeting them at [facility]    Private pay costs discussed: None discussed or indicated.   PAS Confirmation Code:  N/A  Patient/family educated on Medicare website which has current facility and service quality ratings:  (None indicated at this time)    Education Provided on the Discharge Plan: Will return to [facility] and resume home PT/OT; possibly add services at [facility]. AVS per bedside RN.    Persons Notified of Discharge Plans: patient, son Nabeel, kaylyn Hunter, [facility] Nurse Janelle  Patient/Family in Agreement with the Plan: yes    Handoff Referral Completed: CM coordinated with ASAD Nurse. CM coordinated with MD, bedside RN and Charge RN. CM faxed discharge orders.     Additional Information:  Chart reviewed. CM spoke to son/[facility] Nabeel. He confirms wanting patient return to [facility] with order for resumption of HC services for PT/OT. They will increase Nursing and Aide services at [facility] as needed upon return. Will need to come with home 02 if needing home 02. Daughter will be transporting. CM updated MD, bedside RN and Charge RN. MARIE updated Janelle Nurse at [facility]. CM faxed discharge orders per request.     CM met again with patient, spoke to kaylyn Hunter via speakerphone from patient cellular phone and ASAD Nurse Janelle. Patient able to read home 02 machine settings and apply own oxygen. [facility] Nurse will have 02 checks each shift. Patient is able use call light system to get assistance from aide and aide able to call triage RN if any issues. Patient and  family comfortable with this plan in place until Nursing is back on site on Tuesday. Patient is not certain if she is wanting to pay to add any services at this time. Grandson will transport. Daughter will pick Rx from pharmacy (patient handles her own medications and patient/family want to keep this way). CM updated bedside RN and Charge RN.         Seda Miller, RN

## 2022-09-04 NOTE — DISCHARGE SUMMARY
[facility] MEDICINE  DISCHARGE SUMMARY     Primary Care Physician: Dm Oreilly  Admission Date: 8/25/2022   Discharge Provider: Jaret Keys MD Discharge Date: 9/4/2022   Diet:   Active Diet and Nourishment Order   Procedures     Room Service     2 Gram Sodium Diet No Caffeinated Beverages     Diet       Code Status: No CPR- Do NOT Intubate   Activity: DCACTIVITY: Activity as tolerated        Condition at Discharge: Good     REASON FOR PRESENTATION(See Admission Note for Details)     Acute heart failure with preserved ejection fraction exacerbation    PRINCIPAL & ACTIVE DISCHARGE DIAGNOSES      Acute heart failure preserved ejection fraction exacerbation  Acute respiratory failure with hypoxia  Patient presented with acute dyspnea, hypoxia and reports about 5 pound weight gain along with lower extremity edema that developed over about 24 hours  Patient had been on metoprolol titrate 25 mg twice daily  Had treated initially in the hospital with furosemide 40 mg IV every 12 hours but then held due to increased Cr; able to resume at lower dose on 8/31, 20mg IV q8h -Cardio changing to PO on 9/3 in anticipation of potential discharge tomorrow  Cardiology following - appreciate their input  Still requiring some supplemental oxygen but intermittently has been off that  Palliative Care consulted - appreciate their input  Qualify for home oxygen today.  Plan: Discharged home with metoprolol being switched to higher dose of 100 mg of succinate once a day.  Bumetanide switched to furosemide twice daily.  Should weigh herself at home daily and call primary or cardiology if weight up greater or equal to 3 pounds in a day or 5 pounds from new baseline  Follow-up with cardiology per their recommendation.     Atrial fibrillation with RVR  History of AV block requiring pacemaker placement.  Had episodes of tachycardia 9/1 when having increased abd pain/cramping, but rate now  controlled  Per Cardio increase metoprolol to 50mg twice daily and after discussion today switch to metoprolol succinate 100 mg once a day.  Continue rivaroxaban 15 mg daily     Acute anterior lower chest pain episode on 9/3  And reproducible chest wall tenderness  Pain went from 10/10 down to 3/10 spontaneously after about 5 minutes just getting her back into bed  Seem to resolved after sublingual nitroglycerin but hard to know if it was just resolving on its own  Serial troponins negative x3 and no EKG changes  Also given a GI cocktail and we started famotidine  Pain has not returned  Plan: Home on famotidine twice daily and can follow-up with primary care     Constipation  Colonic distention  Abd cramping, N/V overnight 9/1  Added senna, colace; appreciate Palliative input with scheduling meds; given increased bowel movements with significant cramping and vomiting overnight, will change back to prn per pt preference  Prn dulcolax suppository, tap water enema to assist with constipation  Cont preparation H for hemorrhoids  Colonic distention seen on abd xray from 9/1 PM up to 13cm; checked CT scan this AM which showed resolution  Lactate 1.0  Plan: polyethylene glycol with patient reports that is helpful in the past     Acute renal insufficiency  Metabolic alkalosis  This is likely secondary to overdiuresis.  Cr stable at 1.24  2 days ago at 1.36 yesterday and then improved today to 1.18    Patient had previous evidence of contraction alkalosis.  Plan: Recheck at primary care on Tuesday or Wednesday     Normocytic anemia  No clinical evidence for bleeding.  Continue anticoagulation as above.     Thoracic compression fracture, she feels her pain is better.  Chronic pain  MRI with chronic T1, T7 compression fractures.  Neurologically intact on exam.  WBAT  TLSO brace fitting done while here - pt does not think she wants to wear it for 3-6 months at her age; per Ortho note they recommend the TLSO at all times for 6-8  weeks  Continue home PTA medication: Morphine MS IR 15 mg every 8 hours as needed.  Follow up with non-operative orthopedics, Dr. Gan.       Diet: 2 Gram Sodium Diet No Caffeinated Beverages      Code Status: No CPR- Do NOT Intubate          Disposition Plan: Back to her assisted living facility today.  They will increase nursing services and home health aide services.  Also resume home PT and OT    We will also order home oxygen as she qualified for that with home O2 evaluation by the nurse this morning at rest seems to be doing okay with 1 to 2 L but needs 3 L at times or when walking and has been requiring at night while sleeping.    Oxygen Documentation:   I certify that this patient, Veronica Bray has been under my care (or a nurse practitioner or physican's assistant working with me). This is the face-to-face encounter for oxygen medical necessity.      Veronica Bray is now in a chronic stable state and continues to require supplemental oxygen. Patient has continued oxygen desaturation due to Chronic Heart Failure I50.    Alternative treatment(s) tried or considered and deemed clinically infective for treatment of Chronic Heart Failure I50 include cardiac medications.  If portability is ordered, is the patient mobile within the home? yes    **Patients who qualify for home O2 coverage under the CMS guidelines require ABG tests or O2 sat readings obtained closest to, but no earlier than 2 days prior to the discharge, as evidence of the need for home oxygen therapy. Testing must be performed while patient is in the chronic stable state. See notes for O2 sats.85% at rest on room air        PENDING LABS     Unresulted Labs Ordered in the Past 30 Days of this Admission     No orders found from 7/26/2022 to 8/26/2022.            PROCEDURES ( this hospitalization only)          RECOMMENDATIONS TO OUTPATIENT PROVIDER FOR F/U VISIT     Follow-up Appointments     Follow Up Care      Please follow-up with Dr. Gan's  team as previously scheduled at [facility] for your vertebral fractures. Call our scheduling line at   [phone] to make an appointment or reschedule appointment if needed.           Follow-up and recommended labs and tests       Follow up with primary care provider, Dm Oreilly, within 3-4 days to   evaluate medication change and for hospital follow- up.  The following   labs/tests are recommended: basic metabolic and magnesium.               DISPOSITION     Home with home care    SUMMARY OF HOSPITAL COURSE:     Veronica Bray is a 89-year-old female with history of atrial flutter, coronary disease, cardiomyopathy, heart failure with preserved ejection fraction, hyperlipidemia, hypertension, left bundle branch block, mitral regurgitation, sleep apnea, stroke, supraventricular tachycardia, tobacco abuse who presented with acute on chronic diastolic congestive heart failure.  Developed acute renal insufficiency due to IV diuretic effects so they were held briefly, but have since been resumed at a lower dose. Cardiology is following. Orthopedics ordered TLSO brace for 6-8 weeks which pt isn't sure she wants to do at her age and for her comfort. They have recommended conservative management of chronic thoracic compression fractures and outpt follow up. She also developed severe constipation and finally was able to have larger bowel movements on 9/1 with significant cramping, abd pain, and episode of frothy emesis overnight on 9/1. Abd xray with colonic distention to 13cm so checked CT A/P which showed resolution.  Hospital course is notable for patient still requiring 1 L of nasal cannula oxygen today.  Had an episode of severe chest pain that was short-lived and went from 10/10 down to 3/10 spontaneously in 5 minutes and then resolved with sublingual nitroglycerin.  No EKG changes and troponins were negative x3.  Unable to wean her for very long off the oxygen intermittently did okay but required it  overnight and now this morning is requiring 2 to 3 L and call RN oxygen evaluation showed that she needed 3 L with walking.  Home today as noted above.  Patient has not had any recurrent chest pain and she is feeling much improved from admission.     Discharge Medications with Med changes:     Current Discharge Medication List      START taking these medications    Details   famotidine (PEPCID) 20 MG tablet Take 1 tablet (20 mg) by mouth daily  Qty: 60 tablet, Refills: 0    Associated Diagnoses: Gastroesophageal reflux disease with esophagitis without hemorrhage      furosemide (LASIX) 40 MG tablet Take 1 tablet (40 mg) by mouth 2 times daily  Qty: 60 tablet, Refills: 0    Associated Diagnoses: Acute congestive heart failure, unspecified heart failure type (H)      metoprolol succinate ER (TOPROL XL) 100 MG 24 hr tablet Take 1 tablet (100 mg) by mouth daily  Qty: 30 tablet, Refills: 0    Associated Diagnoses: Acute congestive heart failure, unspecified heart failure type (H)      polyethylene glycol (MIRALAX) 17 GM/Dose powder Take 17 g by mouth daily  Qty: 510 g    Associated Diagnoses: Slow transit constipation         CONTINUE these medications which have NOT CHANGED    Details   acetaminophen (TYLENOL) 500 MG tablet Take 500-1,000 mg by mouth every 6 hours as needed for mild pain      aspirin 81 MG EC tablet Take 81 mg by mouth every other day      cetirizine (ZYRTEC) 10 MG tablet Take 10 mg by mouth daily       Cholecalciferol (VITAMIN D3) 50 MCG (2000 UT) CAPS Take 2,000 Units by mouth daily      ferrous sulfate (FEROSUL) 325 (65 Fe) MG tablet Take 325 mg by mouth daily      Lidocaine (LIDOCARE) 4 % Patch Place 1 patch onto the skin every 24 hours To prevent lidocaine toxicity, patient should be patch free for 12 hrs daily.      magnesium 250 MG tablet Take 1 tablet by mouth 2 times daily      morphine (MSIR) 15 MG IR tablet Take 1 tablet (15 mg) by mouth every 8 hours as needed for severe pain  Qty: 10  tablet, Refills: 0    Associated Diagnoses: Nontraumatic compression fracture of T7 vertebra, initial encounter (H)      Multiple Vitamins-Minerals (PRESERVISION AREDS 2) CAPS Take 1 capsule by mouth 2 times daily       XARELTO ANTICOAGULANT 15 MG TABS tablet TAKE 1 TABLET DAILY WITH   DINNER  Qty: 90 tablet, Refills: 1    Associated Diagnoses: PAF (paroxysmal atrial fibrillation) (H)      tiZANidine (ZANAFLEX) 4 MG tablet Take 1 tablet (4 mg) by mouth At Bedtime  Qty: 90 tablet, Refills: 0    Associated Diagnoses: Acute right-sided thoracic back pain; Back pain of thoracolumbar region         STOP taking these medications       bumetanide (BUMEX) 1 MG tablet Comments:   Reason for Stopping:         metoprolol tartrate (LOPRESSOR) 25 MG tablet Comments:   Reason for Stopping:                     Rationale for medication changes:      Replace metoprolol titrate with succinate and increase daily dose from 50 mg to 100 mg for better rate control    Change bumetanide to furosemide for better diuresis per cardiology    Famotidine for possible GERD        Consults       CARE MANAGEMENT / SOCIAL WORK IP CONSULT  NEUROSURGERY IP CONSULT  ORTHOPEDIC SURGERY IP CONSULT  CARDIOLOGY IP CONSULT  PHYSICAL THERAPY ADULT IP CONSULT  OCCUPATIONAL THERAPY ADULT IP CONSULT  ORTHOSIS SPINAL IP CONSULT  PALLIATIVE CARE ADULT IP CONSULT  CARE MANAGEMENT / SOCIAL WORK IP CONSULT    Immunizations given this encounter     Most Recent Immunizations   Administered Date(s) Administered     COVID-19,PF,Moderna 04/14/2022     Flu 65+ Years 09/26/2017           Anticoagulation Information      Rivaroxaban    SIGNIFICANT IMAGING FINDINGS     Results for orders placed or performed during the hospital encounter of 08/25/22   XR Chest Port 1 View    Impression    IMPRESSION: Poststernotomy changes. Dual-lead left subclavian venous pacer again noted. Leads are intact. Cardiomegaly is stable without signs of failure. Mild pulmonary vascular  redistribution. Linear areas of fibrosis in both lungs are unchanged.   CT Thoracic Spine w/o Contrast    Impression    IMPRESSION:  1.  Chronic compression fracture T7 is unchanged.  2.  Chronic T1 compression fracture, unchanged from prior.       XR Chest Port 1 View    Impression    IMPRESSION: Left chest cardiac device, sternotomy wires, CABG changes are similar. The lungs are hypoinflated with patchy bibasilar atelectasis/scarring. Cardiomegaly without overt pulmonary edema. Mild pulmonary vascular congestion. Degenerative change   of the bilateral shoulders.   XR Abdomen Port 2 Views    Impression    IMPRESSION: Poststernotomy changes with pacemaker leads. Likely trace left pleural effusion.    No evidence of free air on the decubitus view. Paucity of stool.    Redundant colon with gaseous distention of colon in the right midabdomen which is distended to 13 cm. On the decubitus view this has an air-fluid level within it. No thumbprinting.    Suggest CT of the abdomen and pelvis to evaluate the bowel further.    Prior cholecystectomy. Postoperative changes of right inguinal hernia repair. Prior right total hip arthroplasty.    NOTE: ABNORMAL REPORT    THE DICTATION ABOVE DESCRIBES AN ABNORMALITY FOR WHICH FOLLOW-UP IS NEEDED.    CT Abdomen Pelvis w/o Contrast    Impression    IMPRESSION:   1.  Notable resolution of the colonic distention overnight. Minimal stool in the right colon.  2.  Entire colon is redundant with extensive distal colonic diverticulosis.  3.  There is a punctate nonobstructing calculus in the lower pole of the right kidney.  4.  Other noncritical findings as noted above.     Echocardiogram Complete   Result Value Ref Range    LVEF  45-50%        SIGNIFICANT LABORATORY FINDINGS     Most Recent 3 CBC's:Recent Labs   Lab Test 08/30/22  0441 08/26/22  0432 08/25/22  0944   WBC 3.3* 4.7 4.1   HGB 10.6* 10.6* 11.4*   MCV 98 98 97   * 189 233     Most Recent 3 BMP's:Recent Labs   Lab Test  09/04/22  0350 09/03/22  0420 09/02/22  0451    136 135*   POTASSIUM 3.7 3.7 3.6   CHLORIDE 88* 88* 88*   CO2 39* 36* 34*   BUN 44* 45* 40*   CR 1.18* 1.36* 1.24*   ANIONGAP 11 12 13   BARRETT 8.7 8.6 9.1    115 147*     Most Recent 3 Troponin's:Recent Labs   Lab Test 03/27/21  1041 03/27/21  0751 09/01/20  1054   TROPI 0.021 0.017 0.033         Discharge Orders        Follow Up Care    Please follow-up with Dr. Gan's team as previously scheduled at [facility] for your vertebral fractures. Call our scheduling line at [phone] to make an appointment or reschedule appointment if needed.     Reason for your hospital stay    89-year-old female with history of atrial flutter, coronary disease, cardiomyopathy, heart failure with preserved ejection fraction, hyperlipidemia, hypertension, left bundle branch block, mitral regurgitation, sleep apnea, stroke, supraventricular tachycardia, tobacco abuse who presented with acute on chronic diastolic congestive heart failure.  Developed acute renal insufficiency due to IV diuretic effects so they were held briefly, but have since been resumed at a lower dose and now to po furosemide 40 mg bid Cardiology is following. Orthopedics ordered TLSO brace for 6-8 weeks which pt isn't sure she wants to do at her age and for her comfort. They have recommended conservative management of chronic thoracic compression fractures and outpt follow up. She also developed severe constipation and finally was able to have larger bowel movements on 9/1 with significant cramping, abd pain, and episode of frothy emesis overnight on 9/1. Abd xray with colonic distention to 13cm so checked CT A/P which showed resolution.Much improved overall and metoprolol to succinate 100 mg q day. On O2 1 L this am and with incentive spirometry attempting to ween.     Follow-up and recommended labs and tests     Follow up with primary care provider, Dm Oreilly, within 3-4 days to evaluate  medication change and for hospital follow- up.  The following labs/tests are recommended: basic metabolic and magnesium.     Activity    Your activity upon discharge: activity as tolerated     Monitor and record    blood pressure daily and call if top number less than 100 or greater than 170  weight every day and call if increased 3 pounds in a day or 5 pounds from new baseline  O2 sat and call if less than 90 %     Resume Home Care Services    Resume home PT and OT     Oxygen Adult/Peds    Oxygen Documentation:   I certify that this patient, Veronica Bray has been under my care (or a nurse practitioner or physican's assistant working with me). This is the face-to-face encounter for oxygen medical necessity.      Veronica Bray is now in a chronic stable state and continues to require supplemental oxygen. Patient has continued oxygen desaturation due to Chronic Heart Failure I50.    Alternative treatment(s) tried or considered and deemed clinically infective for treatment of Chronic Heart Failure I50 include cardiac medications.  If portability is ordered, is the patient mobile within the home? yes    **Patients who qualify for home O2 coverage under the CMS guidelines require ABG tests or O2 sat readings obtained closest to, but no earlier than 2 days prior to the discharge, as evidence of the need for home oxygen therapy. Testing must be performed while patient is in the chronic stable state. See notes for O2 sats.85% at rest on room air     Diet    Follow this diet upon discharge: 2g salt       Examination   Physical Exam   Temp:  [97.7  F (36.5  C)-98.3  F (36.8  C)] 97.9  F (36.6  C)  Pulse:  [78-98] 85  Resp:  [18-24] 20  BP: ()/(53-68) 119/58  Cuff Mean (mmHg):  [86] 86  SpO2:  [85 %-98 %] 94 %  Wt Readings from Last 1 Encounters:   09/04/22 67.3 kg (148 lb 6.4 oz)       General Appearance: Alert and appears to be in no apparent distress  Respiratory: She has mild rales throughout the entire right lung  field and mild to moderate throughout the entire left lung field with mildly decreased breath sounds throughout  Cardiovascular: Irregularly irregular with rate of 86 added no murmur  GI: Soft and nontender  Skin: Clear in skin exposed areas  Neurologic: Cranial nerves II through XII intact, moves all extremities well, appears cognitively intact      Please see EMR for more detailed significant labs, imaging, consultant notes etc.    I, Jaret Keys MD, personally saw the patient today and spent greater than 30 minutes discharging this patient.    Jaret Keys MD  [facility]    CC:Dm Oreilly

## 2022-09-05 NOTE — PLAN OF CARE
Occupational Therapy Discharge Summary    Reason for therapy discharge:    Discharged to home with home therapy.    Progress towards therapy goal(s). See goals on Care Plan in [facility] electronic health record for goal details.  Goals partially met.  Barriers to achieving goals:   discharge from facility.    Therapy recommendation(s):    Continued therapy to increase IND with ADLs

## 2022-09-07 NOTE — PROGRESS NOTES
"Clinic Care Coordination Contact  [facility]: Post-Discharge Note  SITUATION                                                      Admission:    Admission Date: 08/25/22   Reason for Admission: Acute heart failure with preserved ejection fraction exacerbation  Discharge:   Discharge Date: 09/04/22  Discharge Diagnosis: Acute heart failure preserved ejection fraction exacerbation,  Acute respiratory failure with hypoxia, Atrial fibrilation with RVR, History of AV block requiring pacemaker placement, acute anterior lower chest pain episode on 9/3, constipation, colonic distention, Abd cramping, N/V overnight 9/1, acute renal insufficiency, metabilic alkalosis, normocytic anemia, Thoracic compression fracture, chronic pain    BACKGROUND                                                      Per hospital discharge summary and inpatient provider notes:    Veronica Bray presents to ED for swallowing difficulty, shortness of breath exacerbated by exertion, nausea, diaphoresis that started this morning.  She has noticed weight gain over the past few days and increased leg swelling.  She has also felt increased fatigability with any degree of exertion.  She has felt off of her baseline in the past few weeks after suffering a thoracic compression fracture and being treated daily with morphine.  Today she was actually leaving with her daughter to go get fitted for a brace, when they decided that better going to the hospital for her new symptoms.    ASSESSMENT      Discharge Assessment  How are you doing now that you are home?: \"Not that good\"  How are your symptoms? (Red Flag symptoms escalate to triage hotline per guidelines): Worsening (Pt declined speaking with a nurse)  Do you feel your condition is stable enough to be safe at home until your provider visit?:  (Not assessed. Pt does not feel good but did not elaborate or agree to speak with a nurse.)  Does the patient have their discharge instructions? : Yes  Does the " patient have questions regarding their discharge instructions? : No  Were you started on any new medications or were there changes to any of your previous medications? : Yes  Does the patient have all of their medications?: Yes  Do you have questions regarding any of your medications? : No  Do you have all of your needed medical supplies or equipment (DME)?  (i.e. oxygen tank, CPAP, cane, etc.): Yes  Discharge follow-up appointment scheduled within 14 calendar days? : No  Is patient agreeable to assistance with scheduling? : No    Post-op ([facility] CTA Only)  If the patient had a surgery or procedure, do they have any questions for a nurse?: No    PLAN                                                      Outpatient Plan:      Please follow-up with Dr. Gan's team as previously scheduled at [facility] for your vertebral fractures. Call our scheduling line at [phone] to make an appointment or reschedule appointment if needed.    Future Appointments   Date Time Provider Department [place]   11/8/2022  1:00 PM WW [facility] DEVICE NURSE 1 [facility]   11/8/2022  1:50 [facility]   12/13/2022 10:40 AM Paige Hay NP [facility]         For any urgent concerns, please contact our 24 hour nurse triage line: [phone] ([phone])       Hilary Wills  Community Health Worker  [facility]  Ph: [phone]

## 2023-01-11 NOTE — MR AVS SNAPSHOT
After Visit Summary   10/19/2018    Veronica Bray    MRN: [id number]           Patient Information     Date Of Birth          5/31/1931        Visit Information        Provider Department      10/19/2018 11:20 AM Staci García APRN CNP [facility]        Today's Diagnoses     Hx of coccidioidomycosis    -  1    SOB (shortness of breath)        Upper respiratory tract infection, unspecified type        Cough        Mild intermittent asthma without complication        Essential hypertension          Care Instructions    Chest x ray in [address]    Will treat with Zithromax 250mg, 2 tabs by mouth day 1, then 1 tab by mouth days 2-5. She is to watch for GI upset, diarrhea or rash.    Prednisone once daily for 5 more days  Take in morning with food      Please, call or return to clinic if signs or symptoms worsen or fail to improve as anticipated            Follow-ups after your visit        Your next 10 appointments already scheduled     Oct 23, 2018 11:40 AM CDT   Anticoagulation Visit with RU ANTICOAGULATION   [facility] ([facility])    [address]  [facility] [id number] [phone]              Future tests that were ordered for you today     Open Future Orders        Priority Expected Expires Ordered    XR Chest 2 Views Routine 10/19/2018 10/19/2019 10/19/2018            Who to contact     If you have questions or need follow up information about today's clinic visit or your schedule please contact [facility] directly at [phone].  Normal or non-critical lab and imaging results will be communicated to you by MyChart, letter or phone within 4 business days after the clinic has received the results. If you do not hear from us within 7 days, please contact the clinic through MyChart or phone. If you have a critical or abnormal lab result, we will notify you by phone as soon as  "possible.  Submit refill requests through [employer] or call your pharmacy and they will forward the refill request to us. Please allow 3 business days for your refill to be completed.          Additional Information About Your Visit        Care EveryWhere ID     This is your Care EveryWhere ID. This could be used by other organizations to access your [place] medical records  [id number]        Your Vitals Were     Pulse Temperature Respirations Height Pulse Oximetry BMI (Body Mass Index)    62 98.6  F (37  C) (Oral) 18 1.549 m (5' 1\") 94% 27.59 kg/m2       Blood Pressure from Last 3 Encounters:   10/19/18 142/60   10/15/18 138/70   08/24/18 122/64    Weight from Last 3 Encounters:   10/19/18 66.2 kg (146 lb)   10/15/18 65.1 kg (143 lb 9.6 oz)   08/24/18 64.9 kg (143 lb)                 Today's Medication Changes          These changes are accurate as of 10/19/18 12:25 PM.  If you have any questions, ask your nurse or doctor.               Start taking these medicines.        Dose/Directions    azithromycin 250 MG tablet   Commonly known as:  ZITHROMAX   Used for:  Hx of coccidioidomycosis, SOB (shortness of breath), Upper respiratory tract infection, unspecified type, Cough, Mild intermittent asthma without complication   Started by:  Staci García APRN CNP        Two tablets first day, then one tablet daily for four days.   Quantity:  6 tablet   Refills:  0         These medicines have changed or have updated prescriptions.        Dose/Directions    * predniSONE 20 MG tablet   Commonly known as:  DELTASONE   This may have changed:  Another medication with the same name was added. Make sure you understand how and when to take each.   Used for:  Intermittent asthma with acute exacerbation, unspecified asthma severity   Changed by:  Staci García APRN CNP        Dose:  20 mg   Take 1 tablet (20 mg) by mouth 2 times daily   Quantity:  10 tablet   Refills:  0       * predniSONE 20 MG tablet   Commonly " known as:  DELTASONE   This may have changed:  You were already taking a medication with the same name, and this prescription was added. Make sure you understand how and when to take each.   Used for:  Hx of coccidioidomycosis, SOB (shortness of breath), Upper respiratory tract infection, unspecified type, Cough   Changed by:  Staci García APRN CNP        Dose:  20 mg   Take 1 tablet (20 mg) by mouth daily   Quantity:  5 tablet   Refills:  0       * Notice:  This list has 2 medication(s) that are the same as other medications prescribed for you. Read the directions carefully, and ask your doctor or other care provider to review them with you.         Where to get your medicines      These medications were sent to [facility] [address] [place] [id number]     Phone:  [phone]     azithromycin 250 MG tablet    predniSONE 20 MG tablet                Primary Care Provider Office Phone # Fax #    Concepcion Mccall [phone]       [address] [facility] [address]        Equal Access to Services     [facility]: Hadii aad ku hadasho Soomaali, waaxda luqadaha, qaybta kaalmada adedevikayalian, fang manzano . So [facility] [phone].    ATENCIÓN: Si habla español, tiene a martinez disposición servicios gratuitos de asistencia lingüística. Oneil al [phone].    We comply with applicable federal civil rights laws and [place] laws. We do not discriminate on the basis of race, color, national origin, age, disability, sex, sexual orientation, or gender identity.            Thank you!     Thank you for choosing [facility]  for your care. Our goal is always to provide you with excellent care. Hearing back from our patients is one way we can continue to improve our services. Please take a few minutes to complete the written survey that you may receive in the mail after your visit with us.  Thank you!             Your Updated Medication List - Protect others around you: Learn how to safely use, store and throw away your medicines at www.disposemymeds.org.          This list is accurate as of 10/19/18 12:25 PM.  Always use your most recent med list.                   Brand Name Dispense Instructions for use Diagnosis    albuterol 108 (90 Base) MCG/ACT inhaler    PROAIR HFA/PROVENTIL HFA/VENTOLIN HFA    1 Inhaler    Inhale 1-2 puffs into the lungs every 6 hours as needed for shortness of breath / dyspnea or wheezing    Intermittent asthma with acute exacerbation, unspecified asthma severity       ALLEGRA PO      Take 180 mg by mouth daily        ASPIRIN PO      Take 81 mg by mouth every other day        ATORVASTATIN CALCIUM PO      Take 10 mg by mouth At Bedtime        azithromycin 250 MG tablet    ZITHROMAX    6 tablet    Two tablets first day, then one tablet daily for four days.    Hx of coccidioidomycosis, SOB (shortness of breath), Upper respiratory tract infection, unspecified type, Cough, Mild intermittent asthma without complication       carvedilol 6.25 MG tablet    COREG    180 tablet    Take 1 tablet (6.25 mg) by mouth 2 times daily (with meals)    Paroxysmal atrial fibrillation (H), S/P CABG (coronary artery bypass graft), Atrial flutter, paroxysmal (H), Benign essential hypertension       fluticasone 50 MCG/ACT spray    FLONASE     Spray 1 spray into both nostrils daily        lisinopril 5 MG tablet    PRINIVIL/ZESTRIL    90 tablet    Take 1 tablet (5 mg) by mouth daily    Benign essential hypertension       * predniSONE 20 MG tablet    DELTASONE    10 tablet    Take 1 tablet (20 mg) by mouth 2 times daily    Intermittent asthma with acute exacerbation, unspecified asthma severity       * predniSONE 20 MG tablet    DELTASONE    5 tablet    Take 1 tablet (20 mg) by mouth daily    Hx of coccidioidomycosis, SOB (shortness of breath), Upper respiratory tract infection, unspecified type, Cough        PRESERVISION AREDS 2 Caps      Take 1 capsule by mouth 2 times daily        warfarin 5 MG tablet    COUMADIN    100 tablet    1 tab daily as directed per INR Clinic    Long term current use of anticoagulant therapy       * Notice:  This list has 2 medication(s) that are the same as other medications prescribed for you. Read the directions carefully, and ask your doctor or other care provider to review them with you.       Itraconazole Counseling:  I discussed with the patient the risks of itraconazole including but not limited to liver damage, nausea/vomiting, neuropathy, and severe allergy.  The patient understands that this medication is best absorbed when taken with acidic beverages such as non-diet cola or ginger ale.  The patient understands that monitoring is required including baseline LFTs and repeat LFTs at intervals.  The patient understands that they are to contact us or the primary physician if concerning signs are noted.

## 2023-11-29 NOTE — TELEPHONE ENCOUNTER
Signed and faxed back   PROGRESS NOTE  Patient is a 89y old  Female who presents with a chief complaint of Cellulitis of left lower extremity     (29 Nov 2023 11:25)    Chart and available morning labs /imaging are reviewed electronically , urgent issues addressed . More information  is being added upon completion of rounds , when more information is collected and management discussed with consultants , medical staff and social service/case management on the floor   OVERNIGHT    No new issues reported by medical staff . 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 with has a cellulitis in the left lower leg with chronic wounds.  Patient was given a course of Keflex, with no resolution.  Patient then given a second course of Keflex, with no further resolution.  No known fever.  No trauma.  No other acute injury or complaints.  Per [facility] papers pt has NKDA (23 Nov 2023 07:40)    PAST MEDICAL & SURGICAL HISTORY:  Hypertension      CVA (cerebral vascular accident)      Depression      Constipation      Neuropathy      Hyperlipidemia      Grade I diastolic dysfunction      Afib      Neuropathy      VHD (valvular heart disease)      Aortic valvar stenosis      Hypothyroidism      GERD (gastroesophageal reflux disease)      No significant past surgical history          MEDICATIONS  (STANDING):  amLODIPine   Tablet 5 milliGRAM(s) Oral daily  cholecalciferol 2000 Unit(s) Oral daily  escitalopram 20 milliGRAM(s) Oral daily  famotidine    Tablet 20 milliGRAM(s) Oral daily  folic acid 1 milliGRAM(s) Oral daily  gabapentin 300 milliGRAM(s) Oral three times a day  hydroxyurea 500 milliGRAM(s) Oral two times a day  lactobacillus acidophilus 1 Tablet(s) Oral daily  levothyroxine 50 MICROGram(s) Oral daily  melatonin 3 milliGRAM(s) Oral at bedtime  metoprolol tartrate 25 milliGRAM(s) Oral two times a day  senna 1 Tablet(s) Oral daily  simvastatin 10 milliGRAM(s) Oral at bedtime  vancomycin  IVPB 750 milliGRAM(s) IV Intermittent every 12 hours    MEDICATIONS  (PRN):  acetaminophen     Tablet .. 650 milliGRAM(s) Oral every 6 hours PRN Temp greater or equal to 38C (100.4F), Mild Pain (1 - 3)  traMADol 50 milliGRAM(s) Oral every 6 hours PRN Moderate Pain (4 - 6)      OBJECTIVE    T(C): 36.7 (11-29-23 @ 11:46), Max: 36.8 (11-29-23 @ 04:48)  HR: 90 (11-29-23 @ 11:46) (70 - 100)  BP: 135/75 (11-29-23 @ 11:46) (110/69 - 135/75)  RR: 18 (11-29-23 @ 11:46) (17 - 18)  SpO2: 91% (11-29-23 @ 11:46) (90% - 96%)  Wt(kg): --  I&O's Summary        REVIEW OF SYSTEMS:  CONSTITUTIONAL: No fever, weight loss, or fatigue  EYES: No eye pain, visual disturbances, or discharge  ENMT:   No sinus or throat pain  NECK: No pain or stiffness  RESPIRATORY: No cough, wheezing, chills or hemoptysis; No shortness of breath  CARDIOVASCULAR: No chest pain, palpitations, dizziness, or leg swelling  GASTROINTESTINAL: No abdominal pain. No nausea, vomiting; No diarrhea or constipation. No melena or hematochezia.  GENITOURINARY: No dysuria, frequency, hematuria, or incontinence  NEUROLOGICAL: No headaches, memory loss, loss of strength, numbness, or tremors  SKIN: No itching, burning, rashes, or lesions   MUSCULOSKELETAL: No joint pain or swelling; No muscle, back, or extremity pain    PHYSICAL EXAM:  Appearance: NAD. VS past 24 hrs -as above   HEENT:   Moist oral mucosa. Conjunctiva clear b/l.   Neck : supple  Respiratory: Lungs CTAB.  Gastrointestinal:  Soft, nontender. No rebound. No rigidity. BS present	  Cardiovascular: RRR ,S1S2 present  Neurologic: Non-focal. Moving all extremities.  Extremities: No edema. No erythema. No calf tenderness.  Skin: No rashes, No ecchymoses, No cyanosis.	  wounds ,skin lesions-See skin assesment flow sheet Left lower leg wounds down skin, subcutaneous tissue, fat  Left lower leg cellulitis  LABS:                        10.6   8.85  )-----------( 597      ( 29 Nov 2023 06:03 )             34.7     11-29    142  |  106  |  6<L>  ----------------------------<  98  3.6   |  29  |  0.57    Ca    8.1<L>      29 Nov 2023 06:03      CAPILLARY BLOOD GLUCOSE      POCT Blood Glucose.: 116 mg/dL (29 Nov 2023 12:31)    PT/INR - ( 29 Nov 2023 06:03 )   PT: 30.5 sec;   INR: 2.68 ratio         PTT - ( 29 Nov 2023 06:03 )  PTT:44.8 sec  Urinalysis Basic - ( 29 Nov 2023 06:03 )    Color: x / Appearance: x / SG: x / pH: x  Gluc: 98 mg/dL / Ketone: x  / Bili: x / Urobili: x   Blood: x / Protein: x / Nitrite: x   Leuk Esterase: x / RBC: x / WBC x   Sq Epi: x / Non Sq Epi: x / Bacteria: x        Culture - Blood (collected 22 Nov 2023 16:15)  Source: .Blood Blood-Peripheral  Final Report (28 Nov 2023 01:00):    No growth at 5 days    Culture - Blood (collected 22 Nov 2023 16:10)  Source: .Blood Blood-Peripheral  Final Report (28 Nov 2023 01:00):    No growth at 5 days      RADIOLOGY & ADDITIONAL TESTS:   reviewed elctronically  ASSESSMENT/PLAN:

## 2025-05-17 NOTE — PROGRESS NOTES
Agree with orders   Occult stool positive on admission, patient describes stool as bright red blood with mucus.    --follow cultures  --daily CBC  --avoid anticoagulation for now